# Patient Record
Sex: MALE | HISPANIC OR LATINO | Employment: UNEMPLOYED | URBAN - METROPOLITAN AREA
[De-identification: names, ages, dates, MRNs, and addresses within clinical notes are randomized per-mention and may not be internally consistent; named-entity substitution may affect disease eponyms.]

---

## 2017-01-05 ENCOUNTER — OFFICE VISIT (OUTPATIENT)
Dept: INTERPRETER SERVICES | Facility: CLINIC | Age: 62
End: 2017-01-05

## 2017-01-05 ENCOUNTER — OFFICE VISIT (OUTPATIENT)
Dept: PSYCHIATRY | Facility: CLINIC | Age: 62
End: 2017-01-05
Attending: CLINICAL NURSE SPECIALIST
Payer: MEDICAID

## 2017-01-05 VITALS
BODY MASS INDEX: 33.58 KG/M2 | DIASTOLIC BLOOD PRESSURE: 94 MMHG | WEIGHT: 201.8 LBS | HEART RATE: 76 BPM | SYSTOLIC BLOOD PRESSURE: 141 MMHG

## 2017-01-05 DIAGNOSIS — F19.21 POLYSUBSTANCE DEPENDENCE, NON-OPIOID, IN REMISSION (H): ICD-10-CM

## 2017-01-05 DIAGNOSIS — F33.2 SEVERE RECURRENT MAJOR DEPRESSION WITHOUT PSYCHOTIC FEATURES (H): ICD-10-CM

## 2017-01-05 DIAGNOSIS — F40.10 SOCIAL ANXIETY DISORDER: Primary | ICD-10-CM

## 2017-01-05 PROCEDURE — 99212 OFFICE O/P EST SF 10 MIN: CPT | Mod: ZF

## 2017-01-05 NOTE — PROGRESS NOTES
Outpatient Psychiatry Progress Note     Provider: LORE Kim CNS  Date: 2017  Service:  Medication follow up with counseling.   Patient Identification: Gianluca Singh  : 1955   MRN: [id number]    Gianluca Singh is a 61 year old year old male who presents for ongoing psychiatric care.  Gianluca Singh was last seen in clinic on .   At that time,   Assessment & Plan       Gianluca Singh is seen today for follow up and reports his mood has been stable with continued difficult in functioning.    Diagnosis  Axis 1: Major Depression severe, without psychotic symptoms, Social anxiety disorder.  Axis 2: none  Axis 3: See problem list in the medical record    Plan:  Medication: Continue current medications  OTC Recommendations: none  Lab Orders:  none  Referrals: none  Release of Information: none  Future Treatment Considerations:per symptoms  Return for Follow Up:8 weeks               2016  Patient is seen with .  Today Gianluca reports he is feeling about the same.  He attends a group once a week. Also continues therapy and 2 times a week attends AA  Side effects of medication include: none  Psychiatric Review of Systems:  The patient endorses symptoms of depression: In the last 2 weeks more than sleep disturbance, appetite disturbance, restless/lethargy, suicidal ideation but denies plan or intent. Nearly everyday feeling depressed, anhedonia, fatigue, feelings of failure, concentration problems  He  patient endorses symptoms of anxiety : continued social anxiety, avoidance  He endorses symptoms of al including none.    He endorses symptoms of psychosis including at times hears a voice that is difficult to understand say his name.       Review of Medical Systems:  Sleep: stable  Energy: stable  Concentration: stable  Appetite: stable  GI Concerns: none  Cardiac concerns: none  Neurological concerns: none  Other medical concerns: no new concerns  Current Substance  Use:  Alcohol:continued sober 8 year anniversary this month  Other drugs:none  Caffeine:occasional  Nicotine: no change  Past Medical History:   Past Medical History   Diagnosis Date     Hypertension      Depression      Gastro-oesophageal reflux disease      Deviated septum      Sleep apnea      cant tolerate cpap     Otitis media, chronic      Hearing loss      Night sweats      Sleep problems      Ringing in ears      Sneezing      Nasal congestion      Headache(784.0)      Major depression 5/7/2013     LOC (loss of consciousness) (H) age 45     out for 20 mintes after hit on top of head with board     Patient Active Problem List   Diagnosis     Non-English speaking patient     Chronic nasal congestion     HEIKE (obstructive sleep apnea)     Chronic hepatitis C (H)     Esophageal reflux     Headache     Short-term memory loss     Polysubstance dependence, non-opioid, in remission (H)     Social anxiety disorder     Severe recurrent major depression without psychotic features (H)     Heterozygous MTHFR mutation C677T (H)       Allergies:   Allergies   Allergen Reactions     Trazodone Other (See Comments)     Very depressed and suicidal          Current Medications     Current Outpatient Prescriptions Ordered in [facility]   Medication Sig Dispense Refill     OLANZapine (ZYPREXA) 20 MG tablet Take 1 tablet (20 mg) by mouth At Bedtime 30 tablet 5     venlafaxine (EFFEXOR-XR) 150 MG 24 hr capsule Take 2 capsules (300 mg) by mouth daily 60 capsule 5     metFORMIN (GLUCOPHAGE) 500 MG tablet Take one tablet daily in the am and two tablets at bedtime.  R Skolar NP at [facility]       ranitidine (ZANTAC) 150 MG tablet Take 1 tablet (150 mg) by mouth 2 times daily       atorvastatin (LIPITOR) 40 MG tablet Take 1 tablet (40 mg) by mouth daily       lisinopril-hydrochlorothiazide (PRINZIDE,ZESTORETIC) 20-25 MG per tablet Take 1 tablet by mouth daily       No current [facility]-ordered facility-administered medications on file.        Mental  "Status Exam     Appearance:  Casually dressed and Well groomed  Behavior/relationship to examiner/demeanor:  Cooperative  Orientation: Oriented to person, place, time and situation  Psychomotor: normal form  Speech Rate:  Normal  Speech Spontaneity:  Normal  Mood:  \"the same\"  Affect:  Appropriate/mood-congruent  Thought Process (Associations):  Goal directed  Thought Content:  no overt psychosis, patient does not appear to be responding to internal stimuli, Suicidal ideation and denies suicidal intent or plan  Abnormal Perception:  None  Attention/Concentration:  Normal  Language:  Intact  Insight:  Adequate  Judgment:  Adequate for safety      Results     Vital signs: /94 mmHg  Pulse 76  Wt 91.536 kg (201 lb 12.8 oz)    Laboratory Data:  no new results    Assessment & Plan      Gianluca Singh is seen today for follow up and reports he is feeling about the same with depression and anxiety which limits functioning.     Diagnosis  Axis 1: Major Depression, Recurrent, Severe without psychotic symptoms, Social Anxiety Disorder, Alcohol dependence and substance abuse in remission.  Axis 2: none  Axis 3: See problem list in the medical record    Plan:  Medication: continue current medications  OTC Recommendations: none  Lab Orders:  none  Referrals: none  Release of Information: none  Future Treatment Considerations:per symptoms  Return for Follow Up:2 months   The risks, benefits, alternatives and side effects have been discussed and are understood by the patient. The patient understands the risks of using street drugs or alcohol. There are no medical contraindications, the patient agrees to treatment, and has the capacity to do so. The patient understands to call 911 or come to the nearest ED if life threatening or urgent symptoms present.  Over 50% of this time was spent counseling the patient and/or coordinating care regarding review of social and occupational functioning.  In addition patient was counseled " on health and wellness practices to augment medication treatment of symptoms. See note for details.    Alba Merlos, APRN CNS 1/5/2017

## 2017-01-05 NOTE — NURSING NOTE
Chief Complaint   Patient presents with     Recheck Medication     major depressive disorder; social anxiety disorder     Reviewed allergies, smoking status  Obtained weight, blood pressure and heart rate

## 2017-01-07 ASSESSMENT — PATIENT HEALTH QUESTIONNAIRE - PHQ9: SUM OF ALL RESPONSES TO PHQ QUESTIONS 1-9: 23

## 2017-03-08 ENCOUNTER — OFFICE VISIT (OUTPATIENT)
Dept: INTERPRETER SERVICES | Facility: CLINIC | Age: 62
End: 2017-03-08

## 2017-03-08 ENCOUNTER — OFFICE VISIT (OUTPATIENT)
Dept: PSYCHIATRY | Facility: CLINIC | Age: 62
End: 2017-03-08
Attending: CLINICAL NURSE SPECIALIST
Payer: MEDICAID

## 2017-03-08 VITALS
WEIGHT: 200.6 LBS | HEART RATE: 81 BPM | SYSTOLIC BLOOD PRESSURE: 142 MMHG | DIASTOLIC BLOOD PRESSURE: 95 MMHG | BODY MASS INDEX: 33.38 KG/M2

## 2017-03-08 DIAGNOSIS — F19.21 POLYSUBSTANCE DEPENDENCE, NON-OPIOID, IN REMISSION (H): ICD-10-CM

## 2017-03-08 DIAGNOSIS — F33.2 SEVERE RECURRENT MAJOR DEPRESSION WITHOUT PSYCHOTIC FEATURES (H): Primary | ICD-10-CM

## 2017-03-08 DIAGNOSIS — F40.10 SOCIAL ANXIETY DISORDER: ICD-10-CM

## 2017-03-08 PROCEDURE — T1013 SIGN LANG/ORAL INTERPRETER: HCPCS | Mod: U3,ZF

## 2017-03-08 PROCEDURE — 99212 OFFICE O/P EST SF 10 MIN: CPT | Mod: ZF

## 2017-03-08 RX ORDER — VENLAFAXINE HYDROCHLORIDE 150 MG/1
300 CAPSULE, EXTENDED RELEASE ORAL DAILY
Qty: 60 CAPSULE | Refills: 5 | Status: SHIPPED | OUTPATIENT
Start: 2017-03-08 | End: 2017-05-10

## 2017-03-08 NOTE — NURSING NOTE
Chief Complaint   Patient presents with     RECHECK     Severe recurrent major depression without psychotic features      Reviewed allergies, smoking status, and pharmacy preference  Administered abuse screening questions   Obtained weight, blood pressure and heart rate   Iona Maddox LPN

## 2017-03-08 NOTE — MR AVS SNAPSHOT
After Visit Summary   3/8/2017    Gianluca Singh    MRN: [id number]           Patient Information     Date Of Birth          1955        Visit Information        Provider Department      3/8/2017 1:15 PM Alba Merlos APRN CNS Psychiatry Clinic        Today's Diagnoses     Severe recurrent major depression without psychotic features (H)    -  1    Social anxiety disorder        Polysubstance dependence, non-opioid, in remission (H)           Follow-ups after your visit        Your next 10 appointments already scheduled     May 10, 2017  1:15 PM CDT   Adult Med Follow UP with LORE Kim CNS   Psychiatry Clinic ([facility])    [address]  [address] [facility] [address] [phone]              Who to contact     Please call your clinic at [phone] to:    Ask questions about your health    Make or cancel appointments    Discuss your medicines    Learn about your test results    Speak to your doctor   If you have compliments or concerns about an experience at your clinic, or if you wish to file a complaint, please contact [facility] Patient Relations at [phone] or email us at [email or web address]         Additional Information About Your Visit        MyChart Information     Blue Securityt is an electronic gateway that provides easy, online access to your medical records. With Open Lending, you can request a clinic appointment, read your test results, renew a prescription or communicate with your care team.     To sign up for Blue Securityt visit the website at [email or web address]   You will be asked to enter the access code listed below, as well as some personal information. Please follow the directions to create your username and password.     Your access code is: [email or web address]  Expires: 2017  5:07 PM     Your access code will  in 90 days. If you need help or a new code, please  contact your [facility] or call [phone] for assistance.        Care EveryWhere ID     This is your Care EveryWhere ID. This could be used by other organizations to access your [place] medical records  [id number]        Your Vitals Were     Pulse BMI (Body Mass Index)                81 33.38 kg/m2           Blood Pressure from Last 3 Encounters:   03/08/17 (!) 142/95   01/05/17 (!) 141/94   11/11/16 135/83    Weight from Last 3 Encounters:   03/08/17 91 kg (200 lb 9.6 oz)   01/05/17 91.5 kg (201 lb 12.8 oz)   11/11/16 91.4 kg (201 lb 9.6 oz)              Today, you had the following     No orders found for display         Where to get your medicines      These medications were sent to [facility] [address] [place] [address] [facility] & [address] [address]     Phone:  [phone]     venlafaxine 150 MG 24 hr capsule          Primary Care Provider Office Phone # Fax #    Moira Kohlernixon Cuadra [phone]       [facility] [address] [facility] [address]        Thank you!     Thank you for choosing PSYCHIATRY CLINIC  for your care. Our goal is always to provide you with excellent care. Hearing back from our patients is one way we can continue to improve our services. Please take a few minutes to complete the written survey that you may receive in the mail after your visit with us. Thank you!             Your Updated Medication List - Protect others around you: Learn how to safely use, store and throw away your medicines at www.disposemymeds.org.          This list is accurate as of: 3/8/17 11:59 PM.  Always use your most recent med list.                   Brand Name Dispense Instructions for use    LIPITOR 40 MG tablet   Generic drug:  atorvastatin      Take 1 tablet (40 mg) by mouth daily       lisinopril-hydrochlorothiazide 20-25 MG per tablet    PRINZIDE/ZESTORETIC     Take 1 tablet  by mouth daily       metFORMIN 500 MG tablet    GLUCOPHAGE     Take one tablet daily in the am and two tablets at bedtime.  R Abril NP at [facility]       OLANZapine 20 MG tablet    zyPREXA    30 tablet    Take 1 tablet (20 mg) by mouth At Bedtime       ranitidine 150 MG tablet    ZANTAC     Take 1 tablet (150 mg) by mouth 2 times daily       venlafaxine 150 MG 24 hr capsule    EFFEXOR-XR    60 capsule    Take 2 capsules (300 mg) by mouth daily

## 2017-03-08 NOTE — PROGRESS NOTES
Outpatient Psychiatry Progress Note     Provider: LORE Kim CNS  Date: 3/8/2017  Service:  Medication follow up with counseling.   Patient Identification: Gianluca Singh  : 1955   MRN: [id number]    Gianluca Singh is a 61 year old year old male who presents for ongoing psychiatric care.  Gianluca Singh was last seen in clinic on 17.   At that time,   Assessment & Plan      Gianluca Singh is seen today for follow up and reports he is feeling about the same with depression and anxiety which limits functioning.      Diagnosis  Axis 1: Major Depression, Recurrent, Severe without psychotic symptoms, Social Anxiety Disorder, Alcohol dependence and substance abuse in remission.  Axis 2: none  Axis 3: See problem list in the medical record     Plan:  Medication: continue current medications  OTC Recommendations: none  Lab Orders: none  Referrals: none  Release of Information: none  Future Treatment Considerations:per symptoms  Return for Follow Up:2 months      3/8/2017  Seen with Qatari speaking .  Today Gianluca reports he feels tired and has no energy but has been feeling this way for a long time. The therapist he was seeing has left [facility] and she recommended a different therapist. He is attending [employer] 2 times per week. Sometimes goes for walks.    Last blood sugar was about 3 months.  He goes back in tomorrow and might have it checked then.  His  is Rodolfo and seeing her every week.  Side effects of medication include: none reported  Psychiatric Review of Systems:  The patient endorses symptoms of depression: In the last 2 weeks per PHQ 9 more than half days sleep disturbance, appetite disturbance, concentration problems, restless/lethargy.  Nearly everyday anhedonia, feeling depressed, fatigue, feelings of failure.  He  patient endorses symptoms of anxiety : stable  He endorses symptoms of al including none.    He endorses symptoms of psychosis including no  psychotic symptoms.       Review of Medical Systems:  Sleep: stable and no concerns  Energy: stable  Concentration: stable  Appetite: stable  GI Concerns: none  Cardiac concerns: none  Neurological concerns: none  Other medical concerns: no new concerns  Current Substance Use:  Alcohol:denies  Other drugs:denies  Caffeine:no change  Nicotine: no change - uses e cig  Past Medical History:   Past Medical History   Diagnosis Date     Depression      Deviated septum      Gastro-oesophageal reflux disease      Headache(784.0)      Hearing loss      Hypertension      LOC (loss of consciousness) (H) age 45     out for 20 mintes after hit on top of head with board     Major depression 5/7/2013     Nasal congestion      Night sweats      Otitis media, chronic      Ringing in ears      Sleep apnea      cant tolerate cpap     Sleep problems      Sneezing      Patient Active Problem List   Diagnosis     Non-English speaking patient     Chronic nasal congestion     HEIKE (obstructive sleep apnea)     Chronic hepatitis C (H)     Esophageal reflux     Headache     Short-term memory loss     Polysubstance dependence, non-opioid, in remission (H)     Social anxiety disorder     Severe recurrent major depression without psychotic features (H)     Heterozygous MTHFR mutation C677T (H)       Allergies:   Allergies   Allergen Reactions     Trazodone Other (See Comments)     Very depressed and suicidal          Current Medications     Current Outpatient Prescriptions Ordered in [facility]   Medication Sig Dispense Refill     OLANZapine (ZYPREXA) 20 MG tablet Take 1 tablet (20 mg) by mouth At Bedtime 30 tablet 5     venlafaxine (EFFEXOR-XR) 150 MG 24 hr capsule Take 2 capsules (300 mg) by mouth daily 60 capsule 5     metFORMIN (GLUCOPHAGE) 500 MG tablet Take one tablet daily in the am and two tablets at bedtime.  R Skolar NP at [facility]       ranitidine (ZANTAC) 150 MG tablet Take 1 tablet (150 mg) by mouth 2 times daily       atorvastatin (LIPITOR)  "40 MG tablet Take 1 tablet (40 mg) by mouth daily       lisinopril-hydrochlorothiazide (PRINZIDE,ZESTORETIC) 20-25 MG per tablet Take 1 tablet by mouth daily       No current [facility]-ordered facility-administered medications on file.         Mental Status Exam     Appearance:  Casually dressed and Well groomed  Behavior/relationship to examiner/demeanor:  Cooperative  Orientation: Oriented to person, place, time and situation  Psychomotor: normal form  Speech Rate:  Normal  Speech Spontaneity:  Normal  Mood:  \"okay\"  Affect:  Appropriate/mood-congruent  Thought Process (Associations):  Goal directed  Thought Content:  no overt psychosis, denies suicidal ideation, intent or thoughts and patient does not appear to be responding to internal stimuli  Abnormal Perception:  None  Attention/Concentration:  Normal  Language:  Intact  Insight:  Adequate  Judgment:  Good      Results     Vital signs: Wt 91 kg (200 lb 9.6 oz)  BMI 33.38 kg/m2    Laboratory Data:  no new data available    Assessment & Plan      Gianluca Singh is seen today for follow up and reports his mood has been stable with continued difficulty with depression, social anxiety, communication difficulty due to language.    Diagnosis  Axis 1: Major Depression, Recurrent severe without psychosis, Social Anxiety Disorder, Polysubstance in remission  Axis 2: none  Axis 3: See problem list in the medical record    Plan:  Medication: Continue current medications  OTC Recommendations: none  Lab Orders:  none  Referrals: none  Release of Information: none, gave business card for him to give to his PCP to have lab results sent here.  Future Treatment Considerations:per symptoms  Return for Follow Up:2 months.   The risks, benefits, alternatives and side effects have been discussed and are understood by the patient. The patient understands the risks of using street drugs or alcohol. There are no medical contraindications, the patient agrees to treatment, and has the " capacity to do so. The patient understands to call 911 or come to the nearest ED if life threatening or urgent symptoms present.  Over 50% of this time was spent counseling the patient and/or coordinating care regarding review of social and occupational functioning.  In addition patient was counseled on health and wellness practices to augment medication treatment of symptoms. See note for details.    Alba Merlos, APRN CNS 3/8/2017

## 2017-03-09 ENCOUNTER — TRANSFERRED RECORDS (OUTPATIENT)
Dept: HEALTH INFORMATION MANAGEMENT | Facility: CLINIC | Age: 62
End: 2017-03-09

## 2017-03-10 ASSESSMENT — PATIENT HEALTH QUESTIONNAIRE - PHQ9: SUM OF ALL RESPONSES TO PHQ QUESTIONS 1-9: 22

## 2017-03-22 ENCOUNTER — TELEPHONE (OUTPATIENT)
Dept: PSYCHIATRY | Facility: CLINIC | Age: 62
End: 2017-03-22

## 2017-03-22 LAB
ALBUMIN SERPL-MCNC: 3.8 G/DL
ALP SERPL-CCNC: 103 U/L
ALT SERPL-CCNC: 31 U/L
ANION GAP SERPL CALCULATED.3IONS-SCNC: 11 MMOL/L
AST SERPL-CCNC: 15 U/L
BILIRUB SERPL-MCNC: 0.3 MG/DL
BUN SERPL-MCNC: 12 MG/DL
CALCIUM SERPL-MCNC: 8.9 MG/DL
CHLORIDE SERPLBLD-SCNC: 105 MMOL/L
CHOLEST SERPL-MCNC: 121 MG/DL
CO2 SERPL-SCNC: 26 MMOL/L
CREAT SERPL-MCNC: 0.95 MG/DL
ERYTHROCYTE [DISTWIDTH] IN BLOOD BY AUTOMATED COUNT: 13.1 %
GFR SERPL CREATININE-BSD FRML MDRD: 80 ML/MIN/1.73M2
GLUCOSE SERPL-MCNC: 111 MG/DL (ref 70–99)
HBA1C MFR BLD: 5.9 % (ref 0–5.7)
HCT VFR BLD AUTO: 42 %
HDLC SERPL-MCNC: 34 MG/DL
HEMOGLOBIN: 14.6 G/DL (ref 13.3–17.7)
LDLC SERPL CALC-MCNC: 63 MG/DL
MCH RBC QN AUTO: 30.8 PG
MCHC RBC AUTO-ENTMCNC: 34.8 G/DL
MCV RBC AUTO: 89 FL
NONHDLC SERPL-MCNC: 86 MG/DL
PLATELET # BLD AUTO: 278 10^9/L
POTASSIUM SERPL-SCNC: 3.8 MMOL/L
PROT SERPL-MCNC: 7.5 G/DL
RBC # BLD AUTO: 4.74 10^12/L
SODIUM SERPL-SCNC: 142 MMOL/L
TRIGL SERPL-MCNC: 119 MG/DL
TSH SERPL-ACNC: 1.93 MCU/ML
WBC # BLD AUTO: 6.5 10^9/L

## 2017-03-22 NOTE — TELEPHONE ENCOUNTER
03/21/2017,Received: TSH, Lipid Panel, CMP, CBC W PLT, HgbA1C Results from [facility] ([facility]), drawn on 03/09/2017. Results entered into EMR. Sent to LORE Gibbons for review. Previous Lab results also included from [facility],dates drawn: 07/21/2016, 03/09/2016, 12/10/2015, A copy was given to Alba Merlos CNP for review. Original fax placed in scanning on 03/22/2017 and a copy was kept in psychiatry until scanning completed/confirmed. Iona Maddox LPN

## 2017-05-10 ENCOUNTER — OFFICE VISIT (OUTPATIENT)
Dept: PSYCHIATRY | Facility: CLINIC | Age: 62
End: 2017-05-10
Attending: CLINICAL NURSE SPECIALIST
Payer: MEDICAID

## 2017-05-10 VITALS
HEART RATE: 81 BPM | WEIGHT: 202.8 LBS | DIASTOLIC BLOOD PRESSURE: 90 MMHG | BODY MASS INDEX: 33.75 KG/M2 | SYSTOLIC BLOOD PRESSURE: 132 MMHG

## 2017-05-10 DIAGNOSIS — F33.2 SEVERE RECURRENT MAJOR DEPRESSION WITHOUT PSYCHOTIC FEATURES (H): Primary | ICD-10-CM

## 2017-05-10 DIAGNOSIS — F19.21 POLYSUBSTANCE DEPENDENCE, NON-OPIOID, IN REMISSION (H): ICD-10-CM

## 2017-05-10 DIAGNOSIS — F40.10 SOCIAL ANXIETY DISORDER: ICD-10-CM

## 2017-05-10 DIAGNOSIS — F51.04 CHRONIC INSOMNIA: ICD-10-CM

## 2017-05-10 PROCEDURE — 99212 OFFICE O/P EST SF 10 MIN: CPT | Mod: ZF

## 2017-05-10 RX ORDER — VENLAFAXINE HYDROCHLORIDE 150 MG/1
300 CAPSULE, EXTENDED RELEASE ORAL DAILY
Qty: 60 CAPSULE | Refills: 5 | Status: SHIPPED | OUTPATIENT
Start: 2017-05-10 | End: 2017-10-19

## 2017-05-10 RX ORDER — OLANZAPINE 20 MG/1
20 TABLET ORAL AT BEDTIME
Qty: 30 TABLET | Refills: 5 | Status: SHIPPED | OUTPATIENT
Start: 2017-05-10 | End: 2017-12-20

## 2017-05-10 NOTE — MR AVS SNAPSHOT
After Visit Summary   5/10/2017    Gianluca Singh    MRN: [id number]           Patient Information     Date Of Birth          1955        Visit Information        Provider Department      5/10/2017 1:15 PM Diana Wills; Alba Merlos APRN CNS Psychiatry Clinic        Today's Diagnoses     Severe recurrent major depression without psychotic features (H)    -  1    Social anxiety disorder        Polysubstance dependence, non-opioid, in remission (H)        Chronic insomnia           Follow-ups after your visit        Follow-up notes from your care team     Return in about 10 weeks (around 7/19/2017).      Your next 10 appointments already scheduled     Jul 19, 2017  1:15 PM CDT   Adult Med Follow UP with LORE Kim CNS   Psychiatry Clinic ([facility])    [address] [facility] [address] [phone]              Who to contact     Please call your clinic at [phone] to:    Ask questions about your health    Make or cancel appointments    Discuss your medicines    Learn about your test results    Speak to your doctor   If you have compliments or concerns about an experience at your clinic, or if you wish to file a complaint, please contact [facility] Patient Relations at [phone] or email us at [email or web address]         Additional Information About Your Visit        MyChart Information     ShowNearbyt is an electronic gateway that provides easy, online access to your medical records. With Syntensia, you can request a clinic appointment, read your test results, renew a prescription or communicate with your care team.     To sign up for ShowNearbyt visit the website at [email or web address]   You will be asked to enter the access code listed below, as well as some personal information. Please follow the directions to create your username and password.     Your  access code is: [email or web address]  Expires: 2017  5:07 PM     Your access code will  in 90 days. If you need help or a new code, please contact your [facility] or call [phone] for assistance.        Care EveryWhere ID     This is your Care EveryWhere ID. This could be used by other organizations to access your [place] medical records  [id number]        Your Vitals Were     Pulse BMI (Body Mass Index)                81 33.75 kg/m2           Blood Pressure from Last 3 Encounters:   05/10/17 132/90   17 (!) 142/95   17 (!) 141/94    Weight from Last 3 Encounters:   05/10/17 92 kg (202 lb 12.8 oz)   17 91 kg (200 lb 9.6 oz)   17 91.5 kg (201 lb 12.8 oz)              Today, you had the following     No orders found for display         Where to get your medicines      These medications were sent to [facility] [address] [place] [address] & [address] [address]     Phone:  [phone]     OLANZapine 20 MG tablet    venlafaxine 150 MG 24 hr capsule          Primary Care Provider Office Phone # Fax #    Moira Vero Cuadra [phone]       [facility]  [facility] [address]        Thank you!     Thank you for choosing PSYCHIATRY CLINIC  for your care. Our goal is always to provide you with excellent care. Hearing back from our patients is one way we can continue to improve our services. Please take a few minutes to complete the written survey that you may receive in the mail after your visit with us. Thank you!             Your Updated Medication List - Protect others around you: Learn how to safely use, store and throw away your medicines at www.disposemymeds.org.          This list is accurate as of: 5/10/17 11:59 PM.  Always use your most recent med list.                   Brand Name Dispense Instructions for use    LIPITOR 40 MG tablet    Generic drug:  atorvastatin      Take 1 tablet (40 mg) by mouth daily       lisinopril-hydrochlorothiazide 20-25 MG per tablet    PRINZIDE/ZESTORETIC     Take 1 tablet by mouth daily       metFORMIN 500 MG tablet    GLUCOPHAGE     Take one tablet daily in the am and two tablets at bedtime.  HOME Samuels NP at [facility]       OLANZapine 20 MG tablet    zyPREXA    30 tablet    Take 1 tablet (20 mg) by mouth At Bedtime       ranitidine 150 MG tablet    ZANTAC     Take 1 tablet (150 mg) by mouth 2 times daily       venlafaxine 150 MG 24 hr capsule    EFFEXOR-XR    60 capsule    Take 2 capsules (300 mg) by mouth daily

## 2017-05-10 NOTE — PROGRESS NOTES
Outpatient Psychiatry Progress Note     Provider: LORE Kim CNS  Date: 5/10/2017  Service:  Medication follow up with counseling.   Patient Identification: Gianluca Singh  : 1955   MRN: [id number]    Gianluca Singh is a 61 year old year old male who presents for ongoing psychiatric care.  Gianluca Singh was last seen in clinic on 3/8/17.   At that time,   Assessment & Plan      Gianluca Singh is seen today for follow up and reports his mood has been stable with continued difficulty with depression, social anxiety, communication difficulty due to language.     Diagnosis  Axis 1: Major Depression, Recurrent severe without psychosis, Social Anxiety Disorder, Polysubstance in remission  Axis 2: none  Axis 3: See problem list in the medical record     Plan:  Medication: Continue current medications  OTC Recommendations: none  Lab Orders: none  Referrals: none  Release of Information: none, gave business card for him to give to his PCP to have lab results sent here.  Future Treatment Considerations:per symptoms  Return for Follow Up:2 months    ____________________________________________________________________________________________________________________________________________    05/10/2017   Patient is seen with .  Today Gianluca reports he is feeling so.  No worsening of symptoms. Has been the same - attending groups. He is seeing new therapist but not at [facility].  He just saw her one time.   Likes where he is living.     Side effects of medication include: none known  Psychiatric Review of Systems:  The patient endorses symptoms of depression: In the last 2 weeks per PHQ 9 more than 1/2 days sleep disturbance, appetite disturbance, concentration problems.  Nearly every day anhedonia, feeling depressed, feelings of failure  He  patient endorses symptoms of anxiety : stable  He endorses symptoms of al including none.    He endorses symptoms of psychosis including no  psychotic symptoms.       Review of Medical Systems:  Sleep: stable  Energy: stable  Concentration: stable  Appetite: stable  GI Concerns: none  Cardiac concerns: none  Neurological concerns: none  Other medical concerns: no new concerns  Current Substance Use:  Alcohol:continues sober  Other drugs:denies  Caffeine:not reviewed  Nicotine: continued e cig  Past Medical History:   Past Medical History:   Diagnosis Date     Depression      Deviated septum      Gastro-oesophageal reflux disease      Headache(784.0)      Hearing loss      Hypertension      LOC (loss of consciousness) (H) age 45    out for 20 mintes after hit on top of head with board     Major depression 5/7/2013     Nasal congestion      Night sweats      Otitis media, chronic      Ringing in ears      Sleep apnea     cant tolerate cpap     Sleep problems      Sneezing      Patient Active Problem List   Diagnosis     Non-English speaking patient     Chronic nasal congestion     HEIKE (obstructive sleep apnea)     Chronic hepatitis C (H)     Esophageal reflux     Headache     Short-term memory loss     Polysubstance dependence, non-opioid, in remission (H)     Social anxiety disorder     Severe recurrent major depression without psychotic features (H)     Heterozygous MTHFR mutation C677T (H)       Allergies:   Allergies   Allergen Reactions     Trazodone Other (See Comments)     Very depressed and suicidal          Current Medications     Current Outpatient Prescriptions Ordered in [facility]   Medication Sig Dispense Refill     venlafaxine (EFFEXOR-XR) 150 MG 24 hr capsule Take 2 capsules (300 mg) by mouth daily 60 capsule 5     OLANZapine (ZYPREXA) 20 MG tablet Take 1 tablet (20 mg) by mouth At Bedtime 30 tablet 5     metFORMIN (GLUCOPHAGE) 500 MG tablet Take one tablet daily in the am and two tablets at bedtime.  R Skolar NP at [facility]       ranitidine (ZANTAC) 150 MG tablet Take 1 tablet (150 mg) by mouth 2 times daily       atorvastatin (LIPITOR) 40 MG  "tablet Take 1 tablet (40 mg) by mouth daily       lisinopril-hydrochlorothiazide (PRINZIDE,ZESTORETIC) 20-25 MG per tablet Take 1 tablet by mouth daily       No current Epic-ordered facility-administered medications on file.         Mental Status Exam     Appearance:  Casually dressed and Well groomed  Behavior/relationship to examiner/demeanor:  Cooperative  Orientation: Oriented to person, place, time and situation  Psychomotor: normal form  Speech Rate:  Normal  Speech Spontaneity:  Normal  Mood:  \"okay\"  Affect:  Appropriate/mood-congruent  Thought Process (Associations):  Goal directed  Thought Content:  no overt psychosis, denies suicidal ideation, intent or thoughts and patient does not appear to be responding to internal stimuli  Abnormal Perception:  None  Attention/Concentration:  Normal  Language:  Intact  Insight:  Adequate  Judgment:  Good      Results     Vital signs: /90  Pulse 81  Wt 92 kg (202 lb 12.8 oz)  BMI 33.75 kg/m2    Laboratory Data:  no new data    Assessment & Plan      Gianluca Singh is seen today for follow up and reports he continues with depression and anxiety but is at baseline.  Agrees with plan to continue current medication. Is seeing new therapist and feels it will be a good fit. In part is isolated by language barrier.    Diagnosis  Axis 1: Major Depression, Recurrent Severe, Social Anxiety Disorder, Polysubstance in remission.  Axis 2: none  Axis 3: See problem list in the medical record    Plan:  Medication: Continue current medications  OTC Recommendations: none  Lab Orders:  none  Referrals: none  Release of Information: none  Future Treatment Considerations:per symptoms  Return for Follow Up: 2 months   The risks, benefits, alternatives and side effects have been discussed and are understood by the patient. The patient understands the risks of using street drugs or alcohol. There are no medical contraindications, the patient agrees to treatment, and has the capacity " to do so. The patient understands to call 911 or come to the nearest ED if life threatening or urgent symptoms present.  Over 50% of this time was spent counseling the patient and/or coordinating care regarding review of social and occupational functioning.  In addition patient was counseled on health and wellness practices to augment medication treatment of symptoms. See note for details.    Alba Merlos, LORE CNS 5/10/2017

## 2017-05-10 NOTE — NURSING NOTE
Chief Complaint   Patient presents with     RECHECK     Severe recurrent major depression without psychotic features      Reviewed allergies, smoking status, and pharmacy preference  Administered abuse screening questions-done 3/8/17   Obtained weight, blood pressure and heart rate   Iona Maddox LPN

## 2017-05-11 ASSESSMENT — PATIENT HEALTH QUESTIONNAIRE - PHQ9: SUM OF ALL RESPONSES TO PHQ QUESTIONS 1-9: 21

## 2017-07-19 ENCOUNTER — OFFICE VISIT (OUTPATIENT)
Dept: PSYCHIATRY | Facility: CLINIC | Age: 62
End: 2017-07-19
Attending: CLINICAL NURSE SPECIALIST
Payer: MEDICAID

## 2017-07-19 VITALS
WEIGHT: 203.4 LBS | BODY MASS INDEX: 33.85 KG/M2 | HEART RATE: 71 BPM | DIASTOLIC BLOOD PRESSURE: 96 MMHG | SYSTOLIC BLOOD PRESSURE: 156 MMHG

## 2017-07-19 DIAGNOSIS — F19.21 POLYSUBSTANCE DEPENDENCE, NON-OPIOID, IN REMISSION (H): ICD-10-CM

## 2017-07-19 DIAGNOSIS — F40.10 SOCIAL ANXIETY DISORDER: Primary | ICD-10-CM

## 2017-07-19 DIAGNOSIS — F33.2 SEVERE RECURRENT MAJOR DEPRESSION WITHOUT PSYCHOTIC FEATURES (H): ICD-10-CM

## 2017-07-19 PROCEDURE — 99212 OFFICE O/P EST SF 10 MIN: CPT | Mod: ZF

## 2017-07-19 NOTE — MR AVS SNAPSHOT
After Visit Summary   7/19/2017    Gianluca Singh    MRN: [id number]           Patient Information     Date Of Birth          1955        Visit Information        Provider Department      7/19/2017 1:15 PM Rosendo Pereira; Alba Merlos APRN [facility]        Today's Diagnoses     Social anxiety disorder    -  1    Severe recurrent major depression without psychotic features (H)        Polysubstance dependence, non-opioid, in remission (H)           Follow-ups after your visit        Follow-up notes from your care team     Return in about 6 weeks (around 8/30/2017).      Your next 10 appointments already scheduled     Aug 01, 2017  8:15 AM CDT   NEW GENERAL with Sudha Fine MD   Eye Clinic ([facility])    [facility]  [address]  [address] [address]  [place] [address]   [phone]            Aug 30, 2017 10:15 AM CDT   Adult Med Follow UP with LORE Kim CNS   Psychiatry Clinic ([facility])    [address]  [address] [facility] [address] [phone]              Who to contact     Please call your clinic at [phone] to:    Ask questions about your health    Make or cancel appointments    Discuss your medicines    Learn about your test results    Speak to your doctor   If you have compliments or concerns about an experience at your clinic, or if you wish to file a complaint, please contact [facility] Patient Relations at [phone] or email us at [email or web address]         Additional Information About Your Visit        TrafficCasthart Information     Resolver is an electronic gateway that provides easy, online access to your medical records. With Resolver, you can request a clinic appointment, read your test results, renew a prescription or communicate with your care team.     To sign up for Resolver visit the website at  [email or web address]   You will be asked to enter the access code listed below, as well as some personal information. Please follow the directions to create your username and password.     Your access code is: [email or web address]  Expires: 10/17/2017  6:30 AM     Your access code will  in 90 days. If you need help or a new code, please contact your [facility] or call [phone] for assistance.        Care EveryWhere ID     This is your Care EveryWhere ID. This could be used by other organizations to access your [employer] medical records  [id number]        Your Vitals Were     Pulse BMI (Body Mass Index)                71 33.85 kg/m2           Blood Pressure from Last 3 Encounters:   17 (!) 156/96   05/10/17 132/90   17 (!) 142/95    Weight from Last 3 Encounters:   17 92.3 kg (203 lb 6.4 oz)   05/10/17 92 kg (202 lb 12.8 oz)   17 91 kg (200 lb 9.6 oz)              Today, you had the following     No orders found for display       Primary Care Provider Office Phone # Fax #    Moira Pham HIRA Cuadra [phone]       [facility]  [facility] [address]        Equal Access to Services     JENNY LORENZANA : Hadii aad ku hadasho Soomaali, waaxda luqadaha, qaybta kaalmada adeegyada, marilia allen. So [facility] [phone].    ATENCIÓN: Si habla español, tiene a dowd disposición servicios gratuitos de asistencia lingüística. Llame al [phone].    We comply with applicable federal civil rights laws and [place] laws. We do not discriminate on the basis of race, color, national origin, age, disability sex, sexual orientation or gender identity.            Thank you!     Thank you for choosing PSYCHIATRY CLINIC  for your care. Our goal is always to provide you with excellent care. Hearing back from our patients is one way we can continue to improve our services. Please take a few minutes to complete  the written survey that you may receive in the mail after your visit with us. Thank you!             Your Updated Medication List - Protect others around you: Learn how to safely use, store and throw away your medicines at www.disposemymeds.org.          This list is accurate as of: 7/19/17 11:59 PM.  Always use your most recent med list.                   Brand Name Dispense Instructions for use Diagnosis    LIPITOR 40 MG tablet   Generic drug:  atorvastatin      Take 1 tablet (40 mg) by mouth daily        lisinopril-hydrochlorothiazide 20-25 MG per tablet    PRINZIDE/ZESTORETIC     Take 1 tablet by mouth daily        metFORMIN 500 MG tablet    GLUCOPHAGE     Take one tablet daily in the am and two tablets at bedtime.  R Abril NP at [facility]        OLANZapine 20 MG tablet    zyPREXA    30 tablet    Take 1 tablet (20 mg) by mouth At Bedtime    Chronic insomnia       ranitidine 150 MG tablet    ZANTAC     Take 1 tablet (150 mg) by mouth 2 times daily        venlafaxine 150 MG 24 hr capsule    EFFEXOR-XR    60 capsule    Take 2 capsules (300 mg) by mouth daily    Social anxiety disorder

## 2017-07-19 NOTE — PROGRESS NOTES
Outpatient Psychiatry Progress Note     Provider: LORE Kim CNS  Date: 2017  Service:  Medication follow up with counseling.   Patient Identification: Gianluca Singh  : 1955   MRN: [id number]    Gianluca Singh is a 61 year old year old male who presents for ongoing psychiatric care.  Gianluca Singh was last seen in clinic on 5/10/17.   At that time,   Assessment & Plan       Gianluca Singh is seen today for follow up and reports he continues with depression and anxiety but is at baseline.  Agrees with plan to continue current medication. Is seeing new therapist and feels it will be a good fit. In part is isolated by language barrier.     Diagnosis  Axis 1: Major Depression, Recurrent Severe, Social Anxiety Disorder, Polysubstance in remission.  Axis 2: none  Axis 3: See problem list in the medical record     Plan:  Medication: Continue current medications  OTC Recommendations: none  Lab Orders:  none  Referrals: none  Release of Information: none  Future Treatment Considerations:per symptoms  Return for Follow Up: 2 months      ____________________________________________________________________________________________________________________________________________    2017   Seen with .  Today Gianluca reports he has been feeling about the same. No new stress. Likes his new therapist. Reviewed that blood pressure is up. He reports he has been taking consistently.   Continues to attend support groups.  Side effects of medication include: none reported.   Psychiatric Review of Systems:  The patient endorses symptoms of depression: In the last 2 weeks per PHQ 9: several days passive SI.  More than 1/2 days anhedonia.  Nearly everyday feeling down, fatigue, feelings of failure, concentration problems, restless.lethargy.   He  patient endorses symptoms of anxiety : stable with increase at times  He endorses symptoms of al including none.    He endorses symptoms of  psychosis including no psychotic symptoms.       Review of Medical Systems:  Sleep: stable  Energy: stable  Concentration: stable  Appetite: stable  GI Concerns: none  Cardiac concerns: no new concerns  Neurological concerns: none  Other medical concerns: no new concerns  Current Substance Use:  Alcohol:denies  Other drugs:denies  Caffeine:not reviewed  Nicotine: no change  Past Medical History:   Past Medical History:   Diagnosis Date     Depression      Deviated septum      Gastro-oesophageal reflux disease      Headache(784.0)      Hearing loss      Hypertension      LOC (loss of consciousness) (H) age 45    out for 20 mintes after hit on top of head with board     Major depression 5/7/2013     Nasal congestion      Night sweats      Otitis media, chronic      Ringing in ears      Sleep apnea     cant tolerate cpap     Sleep problems      Sneezing      Patient Active Problem List   Diagnosis     Non-English speaking patient     Chronic nasal congestion     HEIKE (obstructive sleep apnea)     Chronic hepatitis C (H)     Esophageal reflux     Headache     Short-term memory loss     Polysubstance dependence, non-opioid, in remission (H)     Social anxiety disorder     Severe recurrent major depression without psychotic features (H)     Heterozygous MTHFR mutation C677T (H)       Allergies:   Allergies   Allergen Reactions     Trazodone Other (See Comments)     Very depressed and suicidal          Current Medications     Current Outpatient Prescriptions Ordered in [facility]   Medication Sig Dispense Refill     venlafaxine (EFFEXOR-XR) 150 MG 24 hr capsule Take 2 capsules (300 mg) by mouth daily 60 capsule 5     OLANZapine (ZYPREXA) 20 MG tablet Take 1 tablet (20 mg) by mouth At Bedtime 30 tablet 5     metFORMIN (GLUCOPHAGE) 500 MG tablet Take one tablet daily in the am and two tablets at bedtime.  R Skolar NP at [facility]       ranitidine (ZANTAC) 150 MG tablet Take 1 tablet (150 mg) by mouth 2 times daily       atorvastatin  "(LIPITOR) 40 MG tablet Take 1 tablet (40 mg) by mouth daily       lisinopril-hydrochlorothiazide (PRINZIDE,ZESTORETIC) 20-25 MG per tablet Take 1 tablet by mouth daily       No current Epic-ordered facility-administered medications on file.         Mental Status Exam     Appearance:  Casually dressed and Well groomed  Behavior/relationship to examiner/demeanor:  Cooperative  Orientation: Oriented to person, place, time and situation  Psychomotor: normal form  Speech Rate:  Normal  Speech Spontaneity:  Normal  Mood:  \"the same\"  Affect:  Appropriate/mood-congruent  Thought Process (Associations):  Goal directed  Thought Content:  no overt psychosis, denies suicidal ideation, intent or thoughts and patient does not appear to be responding to internal stimuli  Abnormal Perception:  None  Attention/Concentration:  Normal  Language:  Intact  Insight:  Adequate  Judgment:  Adequate for safety      Results     Vital signs: BP (!) 156/96  Pulse 71  Wt 92.3 kg (203 lb 6.4 oz)  BMI 33.85 kg/m2    Laboratory Data:  no new data available    Assessment & Plan      Gianluca Singh is seen today for follow up with an  and reports his mood has been stable with continued depression and anxiety.    Diagnosis  Axis 1: Major Depression, Recurrent, severe without psychosis, Social Anxiety Disorder, Polysubstance dependence in remission  Axis 2: none  Axis 3: See problem list in the medical record    Plan:  Medication: Continue current medications  OTC Recommendations: none  Lab Orders:  none  Referrals: none  Release of Information: Therapist at [facility] [phone]  Future Treatment Considerations:per symptoms  Return for Follow Up:6 weeks   The risks, benefits, alternatives and side effects have been discussed and are understood by the patient. The patient understands the risks of using street drugs or alcohol. There are no medical contraindications, the patient agrees to treatment, and has the capacity to do " so. The patient understands to call 911 or come to the nearest ED if life threatening or urgent symptoms present.  Over 50% of this time was spent counseling the patient and/or coordinating care regarding review of social and occupational functioning.  In addition patient was counseled on health and wellness practices to augment medication treatment of symptoms. See note for details.    Alba Merlos, APRN CNS 7/19/2017

## 2017-07-19 NOTE — NURSING NOTE
Chief Complaint   Patient presents with     Recheck Medication     MDD    Reviewed allergies, smoking status, and pharmacy preference  Administered abuse screening question  Obtained weight, blood pressure and heart rate

## 2017-08-01 ENCOUNTER — OFFICE VISIT (OUTPATIENT)
Dept: OPHTHALMOLOGY | Facility: CLINIC | Age: 62
End: 2017-08-01
Attending: OPHTHALMOLOGY
Payer: MEDICAID

## 2017-08-01 DIAGNOSIS — H04.129 DRY EYE: ICD-10-CM

## 2017-08-01 DIAGNOSIS — E11.9 DIABETES MELLITUS TYPE 2 WITHOUT RETINOPATHY (H): Primary | ICD-10-CM

## 2017-08-01 DIAGNOSIS — H25.13 AGE-RELATED NUCLEAR CATARACT OF BOTH EYES: ICD-10-CM

## 2017-08-01 DIAGNOSIS — H52.7 REFRACTIVE ERROR: ICD-10-CM

## 2017-08-01 PROCEDURE — 92015 DETERMINE REFRACTIVE STATE: CPT | Mod: ZF

## 2017-08-01 PROCEDURE — 99213 OFFICE O/P EST LOW 20 MIN: CPT | Mod: ZF

## 2017-08-01 PROCEDURE — T1013 SIGN LANG/ORAL INTERPRETER: HCPCS | Mod: U3,ZF | Performed by: STUDENT IN AN ORGANIZED HEALTH CARE EDUCATION/TRAINING PROGRAM

## 2017-08-01 ASSESSMENT — VISUAL ACUITY
OS_CC: J1
OD_CC: J1
OD_CC: 20/30
CORRECTION_TYPE: GLASSES
METHOD: SNELLEN - LINEAR
OS_CC: 20/20

## 2017-08-01 ASSESSMENT — REFRACTION_WEARINGRX
OS_SPHERE: -0.50
OD_ADD: +2.50
OD_SPHERE: -0.50
OS_ADD: +2.50
OD_AXIS: 040
OD_CYLINDER: +1.00
OS_CYLINDER: +0.75
SPECS_TYPE: BIFOCAL
OS_AXIS: 130

## 2017-08-01 ASSESSMENT — SLIT LAMP EXAM - LIDS
COMMENTS: NORMAL
COMMENTS: NORMAL

## 2017-08-01 ASSESSMENT — TONOMETRY
OS_IOP_MMHG: 10
OD_IOP_MMHG: 13
IOP_METHOD: TONOPEN

## 2017-08-01 ASSESSMENT — CONF VISUAL FIELD
OS_NORMAL: 1
OD_NORMAL: 1

## 2017-08-01 ASSESSMENT — REFRACTION_MANIFEST
OS_AXIS: 130
OD_AXIS: 040
OD_SPHERE: PLANO
OD_CYLINDER: +1.00
OS_ADD: +2.25
OS_SPHERE: -0.25
OD_ADD: +2.25
OS_CYLINDER: +0.75

## 2017-08-01 ASSESSMENT — CUP TO DISC RATIO
OD_RATIO: 0.3
OS_RATIO: 0.45

## 2017-08-01 ASSESSMENT — EXTERNAL EXAM - RIGHT EYE: OD_EXAM: NORMAL

## 2017-08-01 ASSESSMENT — EXTERNAL EXAM - LEFT EYE: OS_EXAM: NORMAL

## 2017-08-01 NOTE — NURSING NOTE
Chief Complaints and History of Present Illnesses   Patient presents with     Diabetic Eye Exam     HPI    Affected eye(s):  Both   Symptoms:     No blurred vision   No decreased vision   No floaters   No flashes   Itching (Comment: intermittent OU)      Frequency:  Constant       Do you have eye pain now?:  No      Comments:  Pt stated decreased near vision over the last 1 year.                  A1C                      5.9                 03/09/2017                 A1C                      6.4                 08/14/2014     BS:   Ceferino Woods  8:37 AM August 1, 2017

## 2017-08-01 NOTE — LETTER
8/1/2017       RE: Gianluca Singh  [address]   [place] [address]     Dear Colleague,    Thank you for referring your patient, Gianluca Singh, to the EYE CLINIC at [facility]. Please see a copy of my visit note below.    CC: Diabetic eye exam    HPI: Gianluca Singh is a 62 year old male who presents for diabetic eye exam. Hx of DM2 x 1 year, on metformin, most recent A1c 5.9 on 3/9/17. Denies any change in vision in either eye recently. Feels that he sees well with glasses on. CURTIS was 1 year ago at a different clinic.    Reports occ dryness sx in both eyes.     PMH:  DM2 x 1 year  HTN    POHx:  Glasses    Current Eye Medications:   None    FH:  Pt denies      Assessment & Plan   Gianluca Singh is a 62 year old male with the following diagnoses:   1. Diabetes mellitus type 2 without retinopathy (H)    2. Age-related nuclear cataract of both eyes    3. Dry eye    4. Refractive error         DM2:   No diabetic retinopathy on exam today   Continue BP/BG control with PCP supervision   Continue annual dilated eye exams     Cataracts, both eyes:   Excellent corrected VA. Pt asymptomatic   Monitor     Dry eye:   Start ATs prn    Refractive error:   Updated MRx dispensed today    C/D asymmetry:   0.3/0.45   IOP normal   No FH glaucoma   Monitor, may pursue glaucoma suspect workup if asymmetry increases or if IOP becomes elevated      RTC: 1 year for diabetes eye exam, sooner prn    Seen with Dr. Sung Fine MD   PGY-3 Ophthalmology      Attending Physician Attestation:  Complete documentation of historical and exam elements from today's encounter can be found in the full encounter summary report (not reduplicated in this progress note).  I personally obtained the chief complaint(s) and history of present illness.  I confirmed and edited as necessary the review of systems, past medical/surgical history, family history, social history, and examination  findings as documented by others; and I examined the patient myself.  I personally reviewed the relevant tests, images, and reports as documented above.  I formulated and edited as necessary the assessment and plan and discussed the findings and management plan with the patient and family. . - Jamar Almaraz MD

## 2017-08-01 NOTE — PROGRESS NOTES
CC: Diabetic eye exam    HPI: Gianluca Singh is a 62 year old male who presents for diabetic eye exam. Hx of DM2 x 1 year, on metformin, most recent A1c 5.9 on 3/9/17. Denies any change in vision in either eye recently. Feels that he sees well with glasses on. CURTIS was 1 year ago at a different clinic.    Reports occ dryness sx in both eyes.     PMH:  DM2 x 1 year  HTN    POHx:  Glasses    Current Eye Medications:   None    FH:  Pt denies      Assessment & Plan   Gianluca Singh is a 62 year old male with the following diagnoses:   1. Diabetes mellitus type 2 without retinopathy (H)    2. Age-related nuclear cataract of both eyes    3. Dry eye    4. Refractive error         DM2:   No diabetic retinopathy on exam today   Continue BP/BG control with PCP supervision   Continue annual dilated eye exams     Cataracts, both eyes:   Excellent corrected VA. Pt asymptomatic   Monitor     Dry eye:   Start ATs prn    Refractive error:   Updated MRx dispensed today    C/D asymmetry:   0.3/0.45   IOP normal   No FH glaucoma   Monitor, may pursue glaucoma suspect workup if asymmetry increases or if IOP becomes elevated      RTC: 1 year for diabetes eye exam, sooner prn    Seen with Dr. Sung Fine MD   PGY-3 Ophthalmology      Attending Physician Attestation:  Complete documentation of historical and exam elements from today's encounter can be found in the full encounter summary report (not reduplicated in this progress note).  I personally obtained the chief complaint(s) and history of present illness.  I confirmed and edited as necessary the review of systems, past medical/surgical history, family history, social history, and examination findings as documented by others; and I examined the patient myself.  I personally reviewed the relevant tests, images, and reports as documented above.  I formulated and edited as necessary the assessment and plan and discussed the findings and management plan with the patient and  family. . - Jamar Almaraz MD

## 2017-08-01 NOTE — MR AVS SNAPSHOT
After Visit Summary   8/1/2017    Gianluca Singh    MRN: [id number]           Patient Information     Date Of Birth          1955        Visit Information        Provider Department      8/1/2017 8:15 AM Sudha Fine MD; [facility] SERVICES Eye Clinic        Today's Diagnoses     Diabetes mellitus type 2 without retinopathy (H)    -  1    Age-related nuclear cataract of both eyes        Dry eye           Follow-ups after your visit        Follow-up notes from your care team     Return in about 1 year (around 8/1/2018) for diabetes eye exam.      Your next 10 appointments already scheduled     Aug 30, 2017 10:15 AM CDT   Adult Med Follow UP with LORE Kim CNS   Psychiatry Clinic ([facility])    [place] [id number] [facility] [address] [phone]              Who to contact     Please call your clinic at [phone] to:    Ask questions about your health    Make or cancel appointments    Discuss your medicines    Learn about your test results    Speak to your doctor   If you have compliments or concerns about an experience at your clinic, or if you wish to file a complaint, please contact [facility] Patient Relations at [phone] or email us at [email or web address]         Additional Information About Your Visit        MyChart Information     BuyPlayWin is an electronic gateway that provides easy, online access to your medical records. With BuyPlayWin, you can request a clinic appointment, read your test results, renew a prescription or communicate with your care team.     To sign up for Starport Systemst visit the website at [email or web address]   You will be asked to enter the access code listed below, as well as some personal information. Please follow the directions to create your username and password.     Your access code is: [email or web address]  Expires: 10/17/2017  6:30 AM      Your access code will  in 90 days. If you need help or a new code, please contact your [facility] or call [phone] for assistance.        Care EveryWhere ID     This is your Care EveryWhere ID. This could be used by other organizations to access your [facility] medical records  [id number]         Blood Pressure from Last 3 Encounters:   14 126/84   14 105/67   14 133/87    Weight from Last 3 Encounters:   14 95.8 kg (211 lb 3.2 oz)   14 93.5 kg (206 lb 3.2 oz)   14 93 kg (205 lb)              Today, you had the following     No orders found for display       Primary Care Provider Office Phone # Fax #    Moira Pham HIRA Cuadra [phone]       [facility] CL[facility]  [facility] [address]        Equal Access to Services     JENNY LORENZANA : Hadii aad ku hadasho Sojasminali, waaxda luqadaha, qaybta kaalmada adeegyada, waxay anne baez . So [facility] [phone].    ATENCIÓN: Si grahamla español, tiene a dowd disposición servicios gratuitos de asistencia lingüística. Llame al [phone].    We comply with applicable federal civil rights laws and [place] laws. We do not discriminate on the basis of race, color, national origin, age, disability sex, sexual orientation or gender identity.            Thank you!     Thank you for choosing EYE CLINIC  for your care. Our goal is always to provide you with excellent care. Hearing back from our patients is one way we can continue to improve our services. Please take a few minutes to complete the written survey that you may receive in the mail after your visit with us. Thank you!             Your Updated Medication List - Protect others around you: Learn how to safely use, store and throw away your medicines at www.disposemymeds.org.          This list is accurate as of: 17 10:12 AM.  Always use your most recent med list.                   Brand Name  Dispense Instructions for use Diagnosis    LIPITOR 40 MG tablet   Generic drug:  atorvastatin      Take 1 tablet (40 mg) by mouth daily        lisinopril-hydrochlorothiazide 20-25 MG per tablet    PRINZIDE/ZESTORETIC     Take 1 tablet by mouth daily        metFORMIN 500 MG tablet    GLUCOPHAGE     Take one tablet daily in the am and two tablets at bedtime.  R Abril NP at [facility]        OLANZapine 20 MG tablet    zyPREXA    30 tablet    Take 1 tablet (20 mg) by mouth At Bedtime    Chronic insomnia       ranitidine 150 MG tablet    ZANTAC     Take 1 tablet (150 mg) by mouth 2 times daily        venlafaxine 150 MG 24 hr capsule    EFFEXOR-XR    60 capsule    Take 2 capsules (300 mg) by mouth daily    Social anxiety disorder

## 2017-09-06 ENCOUNTER — OFFICE VISIT (OUTPATIENT)
Dept: PSYCHIATRY | Facility: CLINIC | Age: 62
End: 2017-09-06
Attending: CLINICAL NURSE SPECIALIST
Payer: MEDICAID

## 2017-09-06 VITALS
HEART RATE: 87 BPM | DIASTOLIC BLOOD PRESSURE: 94 MMHG | BODY MASS INDEX: 34.25 KG/M2 | WEIGHT: 205.8 LBS | SYSTOLIC BLOOD PRESSURE: 155 MMHG

## 2017-09-06 DIAGNOSIS — F33.2 SEVERE RECURRENT MAJOR DEPRESSION WITHOUT PSYCHOTIC FEATURES (H): ICD-10-CM

## 2017-09-06 DIAGNOSIS — F19.21 POLYSUBSTANCE DEPENDENCE, NON-OPIOID, IN REMISSION (H): ICD-10-CM

## 2017-09-06 DIAGNOSIS — F40.10 SOCIAL ANXIETY DISORDER: Primary | ICD-10-CM

## 2017-09-06 PROCEDURE — 99212 OFFICE O/P EST SF 10 MIN: CPT | Mod: ZF

## 2017-09-06 NOTE — PROGRESS NOTES
Outpatient Psychiatry Progress Note     Provider: LORE Kim CNS  Date: 2017  Service:  Medication follow up with counseling.   Patient Identification: Gianluca Singh  : 1955   MRN: [id number]    Gianluca Singh is a 62 year old year old male who presents for ongoing psychiatric care.  Gianluca Singh was last seen in clinic on 17.   At that time,   Assessment & Plan       Gianlcua Singh is seen today for follow up with an  and reports his mood has been stable with continued depression and anxiety.     Diagnosis  Axis 1: Major Depression, Recurrent, severe without psychosis, Social Anxiety Disorder, Polysubstance dependence in remission  Axis 2: none  Axis 3: See problem list in the medical record     Plan:  Medication: Continue current medications  OTC Recommendations: none  Lab Orders:  none  Referrals: none  Release of Information: Therapist at [facility] [phone]  Future Treatment Considerations:per symptoms  Return for Follow Up:6 weeks      ____________________________________________________________________________________________________________________________________________    2017  Today Gianluca reports has more depression, back pain, increased fatigue. Back pain started about 2 weeks ago. BP has been ok at his primary care.  No new stresses but continues to wish he had contact with his children.   Has not been sleeping very well lately.     Side effects of medication include: none known  Psychiatric Review of Systems:  The patient endorses symptoms of depression: In the last 2 weeks per PHQ 9 more than 1/2 days sleep disturbance, appetite disturbance, concentration problems, SI but denies plan or intent. Nearly everyday anhedonia, feeling depressed, fatigue, feelings of failure, restless.lethargy.  He  patient endorses symptoms of anxiety : stable  He endorses symptoms of al including none.    He endorses symptoms of psychosis including no  psychotic symptoms.       Review of Medical Systems:  Sleep: see subjective  Energy: stable  Concentration: stable  Appetite: stable  GI Concerns: no concerns  Cardiac concerns: no concerns  Neurological concerns: no concerns  Other medical concerns: no concerns  Current Substance Use:  Alcohol:denies  Other drugs:denies  Caffeine:no change  Nicotine: no change  Past Medical History:   Past Medical History:   Diagnosis Date     Depression      Deviated septum      Gastro-oesophageal reflux disease      Headache(784.0)      Hearing loss      Hypertension      LOC (loss of consciousness) (H) age 45    out for 20 mintes after hit on top of head with board     Major depression 5/7/2013     Nasal congestion      Night sweats      Otitis media, chronic      Ringing in ears      Sleep apnea     cant tolerate cpap     Sleep problems      Sneezing      Patient Active Problem List   Diagnosis     Non-English speaking patient     Chronic nasal congestion     HEIKE (obstructive sleep apnea)     Chronic hepatitis C (H)     Esophageal reflux     Headache     Short-term memory loss     Polysubstance dependence, non-opioid, in remission (H)     Social anxiety disorder     Severe recurrent major depression without psychotic features (H)     Heterozygous MTHFR mutation C677T (H)       Allergies:   Allergies   Allergen Reactions     Trazodone Other (See Comments)     Very depressed and suicidal          Current Medications     Current Outpatient Prescriptions Ordered in [facility]   Medication Sig Dispense Refill     venlafaxine (EFFEXOR-XR) 150 MG 24 hr capsule Take 2 capsules (300 mg) by mouth daily 60 capsule 5     OLANZapine (ZYPREXA) 20 MG tablet Take 1 tablet (20 mg) by mouth At Bedtime 30 tablet 5     metFORMIN (GLUCOPHAGE) 500 MG tablet Take one tablet daily in the am and two tablets at bedtime.  R Skolar NP at [facility]       ranitidine (ZANTAC) 150 MG tablet Take 1 tablet (150 mg) by mouth 2 times daily       atorvastatin (LIPITOR) 40  "MG tablet Take 1 tablet (40 mg) by mouth daily       lisinopril-hydrochlorothiazide (PRINZIDE,ZESTORETIC) 20-25 MG per tablet Take 1 tablet by mouth daily       No current [facility]-ordered facility-administered medications on file.         Mental Status Exam     Appearance:  Casually dressed and Well groomed  Behavior/relationship to examiner/demeanor:  Cooperative  Orientation: Oriented to person, place, time and situation  Psychomotor: normal form  Speech Rate:  Normal  Speech Spontaneity:  Normal  Mood:  \"okay\"  Affect:  Appropriate/mood-congruent  Thought Process (Associations):  Goal directed  Thought Content:  no overt psychosis, patient does not appear to be responding to internal stimuli, Suicidal ideation and denies suicidal intent or plan  Abnormal Perception:  None  Attention/Concentration:  Normal  Language:  Intact  Insight:  Adequate  Judgment:  Adequate for safety      Results     Vital signs: BP (!) 155/94  Pulse 87  Wt 93.4 kg (205 lb 12.8 oz)  BMI 34.25 kg/m2    Laboratory Data:  no new data available    Assessment & Plan      Gianluca Singh is seen today for follow up and reports he is feeling the same with continued depression and anxiety affecting his functioning, however his symptoms are stable and he is at baseline.    Diagnosis  Social Anxiety Disorder, Major Depression, Recurrent Severe without psychosis, Polysubstance dependence in remission.    Plan:  Medication: Continue current medications  OTC Recommendations: none  Lab Orders:  none  Referrals: none  Release of Information: none  Future Treatment Considerations:per symptoms  Return for Follow Up:4 weeks    The risks, benefits, alternatives and side effects have been discussed and are understood by the patient. The patient understands the risks of using street drugs or alcohol. There are no medical contraindications, the patient agrees to treatment, and has the capacity to do so. The patient understands to call 911 or come to the nearest " ED if life threatening or urgent symptoms present.  Over 50% of this time was spent counseling the patient and/or coordinating care regarding review of social and occupational functioning.  In addition patient was counseled on health and wellness practices to augment medication treatment of symptoms. See note for details.    Alba Merlos, LORE CNS 9/6/2017

## 2017-09-06 NOTE — LETTER
September 6, 2017      Re: Gianluca Singh  [address]   [facility] [address]     Gianluca has been a patient of mine since 11/11/2103.  During that that he has been a model patient. His mental health has been stable. He does not use drugs or alcohol and has no had a legal problems.  To my knowledge he has not had an problems in his current housing and has always paid his rent on time. He has community assistance through [facility] also.     Sincerely,        Alba TORREZ CNS

## 2017-09-06 NOTE — LETTER
September 6, 2017      Re: Gianluca Singh  [address]   [facility] [address]     Gianluca has been a patient of mine since 11/11/213.  During that time he has been a model patient. His mental health has been stable. He does not use drugs or alcohol and has no had a legal problems.  To my knowledge he has not had an problems in his current housing and has always paid his rent on time. He has community assistance through [facility] also.     Sincerely,        Alba TORREZ CNS

## 2017-09-06 NOTE — NURSING NOTE
Chief Complaint   Patient presents with     Recheck Medication     social anxiety disorder     Reviewed allergies, smoking status, and pharmacy preference    Obtained weight, blood pressure and heart rate

## 2017-09-06 NOTE — MR AVS SNAPSHOT
After Visit Summary   9/6/2017    Gianluca Singh    MRN: [id number]           Patient Information     Date Of Birth          1955        Visit Information        Provider Department      9/6/2017 10:45 AM Alba Merlos APRN CNS Psychiatry Clinic        Today's Diagnoses     Social anxiety disorder    -  1    Severe recurrent major depression without psychotic features (H)        Polysubstance dependence, non-opioid, in remission (H)           Follow-ups after your visit        Your next 10 appointments already scheduled     Oct 05, 2017 10:45 AM CDT   Adult Med Follow UP with LORE Kim CNS   Psychiatry Clinic ([facility])    [address]  [address] [facility] [id number] [phone]            Nov 13, 2017 12:00 PM CST   Lab with  LAB   [facility] ([facility])    [address] [facility]  [address] [facility] [id number] [phone]            Nov 13, 2017  1:00 PM CST   (Arrive by 12:45 PM)   New General Liver with Shelton Arechiga MD   [facility] Hepatology ([facility])    [address] [place] [address]  3rd Floor  [facility] [address] [phone]              Who to contact     Please call your clinic at [phone] to:    Ask questions about your health    Make or cancel appointments    Discuss your medicines    Learn about your test results    Speak to your doctor   If you have compliments or concerns about an experience at your clinic, or if you wish to file a complaint, please contact [facility] Patient Relations at [phone] or email us at [email or web address]         Additional Information About Your Visit        MyChart Information     Mingyian is an electronic gateway that provides easy, online access to your medical records. With Mingyian, you can request a clinic appointment, read your test results, renew a  prescription or communicate with your care team.     To sign up for Signdathart visit the website at [email or web address]   You will be asked to enter the access code listed below, as well as some personal information. Please follow the directions to create your username and password.     Your access code is: [email or web address]  Expires: 10/17/2017  6:30 AM     Your access code will  in 90 days. If you need help or a new code, please contact your [facility] or call [phone] for assistance.        Care EveryWhere ID     This is your Care EveryWhere ID. This could be used by other organizations to access your [place] medical records  [id number]        Your Vitals Were     Pulse BMI (Body Mass Index)                87 34.25 kg/m2           Blood Pressure from Last 3 Encounters:   17 (!) 155/94   17 (!) 156/96   05/10/17 132/90    Weight from Last 3 Encounters:   17 93.4 kg (205 lb 12.8 oz)   17 92.3 kg (203 lb 6.4 oz)   05/10/17 92 kg (202 lb 12.8 oz)              Today, you had the following     No orders found for display       Primary Care Provider Office Phone # Fax #    Moira Vero Cuadra [phone]       [facility] CLINIC  [place] [address]        Equal Access to Services     JENNY LORENZANA AH: Hadii aad ku hadasho Soomaali, waaxda luqadaha, qaybta kaalmada adeegyada, marilia allen. So [facility] [phone].    ATENCIÓN: Si habla español, tiene a dowd disposición servicios gratuitos de asistencia lingüística. Llame al [phone].    We comply with applicable federal civil rights laws and [place] laws. We do not discriminate on the basis of race, color, national origin, age, disability sex, sexual orientation or gender identity.            Thank you!     Thank you for choosing PSYCHIATRY CLINIC  for your care. Our goal is always to provide you with excellent care. Hearing back from our  patients is one way we can continue to improve our services. Please take a few minutes to complete the written survey that you may receive in the mail after your visit with us. Thank you!             Your Updated Medication List - Protect others around you: Learn how to safely use, store and throw away your medicines at www.disposemymeds.org.          This list is accurate as of: 9/6/17 11:59 PM.  Always use your most recent med list.                   Brand Name Dispense Instructions for use Diagnosis    LIPITOR 40 MG tablet   Generic drug:  atorvastatin      Take 1 tablet (40 mg) by mouth daily        lisinopril-hydrochlorothiazide 20-25 MG per tablet    PRINZIDE/ZESTORETIC     Take 1 tablet by mouth daily        metFORMIN 500 MG tablet    GLUCOPHAGE     Take one tablet daily in the am and two tablets at bedtime.  HOME Samuels NP at [facility]        OLANZapine 20 MG tablet    zyPREXA    30 tablet    Take 1 tablet (20 mg) by mouth At Bedtime    Chronic insomnia       ranitidine 150 MG tablet    ZANTAC     Take 1 tablet (150 mg) by mouth 2 times daily        venlafaxine 150 MG 24 hr capsule    EFFEXOR-XR    60 capsule    Take 2 capsules (300 mg) by mouth daily    Social anxiety disorder

## 2017-09-20 ASSESSMENT — PATIENT HEALTH QUESTIONNAIRE - PHQ9: SUM OF ALL RESPONSES TO PHQ QUESTIONS 1-9: 23

## 2017-10-18 DIAGNOSIS — B19.20 HEPATITIS C VIRUS INFECTION WITHOUT HEPATIC COMA, UNSPECIFIED CHRONICITY: Primary | ICD-10-CM

## 2017-10-19 ENCOUNTER — OFFICE VISIT (OUTPATIENT)
Dept: PSYCHIATRY | Facility: CLINIC | Age: 62
End: 2017-10-19
Attending: CLINICAL NURSE SPECIALIST
Payer: MEDICAID

## 2017-10-19 ENCOUNTER — OFFICE VISIT (OUTPATIENT)
Dept: INTERPRETER SERVICES | Facility: CLINIC | Age: 62
End: 2017-10-19
Attending: CLINICAL NURSE SPECIALIST

## 2017-10-19 VITALS
DIASTOLIC BLOOD PRESSURE: 100 MMHG | HEART RATE: 75 BPM | SYSTOLIC BLOOD PRESSURE: 146 MMHG | WEIGHT: 200.4 LBS | BODY MASS INDEX: 33.35 KG/M2

## 2017-10-19 DIAGNOSIS — F40.10 SOCIAL ANXIETY DISORDER: ICD-10-CM

## 2017-10-19 PROCEDURE — 99212 OFFICE O/P EST SF 10 MIN: CPT | Mod: ZF

## 2017-10-19 RX ORDER — VENLAFAXINE HYDROCHLORIDE 150 MG/1
300 CAPSULE, EXTENDED RELEASE ORAL DAILY
Qty: 60 CAPSULE | Refills: 5 | Status: SHIPPED | OUTPATIENT
Start: 2017-10-19 | End: 2017-12-20

## 2017-10-19 NOTE — PROGRESS NOTES
Outpatient Psychiatry Progress Note     Provider: LORE Isaac CNP  Date: 10/19/2017  Service:  Medication follow up with counseling.   Patient Identification: Gianluca Singh  : 1955   MRN: [id number]    Gianluca Singh is a 62 year old year old male who presents for ongoing psychiatric care.  Gianluca Singh was last seen in clinic on 17.   At that time, Gianluca reported that his mood was stable with continued depression and anxiety    ____________________________________________________________________________________________________________________________________________    10/19/2017  Today Gianluca, accompanied by an , reports continued mood stability with occasional depression and anxiety.  He reports his mood is good and much of what he expressed during interview contradicted what was documented on the PHQ-9.  He states he feels nervous around people but continues to attend AA and an older adults group (60+ y.o.).  He continues to walk for 30 minutes every other day.  He is sleeping 6-7 hours per night and not feeling rested the following morning.  Appetite is good with no concerns. Has lost 5lbs since last appointment.  He does report that his therapist resigned and that he'll be assigned a new one.  The clinic is supposed to call but has not as of today.  Gianluca reports the therapy was helpful and wants to restart soon.  Side effects of medication include: none reported  Psychiatric Review of Systems:  The patient endorses symptoms of depression: In the last 2 weeks per PHQ 9:  Gianluca endorses feeling little interest, depressed, having little energy and feeling bad about himself.  He has sleep disturbance, poor appetite, and trouble concentrating more than half the days.  He has thoughts of he would be better off dead several days.    He  patient endorses symptoms of anxiety : worrying and feeling nervous or on edge when in groups.  He endorses symptoms of al including none.    He  endorses symptoms of psychosis including no psychotic symptoms.       Review of Medical Systems:  Sleep: 6-7 hours per night  Energy:  Low but he continues to walk every other day and attend groups  Concentration: experiences difficulty maintaining attention  Appetite: He reports it is good.  GI Concerns: none currently   Cardiac concerns: Elevated BP.    Neurological concerns: none endorsed  Other medical concerns: increased pain.  Scheduled to see doctor next month  Current Substance Use:  Alcohol:denies  Other drugs:denies  Caffeine:no change  Nicotine: no change  Past Medical History:   Past Medical History:   Diagnosis Date     Depression      Deviated septum      Gastro-oesophageal reflux disease      Headache(784.0)      Hearing loss      Hypertension      LOC (loss of consciousness) (H) age 45    out for 20 mintes after hit on top of head with board     Major depression 5/7/2013     Nasal congestion      Night sweats      Otitis media, chronic      Ringing in ears      Sleep apnea     cant tolerate cpap     Sleep problems      Sneezing      Patient Active Problem List   Diagnosis     Non-English speaking patient     Chronic nasal congestion     HEIKE (obstructive sleep apnea)     Chronic hepatitis C (H)     Esophageal reflux     Headache     Short-term memory loss     Polysubstance dependence, non-opioid, in remission (H)     Social anxiety disorder     Severe recurrent major depression without psychotic features (H)     Heterozygous MTHFR mutation C677T (H)       Allergies:   Allergies   Allergen Reactions     Trazodone Other (See Comments)     Very depressed and suicidal          Current Medications     Current Outpatient Prescriptions Ordered in [facility]   Medication Sig Dispense Refill     venlafaxine (EFFEXOR-XR) 150 MG 24 hr capsule Take 2 capsules (300 mg) by mouth daily 60 capsule 5     OLANZapine (ZYPREXA) 20 MG tablet Take 1 tablet (20 mg) by mouth At Bedtime 30 tablet 5     metFORMIN (GLUCOPHAGE) 500 MG  "tablet Take one tablet daily in the am and two tablets at bedtime.  R Abril NP at [facility]       ranitidine (ZANTAC) 150 MG tablet Take 1 tablet (150 mg) by mouth 2 times daily       atorvastatin (LIPITOR) 40 MG tablet Take 1 tablet (40 mg) by mouth daily       lisinopril-hydrochlorothiazide (PRINZIDE,ZESTORETIC) 20-25 MG per tablet Take 1 tablet by mouth daily       No current Epic-ordered facility-administered medications on file.         Mental Status Exam     Appearance:  Casually dressed and Well groomed  Behavior/relationship to examiner/demeanor:  Cooperative, Engaged and Pleasant  Orientation: Oriented to person, place, time and situation  Psychomotor: slowed gait   Speech Rate:  Normal  Speech Spontaneity:  Normal  Mood:  \"good\"  Affect:  Appropriate/mood-congruent  Thought Process (Associations):  Logical, Linear and Goal directed  Thought Content:  no evidence of suicidal or homicidal ideation, denies suicidal ideation, intent or thoughts, patient denies auditory and visual hallucinations, patient does not appear to be responding to internal stimuli, denies suicidal intent or plan and No homicidal ideation  Abnormal Perception:  None  Attention/Concentration:  Fair  Language:  Intact  Insight:  Adequate  Judgment:  Adequate for safety      Results     Vital signs: BP (!) 146/100  Pulse 75  Wt 90.9 kg (200 lb 6.4 oz)  BMI 33.35 kg/m2    Laboratory Data:  No new lab results but new orders created yesterday    Assessment & Plan      Gianluca Singh is seen today for follow up and reports continued mood stability.  No medication changes.  Recommended he call his psychotherapy clinic to ensure he is assigned a new therapist since his current therapist resigned.    Diagnosis  Social Anxiety Disorder, Major Depression, Recurrent Severe without psychosis, Polysubstance dependence in remission.    Plan:  Medication: no changes  OTC Recommendations: none  Lab Orders:  none  Referrals: none  Release of Information: " none  Future Treatment Considerations: per symptoms  Return for Follow Up: 2 months with LORE Gibbons   The risks, benefits, alternatives and side effects have been discussed and are understood by the patient. The patient understands the risks of using street drugs or alcohol. There are no medical contraindications, the patient agrees to treatment, and has the capacity to do so. The patient understands to call 911 or come to the nearest ED if life threatening or urgent symptoms present.  In addition time was spent counseling the patient and/or coordinating care regarding review of social and occupational functioning.  In addition patient was counseled on health and wellness practices to augment medication treatment of symptoms. See note for details.    LORE Isaac CNP 10/19/2017

## 2017-10-19 NOTE — NURSING NOTE
Chief Complaint   Patient presents with     Recheck Medication     social anxiety disorder     Reviewed allergies, smoking status, and pharmacy preference  Administered abuse screening question  Obtained weight, blood pressure and heart rate

## 2017-10-19 NOTE — MR AVS SNAPSHOT
After Visit Summary   10/19/2017    Gianluca Singh    MRN: [id number]           Patient Information     Date Of Birth          1955        Visit Information        Provider Department      10/19/2017 9:15 AM Antonio Chatterjee APRN CNP Psychiatry Clinic        Today's Diagnoses     Social anxiety disorder           Follow-ups after your visit        Your next 10 appointments already scheduled     Dec 20, 2017 11:15 AM CST   Adult Med Follow UP with LORE Kim CNS   Psychiatry Clinic ([facility])    [address]  [address] [facility] [id number] [phone]              Who to contact     Please call your clinic at [phone] to:    Ask questions about your health    Make or cancel appointments    Discuss your medicines    Learn about your test results    Speak to your doctor   If you have compliments or concerns about an experience at your clinic, or if you wish to file a complaint, please contact [facility] Patient Relations at [phone] or email us at [email or web address]         Additional Information About Your Visit        MyChart Information     Companion Canine is an electronic gateway that provides easy, online access to your medical records. With Compan[employer] Canine, you can request a clinic appointment, read your test results, renew a prescription or communicate with your care team.     To sign up for [employer] visit the website at [email or web address]   You will be asked to enter the access code listed below, as well as some personal information. Please follow the directions to create your username and password.     Your access code is: [email or web address]  Expires: 2018  5:30 AM     Your access code will  in 90 days. If you need help or a new code, please contact your [facility] or call [phone] for assistance.        Care EveryWhere ID     This is your Care  EveryWhere ID. This could be used by other organizations to access your [place] medical records  [id number]        Your Vitals Were     Pulse BMI (Body Mass Index)                75 33.35 kg/m2           Blood Pressure from Last 3 Encounters:   11/13/17 129/85   10/19/17 (!) 146/100   09/06/17 (!) 155/94    Weight from Last 3 Encounters:   11/13/17 91.5 kg (201 lb 12.8 oz)   10/19/17 90.9 kg (200 lb 6.4 oz)   09/06/17 93.4 kg (205 lb 12.8 oz)              Today, you had the following     No orders found for display         Where to get your medicines      Some of these will need a paper prescription and others can be bought over the counter.  Ask your nurse if you have questions.     Bring a paper prescription for each of these medications     venlafaxine 150 MG 24 hr capsule          Primary Care Provider Office Phone # Fax #    Moira Vero Cuadra [phone]       [facility] [address] [address]        Equal Access to Services     [facility]OHNNIE [facility] : Hadii yolanda murillo hadasho Sofany, waaxda luqadaha, qaybta kaalmada adegeorge, marilia baez . So [facility] [phone].    ATENCIÓN: Si habla español, tiene a dowd disposición servicios gratuitos de asistencia lingüística. Llame al [phone].    We comply with applicable federal civil rights laws and [place] laws. We do not discriminate on the basis of race, color, national origin, age, disability, sex, sexual orientation, or gender identity.            Thank you!     Thank you for choosing PSYCHIATRY CLINIC  for your care. Our goal is always to provide you with excellent care. Hearing back from our patients is one way we can continue to improve our services. Please take a few minutes to complete the written survey that you may receive in the mail after your visit with us. Thank you!             Your Updated Medication List - Protect others around you: Learn how to safely use, store and throw away your  medicines at www.disposemymeds.org.          This list is accurate as of: 10/19/17 11:59 PM.  Always use your most recent med list.                   Brand Name Dispense Instructions for use Diagnosis    LIPITOR 40 MG tablet   Generic drug:  atorvastatin      Take 1 tablet (40 mg) by mouth daily        lisinopril-hydrochlorothiazide 20-25 MG per tablet    PRINZIDE/ZESTORETIC     Take 1 tablet by mouth daily        metFORMIN 500 MG tablet    GLUCOPHAGE     Take one tablet daily in the am and two tablets at bedtime.  HOME Samuels NP at [facility]        OLANZapine 20 MG tablet    zyPREXA    30 tablet    Take 1 tablet (20 mg) by mouth At Bedtime    Chronic insomnia       ranitidine 150 MG tablet    ZANTAC     Take 1 tablet (150 mg) by mouth 2 times daily        venlafaxine 150 MG 24 hr capsule    EFFEXOR-XR    60 capsule    Take 2 capsules (300 mg) by mouth daily    Social anxiety disorder

## 2017-11-02 ENCOUNTER — PRE VISIT (OUTPATIENT)
Dept: GASTROENTEROLOGY | Facility: CLINIC | Age: 62
End: 2017-11-02

## 2017-11-02 NOTE — TELEPHONE ENCOUNTER
Itinerary mailed to home address    lab orders in [facility], lab appointment made    Carole Schultz CMA  11/2/2017 5:26 PM

## 2017-11-06 ASSESSMENT — PATIENT HEALTH QUESTIONNAIRE - PHQ9: SUM OF ALL RESPONSES TO PHQ QUESTIONS 1-9: 21

## 2017-11-13 ENCOUNTER — OFFICE VISIT (OUTPATIENT)
Dept: GASTROENTEROLOGY | Facility: CLINIC | Age: 62
End: 2017-11-13
Attending: INTERNAL MEDICINE
Payer: MEDICAID

## 2017-11-13 VITALS
BODY MASS INDEX: 33.62 KG/M2 | WEIGHT: 201.8 LBS | OXYGEN SATURATION: 95 % | HEIGHT: 65 IN | DIASTOLIC BLOOD PRESSURE: 85 MMHG | SYSTOLIC BLOOD PRESSURE: 129 MMHG | HEART RATE: 81 BPM | TEMPERATURE: 98.2 F

## 2017-11-13 DIAGNOSIS — B19.20 HEPATITIS C VIRUS INFECTION WITHOUT HEPATIC COMA, UNSPECIFIED CHRONICITY: ICD-10-CM

## 2017-11-13 DIAGNOSIS — B18.2 CHRONIC HEPATITIS C WITHOUT HEPATIC COMA (H): ICD-10-CM

## 2017-11-13 DIAGNOSIS — B18.2 CHRONIC HEPATITIS C WITHOUT HEPATIC COMA (H): Primary | ICD-10-CM

## 2017-11-13 LAB
ALBUMIN SERPL-MCNC: 3.8 G/DL (ref 3.4–5)
ALP SERPL-CCNC: 117 U/L (ref 40–150)
ALT SERPL W P-5'-P-CCNC: 32 U/L (ref 0–70)
ANION GAP SERPL CALCULATED.3IONS-SCNC: 10 MMOL/L (ref 3–14)
AST SERPL W P-5'-P-CCNC: 25 U/L (ref 0–45)
BASOPHILS # BLD AUTO: 0 10E9/L (ref 0–0.2)
BASOPHILS NFR BLD AUTO: 0.6 %
BILIRUB DIRECT SERPL-MCNC: 0.2 MG/DL (ref 0–0.2)
BILIRUB SERPL-MCNC: 0.7 MG/DL (ref 0.2–1.3)
BUN SERPL-MCNC: 16 MG/DL (ref 7–30)
CALCIUM SERPL-MCNC: 9 MG/DL (ref 8.5–10.1)
CHLORIDE SERPL-SCNC: 101 MMOL/L (ref 94–109)
CO2 SERPL-SCNC: 23 MMOL/L (ref 20–32)
CREAT SERPL-MCNC: 0.91 MG/DL (ref 0.66–1.25)
DIFFERENTIAL METHOD BLD: NORMAL
EOSINOPHIL # BLD AUTO: 0.1 10E9/L (ref 0–0.7)
EOSINOPHIL NFR BLD AUTO: 1.6 %
ERYTHROCYTE [DISTWIDTH] IN BLOOD BY AUTOMATED COUNT: 12.6 % (ref 10–15)
GFR SERPL CREATININE-BSD FRML MDRD: 84 ML/MIN/1.7M2
GLUCOSE SERPL-MCNC: 109 MG/DL (ref 70–99)
HBV CORE AB SERPL QL IA: REACTIVE
HBV SURFACE AB SERPL IA-ACNC: 21.87 M[IU]/ML
HBV SURFACE AG SERPL QL IA: NONREACTIVE
HCT VFR BLD AUTO: 42.9 % (ref 40–53)
HGB BLD-MCNC: 14.6 G/DL (ref 13.3–17.7)
IMM GRANULOCYTES # BLD: 0.1 10E9/L (ref 0–0.4)
IMM GRANULOCYTES NFR BLD: 0.8 %
INR PPP: 0.96 (ref 0.86–1.14)
LYMPHOCYTES # BLD AUTO: 1.5 10E9/L (ref 0.8–5.3)
LYMPHOCYTES NFR BLD AUTO: 24.5 %
MCH RBC QN AUTO: 30.2 PG (ref 26.5–33)
MCHC RBC AUTO-ENTMCNC: 34 G/DL (ref 31.5–36.5)
MCV RBC AUTO: 89 FL (ref 78–100)
MONOCYTES # BLD AUTO: 0.6 10E9/L (ref 0–1.3)
MONOCYTES NFR BLD AUTO: 8.8 %
NEUTROPHILS # BLD AUTO: 4 10E9/L (ref 1.6–8.3)
NEUTROPHILS NFR BLD AUTO: 63.7 %
NRBC # BLD AUTO: 0 10*3/UL
NRBC BLD AUTO-RTO: 0 /100
PLATELET # BLD AUTO: 248 10E9/L (ref 150–450)
POTASSIUM SERPL-SCNC: 3.7 MMOL/L (ref 3.4–5.3)
PROT SERPL-MCNC: 7.6 G/DL (ref 6.8–8.8)
RBC # BLD AUTO: 4.84 10E12/L (ref 4.4–5.9)
SODIUM SERPL-SCNC: 134 MMOL/L (ref 133–144)
WBC # BLD AUTO: 6.3 10E9/L (ref 4–11)

## 2017-11-13 PROCEDURE — 91200 LIVER ELASTOGRAPHY: CPT | Mod: ZF

## 2017-11-13 PROCEDURE — 86706 HEP B SURFACE ANTIBODY: CPT | Performed by: INTERNAL MEDICINE

## 2017-11-13 PROCEDURE — T1013 SIGN LANG/ORAL INTERPRETER: HCPCS | Mod: U3,ZF | Performed by: INTERNAL MEDICINE

## 2017-11-13 PROCEDURE — 36415 COLL VENOUS BLD VENIPUNCTURE: CPT | Performed by: INTERNAL MEDICINE

## 2017-11-13 PROCEDURE — G0499 HEPB SCREEN HIGH RISK INDIV: HCPCS | Performed by: INTERNAL MEDICINE

## 2017-11-13 PROCEDURE — 80076 HEPATIC FUNCTION PANEL: CPT | Performed by: INTERNAL MEDICINE

## 2017-11-13 PROCEDURE — 85610 PROTHROMBIN TIME: CPT | Performed by: INTERNAL MEDICINE

## 2017-11-13 PROCEDURE — 80048 BASIC METABOLIC PNL TOTAL CA: CPT | Performed by: INTERNAL MEDICINE

## 2017-11-13 PROCEDURE — 87522 HEPATITIS C REVRS TRNSCRPJ: CPT | Performed by: INTERNAL MEDICINE

## 2017-11-13 PROCEDURE — 85025 COMPLETE CBC W/AUTO DIFF WBC: CPT | Performed by: INTERNAL MEDICINE

## 2017-11-13 PROCEDURE — 86704 HEP B CORE ANTIBODY TOTAL: CPT | Performed by: INTERNAL MEDICINE

## 2017-11-13 ASSESSMENT — PAIN SCALES - GENERAL: PAINLEVEL: NO PAIN (0)

## 2017-11-13 NOTE — NURSING NOTE
Chief Complaint   Patient presents with     Consult     Hepatitis C   Pt roomed, vitals, meds, and allergies reviewed with pt. Pt ready for provider.  Seun Almaraz, CMA

## 2017-11-13 NOTE — LETTER
11/13/2017       RE: Gianluca Singh  [address]   [facility] [address]     Dear Colleague,    Thank you for referring your patient, Gianluca Singh, to the [facility] HEPATOLOGY at [facility]. Please see a copy of my visit note below.    [facility]    Hepatology New Patient Visit    Referring provider:  Moira Cuadra      62 year old male    Chief complaint:  Hepatitis C    HPI:  HCV  - dx?  - GT-1a or 1b  - hx IVDU, EDA  - liver bx 5/2013- stage 0 fibrosis  - no prior rx    Comes to clinic this AM with a  for management of hep C.  It is not clear when the patient was first diagnosed with hep C.  He likely acquired it between 1977 and 2009 when he was using IV and IN drugs.  He had a liver biopsy in 2013 which showed no fibrosis.  He has never been treated mainly due to his history of depression.    Patient is well.  He denies any signs or symptoms specific to liver disease.    Patient denies jaundice, abdominal distension, lower extremity edema, lethargy or confusion.    No history of melena, hematemesis or hematochezia.    Patient denies fevers, sweats, chills or weight loss.    Patient does not drink alcohol.  He last drank alcohol in 2009.  He previously drank large amounts for 40-45 years.  He is an ex-smoker since 2009 and smoked several cigarettes per day for a similar period.    Medical hx Surgical hx   Past Medical History:   Diagnosis Date     Depression      Deviated septum      Gastro-oesophageal reflux disease      Headache(784.0)      Hearing loss      Hypertension      LOC (loss of consciousness) (H) age 45     Major depression 5/7/2013     Nasal congestion      Night sweats      Otitis media, chronic      Ringing in ears      Sleep apnea      Sleep problems      Sneezing       Past Surgical History:   Procedure Laterality Date     HERNIA REPAIR  2006    boith sides     LASER CO2  TYMPANOMASTOIDECTOMY  9/7/2011    Procedure:LASER CO2 TYMPANOMASTOIDECTOMY; Right Tympanoplasty , Cartilage Backed Right Tympanomastoidectomy, Omni Guide Laser on Standby  *Latex Safe*; Surgeon:BENNETT MAXWELL; Location:[facility] OR     SEPTOPLASTY  9/17/2012    Procedure: SEPTOPLASTY;  Septoplasty *Latex Safe* Turbinate reduction ;  Surgeon: Babar Dickerson MD;  Location: [facility] OR     UVULOPALATOPHARYNGOPLASTY  7/9/2012    Procedure: UVULOPALATOPHARYNGOPLASTY;  Uvulopalatopharyngoplasty * Latex Safe*;  Surgeon: Babar Dickerson MD;  Location: [facility] OR          Medications  Prior to Admission medications    Medication Sig Start Date End Date Taking? Authorizing Provider   venlafaxine (EFFEXOR-XR) 150 MG 24 hr capsule Take 2 capsules (300 mg) by mouth daily 10/19/17  Yes Antonio Chatterjee APRN CNP   OLANZapine (ZYPREXA) 20 MG tablet Take 1 tablet (20 mg) by mouth At Bedtime 5/10/17  Yes Alba Merlos APRN CNS   metFORMIN (GLUCOPHAGE) 500 MG tablet Take one tablet daily in the am and two tablets at bedtime.  R Skolar NP at [facility] 4/13/16  Yes Alba Merlos APRN CNS   ranitidine (ZANTAC) 150 MG tablet Take 1 tablet (150 mg) by mouth 2 times daily 3/16/16  Yes Alba Merlos APRN CNS   atorvastatin (LIPITOR) 40 MG tablet Take 1 tablet (40 mg) by mouth daily 2/4/16  Yes Alba Merlos APRN CNS   lisinopril-hydrochlorothiazide (PRINZIDE,ZESTORETIC) 20-25 MG per tablet Take 1 tablet by mouth daily 2/4/16  Yes Alba Merlos APRN CNS       Allergies  Allergies   Allergen Reactions     Trazodone Other (See Comments)     Very depressed and suicidal       Family hx Social hx   Family History   Problem Relation Age of Onset     Depression Mother      Depression Father      Substance Abuse Father      MENTAL ILLNESS Other      institutionalized     Substance Abuse Brother      Substance Abuse Brother      Substance Abuse Brother      Substance Abuse Brother      Substance Abuse Brother      Liver Disease No  "family hx of       Social History   Substance Use Topics     Smoking status: Former Smoker     Packs/day: 0.00     Types: Cigarettes     Smokeless tobacco: Never Used      Comment: pt still uses e-cig     Alcohol use No     Lives alone in [place].  3 children, presumably healthy- has no contact.  Not currently working.  Previously \"did everything.\"  [place] from  [place] 2005.     Review of systems  A 10-point review of systems was negative.    Examination  /85  Pulse 81  Temp 98.2  F (36.8  C) (Oral)  Ht 1.651 m (5' 5\")  Wt 91.5 kg (201 lb 12.8 oz)  SpO2 95%  BMI 33.58 kg/m2  Body mass index is 33.58 kg/(m^2).    Gen- well, NAD, A+Ox3, normal color  Eye- EOMI  ENT- MMM, normal oropharynx  Lym- no palpable lymphadenopathy  CVS- S1, S2 normal, no added sounds, RRR  RS- CTA  Abd- obese, soft, non-tender, no ascites or organomegaly on palpation or percussion, BS+  Extr- pulses good, no CURTIS  MS- hands normal- no clubbing  Neuro- A+Ox3, no asterixis  Skin- no rash or jaundice  Psych- normal mood    Laboratory  Lab Results   Component Value Date     11/13/2017    POTASSIUM 3.7 11/13/2017    CHLORIDE 101 11/13/2017    CO2 23 11/13/2017    BUN 16 11/13/2017    CR 0.91 11/13/2017       Lab Results   Component Value Date    BILITOTAL 0.7 11/13/2017    ALT 32 11/13/2017    AST 25 11/13/2017    ALKPHOS 117 11/13/2017       Lab Results   Component Value Date    ALBUMIN 3.8 11/13/2017    PROTTOTAL 7.6 11/13/2017        Lab Results   Component Value Date    WBC 6.3 11/13/2017    HGB 14.6 11/13/2017    MCV 89 11/13/2017     11/13/2017       Lab Results   Component Value Date    INR 0.96 11/13/2017     HCV RNA 3/4/2013- GT1a or 1b    Radiology  Nil recent    Assessment  62 year old male who presents for evaluation and management of treatment-naive, GT-1 hepatitis C.  No clinical or biochemical evidence of cirrhosis.  Will obtain Fibrosis Scan to rule out advanced hepatic fibrosis.  If no evidence of " advanced hepatic fibrosis, will treat with 12 weeks ELB-GRAZ and will not require follow-up.    We discussed the natural history of hepatitis C, cirrhosis, duration, efficacy and side effects of antiviral therapy.      Plan  1.  Check HCV RNA  2.  Fibrosis Scan  3.  ELB-GRAZ for 12 weeks if no cirrhosis  4.  Follow-up as needed    Shelton Fofana MD  Hepatology  [facility]

## 2017-11-13 NOTE — MR AVS SNAPSHOT
"              After Visit Summary   11/13/2017    Gianluca Singh    MRN: [id number]           Patient Information     Date Of Birth          1955        Visit Information        Provider Department      11/13/2017 12:45 PM Shelton Bhakta MD; [employer] [employer] [facility] Hepatology        Today's Diagnoses     Chronic hepatitis C without hepatic coma (H)    -  1       Follow-ups after your visit        Follow-up notes from your care team     Return if symptoms worsen or fail to improve.      Your next 10 appointments already scheduled     Dec 20, 2017 11:15 AM CST   Adult Med Follow UP with LORE Kim CNS   Psychiatry Clinic ([facility])    [address] [facility] [address] [phone]              Who to contact     If you have questions or need follow up information about today's clinic visit or your schedule please contact [facility] HEPATOLOGY directly at [phone].  Normal or non-critical lab and imaging results will be communicated to you by Synerch[employer]art, letter or phone within 4 business days after the clinic has received the results. If you do not hear from us within 7 days, please contact the clinic through [employer] or phone. If you have a critical or abnormal lab result, we will notify you by phone as soon as possible.  Submit refill requests through "[employer] or call your pharmacy and they will forward the refill request to us. Please allow 3 business days for your refill to be completed.          Additional Information About Your Visit        [employer] Information     "[employer] lets you send messages to your doctor, view your test results, renew your prescriptions, schedule appointments and more. To sign up, go to [email or web address] . Click on \"Log in\" on the left side of the screen, which will take you to the Welcome page. Then click on \"Sign up Now\" on the right side of the page.     You will be asked to " "enter the access code listed below, as well as some personal information. Please follow the directions to create your username and password.     Your access code is: [email or web address]  Expires: 2018  5:30 AM     Your access code will  in 90 days. If you need help or a new code, please call your [place] clinic or [phone].        Care EveryWhere ID     This is your Care EveryWhere ID. This could be used by other organizations to access your [place] medical records  [id number]        Your Vitals Were     Pulse Temperature Height Pulse Oximetry BMI (Body Mass Index)       81 98.2  F (36.8  C) (Oral) 1.651 m (5' 5\") 95% 33.58 kg/m2        Blood Pressure from Last 3 Encounters:   17 129/85   14 126/84   14 105/67    Weight from Last 3 Encounters:   17 91.5 kg (201 lb 12.8 oz)   14 95.8 kg (211 lb 3.2 oz)   14 93.5 kg (206 lb 3.2 oz)               Primary Care Provider Office Phone # Fax #    Moira Kohlernixon Cuadra [phone]       [facility] CLINIC  [facility] [address]        Equal Access to Services     JENNY LORENZANA AH: Hadii aad ku hadasho Soomaali, waaxda luqadaha, qaybta kaalmada adeegyada, marilia allen. So [place] [phone].    ATENCIÓN: Si habla español, tiene a dowd disposición servicios gratuitos de asistencia lingüística. Llame al [phone].    We comply with applicable federal civil rights laws and [place] laws. We do not discriminate on the basis of race, color, national origin, age, disability, sex, sexual orientation, or gender identity.            Thank you!     Thank you for choosing [facility] HEPATOLOGY  for your care. Our goal is always to provide you with excellent care. Hearing back from our patients is one way we can continue to improve our services. Please take a few minutes to complete the written survey that you may receive in the mail after your visit with us. Thank you!           "   Your Updated Medication List - Protect others around you: Learn how to safely use, store and throw away your medicines at www.disposemymeds.org.          This list is accurate as of: 11/13/17 11:59 PM.  Always use your most recent med list.                   Brand Name Dispense Instructions for use Diagnosis    LIPITOR 40 MG tablet   Generic drug:  atorvastatin      Take 1 tablet (40 mg) by mouth daily        lisinopril-hydrochlorothiazide 20-25 MG per tablet    PRINZIDE/ZESTORETIC     Take 1 tablet by mouth daily        metFORMIN 500 MG tablet    GLUCOPHAGE     Take one tablet daily in the am and two tablets at bedtime.  R Abril NP at [facility]        OLANZapine 20 MG tablet    zyPREXA    30 tablet    Take 1 tablet (20 mg) by mouth At Bedtime    Chronic insomnia       ranitidine 150 MG tablet    ZANTAC     Take 1 tablet (150 mg) by mouth 2 times daily        venlafaxine 150 MG 24 hr capsule    EFFEXOR-XR    60 capsule    Take 2 capsules (300 mg) by mouth daily    Social anxiety disorder

## 2017-11-13 NOTE — PROGRESS NOTES
[facility]    Hepatology New Patient Visit    Referring provider:  Moira Cuadra      62 year old male    Chief complaint:  Hepatitis C    HPI:  HCV  - dx?  - GT-1a or 1b  - hx IVDU, EDA  - liver bx 5/2013- stage 0 fibrosis  - no prior rx    Comes to clinic this AM with a  for management of hep C.  It is not clear when the patient was first diagnosed with hep C.  He likely acquired it between 1977 and 2009 when he was using IV and IN drugs.  He had a liver biopsy in 2013 which showed no fibrosis.  He has never been treated mainly due to his history of depression.    Patient is well.  He denies any signs or symptoms specific to liver disease.    Patient denies jaundice, abdominal distension, lower extremity edema, lethargy or confusion.    No history of melena, hematemesis or hematochezia.    Patient denies fevers, sweats, chills or weight loss.    Patient does not drink alcohol.  He last drank alcohol in 2009.  He previously drank large amounts for 40-45 years.  He is an ex-smoker since 2009 and smoked several cigarettes per day for a similar period.    Medical hx Surgical hx   Past Medical History:   Diagnosis Date     Depression      Deviated septum      Gastro-oesophageal reflux disease      Headache(784.0)      Hearing loss      Hypertension      LOC (loss of consciousness) (H) age 45     Major depression 5/7/2013     Nasal congestion      Night sweats      Otitis media, chronic      Ringing in ears      Sleep apnea      Sleep problems      Sneezing       Past Surgical History:   Procedure Laterality Date     HERNIA REPAIR  2006    [facility]     LASER CO2 TYMPANOMASTOIDECTOMY  9/7/2011    Procedure:LASER CO2 TYMPANOMASTOIDECTOMY; Right Tympanoplasty , Cartilage Backed Right Tympanomastoidectomy, Omni Guide Laser on Standby  *Latex Safe*; Surgeon:BENNETT MAXWELL Location:[facility] OR     SEPTOPLASTY  9/17/2012    Procedure: SEPTOPLASTY;  Septoplasty *Latex  Safe* Turbinate reduction ;  Surgeon: Babar Dickerson MD;  Location: [facility] OR     UVULOPALATOPHARYNGOPLASTY  7/9/2012    Procedure: UVULOPALATOPHARYNGOPLASTY;  Uvulopalatopharyngoplasty * Latex Safe*;  Surgeon: Babar Dickerson MD;  Location:  OR          Medications  Prior to Admission medications    Medication Sig Start Date End Date Taking? Authorizing Provider   venlafaxine (EFFEXOR-XR) 150 MG 24 hr capsule Take 2 capsules (300 mg) by mouth daily 10/19/17  Yes Antonio Chatterjee APRN CNP   OLANZapine (ZYPREXA) 20 MG tablet Take 1 tablet (20 mg) by mouth At Bedtime 5/10/17  Yes Alba Merlos APRN CNS   metFORMIN (GLUCOPHAGE) 500 MG tablet Take one tablet daily in the am and two tablets at bedtime.  R Skolar NP at [facility] 4/13/16  Yes Alba Merlos APRN CNS   ranitidine (ZANTAC) 150 MG tablet Take 1 tablet (150 mg) by mouth 2 times daily 3/16/16  Yes Alba Merlos APRN CNS   atorvastatin (LIPITOR) 40 MG tablet Take 1 tablet (40 mg) by mouth daily 2/4/16  Yes Alba Merlos APRN CNS   lisinopril-hydrochlorothiazide (PRINZIDE,ZESTORETIC) 20-25 MG per tablet Take 1 tablet by mouth daily 2/4/16  Yes Alba Merlos APRN CNS       Allergies  Allergies   Allergen Reactions     Trazodone Other (See Comments)     Very depressed and suicidal       Family hx Social hx   Family History   Problem Relation Age of Onset     Depression Mother      Depression Father      Substance Abuse Father      MENTAL ILLNESS Other      institutionalized     Substance Abuse Brother      Substance Abuse Brother      Substance Abuse Brother      Substance Abuse Brother      Substance Abuse Brother      Liver Disease No family hx of       Social History   Substance Use Topics     Smoking status: Former Smoker     Packs/day: 0.00     Types: Cigarettes     Smokeless tobacco: Never Used      Comment: pt still uses e-cig     Alcohol use No     Lives alone in [place].  3 children, presumably healthy- has no contact.   "Not currently working.  Previously \"did everything.\"  [place] in [place] from  [place] 2005.     Review of systems  A 10-point review of systems was negative.    Examination  /85  Pulse 81  Temp 98.2  F (36.8  C) (Oral)  Ht 1.651 m (5' 5\")  Wt 91.5 kg (201 lb 12.8 oz)  SpO2 95%  BMI 33.58 kg/m2  Body mass index is 33.58 kg/(m^2).    Gen- well, NAD, A+Ox3, normal color  Eye- EOMI  ENT- MMM, normal oropharynx  Lym- no palpable lymphadenopathy  CVS- S1, S2 normal, no added sounds, RRR  RS- CTA  Abd- obese, soft, non-tender, no ascites or organomegaly on palpation or percussion, BS+  Extr- pulses good, no CURTIS  MS- hands normal- no clubbing  Neuro- A+Ox3, no asterixis  Skin- no rash or jaundice  Psych- normal mood    Laboratory  Lab Results   Component Value Date     11/13/2017    POTASSIUM 3.7 11/13/2017    CHLORIDE 101 11/13/2017    CO2 23 11/13/2017    BUN 16 11/13/2017    CR 0.91 11/13/2017       Lab Results   Component Value Date    BILITOTAL 0.7 11/13/2017    ALT 32 11/13/2017    AST 25 11/13/2017    ALKPHOS 117 11/13/2017       Lab Results   Component Value Date    ALBUMIN 3.8 11/13/2017    PROTTOTAL 7.6 11/13/2017        Lab Results   Component Value Date    WBC 6.3 11/13/2017    HGB 14.6 11/13/2017    MCV 89 11/13/2017     11/13/2017       Lab Results   Component Value Date    INR 0.96 11/13/2017     HCV RNA 3/4/2013- GT1a or 1b    Radiology  Nil recent    Assessment  62 year old male who presents for evaluation and management of treatment-naive, GT-1 hepatitis C.  No clinical or biochemical evidence of cirrhosis.  Will obtain Fibrosis Scan to rule out advanced hepatic fibrosis.  If no evidence of advanced hepatic fibrosis, will treat with 12 weeks ELB-GRAZ and will not require follow-up.    We discussed the natural history of hepatitis C, cirrhosis, duration, efficacy and side effects of antiviral therapy.      Plan  1.  Check HCV RNA  2.  Fibrosis Scan  3.  ELB-GRAZ for 12 weeks if no " cirrhosis  4.  Follow-up as needed    Shelton Fofana MD  Hepatology  [facility]

## 2017-11-16 LAB
HCV RNA SERPL NAA+PROBE-ACNC: ABNORMAL [IU]/ML
HCV RNA SERPL NAA+PROBE-LOG IU: 6.1 LOG IU/ML

## 2017-12-03 DIAGNOSIS — B18.2 CHRONIC HEPATITIS C WITHOUT HEPATIC COMA (H): Primary | ICD-10-CM

## 2017-12-06 ENCOUNTER — TELEPHONE (OUTPATIENT)
Dept: GASTROENTEROLOGY | Facility: CLINIC | Age: 62
End: 2017-12-06

## 2017-12-06 DIAGNOSIS — B18.2 CHRONIC HEPATITIS C WITHOUT HEPATIC COMA (H): Primary | ICD-10-CM

## 2017-12-06 NOTE — TELEPHONE ENCOUNTER
Called patient through  services to let him know that his fibrosis scan looked great and Dr. Fofana will be ordering 12 weeks of Zepatier for his hepatitis C.  Patient was pleased to know of his results and had no further questions.

## 2017-12-11 ENCOUNTER — TELEPHONE (OUTPATIENT)
Dept: GASTROENTEROLOGY | Facility: CLINIC | Age: 62
End: 2017-12-11

## 2017-12-11 NOTE — TELEPHONE ENCOUNTER
Patient has Medicaid, faxed attestation form to clinic for completion. Please also note, plan's preferred therapy is Mavyret. Would you like us to move forward with the Zepatier or submit Mavyret? Please advise, thank you.

## 2017-12-13 NOTE — TELEPHONE ENCOUNTER
PA Initiation    Medication: Mavyret  Insurance Company: [place] Medicaid ([facility] - Phone [phone] Fax [phone]  Pharmacy Filling the Rx: [facility] - [place], [place] - [facility] [address]  Filling Pharmacy Phone: [phone]  Filling Pharmacy Fax: [phone]  Start Date: 12/13/2017

## 2017-12-15 NOTE — TELEPHONE ENCOUNTER
Received fax back from plan: Provider must verify here is monitoring plan for reactivation of HBV during treatment and post-treatment follow up and also that patient has been counseled on HBV reactivation adverse events management plan and the risk of HBV reactivation including serious liver injury or death.    Can you please write something up (more a less a small appeal letter) regarding this? Please let me know if you have any questions, thank you.

## 2017-12-20 ENCOUNTER — OFFICE VISIT (OUTPATIENT)
Dept: INTERPRETER SERVICES | Facility: CLINIC | Age: 62
End: 2017-12-20
Payer: MEDICAID

## 2017-12-20 ENCOUNTER — TELEPHONE (OUTPATIENT)
Dept: GASTROENTEROLOGY | Facility: CLINIC | Age: 62
End: 2017-12-20

## 2017-12-20 ENCOUNTER — OFFICE VISIT (OUTPATIENT)
Dept: PSYCHIATRY | Facility: CLINIC | Age: 62
End: 2017-12-20
Attending: CLINICAL NURSE SPECIALIST
Payer: MEDICAID

## 2017-12-20 VITALS
HEART RATE: 83 BPM | DIASTOLIC BLOOD PRESSURE: 88 MMHG | WEIGHT: 194.6 LBS | BODY MASS INDEX: 32.38 KG/M2 | SYSTOLIC BLOOD PRESSURE: 144 MMHG

## 2017-12-20 DIAGNOSIS — F33.2 SEVERE RECURRENT MAJOR DEPRESSION WITHOUT PSYCHOTIC FEATURES (H): ICD-10-CM

## 2017-12-20 DIAGNOSIS — F51.04 CHRONIC INSOMNIA: ICD-10-CM

## 2017-12-20 DIAGNOSIS — F19.21 POLYSUBSTANCE DEPENDENCE, NON-OPIOID, IN REMISSION (H): Primary | ICD-10-CM

## 2017-12-20 DIAGNOSIS — F40.10 SOCIAL ANXIETY DISORDER: ICD-10-CM

## 2017-12-20 PROCEDURE — 99212 OFFICE O/P EST SF 10 MIN: CPT | Mod: ZF

## 2017-12-20 RX ORDER — OLANZAPINE 20 MG/1
20 TABLET ORAL AT BEDTIME
Qty: 30 TABLET | Refills: 5 | Status: SHIPPED | OUTPATIENT
Start: 2017-12-20 | End: 2018-06-20

## 2017-12-20 RX ORDER — VENLAFAXINE HYDROCHLORIDE 150 MG/1
300 CAPSULE, EXTENDED RELEASE ORAL DAILY
Qty: 60 CAPSULE | Refills: 5 | Status: SHIPPED | OUTPATIENT
Start: 2017-12-20 | End: 2018-06-20

## 2017-12-20 NOTE — Clinical Note
Marilyn, I saw Gianluca today and he said that he has not received the anti viral medication.  He needs a  to talk. Could someone contact him and let him know if he should expect it in the mail or if he can pick it up at his usual pharmacy? Thank you,Alba ORTIZ, APRN

## 2017-12-20 NOTE — TELEPHONE ENCOUNTER
Spoke with patient through  service to let him know that we are still processing his paperwork for his Mavyret medication.  As soon as everything goes through I will be contacting him.  Patient was fine with this and had no further questions.

## 2017-12-20 NOTE — MR AVS SNAPSHOT
After Visit Summary   12/20/2017    Gianluca Singh    MRN: [id number]           Patient Information     Date Of Birth          1955        Visit Information        Provider Department      12/20/2017 11:15 AM Alba Merlos APRN CNS Psychiatry Clinic        Today's Diagnoses     Polysubstance dependence, non-opioid, in remission (H)    -  1    Social anxiety disorder        Severe recurrent major depression without psychotic features (H)        Chronic insomnia           Follow-ups after your visit        Follow-up notes from your care team     Return in about 8 weeks (around 2/14/2018).      Your next 10 appointments already scheduled     Feb 21, 2018 10:15 AM CST   Adult Med Follow UP with LORE Kim CNS   Psychiatry Clinic ([facility])    [place] [id number] [facility] [address] [phone]              Who to contact     Please call your clinic at [phone] to:    Ask questions about your health    Make or cancel appointments    Discuss your medicines    Learn about your test results    Speak to your doctor   If you have compliments or concerns about an experience at your clinic, or if you wish to file a complaint, please contact [facility] Patient Relations at [phone] or email us at [email or web address]         Additional Information About Your Visit        MyChart Information     MyPrepAppt is an electronic gateway that provides easy, online access to your medical records. With FusionOne, you can request a clinic appointment, read your test results, renew a prescription or communicate with your care team.     To sign up for MyPrepAppt visit the website at [email or web address]   You will be asked to enter the access code listed below, as well as some personal information. Please follow the directions to create your username and password.     Your access code is:  [email or web address]  Expires: 2018  5:30 AM     Your access code will  in 90 days. If you need help or a new code, please contact your [facility] or call [phone] for assistance.        Care EveryWhere ID     This is your Care EveryWhere ID. This could be used by other organizations to access your [facility] medical records  [id number]        Your Vitals Were     Pulse BMI (Body Mass Index)                83 32.38 kg/m2           Blood Pressure from Last 3 Encounters:   17 144/88   17 129/85   10/19/17 (!) 146/100    Weight from Last 3 Encounters:   17 88.3 kg (194 lb 9.6 oz)   17 91.5 kg (201 lb 12.8 oz)   10/19/17 90.9 kg (200 lb 6.4 oz)              Today, you had the following     No orders found for display         Where to get your medicines      These medications were sent to [facility] [address] [place] [address] & [address] [address]     Phone:  [phone]     OLANZapine 20 MG tablet    venlafaxine 150 MG 24 hr capsule          Primary Care Provider Office Phone # Fax #    Moira Kohlernixon Cuadra [phone]       [facility]  [facility] [address]        Equal Access to Services     JENNY LORENZANA AH: Hadii aad ku hadasho Soomaali, waaxda luqadaha, qaybta kaalmada adeegyada, marilia rodríguez hayadriane baez . So [facility] [phone].    ATENCIÓN: Si habla español, tiene a dowd disposición servicios gratuitos de asistencia lingüística. Llame al [phone].    We comply with applicable federal civil rights laws and [place] laws. We do not discriminate on the basis of race, color, national origin, age, disability, sex, sexual orientation, or gender identity.            Thank you!     Thank you for choosing PSYCHIATRY CLINIC  for your care. Our goal is always to provide you with excellent care. Hearing back from our patients is one  way we can continue to improve our services. Please take a few minutes to complete the written survey that you may receive in the mail after your visit with us. Thank you!             Your Updated Medication List - Protect others around you: Learn how to safely use, store and throw away your medicines at www.disposemymeds.org.          This list is accurate as of: 12/20/17 11:59 PM.  Always use your most recent med list.                   Brand Name Dispense Instructions for use Diagnosis    elbasvir-grazoprevir  MG Tabs per tablet    ZEPATIER    30 tablet    Take 1 tablet by mouth daily    Chronic hepatitis C without hepatic coma (H)       LIPITOR 40 MG tablet   Generic drug:  atorvastatin      Take 1 tablet (40 mg) by mouth daily        lisinopril-hydrochlorothiazide 20-25 MG per tablet    PRINZIDE/ZESTORETIC     Take 1 tablet by mouth daily        metFORMIN 500 MG tablet    GLUCOPHAGE     Take one tablet daily in the am and two tablets at bedtime.  R Skolar NP at [facility]        OLANZapine 20 MG tablet    zyPREXA    30 tablet    Take 1 tablet (20 mg) by mouth At Bedtime    Chronic insomnia       ranitidine 150 MG tablet    ZANTAC     Take 1 tablet (150 mg) by mouth 2 times daily        venlafaxine 150 MG 24 hr capsule    EFFEXOR-XR    60 capsule    Take 2 capsules (300 mg) by mouth daily    Social anxiety disorder

## 2017-12-20 NOTE — PROGRESS NOTES
Outpatient Psychiatry Progress Note     Provider: LORE Kim CNS  Date: 2017  Service:  Medication follow up with counseling.   Patient Identification: Gianluca Singh  : 1955   MRN: [id number]    Gianluca Singh is a 62 year old year old male who presents for ongoing psychiatric care.  Gianluca Singh was last seen in clinic on 10/19/17 by Antonio Gates.   At that time,   Assessment & Plan       Gianluca Singh is seen today for follow up and reports continued mood stability.  No medication changes.  Recommended he call his psychotherapy clinic to ensure he is assigned a new therapist since his current therapist resigned.     Diagnosis  Social Anxiety Disorder, Major Depression, Recurrent Severe without psychosis, Polysubstance dependence in remission.     Plan:  Medication: no changes  OTC Recommendations: none  Lab Orders:  none  Referrals: none  Release of Information: none  Future Treatment Considerations: per symptoms  Return for Follow Up: 2 months with LORE Gibbons      ____________________________________________________________________________________________________________________________________________    2017   Seen with   Upper sorbian  Today Gianluca reports his mood is worse due to the holidays which are hard for him since he has no contact with  Children.   Youngest is age 29 and two older children.  He has been more isolated than he had been. He could spend the holiday with a couple he his friends with.   Not seeing a therapist now because the last therapist left and has not called him to schedule there. He will ask  to help find a new own.  Attending program at [facility] ?  Side effects of medication include: none known  Psychiatric Review of Systems:  The patient endorses symptoms of depression: In the last 2 weeks per PHQ 9 score of 24. Denies SI intent or plan.  He  patient endorses symptoms of anxiety : stable  He endorses symptoms of al  including none.    He endorses symptoms of psychosis including no psychotic symptoms.       Review of Medical Systems:  Sleep: stable  Energy: stable  Concentration: stable  Appetite: has lost weight, reports eating healthier  GI Concerns: none  Cardiac concerns: none  Neurological concerns: none  Other medical concerns: no new concerns  Current Substance Use:  Alcohol:continues sober  Other drugs:none  Caffeine:no change  Nicotine: e cigs  Past Medical History:   Past Medical History:   Diagnosis Date     Depression      Gastro-oesophageal reflux disease      Headache(784.0)      Hypertension      LOC (loss of consciousness) (H) age 45    out for 20 mintes after hit on top of head with board     Major depression 5/7/2013     Otitis media, chronic      Sleep apnea     cant tolerate cpap     Patient Active Problem List   Diagnosis     Non-English speaking patient     Chronic nasal congestion     HEIKE (obstructive sleep apnea)     Chronic hepatitis C (H)     Esophageal reflux     Headache     Short-term memory loss     Polysubstance dependence, non-opioid, in remission (H)     Social anxiety disorder     Severe recurrent major depression without psychotic features (H)     Heterozygous MTHFR mutation C677T (H)       Allergies:   Allergies   Allergen Reactions     Trazodone Other (See Comments)     Very depressed and suicidal          Current Medications     Current Outpatient Prescriptions Ordered in [facility]   Medication Sig Dispense Refill     elbasvir-grazoprevir (ZEPATIER)  MG TABS per tablet Take 1 tablet by mouth daily 30 tablet 2     venlafaxine (EFFEXOR-XR) 150 MG 24 hr capsule Take 2 capsules (300 mg) by mouth daily 60 capsule 5     OLANZapine (ZYPREXA) 20 MG tablet Take 1 tablet (20 mg) by mouth At Bedtime 30 tablet 5     metFORMIN (GLUCOPHAGE) 500 MG tablet Take one tablet daily in the am and two tablets at bedtime.  HOME Samuels NP at [facility]       ranitidine (ZANTAC) 150 MG tablet Take 1 tablet (150 mg) by  "mouth 2 times daily       atorvastatin (LIPITOR) 40 MG tablet Take 1 tablet (40 mg) by mouth daily       lisinopril-hydrochlorothiazide (PRINZIDE,ZESTORETIC) 20-25 MG per tablet Take 1 tablet by mouth daily       No current Epic-ordered facility-administered medications on file.         Mental Status Exam     Appearance:  Casually dressed and Well groomed  Behavior/relationship to examiner/demeanor:  Cooperative  Orientation: Oriented to person, place, time and situation  Psychomotor: normal form  Speech Rate:  Normal  Speech Spontaneity:  Normal  Mood:  \"same\"  Affect:  Appropriate/mood-congruent  Thought Process (Associations):  Goal directed  Thought Content:  no overt psychosis, patient does not appear to be responding to internal stimuli, Suicidal ideation and denies suicidal intent or plan  Abnormal Perception:  None  Attention/Concentration:  Normal  Language:  Intact  Insight:  Adequate  Judgment:  Adequate for safety      Results     Vital signs: /88  Pulse 83  Wt 88.3 kg (194 lb 9.6 oz)  BMI 32.38 kg/m2    Laboratory Data:  no new data available    Assessment & Plan      Gianluca Singh is seen today for follow up with  and reports his mood is increased depression and anxiety due to the holidays and not having contact with children.  He has not started treatment ordered for Hep C as he has not received the medication yet. At this time is safe and has some support. Agrees to continue current medication.    Diagnosis  Social Anxiety Disorder, Major Depression, Recurrent Severe without psychosis, Polysubstance dependence in remission.    Plan:  Medication: Continue current medication  OTC Recommendations: none  Lab Orders:  none  Referrals: none  Release of Information: none  Future Treatment Considerations:per symptoms  Return for Follow Up:8 weeks. Message sent to hepatology that he has not received Hep C antiviral medication.   The risks, benefits, alternatives and side effects " have been discussed and are understood by the patient. The patient understands the risks of using street drugs or alcohol. There are no medical contraindications, the patient agrees to treatment, and has the capacity to do so. The patient understands to call 911 or come to the nearest ED if life threatening or urgent symptoms present.  In addition time was spent counseling the patient and/or coordinating care regarding review of social and occupational functioning.  In addition patient was counseled on health and wellness practices to augment medication treatment of symptoms. See note for details.    Alba Merlos, APRN CNS 12/20/2017

## 2017-12-21 ASSESSMENT — PATIENT HEALTH QUESTIONNAIRE - PHQ9: SUM OF ALL RESPONSES TO PHQ QUESTIONS 1-9: 22

## 2017-12-26 ENCOUNTER — NURSE TRIAGE (OUTPATIENT)
Dept: NURSING | Facility: CLINIC | Age: 62
End: 2017-12-26

## 2017-12-26 ENCOUNTER — TELEPHONE (OUTPATIENT)
Dept: GASTROENTEROLOGY | Facility: CLINIC | Age: 62
End: 2017-12-26

## 2017-12-26 NOTE — TELEPHONE ENCOUNTER
Received a call from LSW from [facility]. Mr. Singh had questions to the LSW of why his Mayret had been denied. Reviewed chart and communicated information from note on 12/20:  My Catalan LPN        Spoke with patient through  service to let him know that we are still processing his paperwork for his Mavyret medication.  As soon as everything goes through I will be contacting him.  Patient was fine with this and had no further questions.         Ms. Choi will be seeing patient tomorrow and will communicate this to him. If other questions, he can contact his RN at the clinic.     LORE Cheng, CNP  Inpatient hepatology [place]  [phone]

## 2017-12-26 NOTE — TELEPHONE ENCOUNTER
Reason for Call: [facility]   Lindsay BHAT  [phone], assisted to Page  Marcus to page the on-call Hepatology provider( covering for Shelton Bhakta )  to her @ [phone] requesting more information about  Mavyret Rx denial . Gave Lindsay the 's phone number if she doesn't hear anything within 20 minutes for a call back.   .Aida Davis RN [facility] [place] nurse advisors.

## 2017-12-29 DIAGNOSIS — B18.2 CHRONIC HEPATITIS C WITHOUT HEPATIC COMA (H): Primary | ICD-10-CM

## 2018-01-02 ENCOUNTER — MEDICAL CORRESPONDENCE (OUTPATIENT)
Dept: HEALTH INFORMATION MANAGEMENT | Facility: CLINIC | Age: 63
End: 2018-01-02

## 2018-01-02 ENCOUNTER — TELEPHONE (OUTPATIENT)
Dept: GASTROENTEROLOGY | Facility: CLINIC | Age: 63
End: 2018-01-02

## 2018-01-02 NOTE — TELEPHONE ENCOUNTER
Nicholas,RN with [facility] clinic called to request Hep Clinic contact pt regarding Hep C medication & treatment plan.    Please callback pt at [phone].    Nicholas at [facility] can be called at [phone], opt [phone].

## 2018-01-02 NOTE — TELEPHONE ENCOUNTER
Prior Authorization Approval    Authorization Effective Date: 12/27/2017  Authorization Expiration Date: 2/20/2018  Medication: Mavyret  Approved Dose/Quantity: 8 weeks  Reference #: [id number]   Insurance Company: [place] Medicaid ([facility]) - Phone [phone] Fax [phone]  Expected CoPay: $3     CoPay Card Available: No   Foundation Assistance Needed: No  Which Pharmacy is filling the prescription (Not needed for infusion/clinic administered): [facility] - [place], [place] - [address]

## 2018-01-05 NOTE — TELEPHONE ENCOUNTER
1/5/2018  2:35 PM      Hep C Care Coordination Call   Connected with patient for follow up on Hep C treatment delivery status. Patient is Tunisian speaking. This RN CC does speak fluent Tunisian. Communicated with patient that Baron CROWELL From [facility] will be contacting patient to set up delivery of Hepatitis C treatment. Patient states, he did receive a call but the connection was bad and ultimately could not complete setting up delivery. Patient states he will wait for  to call with a  to set up delivery and is aware he can contact this RN CC directly at [phone] with any further questions or concerns.         Jonna Ricci RN, BSN, PHN  [facility]   RN Care Coordinator Hepatology Specialty Clinic/Program

## 2018-01-12 ENCOUNTER — OFFICE VISIT (OUTPATIENT)
Dept: PHARMACY | Facility: CLINIC | Age: 63
End: 2018-01-12
Payer: MEDICAID

## 2018-01-12 VITALS — HEART RATE: 75 BPM | DIASTOLIC BLOOD PRESSURE: 84 MMHG | SYSTOLIC BLOOD PRESSURE: 134 MMHG

## 2018-01-12 DIAGNOSIS — E11.9 TYPE 2 DIABETES MELLITUS WITHOUT COMPLICATION, WITHOUT LONG-TERM CURRENT USE OF INSULIN (H): ICD-10-CM

## 2018-01-12 DIAGNOSIS — B18.2 CHRONIC HEPATITIS C WITHOUT HEPATIC COMA (H): Primary | ICD-10-CM

## 2018-01-12 DIAGNOSIS — K21.9 GASTROESOPHAGEAL REFLUX DISEASE WITHOUT ESOPHAGITIS: ICD-10-CM

## 2018-01-12 DIAGNOSIS — I10 ESSENTIAL HYPERTENSION: ICD-10-CM

## 2018-01-12 DIAGNOSIS — F32.A DEPRESSION, UNSPECIFIED DEPRESSION TYPE: ICD-10-CM

## 2018-01-12 DIAGNOSIS — E78.5 HYPERLIPIDEMIA LDL GOAL <100: ICD-10-CM

## 2018-01-12 PROCEDURE — 99605 MTMS BY PHARM NP 15 MIN: CPT | Performed by: PHARMACIST

## 2018-01-12 PROCEDURE — 99607 MTMS BY PHARM ADDL 15 MIN: CPT | Performed by: PHARMACIST

## 2018-01-12 NOTE — PATIENT INSTRUCTIONS
Recommendations from today's MTM visit:                                                    MTM (medication therapy management) is a service provided by a clinical pharmacist designed to help you get the most of out of your medicines.     Concerning Mavyret Therapy:   -You will be taking Mavyret, 3 tablets daily for 8 weeks with food   -If you start any new medications, please call to discuss drug interactions with me before starting   -If you miss a dose, and it has been less than 18 hours, you may take the dose. If it has been more than 18 hours, skip the dose and take your next dose as normal. Do not take 2 doses at the same time.    -Most common side effects are Headache, Fatigue, and Nausea.    -Replace all items that come into contact with blood one week after starting therapy (eg. Toothbrush, bladed razors, diabetic lancets, etc).    -Set a daily alarm on your phone to remind you to take the medication.    1. Hold your Atorvastatin while on Mavyret therapy.     Next MTM visit: as needed    To schedule another MTM appointment, please call the clinic directly or you may call the MTM scheduling line at [phone] or toll-free at [phone].     My Clinical Pharmacist's contact information:                                                      It was a pleasure seeing you today!  Please feel free to contact me with any questions or concerns you have.      Regan Melissa, PharmD  MTM Pharmacist    Phone: [phone]      You may receive a survey about the MTM services you received.  I would appreciate your feedback to help me serve you better in the future. Please fill it out and return it when you can. Your comments will be anonymous.

## 2018-01-12 NOTE — MR AVS SNAPSHOT
After Visit Summary   1/12/2018    Gianluca Singh    MRN: [id number]           Patient Information     Date Of Birth          1955        Visit Information        Provider Department      1/12/2018 11:00 AM [facility]        Care Instructions    Recommendations from today's MTM visit:                                                    MTM (medication therapy management) is a service provided by a clinical pharmacist designed to help you get the most of out of your medicines.     Concerning Mavyret Therapy:   -You will be taking Mavyret, 3 tablets daily for 8 weeks with food   -If you start any new medications, please call to discuss drug interactions with me before starting   -If you miss a dose, and it has been less than 18 hours, you may take the dose. If it has been more than 18 hours, skip the dose and take your next dose as normal. Do not take 2 doses at the same time.    -Most common side effects are Headache, Fatigue, and Nausea.    -Replace all items that come into contact with blood one week after starting therapy (eg. Toothbrush, bladed razors, diabetic lancets, etc).    -Set a daily alarm on your phone to remind you to take the medication.    1. Hold your Atorvastatin while on Mavyret therapy.     Next MTM visit: as needed    To schedule another MTM appointment, please call the clinic directly or you may call the MTM scheduling line at [phone] or toll-free at [phone].     My Clinical Pharmacist's contact information:                                                      It was a pleasure seeing you today!  Please feel free to contact me with any questions or concerns you have.      Regan Melissa, PharmD  MTM Pharmacist    Phone: [phone]      You may receive a survey about the MTM services you received.  I would appreciate your feedback to help me serve you better in the future. Please fill it out and return it when  "you can. Your comments will be anonymous.              Follow-ups after your visit        Your next 10 appointments already scheduled     2018 10:15 AM [facility]   Adult Med Follow UP with LORE Kim CNS   Psychiatry Clinic ([facility])    [address]  [address] [place] [address] [phone]              Who to contact     If you have questions or need follow up information about today's clinic visit or your schedule please contact [facility] MEDICATION THERAPY MANAGEMENT directly at [phone].  Normal or non-critical lab and imaging results will be communicated to you by Simple[employer], letter or phone within 4 business days after the clinic has received the results. If you do not hear from us within 7 days, please contact the clinic through Simple[employer] or phone. If you have a critical or abnormal lab result, we will notify you by phone as soon as possible.  Submit refill requests through [employer] or call your pharmacy and they will forward the refill request to us. Please allow 3 business days for your refill to be completed.          Additional Information About Your Visit        MyChart Information     [employer] lets you send messages to your doctor, view your test results, renew your prescriptions, schedule appointments and more. To sign up, go to [email or web address] . Click on \"Log in\" on the left side of the screen, which will take you to the Welcome page. Then click on \"Sign up Now\" on the right side of the page.     You will be asked to enter the access code listed below, as well as some personal information. Please follow the directions to create your username and password.     Your access code is: [email or web address]  Expires: 2018  5:30 AM     Your access code will  in 90 days. If you need help or a new code, please call your [place] clinic or [phone].        Care EveryWhere ID     This is your Care EveryWhere ID. This could be used " by other organizations to access your [place] medical records  [id number]        Your Vitals Were     Pulse                   75            Blood Pressure from Last 3 Encounters:   01/12/18 134/84   11/13/17 129/85   11/13/14 126/84    Weight from Last 3 Encounters:   11/13/17 201 lb 12.8 oz (91.5 kg)   11/13/14 211 lb 3.2 oz (95.8 kg)   03/13/14 206 lb 3.2 oz (93.5 kg)              Today, you had the following     No orders found for display         Today's Medication Changes          These changes are accurate as of: 1/12/18 11:19 AM.  If you have any questions, ask your nurse or doctor.               Stop taking these medicines if you haven't already. Please contact your care team if you have questions.     elbasvir-grazoprevir  MG Tabs per tablet   Commonly known as:  ZEPATIER   Stopped by:  Regan Melissa, [facility]                    Primary Care Provider Office Phone # Fax #    Moira Vero Cuadra [phone]       [facility] [address] [facility] [address]        Equal Access to Services     [facility] : Hadii yolanda murillo hadasho Soomaali, waaxda luqadaha, qaybta kaalmada adevipinyada, marilia baez . So [facility] [phone].    ATENCIÓN: Si habla español, tiene a dowd disposición servicios gratuitos de asistencia lingüística. [facility] [phone].    We comply with applicable federal civil rights laws and [place] laws. We do not discriminate on the basis of race, color, national origin, age, disability, sex, sexual orientation, or gender identity.            Thank you!     Thank you for choosing [facility] MEDICATION THERAPY MANAGEMENT  for your care. Our goal is always to provide you with excellent care. Hearing back from our patients is one way we can continue to improve our services. Please take a few minutes to complete the written survey that you may receive in the mail after your visit with us. Thank you!             Your Updated Medication  List - Protect others around you: Learn how to safely use, store and throw away your medicines at www.disposemymeds.org.          This list is accurate as of: 1/12/18 11:19 AM.  Always use your most recent med list.                   Brand Name Dispense Instructions for use Diagnosis    ASPIRIN PO      Take 81 mg by mouth daily        Glecaprevir-Pibrentasvir 100-40 MG Tabs     90 tablet    Take 3 tablets by mouth daily    Chronic hepatitis C without hepatic coma (H)       LIPITOR 40 MG tablet   Generic drug:  atorvastatin      Take 1 tablet (40 mg) by mouth daily        lisinopril-hydrochlorothiazide 20-25 MG per tablet    PRINZIDE/ZESTORETIC     Take 1 tablet by mouth daily        metFORMIN 500 MG tablet    GLUCOPHAGE     Take one tablet daily in the am and two tablets at bedtime.  HOME Samuels NP at [facility]        OLANZapine 20 MG tablet    zyPREXA    30 tablet    Take 1 tablet (20 mg) by mouth At Bedtime    Chronic insomnia       ranitidine 150 MG tablet    ZANTAC     Take 150 mg by mouth daily        venlafaxine 150 MG 24 hr capsule    EFFEXOR-XR    60 capsule    Take 2 capsules (300 mg) by mouth daily    Social anxiety disorder

## 2018-01-12 NOTE — PROGRESS NOTES
SUBJECTIVE/OBJECTIVE:                           Gianluca Singh is a 62 year old male coming in for an initial visit for Medication Therapy Management.  He was referred to me from LIV Ricci. Used a phone  for     Chief Complaint: HCV start/ medication review. Questioning how to take 2-3 capsules    Allergies/ADRs: Reviewed in Epic  Tobacco: No tobacco use  Alcohol: not currently using  Caffeine: 1 cups/day of coffee  Activity: 15-20 minutes daily.   PMH: Reviewed in Epic    Medication Adherence/Access  The patient misses their medication 0 times per week.    Patient is responsible for his/her own medications.   The patient fills specialty prescriptions at [facility].      HCV: Planning on taking with lunch.   -[facility] Use Only - Okay  Genotype: 1a or 1b  Viral Load and date drawn: 1.36 million on 11/13/17  Previous treatment: Treatment naive  Liver staging: F0 on 12/03/17  Child Martin score: N/A  HIV positive: unknown  Hep B Hx: Past resolved infection: will need monitoring for reactivation.   Renal impairment CrCl <30mL/min: No  Decompensated Cirrhosis: No  Recurrent HCV post-liver transplant: No  Hepatocellular Carcinoma: No   Initial Regimen: Mavyret  Length of Therapy Ordered: 8 weeks  DDIx with HCV therapy: Atorvastatin  MTM Evaluation: HCV therapy appropriate and Length of therapy appropriate  Recommended Interventions: Hold Atorvastatin during Mavyret therapy.     Hypertension: Current medications include Lisinopril/ HCTZ 20/25mg daily.  Patient does not self-monitor BP.  Patient reports no current medication side effects.    Hyperlipidemia: Current therapy includes Atorvastatin 40mg once daily.  Pt reports mild myalgia when he is feeling tired.   The ASCVD Risk score (Marlys MIR Jr, et al., 2013) failed to calculate for the following reasons:    The valid total cholesterol range is 130 to 320 mg/dL    Diabetes:  Pt currently taking Metformin 500mg q1AM and 1000mg qPM. Pt is not  experiencing side effects.  SMBG: one time daily.   Ranges (patient reported): fasting highest is 130, lowest 85-90. He is usually between .   Patient is not experiencing hypoglycemia  Recent symptoms of high blood sugar? none  Microalbumin unknown if < 30 mg/g. Pt is taking an ACEi/ARB.  Aspirin: Taking 81mg daily and denies side effects  Diet/Exercise: exercising daily, did not discuss diet.     GERD: Current medications include: Zantac (ranitidine) 150mg daily.  This controls his GERD symptoms.  Pt c/o no current symptoms.  Patient feels that current regimen is effective.    Depression:  Current medications include: Venlafaxine XL 300mg daily. He states this is effective. He takes Olanzapine for sleep, this is effective as well.  Pt reports that depression symptoms are improved on these therapies.    Current labs include:  BP Readings from Last 3 Encounters:   11/13/17 129/85   11/13/14 126/84   03/13/14 105/67     Today's Vitals: There were no vitals taken for this visit.  Lab Results   Component Value Date    A1C 5.9 03/09/2017   .  Lab Results   Component Value Date    CHOL 121 03/09/2017     Lab Results   Component Value Date    TRIG 119 03/09/2017     Lab Results   Component Value Date    HDL 34 03/09/2017     Lab Results   Component Value Date    LDL 63 03/09/2017       Liver Function Studies -   Recent Labs   Lab Test  11/13/17   1113   PROTTOTAL  7.6   ALBUMIN  3.8   BILITOTAL  0.7   ALKPHOS  117   AST  25   ALT  32       No results found for: UCRR, MICROL, UMALCR    Last Basic Metabolic Panel:  Lab Results   Component Value Date     11/13/2017      Lab Results   Component Value Date    POTASSIUM 3.7 11/13/2017     Lab Results   Component Value Date    CHLORIDE 101 11/13/2017     Lab Results   Component Value Date    BUN 16 11/13/2017     Lab Results   Component Value Date    CR 0.91 11/13/2017     GFR Estimate   Date Value Ref Range Status   11/13/2017 84 >60 mL/min/1.7m2 Final     Comment:      Non  GFR Calc   03/09/2017 80 >60 ml/min/1.73m2 Final   08/15/2014 89 >60 ml/min/1.73m2 Final     GFR Estimate If Black   Date Value Ref Range Status   11/13/2017 >90 >60 mL/min/1.7m2 Final     Comment:      GFR Calc   03/09/2017 >90 >60 ml/min/1.73m2 Final   08/15/2014 >90 >60 ml/min/1.73m2 Final     TSH   Date Value Ref Range Status   03/09/2017 1.93 0.40 - 4.00 mcU/mL Final       There is no immunization history on file for this patient.    ASSESSMENT:                             Current medications were reviewed today.     Medication Adherence: no issues identified    HCV: Therapy is appropriate. Pt takes most his medications on an empty stomach, so he will take Mavyret at lunchtime. He will hold Atorvastatin during Mavyret course as Mavyret can substantially increase blood concentrations of statins.   Discussed with patient...  -Adherence- pt to set alarm on his phone  -DDIx- Pt to hold Atorvastatin  -Missed dose protocol- 18 hours  -Common SE  -Replacing objects that come into contact with blood 1 week after start (ie. Toothbrush, razors, Lancets, pt states he only uses fresh lancets)  -Administration- take with food.     Hypertension: Stable. BP <140/90 today.    Hyperlipidemia: Stable. Pt holding Atorvastatin prior to therapy.     Diabetes:  Stable. Fasting BG at goal .     GERD: Stable. Sx controlled.     Depression:  Stable. Sx controlled    PLAN:                            Jonna Ricci RN...  1. Pt is starting Mavyret x 8 weeks on 1/12/18    Pt to...  1. Hold Atorvastatin throughout Mavyret therapy.     I spent 50 minutes with this patient today. I offer these suggestions for consideration by the PCP. A copy of the visit note was provided to the patient's primary care provider.    Will follow up as needed.    The patient was given a summary of these recommendations as an after visit summary.     Regan Melissa, PharmD  [place] Pharmacist    Phone: [phone]

## 2018-01-15 ENCOUNTER — CARE COORDINATION (OUTPATIENT)
Dept: GASTROENTEROLOGY | Facility: CLINIC | Age: 63
End: 2018-01-15

## 2018-01-15 DIAGNOSIS — B18.2 CHRONIC HEPATITIS C WITHOUT HEPATIC COMA (H): Primary | ICD-10-CM

## 2018-01-15 NOTE — LETTER
January 15, 2018       TO: Gianluca Singh  [address]   [place] [address]       Dear Mr. Singh,    Below is a summary of your treatment schedule. Please follow the schedule as closely as possible. Labs should be drawn as close to the date indicated at the [facility] or [facility].    Hepatitis C Treatment  Treatment:  Mavyret x8 weeks       Start Date: 01/12/18    Week 4  Hepatic panel, BMP Lab Due: 2/9/2018. You do not need to fast for this lab. Please set up a clinic nurse visit to review lab results and to check in on treatment tolerance which will be reported to the provider and insurance.     Week 8 - End of Treatment  HCV RNA Quant Lab Due: 3/9/2018  Please have this lab drawn on or within a week after this date 3/9/2018. You will need to repeat this lab 3 months after completing treatment to ensure you have cleared the virus. Please see date for the final lab below. You do not need to fast for this lab.     3 Months Post Treatment  HCV RNA Quant Lab Due: 6/7/2018 Please have this lab drawn on the specified date of 6/7/2018 or within a week after. This final lab will determine if you have cleared the Hepatitis C virus with Mavyret treatment. Without this final lab, we will be unable to determine if treatment was successful. You do not need to fast for this lab.             Educational information to patient on Hep C treatment;     -Contact the [facility] Hepatology clinic and speak with RN Care Coordinator prior to starting any new prescribed or OTC medications.   -Take medications exactly as prescribed, do not change dose or stop taking without consulting your provider.   -Take Medication one time each day with or without food  -If you miss a dose of medication, then take it as soon as you remember on the same day. If not remembered on the same day, then skip the dose and take the next dose at the usual time. Do not take more than the recommended dose. Contact the  clinic if you miss a dose.    Please contact the pharmacy 1-2 weeks prior to needing a medication refill.      Side Effects  The most common side effects of Hep C medication treatment can include:  -tiredness  -headache  Notify the clinic of any side effects that bother you or that do not go away.   Possible side effects have been discussed.   Patient has been instructed to clinic for rash, itching or unmanageable nausea.    How to store Hep C Treatment Medications  -Store Medication at room temperature below 86 degrees F  -Keep Medication in it's original container  -Do not use Medication if the seal is broken or missing    General information  It is not known if treatment will prevent you from infecting another person or reinfecting yourself with the hepatitis C virus during treatment. It is best that as soon as you start treatment to buy a new toothbrush, disposable razors (if you use a rotating shaver you do not need to buy a new one) and nail clippers. If you check your blood sugar at home, please dispose of the fingerstick needle after each use and DO NOT REUSE the insulin needles. These items should not be shared with anyone.        If you have any questions, please contact the main clinic at [phone] or your RN Care Coordinator at [phone]. We appreciate you choosing the [facility] clinic for your treatment.                   Jonna Ricci RN, BSN, PHN  [facility]  Care Coordinator for Hepatology Clinic/Specialty Program

## 2018-01-15 NOTE — PROGRESS NOTES
1/15/2018  10:36 AM      Hep C Care Coordination Call   Patient connected with Regan STAFFORD PharmD to review medication interactions and side effects of Hepatitis C treatment. Patient will be taking Mavyret for x8 weeks. Patient reported starting on 1/12/2018. I will base the treatment POC on this start date;       Below is a summary of your treatment schedule. Please follow the schedule as closely as possible. Labs should be drawn as close to the date indicated at the [facility] or [facility].    Hepatitis C Treatment  Treatment:  Mavyret x8 weeks   Genotype: 1a (or 1b)  Stage Fibrosis: F0  Treatment Naive       Start Date: 01/12/18    Week 4  Hepatic panel, BMP Lab Due: 2/9/2018. You do not need to fast for this lab. Please set up a clinic nurse visit to review lab results and to check in on treatment tolerance which will be reported to the provider and insurance.     Week 8 - End of Treatment  HCV RNA Quant Lab Due: 3/9/2018  Please have this lab drawn on or within a week after this date 3/9/2018. You will need to repeat this lab 3 months after completing treatment to ensure you have cleared the virus. Please see date for the final lab below. You do not need to fast for this lab.     3 Months Post Treatment  HCV RNA Quant Lab Due: 6/7/2018 Please have this lab drawn on the specified date of 6/7/2018 or within a week after. This final lab will determine if you have cleared the Hepatitis C virus with Mavyret treatment. Without this final lab, we will be unable to determine if treatment was successful. You do not need to fast for this lab.     Educational information to patient on Hep C treatment;     -Contact the [facility] Hepatology clinic and speak with RN Care Coordinator prior to starting any new prescribed or OTC medications.   -Take medications exactly as prescribed, do not change dose or stop taking without consulting your provider.   -Take Medication one time each day with or without  food  -If you miss a dose of medication, then take it as soon as you remember on the same day. If not remembered on the same day, then skip the dose and take the next dose at the usual time. Do not take more than the recommended dose. Contact the clinic if you miss a dose.    Please contact the pharmacy 1-2 weeks prior to needing a medication refill.      Side Effects  The most common side effects of Hep C medication treatment can include:  -tiredness  -headache  Notify the clinic of any side effects that bother you or that do not go away.   Possible side effects have been discussed.   Patient has been instructed to clinic for rash, itching or unmanageable nausea.    How to store Hep C Treatment Medications  -Store Medication at room temperature below 86 degrees F  -Keep Medication in it's original container  -Do not use Medication if the seal is broken or missing    General information  It is not known if treatment will prevent you from infecting another person or reinfecting yourself with the hepatitis C virus during treatment. It is best that as soon as you start treatment to buy a new toothbrush, disposable razors (if you use a rotating shaver you do not need to buy a new one) and nail clippers. If you check your blood sugar at home, please dispose of the fingerstick needle after each use and DO NOT REUSE the insulin needles. These items should not be shared with anyone.        If you have any questions, please contact the main clinic at [phone] or your RN Care Coordinator at [phone]. We appreciate you choosing the [facility] clinic for your treatment. Patient agrees to Hep C treatment POC and verbalizes understanding. Patient will receive a copy of treatment plan in the mail, address verified with patient. Patient has no further questions or concerns. Hep C care team updated on patient status.              Jonna Ricci RN, BSN, PHN  [facility]  Care Coordinator for Hepatology Clinic/Specialty Program

## 2018-01-23 ENCOUNTER — HOSPITAL ENCOUNTER (OUTPATIENT)
Facility: CLINIC | Age: 63
End: 2018-01-23
Attending: INTERNAL MEDICINE | Admitting: INTERNAL MEDICINE
Payer: MEDICAID

## 2018-02-09 DIAGNOSIS — B18.2 CHRONIC HEPATITIS C WITHOUT HEPATIC COMA (H): ICD-10-CM

## 2018-02-09 LAB
ALBUMIN SERPL-MCNC: 3.8 G/DL (ref 3.4–5)
ALP SERPL-CCNC: 82 U/L (ref 40–150)
ALT SERPL W P-5'-P-CCNC: 22 U/L (ref 0–70)
ANION GAP SERPL CALCULATED.3IONS-SCNC: 8 MMOL/L (ref 3–14)
AST SERPL W P-5'-P-CCNC: 21 U/L (ref 0–45)
BILIRUB DIRECT SERPL-MCNC: <0.1 MG/DL (ref 0–0.2)
BILIRUB SERPL-MCNC: 0.2 MG/DL (ref 0.2–1.3)
BUN SERPL-MCNC: 16 MG/DL (ref 7–30)
CALCIUM SERPL-MCNC: 9.2 MG/DL (ref 8.5–10.1)
CHLORIDE SERPL-SCNC: 104 MMOL/L (ref 94–109)
CO2 SERPL-SCNC: 22 MMOL/L (ref 20–32)
CREAT SERPL-MCNC: 0.81 MG/DL (ref 0.66–1.25)
GFR SERPL CREATININE-BSD FRML MDRD: >90 ML/MIN/1.7M2
GLUCOSE SERPL-MCNC: 92 MG/DL (ref 70–99)
POTASSIUM SERPL-SCNC: 4 MMOL/L (ref 3.4–5.3)
PROT SERPL-MCNC: 8.2 G/DL (ref 6.8–8.8)
SODIUM SERPL-SCNC: 135 MMOL/L (ref 133–144)

## 2018-02-21 ENCOUNTER — OFFICE VISIT (OUTPATIENT)
Dept: PSYCHIATRY | Facility: CLINIC | Age: 63
End: 2018-02-21
Attending: CLINICAL NURSE SPECIALIST
Payer: MEDICAID

## 2018-02-21 VITALS
DIASTOLIC BLOOD PRESSURE: 85 MMHG | WEIGHT: 202.4 LBS | BODY MASS INDEX: 33.68 KG/M2 | HEART RATE: 83 BPM | SYSTOLIC BLOOD PRESSURE: 135 MMHG

## 2018-02-21 DIAGNOSIS — F40.10 SOCIAL ANXIETY DISORDER: ICD-10-CM

## 2018-02-21 DIAGNOSIS — F33.2 SEVERE RECURRENT MAJOR DEPRESSION WITHOUT PSYCHOTIC FEATURES (H): ICD-10-CM

## 2018-02-21 DIAGNOSIS — Z78.9 NON-ENGLISH SPEAKING PATIENT: ICD-10-CM

## 2018-02-21 DIAGNOSIS — F19.21 POLYSUBSTANCE DEPENDENCE, NON-OPIOID, IN REMISSION (H): Primary | ICD-10-CM

## 2018-02-21 PROCEDURE — T1013 SIGN LANG/ORAL INTERPRETER: HCPCS | Mod: U3,ZF

## 2018-02-21 PROCEDURE — G0463 HOSPITAL OUTPT CLINIC VISIT: HCPCS | Mod: ZF

## 2018-02-21 ASSESSMENT — PAIN SCALES - GENERAL: PAINLEVEL: NO PAIN (0)

## 2018-02-21 NOTE — NURSING NOTE
Chief Complaint   Patient presents with     Recheck Medication     Polysubstance dependence, non-opioid, in remission      Reviewed allergies, medications, pharmacy and smoking status.  Administered abuse screening questions     Obtained weight, pain level, blood pressure and heart rate

## 2018-02-21 NOTE — MR AVS SNAPSHOT
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      Your next 10 appointments already scheduled     Mar 08, 2018   Procedure with Iona Lutz MD   [facility], [facility] ([facility], [facility])    [address] [place] [id number] [phone]           The [facility] is located on the corner of [facility] and [facility] on the [facility]. It is easily accessible from virtually any point in the [place] area, via [address] and [address].            Apr 18, 2018  9:45 AM CDT   Adult Med Follow UP with LORE Kim CNS   Psychiatry Clinic ([facility])    [place] [id number] [address] [address] [phone]              Who to contact     Please call your clinic at [phone] to:    Ask questions about your health    Make or cancel appointments    Discuss your medicines    Learn about your test results    Speak to your doctor            Additional Information About Your Visit        MyChart Information     Cambly is an electronic gateway that provides easy, online access to your medical records. With Cambly, you can request a clinic appointment, read your test results, renew a prescription or communicate with your care team.     To sign up for Cambly visit the  website at [email or web address]   You will be asked to enter the access code listed below, as well as some personal information. Please follow the directions to create your username and password.     Your access code is: [email or web address]  Expires: 2018  9:44 PM     Your access code will  in 90 days. If you need help or a new code, please contact your [facility] or call [phone] for assistance.        Care EveryWhere ID     This is your Care EveryWhere ID. This could be used by other organizations to access your [facility] medical records  [id number]        Your Vitals Were     Pulse BMI (Body Mass Index)                83 33.68 kg/m2           Blood Pressure from Last 3 Encounters:   18 135/85   18 134/84   17 144/88    Weight from Last 3 Encounters:   18 91.8 kg (202 lb 6.4 oz)   17 88.3 kg (194 lb 9.6 oz)   17 91.5 kg (201 lb 12.8 oz)              Today, you had the following     No orders found for display       Primary Care Provider Office Phone # Fax #    Moira Kohlernixon Cuadra [phone]       [facility]  [facility] [id number]        Equal Access to Services     JENNY LORENZANA : Hadii yolanda ku hadasho Sojasminali, waaxda luqadaha, qaybta kaalmada adevipinyada, marilia allen. So [facility] [phone].    ATENCIÓN: Si habla español, tiene a dowd disposición servicios gratuitos de asistencia lingüística. Llame al [phone].    We comply with applicable federal civil rights laws and [place] laws. We do not discriminate on the basis of race, color, national origin, age, disability, sex, sexual orientation, or gender identity.            Thank you!     Thank you for choosing PS[facility] CLINIC  for your care. Our goal is always to provide you with excellent care. Hearing back from our patients is one way we can continue to improve our services. Please take a few minutes to  complete the written survey that you may receive in the mail after your visit with us. Thank you!             Your Updated Medication List - Protect others around you: Learn how to safely use, store and throw away your medicines at www.disposemymeds.org.          This list is accurate as of 2/21/18 11:59 PM.  Always use your most recent med list.                   Brand Name Dispense Instructions for use Diagnosis    ASPIRIN PO      Take 81 mg by mouth daily        Glecaprevir-Pibrentasvir 100-40 MG Tabs     90 tablet    Take 3 tablets by mouth daily    Chronic hepatitis C without hepatic coma (H)       LIPITOR 40 MG tablet   Generic drug:  atorvastatin      Take 1 tablet (40 mg) by mouth daily        lisinopril-hydrochlorothiazide 20-25 MG per tablet    PRINZIDE/ZESTORETIC     Take 1 tablet by mouth daily        metFORMIN 500 MG tablet    GLUCOPHAGE     Take one tablet daily in the am and two tablets at bedtime.  HOME Samuels NP at [facility]        OLANZapine 20 MG tablet    zyPREXA    30 tablet    Take 1 tablet (20 mg) by mouth At Bedtime    Chronic insomnia       ranitidine 150 MG tablet    ZANTAC     Take 150 mg by mouth daily        venlafaxine 150 MG 24 hr capsule    EFFEXOR-XR    60 capsule    Take 2 capsules (300 mg) by mouth daily    Social anxiety disorder

## 2018-02-21 NOTE — PROGRESS NOTES
Outpatient Psychiatry Progress Note     Provider: LORE Kim CNS  Date: 2018  Service:  Medication follow up with counseling.   Patient Identification: Gianluca Singh  : 1955   MRN: [id number]    Gianluca Singh is a 62 year old year old male who presents for ongoing psychiatric care.  Gianluca Singh was last seen in clinic on 17.   At that time,   Assessment & Plan       Gianluca Singh is seen today for follow up with  and reports his mood is increased depression and anxiety due to the holidays and not having contact with children.  He has not started treatment ordered for Hep C as he has not received the medication yet. At this time is safe and has some support. Agrees to continue current medication.     Diagnosis  Social Anxiety Disorder, Major Depression, Recurrent Severe without psychosis, Polysubstance dependence in remission.     Plan:  Medication: Continue current medication  OTC Recommendations: none  Lab Orders:  none  Referrals: none  Release of Information: none  Future Treatment Considerations:per symptoms  Return for Follow Up:8 weeks. Message sent to hepatology that he has not received Hep C antiviral medication.      ____________________________________________________________________________________________________________________________________________    2018   Seen with    Today Gianluca reports he did start Hep C medications.  Mood has been stable. Not seeing therapist since last one went to another clinic.  Attending AA regularly. Continues isolated.  Side effects of medication include:none  Psychiatric Review of Systems:  Depression: In the last 2 weeks per PHQ 9 several days suicidal ideation but denies plan or intent.  More than half days sleep disturbance, appetite disturbance, concentration problems, restless/lethargy.  Nearly everyday anhedonia, feeling depressed, fatigue, feelings of failure.  Anxiety : stable  Pamela:  "none   Psychosis  none.   ADHD none    Review of Medical Systems:  Sleep: stable  Energy: stable  Concentration: stable  Appetite: stable  GI Concerns: none  Cardiac concerns: none  Neurological concerns: none  Other medical concerns: no new concerns  Current Substance Use:  Alcohol:sober  Other drugs:none  Caffeine:no change  Nicotine: e cigs  Past Medical History:   Past Medical History:   Diagnosis Date     Depression      Gastro-oesophageal reflux disease      Headache(784.0)      Hypertension      LOC (loss of consciousness) (H) age 45    out for 20 mintes after hit on top of head with board     Major depression 5/7/2013     Otitis media, chronic      Sleep apnea     cant tolerate cpap     Patient Active Problem List   Diagnosis     Non-English speaking patient     Chronic nasal congestion     HEIKE (obstructive sleep apnea)     Chronic hepatitis C (H)     Esophageal reflux     Headache     Short-term memory loss     Polysubstance dependence, non-opioid, in remission (H)     Social anxiety disorder     Severe recurrent major depression without psychotic features (H)     Heterozygous MTHFR mutation C677T (H)       Allergies:   Allergies   Allergen Reactions     Trazodone Other (See Comments)     Very depressed and suicidal          Current Medications     Current Outpatient Prescriptions Ordered in [employer]   Medication Sig Dispense Refill     UNABLE TO FIND MEDICATION NAME: not listed on package - Pt states it is for \"Hepatitis C\"       ASPIRIN PO Take 81 mg by mouth daily       Glecaprevir-Pibrentasvir 100-40 MG TABS Take 3 tablets by mouth daily 90 tablet 1     OLANZapine (ZYPREXA) 20 MG tablet Take 1 tablet (20 mg) by mouth At Bedtime 30 tablet 5     venlafaxine (EFFEXOR-XR) 150 MG 24 hr capsule Take 2 capsules (300 mg) by mouth daily 60 capsule 5     metFORMIN (GLUCOPHAGE) 500 MG tablet Take one tablet daily in the am and two tablets at bedtime.  R Abril NP at [facility]       ranitidine (ZANTAC) 150 MG tablet Take " "150 mg by mouth daily        atorvastatin (LIPITOR) 40 MG tablet Take 1 tablet (40 mg) by mouth daily       lisinopril-hydrochlorothiazide (PRINZIDE,ZESTORETIC) 20-25 MG per tablet Take 1 tablet by mouth daily       No current Epic-ordered facility-administered medications on file.         Mental Status Exam     Appearance:  Casually dressed and Well groomed  Behavior/relationship to examiner/demeanor:  Cooperative  Orientation: Oriented to person, place, time and situation  Psychomotor: normal   Speech Rate:  Normal  Speech Spontaneity:  Normal  Mood:  \"good\"  Affect:  Appropriate/mood-congruent  Thought Process (Associations):  Linear and Goal directed  Thought Content:  no overt psychosis and denies suicidal ideation, intent or thoughts  Abnormal Perception:  None  Attention/Concentration:  Normal  Insight:  Adequate  Judgment:  Adequate for safety      Results     Vital signs: /85  Pulse 83  Wt 91.8 kg (202 lb 6.4 oz)  BMI 33.68 kg/m2    Laboratory Data:  no new data    Assessment & Plan      Gianluca Singh is seen today for follow up and reports starting his Hep C treatment, says he understands the directions from the pharmacy. He would like to continue current medications.    Diagnosis  Social Anxiety Disorder, Major Depression, Recurrent Severe without psychosis, Polysubstance dependence in remission.       Plan:  Medication: Continue current  medications  OTC Recommendations: none  Lab Orders:  none  Referrals: none  Release of Information: none  Future Treatment Considerations:per symptoms  Return for Follow Up:8 weeks   The risks, benefits, alternatives and side effects have been discussed and are understood by the patient. The patient understands the risks of using street drugs or alcohol. There are no medical contraindications, the patient agrees to treatment, and has the capacity to do so. The patient understands to call 911 or come to the nearest ED if life threatening or urgent symptoms " present.  In addition time was spent counseling the patient and/or coordinating care regarding review of social and occupational functioning.  In addition patient was counseled on health and wellness practices to augment medication treatment of symptoms. See note for details.    Pat Allen, Psychiatric/Mental Health Nurse Practitioner Student, acting as scribe for LORE Kim CNS  I , Alba Merlos, personally performed the entire clinical encounter as documented by Pat Allen RN, DNP student    LORE Kim 2/21/2018

## 2018-03-01 ENCOUNTER — TELEPHONE (OUTPATIENT)
Dept: GASTROENTEROLOGY | Facility: CLINIC | Age: 63
End: 2018-03-01

## 2018-03-01 DIAGNOSIS — Z12.11 ENCOUNTER FOR SCREENING COLONOSCOPY: Primary | ICD-10-CM

## 2018-03-01 NOTE — TELEPHONE ENCOUNTER
Patient scheduled for colonoscopy    Indication for procedure. screening    Referring Provider. Dr. Fofana    ? Yes, French    Arrival time verified? Yes, 8:30 am    Facility location verified? [address] [facility].  [phone]    Instructions given regarding prep and procedure    Prep Type Gol[place]    Are you taking any anticoagulants or blood thinners? no    Instructions given? Yes, verbally with  and written    Electronic implanted devices? no    Pre procedure teaching completed? Yes    Transportation from procedure? Yes, making arrangements with     H&P / Pre op physical completed? To be completed by PCP.    Kathe Peacock RN

## 2018-03-06 ASSESSMENT — PATIENT HEALTH QUESTIONNAIRE - PHQ9: SUM OF ALL RESPONSES TO PHQ QUESTIONS 1-9: 21

## 2018-03-08 ENCOUNTER — OFFICE VISIT (OUTPATIENT)
Dept: INTERPRETER SERVICES | Facility: CLINIC | Age: 63
End: 2018-03-08
Payer: MEDICAID

## 2018-03-08 ENCOUNTER — HOSPITAL ENCOUNTER (OUTPATIENT)
Facility: CLINIC | Age: 63
Discharge: HOME | End: 2018-03-08
Attending: INTERNAL MEDICINE | Admitting: INTERNAL MEDICINE
Payer: MEDICAID

## 2018-03-08 ENCOUNTER — ANESTHESIA (OUTPATIENT)
Dept: GASTROENTEROLOGY | Facility: CLINIC | Age: 63
End: 2018-03-08
Payer: MEDICAID

## 2018-03-08 ENCOUNTER — ANESTHESIA EVENT (OUTPATIENT)
Dept: GASTROENTEROLOGY | Facility: CLINIC | Age: 63
End: 2018-03-08
Payer: MEDICAID

## 2018-03-08 ENCOUNTER — SURGERY (OUTPATIENT)
Age: 63
End: 2018-03-08

## 2018-03-08 VITALS
OXYGEN SATURATION: 95 % | HEART RATE: 74 BPM | SYSTOLIC BLOOD PRESSURE: 155 MMHG | RESPIRATION RATE: 15 BRPM | DIASTOLIC BLOOD PRESSURE: 96 MMHG

## 2018-03-08 LAB — COLONOSCOPY: NORMAL

## 2018-03-08 PROCEDURE — 37000008 ZZH ANESTHESIA TECHNICAL FEE, 1ST 30 MIN: Performed by: INTERNAL MEDICINE

## 2018-03-08 PROCEDURE — 25000128 H RX IP 250 OP 636: Performed by: NURSE ANESTHETIST, CERTIFIED REGISTERED

## 2018-03-08 PROCEDURE — 37000009 ZZH ANESTHESIA TECHNICAL FEE, EACH ADDTL 15 MIN: Performed by: INTERNAL MEDICINE

## 2018-03-08 PROCEDURE — T1013 SIGN LANG/ORAL INTERPRETER: HCPCS | Mod: U3

## 2018-03-08 PROCEDURE — G0121 COLON CA SCRN NOT HI RSK IND: HCPCS | Performed by: INTERNAL MEDICINE

## 2018-03-08 PROCEDURE — 45378 DIAGNOSTIC COLONOSCOPY: CPT | Performed by: INTERNAL MEDICINE

## 2018-03-08 RX ORDER — FENTANYL CITRATE 50 UG/ML
INJECTION, SOLUTION INTRAMUSCULAR; INTRAVENOUS PRN
Status: DISCONTINUED | OUTPATIENT
Start: 2018-03-08 | End: 2018-03-08

## 2018-03-08 RX ORDER — ONDANSETRON 4 MG/1
4 TABLET, ORALLY DISINTEGRATING ORAL EVERY 30 MIN PRN
Status: DISCONTINUED | OUTPATIENT
Start: 2018-03-08 | End: 2018-03-08 | Stop reason: HOSPADM

## 2018-03-08 RX ORDER — SODIUM CHLORIDE, SODIUM LACTATE, POTASSIUM CHLORIDE, CALCIUM CHLORIDE 600; 310; 30; 20 MG/100ML; MG/100ML; MG/100ML; MG/100ML
INJECTION, SOLUTION INTRAVENOUS CONTINUOUS PRN
Status: DISCONTINUED | OUTPATIENT
Start: 2018-03-08 | End: 2018-03-08

## 2018-03-08 RX ORDER — NALOXONE HYDROCHLORIDE 0.4 MG/ML
.1-.4 INJECTION, SOLUTION INTRAMUSCULAR; INTRAVENOUS; SUBCUTANEOUS
Status: DISCONTINUED | OUTPATIENT
Start: 2018-03-08 | End: 2018-03-08 | Stop reason: HOSPADM

## 2018-03-08 RX ORDER — PROPOFOL 10 MG/ML
INJECTION, EMULSION INTRAVENOUS PRN
Status: DISCONTINUED | OUTPATIENT
Start: 2018-03-08 | End: 2018-03-08

## 2018-03-08 RX ORDER — PROPOFOL 10 MG/ML
INJECTION, EMULSION INTRAVENOUS CONTINUOUS PRN
Status: DISCONTINUED | OUTPATIENT
Start: 2018-03-08 | End: 2018-03-08

## 2018-03-08 RX ORDER — ONDANSETRON 2 MG/ML
4 INJECTION INTRAMUSCULAR; INTRAVENOUS EVERY 30 MIN PRN
Status: DISCONTINUED | OUTPATIENT
Start: 2018-03-08 | End: 2018-03-08 | Stop reason: HOSPADM

## 2018-03-08 RX ORDER — SODIUM CHLORIDE, SODIUM LACTATE, POTASSIUM CHLORIDE, CALCIUM CHLORIDE 600; 310; 30; 20 MG/100ML; MG/100ML; MG/100ML; MG/100ML
INJECTION, SOLUTION INTRAVENOUS CONTINUOUS
Status: DISCONTINUED | OUTPATIENT
Start: 2018-03-08 | End: 2018-03-08 | Stop reason: HOSPADM

## 2018-03-08 RX ADMIN — FENTANYL CITRATE 50 MCG: 50 INJECTION, SOLUTION INTRAMUSCULAR; INTRAVENOUS at 09:45

## 2018-03-08 RX ADMIN — PROPOFOL 100 MCG/KG/MIN: 10 INJECTION, EMULSION INTRAVENOUS at 10:01

## 2018-03-08 RX ADMIN — PROPOFOL 20 MG: 10 INJECTION, EMULSION INTRAVENOUS at 10:05

## 2018-03-08 RX ADMIN — FENTANYL CITRATE 50 MCG: 50 INJECTION, SOLUTION INTRAMUSCULAR; INTRAVENOUS at 10:05

## 2018-03-08 RX ADMIN — SODIUM CHLORIDE, POTASSIUM CHLORIDE, SODIUM LACTATE AND CALCIUM CHLORIDE: 600; 310; 30; 20 INJECTION, SOLUTION INTRAVENOUS at 09:45

## 2018-03-08 NOTE — DISCHARGE INSTRUCTIONS
Discharge Instructions after Colonoscopy  or Sigmoidoscopy    Today you had a __x__ Colonoscopy    Activity and Diet  You were given medicine for pain. You may be dizzy or sleepy.  For 24 hours:    Do not drive or use heavy equipment.    Do not make important decisions.    Do not drink any alcohol.  You may return to your normal diet and medicines.    Discomfort    Air was placed in your colon during the exam in order to see it. Walking helps to pass the air.    You may take Tylenol (acetaminophen) for pain unless your doctor has told you not to.  Do not take aspirin or ibuprofen (Advil, Motrin, or other anti-inflammatory  drugs) for _____ days.    Follow-up  ____ We took small tissue samples or polyps to study. Your doctor will call you with the results  within two weeks.    When to call:    Call right away if you have:    Unusual pain in belly or chest pain not relieved with passing air.    More than 1 to 2 Tablespoons of bleeding from your rectum.    Fever above 100.6  F (37.5  C).    If you have severe pain, bleeding, or shortness of breath, go to an emergency room.    If you have questions, call:  Monday to Friday, 7 a.m. to 4:30 p.m.  Endoscopy: [phone] (We may have to call you back)    After hours  Hospital: [phone] (Ask for the GI fellow on call)

## 2018-03-08 NOTE — ANESTHESIA PREPROCEDURE EVALUATION
Anesthesia Evaluation     .             ROS/MED HX    ENT/Pulmonary:     (+)sleep apnea, doesn't use CPAP , . .    Neurologic:  - neg neurologic ROS     Cardiovascular:     (+) hypertension----. : . . . :. .       METS/Exercise Tolerance:  >4 METS   Hematologic:  - neg hematologic  ROS       Musculoskeletal:  - neg musculoskeletal ROS       GI/Hepatic:     (+) GERD hepatitis type C,       Renal/Genitourinary:  - ROS Renal section negative       Endo:     (+) type II DM .      Psychiatric:  - neg psychiatric ROS   (+) psychiatric history depression      Infectious Disease:  - neg infectious disease ROS       Malignancy:         Other:                     Physical Exam  Normal systems: cardiovascular and pulmonary    Airway   Mallampati: I  TM distance: >3 FB  Neck ROM: full    Dental   (+) missing and partials    Cardiovascular   Rhythm and rate: regular and normal      Pulmonary    breath sounds clear to auscultation                    Anesthesia Plan      History & Physical Review  History and physical reviewed and following examination; no interval change.    ASA Status:  3 .    NPO Status:  > 8 hours    Plan for MAC with Intravenous and Propofol induction. Maintenance will be TIVA.  Reason for MAC:  Deep or markedly invasive procedure (G8)  PONV prophylaxis:  Ondansetron (or other 5HT-3)       Postoperative Care  Postoperative pain management:  IV analgesics.      Consents  Anesthetic plan, risks, benefits and alternatives discussed with:  Patient..

## 2018-03-08 NOTE — IP AVS SNAPSHOT
[facility], [place], Endoscopy    [address] [facility] [address]    Phone:  [phone]                                       After Visit Summary   3/8/2018    Gianluca Singh    MRN: [id number]           After Visit Summary Signature Page     I have received my discharge instructions, and my questions have been answered. I have discussed any challenges I see with this plan with the nurse or doctor.    ..........................................................................................................................................  Patient/Patient Representative Signature      ..........................................................................................................................................  Patient Representative Print Name and Relationship to Patient    ..................................................               ................................................  Date                                            Time    ..........................................................................................................................................  Reviewed by Signature/Title    ...................................................              ..............................................  Date                                                            Time

## 2018-03-08 NOTE — IP AVS SNAPSHOT
MRN:[id number]                      After Visit Summary   3/8/2018    Gianluca Singh    MRN: [id number]           Thank you!     Thank you for choosing [facility] for your care. Our goal is always to provide you with excellent care. Hearing back from our patients is one way we can continue to improve our services. Please take a few minutes to complete the written survey that you may receive in the mail after you visit with us. Thank you!        Patient Information     Date Of Birth          1955        About your hospital stay     You were admitted on:  March 8, 2018 You last received care in the:  [facility], Endoscopy    You were discharged on:  March 8, 2018       Who to Call     For medical emergencies, please call 911.  For non-urgent questions about your medical care, please call your primary care provider or clinic, [phone]  For questions related to your surgery, please call your surgery clinic        Attending Provider     Provider Specialty    Iona Lutz MD Internal Medicine       Primary Care Provider Office Phone # Fax #    Moira Vero Cuadra [phone]      Your next 10 appointments already scheduled     Apr 18, 2018  9:45 AM CDT   Adult Med Follow UP with LORE Kim CNS   Psychiatry Clinic ([facility])    [place] [id number] [place] [address] [phone]              Pending Results     No orders found from 3/6/2018 to 3/9/2018.            Admission Information     Date & Time Provider Department Dept. Phone    3/8/2018 Iona Lutz MD [facility], Endoscopy [phone]      Your Vitals Were     Blood Pressure Pulse Respirations Pulse Oximetry          155/96 74 15 95%        MyChart Information     MyChart lets you send messages to your doctor, view your test results, renew your prescriptions, schedule appointments and more. To sign up, go  "to [email or web address] . Click on \"Log in\" on the left side of the screen, which will take you to the Welcome page. Then click on \"Sign up Now\" on the right side of the page.     You will be asked to enter the access code listed below, as well as some personal information. Please follow the directions to create your username and password.     Your access code is: [email or web address]  Expires: 2018  9:44 PM     Your access code will  in 90 days. If you need help or a new code, please call your [facility] clinic or [phone].        Care EveryWhere ID     This is your Care EveryWhere ID. This could be used by other organizations to access your [facility] medical records  [id number]        Equal Access to Services     JENNY LORENZANA : Emely Barcenas, vineet oneil, becki becker, marilia allen. So [facility] [phone].    ATENCIÓN: Si habla español, tiene a dowd disposición servicios gratuitos de asistencia lingüística. Murray al [phone].    We comply with applicable federal civil rights laws and [place] laws. We do not discriminate on the basis of race, color, national origin, age, disability, sex, sexual orientation, or gender identity.               Review of your medicines      UNREVIEWED medicines. Ask your doctor about these medicines        Dose / Directions    ASPIRIN PO        Dose:  81 mg   Take 81 mg by mouth daily   Refills:  0       LIPITOR 40 MG tablet   Generic drug:  atorvastatin        Dose:  40 mg   Take 1 tablet (40 mg) by mouth daily   Refills:  0       lisinopril-hydrochlorothiazide 20-25 MG per tablet   Commonly known as:  PRINZIDE/ZESTORETIC        Dose:  1 tablet   Take 1 tablet by mouth daily   Refills:  0       metFORMIN 500 MG tablet   Commonly known as:  GLUCOPHAGE        Take one tablet daily in the am and two tablets at bedtime.  R Abril NP at [facility]   Refills:  0       OLANZapine 20 MG tablet   Commonly known as:  zyPREXA "   Used for:  Chronic insomnia        Dose:  20 mg   Take 1 tablet (20 mg) by mouth At Bedtime   Quantity:  30 tablet   Refills:  5       ranitidine 150 MG tablet   Commonly known as:  ZANTAC        Dose:  150 mg   Take 150 mg by mouth daily   Refills:  0       venlafaxine 150 MG 24 hr capsule   Commonly known as:  EFFEXOR-XR   Used for:  Social anxiety disorder        Dose:  300 mg   Take 2 capsules (300 mg) by mouth daily   Quantity:  60 capsule   Refills:  5                Protect others around you: Learn how to safely use, store and throw away your medicines at www.disposemymeds.org.             Medication List: This is a list of all your medications and when to take them. Check marks below indicate your daily home schedule. Keep this list as a reference.      Medications           Morning Afternoon Evening Bedtime As Needed    ASPIRIN PO   Take 81 mg by mouth daily                                LIPITOR 40 MG tablet   Take 1 tablet (40 mg) by mouth daily   Generic drug:  atorvastatin                                lisinopril-hydrochlorothiazide 20-25 MG per tablet   Commonly known as:  PRINZIDE/ZESTORETIC   Take 1 tablet by mouth daily                                metFORMIN 500 MG tablet   Commonly known as:  GLUCOPHAGE   Take one tablet daily in the am and two tablets at bedtime.  R Skolar NP at [facility]                                OLANZapine 20 MG tablet   Commonly known as:  zyPREXA   Take 1 tablet (20 mg) by mouth At Bedtime                                ranitidine 150 MG tablet   Commonly known as:  ZANTAC   Take 150 mg by mouth daily                                venlafaxine 150 MG 24 hr capsule   Commonly known as:  EFFEXOR-XR   Take 2 capsules (300 mg) by mouth daily

## 2018-03-08 NOTE — ANESTHESIA POSTPROCEDURE EVALUATION
Patient: Gianluca Singh    Procedure(s):  colonoscopy - Wound Class: II-Clean Contaminated    Diagnosis:screening colonoscopy  Diagnosis Additional Information: No value filed.    Anesthesia Type:  MAC    Note:  Anesthesia Post Evaluation    Patient location during evaluation: PACU  Patient participation: Able to fully participate in evaluation  Level of consciousness: awake and alert  Pain management: adequate  Airway patency: patent  Cardiovascular status: acceptable  Respiratory status: acceptable  Hydration status: acceptable  PONV: none     Anesthetic complications: None          Last vitals:  Vitals:    03/08/18 1110 03/08/18 1111 03/08/18 1112   BP: (!) 155/96     Pulse:      Resp: 18 16 15   SpO2: 95% 96% 95%         Electronically Signed By: Jen Powers MD  March 8, 2018  11:41 AM

## 2018-03-08 NOTE — ANESTHESIA CARE TRANSFER NOTE
Patient: Gianluca Singh    Procedure(s):  colonoscopy - Wound Class: II-Clean Contaminated    Diagnosis: screening colonoscopy  Diagnosis Additional Information: No value filed.    Anesthesia Type:   MAC     Note:  Airway :Room Air  Patient transferred to:Phase II  Comments: Pt to recovery room.  Spontaneous respirations.   Report given to RN.  Handoff Report: Identifed the Patient, Identified the Reponsible Provider, Reviewed the pertinent medical history, Discussed the surgical course, Reviewed Intra-OP anesthesia mangement and issues during anesthesia, Set expectations for post-procedure period and Allowed opportunity for questions and acknowledgement of understanding      Vitals: (Last set prior to Anesthesia Care Transfer)    CRNA VITALS  3/8/2018 1029 - 3/8/2018 1103      3/8/2018             Pulse: 75    Ht Rate: 74    SpO2: 97 %    Resp Rate (set): 10                Electronically Signed By: LORE Reed CRNA  March 8, 2018  11:03 AM

## 2018-04-18 ENCOUNTER — OFFICE VISIT (OUTPATIENT)
Dept: PSYCHIATRY | Facility: CLINIC | Age: 63
End: 2018-04-18
Attending: CLINICAL NURSE SPECIALIST
Payer: MEDICAID

## 2018-04-18 ENCOUNTER — OFFICE VISIT (OUTPATIENT)
Dept: INTERPRETER SERVICES | Facility: CLINIC | Age: 63
End: 2018-04-18
Payer: MEDICAID

## 2018-04-18 VITALS
DIASTOLIC BLOOD PRESSURE: 82 MMHG | BODY MASS INDEX: 33.18 KG/M2 | HEART RATE: 96 BPM | WEIGHT: 199.4 LBS | SYSTOLIC BLOOD PRESSURE: 119 MMHG

## 2018-04-18 DIAGNOSIS — F33.2 SEVERE RECURRENT MAJOR DEPRESSION WITHOUT PSYCHOTIC FEATURES (H): ICD-10-CM

## 2018-04-18 DIAGNOSIS — F19.21 POLYSUBSTANCE DEPENDENCE, NON-OPIOID, IN REMISSION (H): ICD-10-CM

## 2018-04-18 DIAGNOSIS — F40.10 SOCIAL ANXIETY DISORDER: Primary | ICD-10-CM

## 2018-04-18 PROCEDURE — G0463 HOSPITAL OUTPT CLINIC VISIT: HCPCS | Mod: ZF

## 2018-04-18 PROCEDURE — T1013 SIGN LANG/ORAL INTERPRETER: HCPCS | Mod: U3

## 2018-04-18 ASSESSMENT — PAIN SCALES - GENERAL: PAINLEVEL: NO PAIN (0)

## 2018-04-18 NOTE — NURSING NOTE
Chief Complaint   Patient presents with     Recheck Medication     Polysubstance dependence, non-opioid, in remission

## 2018-04-18 NOTE — PROGRESS NOTES
"  Outpatient Psychiatry Progress Note     Provider: LORE Kim CNS  Date: 2018  Service:  Medication follow up with counseling.   Patient Identification: Gianluca Singh  : 1955   MRN: [id number]    Gianluca Singh is a 62 year old year old male who presents for ongoing psychiatric care.  Gianluca Singh was last seen in clinic on 18.   At that time,   Assessment & Plan       Gianluca Singh is seen today for follow up and reports starting his Hep C treatment, says he understands the directions from the pharmacy. He would like to continue current medications.     Diagnosis  Social Anxiety Disorder, Major Depression, Recurrent Severe without psychosis, Polysubstance dependence in remission.        Plan:  Medication: Continue current  medications  OTC Recommendations: none  Lab Orders:  none  Referrals: none  Release of Information: none  Future Treatment Considerations:per symptoms  Return for Follow Up:8 weeks      ____________________________________________________________________________________________________________________________________________    2018   Seen with   Today Gianluca reports he is \"so -so\"  Has not seen a therapist for about 4 months. Not sure if this is helfpul. Has  he sees about 2 times a month.  Finished treatment for Hep C and gets labs in .    Attending groups, social is about the same.   He moved in February because rent was too expensive.  He is living in Public Housing and this place is better than the last place which was not public housing.   Side effects of medication include: none known  Psychiatric Review of Systems  Depression: In the last 2 weeks per PHQ 9 more than 1/2 days sleep disturbance, appetite disturbance, concentration problems, restless/lethargy.  Nearly everyday anhedonia, feeling depressed, fatigue, feelings of failure, SI but denies plan or intent.  Anxiety : no change  Pamela na   Psychosis  na. "   ADHD na    Review of Medical Systems:  Sleep: stable  Energy: stable  Concentration: stable  Appetite: stable  GI Concerns: no new concerns  Cardiac concerns: no new concerns  Neurological concerns: no new concerns  Other medical concerns: no new concerns, has completed treatment for Hep C and will be following up soon  Current Substance Use:  Alcohol:denies  Other drugs:denies  Caffeine:no change  Nicotine: no change  Past Medical History:   Past Medical History:   Diagnosis Date     Depression      Gastro-oesophageal reflux disease      Headache(784.0)      Hypertension      LOC (loss of consciousness) (H) age 45    out for 20 mintes after hit on top of head with board     Major depression 5/7/2013     Otitis media, chronic      Sleep apnea     cant tolerate cpap     Patient Active Problem List   Diagnosis     Non-English speaking patient     Chronic nasal congestion     HEIKE (obstructive sleep apnea)     Chronic hepatitis C (H)     Esophageal reflux     Headache     Short-term memory loss     Polysubstance dependence, non-opioid, in remission (H)     Social anxiety disorder     Severe recurrent major depression without psychotic features (H)     Heterozygous MTHFR mutation C677T (H)       Allergies:   Allergies   Allergen Reactions     Trazodone Other (See Comments)     Very depressed and suicidal          Current Medications     Current Outpatient Prescriptions Ordered in [facility]   Medication Sig Dispense Refill     ASPIRIN PO Take 81 mg by mouth daily       atorvastatin (LIPITOR) 40 MG tablet Take 1 tablet (40 mg) by mouth daily       lisinopril-hydrochlorothiazide (PRINZIDE,ZESTORETIC) 20-25 MG per tablet Take 1 tablet by mouth daily       metFORMIN (GLUCOPHAGE) 500 MG tablet Take one tablet daily in the am and two tablets at bedtime.  R Skolar NP at [facility]       OLANZapine (ZYPREXA) 20 MG tablet Take 1 tablet (20 mg) by mouth At Bedtime 30 tablet 5     ranitidine (ZANTAC) 150 MG tablet Take 150 mg by mouth daily  "       venlafaxine (EFFEXOR-XR) 150 MG 24 hr capsule Take 2 capsules (300 mg) by mouth daily 60 capsule 5     No current Epic-ordered facility-administered medications on file.         Mental Status Exam     Appearance:  Casually dressed and Well groomed  Behavior/relationship to examiner/demeanor:  Cooperative  Orientation: Oriented to person, place, time and situation  Psychomotor: normal form  Speech Rate:  Normal  Speech Spontaneity:  Poverty and latency but does not speak english  Mood:  \"so so\"  Affect:  Appropriate/mood-congruent  Thought Process (Associations):  Goal directed  Thought Content:  no overt psychosis, patient does not appear to be responding to internal stimuli, Suicidal ideation and denies suicidal intent or plan  Abnormal Perception:  None  Attention/Concentration:  Normal  Insight:  Adequate  Judgment:  Good      Results     Vital signs: /82  Pulse 96  Wt 90.4 kg (199 lb 6.4 oz)  BMI 33.18 kg/m2    Laboratory Data:  no new data    Assessment & Plan      Gianluca Singh is seen today for follow up with a  and reports his mood is the same and generally stable. Isolated due to social anxiety and language barrier.    Diagnosis  Social Anxiety Disorder, Major Depression, Recurrent Severe without psychosis, Polysubstance dependence in remission    Plan:  Medication: Continue current medications  OTC Recommendations: none  Lab Orders:  none  Referrals: none  Release of Information: none  Future Treatment Considerations:per symptoms  Return for Follow Up:8 weeks   The risks, benefits, alternatives and side effects have been discussed and are understood by the patient. The patient understands the risks of using street drugs or alcohol. There are no medical contraindications, the patient agrees to treatment, and has the capacity to do so. The patient understands to call 911 or come to the nearest ED if life threatening or urgent symptoms present.  In addition time was spent " counseling the patient and/or coordinating care regarding review of social and occupational functioning.  In addition patient was counseled on health and wellness practices to augment medication treatment of symptoms. See note for details.    Alba Merlos, APRN CNS 4/18/2018

## 2018-04-18 NOTE — MR AVS SNAPSHOT
After Visit Summary   2018    Gianluca Singh    MRN: [id number]           Patient Information     Date Of Birth          1955        Visit Information        Provider Department      2018 9:45 AM Alba Merlos APRN CNS [facility]        Today's Diagnoses     Social anxiety disorder    -  1    Severe recurrent major depression without psychotic features (H)        Polysubstance dependence, non-opioid, in remission (H)           Follow-ups after your visit        Follow-up notes from your care team     Return in about 8 weeks (around 2018).      Your next 10 appointments already scheduled     2018  1:45 PM CDT   Adult Med Follow UP with LORE Kim CNS   Psychiatry Clinic ([facility])    [place] [id number] [address] [phone]              Who to contact     Please call your clinic at [phone] to:    Ask questions about your health    Make or cancel appointments    Discuss your medicines    Learn about your test results    Speak to your doctor            Additional Information About Your Visit        MyChart Information     [employer] is an electronic gateway that provides easy, online access to your medical records. With [employer], you can request a clinic appointment, read your test results, renew a prescription or communicate with your care team.     To sign up for [employer] visit the website at [email or web address]   You will be asked to enter the access code listed below, as well as some personal information. Please follow the directions to create your username and password.     Your access code is: [email or web address]  Expires: 2018 10:44 PM     Your access code will  in 90 days. If you need help or a new code, please contact your [facility] or call [phone] for assistance.        Care EveryWhere ID     This is your Care EveryWhere  ID. This could be used by other organizations to access your [facility] medical records  [id number]        Your Vitals Were     Pulse BMI (Body Mass Index)                96 33.18 kg/m2           Blood Pressure from Last 3 Encounters:   04/18/18 119/82   03/08/18 (!) 155/96   02/21/18 135/85    Weight from Last 3 Encounters:   04/18/18 90.4 kg (199 lb 6.4 oz)   02/21/18 91.8 kg (202 lb 6.4 oz)   12/20/17 88.3 kg (194 lb 9.6 oz)              Today, you had the following     No orders found for display       Primary Care Provider Office Phone # Fax #    Moira Cuadra, HIRA [phone]       [facility] [address] [facility] [address]        Equal Access to Services     JENNY LORENZANA : Hadii yolanda Barcenas, waaxda luqadaha, qaybta kaalmada rosita, marilia baez . So [facility] [phone].    ATENCIÓN: Si habla español, tiene a dowd disposición servicios gratuitos de asistencia lingüística. Murray al [phone].    We comply with applicable federal civil rights laws and [place] laws. We do not discriminate on the basis of race, color, national origin, age, disability, sex, sexual orientation, or gender identity.            Thank you!     Thank you for choosing PSYCHIATRY CLINIC  for your care. Our goal is always to provide you with excellent care. Hearing back from our patients is one way we can continue to improve our services. Please take a few minutes to complete the written survey that you may receive in the mail after your visit with us. Thank you!             Your Updated Medication List - Protect others around you: Learn how to safely use, store and throw away your medicines at www.disposemymeds.org.          This list is accurate as of 4/18/18 11:59 PM.  Always use your most recent med list.                   Brand Name Dispense Instructions for use Diagnosis    ASPIRIN PO      Take 81 mg by mouth daily        LIPITOR 40 MG tablet   Generic  drug:  atorvastatin      Take 1 tablet (40 mg) by mouth daily        lisinopril-hydrochlorothiazide 20-25 MG per tablet    PRINZIDE/ZESTORETIC     Take 1 tablet by mouth daily        metFORMIN 500 MG tablet    GLUCOPHAGE     Take one tablet daily in the am and two tablets at bedtime.  HOME Samuels NP at [facility]        OLANZapine 20 MG tablet    zyPREXA    30 tablet    Take 1 tablet (20 mg) by mouth At Bedtime    Chronic insomnia       ranitidine 150 MG tablet    ZANTAC     Take 150 mg by mouth daily        venlafaxine 150 MG 24 hr capsule    EFFEXOR-XR    60 capsule    Take 2 capsules (300 mg) by mouth daily    Social anxiety disorder

## 2018-04-19 ASSESSMENT — PATIENT HEALTH QUESTIONNAIRE - PHQ9: SUM OF ALL RESPONSES TO PHQ QUESTIONS 1-9: 23

## 2018-06-07 ENCOUNTER — MEDICAL CORRESPONDENCE (OUTPATIENT)
Dept: HEALTH INFORMATION MANAGEMENT | Facility: CLINIC | Age: 63
End: 2018-06-07

## 2018-06-20 ENCOUNTER — OFFICE VISIT (OUTPATIENT)
Dept: PSYCHIATRY | Facility: CLINIC | Age: 63
End: 2018-06-20
Attending: CLINICAL NURSE SPECIALIST
Payer: MEDICAID

## 2018-06-20 VITALS
DIASTOLIC BLOOD PRESSURE: 88 MMHG | SYSTOLIC BLOOD PRESSURE: 128 MMHG | WEIGHT: 209.6 LBS | HEART RATE: 82 BPM | BODY MASS INDEX: 34.88 KG/M2

## 2018-06-20 DIAGNOSIS — F19.21 POLYSUBSTANCE DEPENDENCE, NON-OPIOID, IN REMISSION (H): ICD-10-CM

## 2018-06-20 DIAGNOSIS — F40.10 SOCIAL ANXIETY DISORDER: Primary | ICD-10-CM

## 2018-06-20 DIAGNOSIS — F33.2 SEVERE RECURRENT MAJOR DEPRESSION WITHOUT PSYCHOTIC FEATURES (H): ICD-10-CM

## 2018-06-20 DIAGNOSIS — Z78.9 NON-ENGLISH SPEAKING PATIENT: ICD-10-CM

## 2018-06-20 DIAGNOSIS — F51.04 CHRONIC INSOMNIA: ICD-10-CM

## 2018-06-20 PROCEDURE — G0463 HOSPITAL OUTPT CLINIC VISIT: HCPCS | Mod: ZF

## 2018-06-20 RX ORDER — VENLAFAXINE HYDROCHLORIDE 150 MG/1
300 CAPSULE, EXTENDED RELEASE ORAL DAILY
Qty: 60 CAPSULE | Refills: 5 | Status: SHIPPED | OUTPATIENT
Start: 2018-06-20 | End: 2018-10-30

## 2018-06-20 RX ORDER — OLANZAPINE 20 MG/1
20 TABLET ORAL AT BEDTIME
Qty: 30 TABLET | Refills: 5 | Status: SHIPPED | OUTPATIENT
Start: 2018-06-20 | End: 2018-08-15 | Stop reason: DRUGHIGH

## 2018-06-20 ASSESSMENT — PAIN SCALES - GENERAL: PAINLEVEL: NO PAIN (0)

## 2018-06-20 NOTE — MR AVS SNAPSHOT
After Visit Summary   6/20/2018    Gianluca Singh    MRN: [id number]           Patient Information     Date Of Birth          1955        Visit Information        Provider Department      6/20/2018 1:45 PM Alba Merlos APRN CNS Psychiatry Clinic        Today's Diagnoses     Social anxiety disorder    -  1    Severe recurrent major depression without psychotic features (H)        Polysubstance dependence, non-opioid, in remission (H)        Non-English speaking patient        Chronic insomnia           Follow-ups after your visit        Follow-up notes from your care team     Return in about 8 weeks (around 8/15/2018).      Your next 10 appointments already scheduled     Aug 06, 2018 10:30 AM CDT   NEW GENERAL with Wesly Darby OD   Eye Clinic ([facility])    [address] [address]   [phone]            Aug 15, 2018  1:15 PM CDT   Adult Med Follow UP with LORE Kim CNS   Psychiatry Clinic ([facility])    [address]  [address] [id number] [phone]              Who to contact     Please call your clinic at [phone] to:    Ask questions about your health    Make or cancel appointments    Discuss your medicines    Learn about your test results    Speak to your doctor            Additional Information About Your Visit        MyChart Information     [employer] is an electronic gateway that provides easy, online access to your medical records. With [employer], you can request a clinic appointment, read your test results, renew a prescription or communicate with your care team.     To sign up for [employer] visit the website at [email or web address]   You will be asked to enter the access code listed below, as well as some personal information. Please follow the directions to create your username and password.     Your access code  is: [email or web address]  Expires: 2018  6:30 AM     Your access code will  in 90 days. If you need help or a new code, please contact your [facility] or call [phone] for assistance.        Care EveryWhere ID     This is your Care EveryWhere ID. This could be used by other organizations to access your [facility] medical records  [id number]        Your Vitals Were     Pulse BMI (Body Mass Index)                82 34.88 kg/m2           Blood Pressure from Last 3 Encounters:   18 132/86   18 128/88   18 119/82    Weight from Last 3 Encounters:   18 94.6 kg (208 lb 9.6 oz)   18 95.1 kg (209 lb 9.6 oz)   18 90.4 kg (199 lb 6.4 oz)              Today, you had the following     No orders found for display         Where to get your medicines      These medications were sent to [facility] [address] [place] [address] & [address] [address]     Phone:  [phone]     OLANZapine 20 MG tablet    venlafaxine 150 MG 24 hr capsule          Primary Care Provider Office Phone # Fax #    Moira Vero Cuadra [phone]       [facility] [address]        Equal Access to Services     JENNY LORENZANA : Hadii yolanda murillo hadasho Sojasminali, waaxda luqadaha, qaybta kaalmada adeegyada, marilia baez . So [facility] [phone].    ATENCIÓN: Si habla español, tiene a dowd disposición servicios gratuitos de asistencia lingüística. Llame al [phone].    We comply with applicable federal civil rights laws and [place] laws. We do not discriminate on the basis of race, color, national origin, age, disability, sex, sexual orientation, or gender identity.            Thank you!     Thank you for choosing PSYCHIATRY CLINIC  for your care. Our goal is always to provide you with excellent care. Hearing back from our patients is one  way we can continue to improve our services. Please take a few minutes to complete the written survey that you may receive in the mail after your visit with us. Thank you!             Your Updated Medication List - Protect others around you: Learn how to safely use, store and throw away your medicines at www.disposemymeds.org.          This list is accurate as of 6/20/18 11:59 PM.  Always use your most recent med list.                   Brand Name Dispense Instructions for use Diagnosis    ASPIRIN PO      Take 81 mg by mouth daily        LIPITOR 40 MG tablet   Generic drug:  atorvastatin      Take 1 tablet (40 mg) by mouth daily        lisinopril-hydrochlorothiazide 20-25 MG per tablet    PRINZIDE/ZESTORETIC     Take 1 tablet by mouth daily        metFORMIN 500 MG tablet    GLUCOPHAGE     Take one tablet daily in the am and two tablets at bedtime.  HOME Samuels NP at [facility]        OLANZapine 20 MG tablet    zyPREXA    30 tablet    Take 1 tablet (20 mg) by mouth At Bedtime    Chronic insomnia       ranitidine 150 MG tablet    ZANTAC     Take 150 mg by mouth daily        venlafaxine 150 MG 24 hr capsule    EFFEXOR-XR    60 capsule    Take 2 capsules (300 mg) by mouth daily    Social anxiety disorder

## 2018-06-20 NOTE — PROGRESS NOTES
Outpatient Psychiatry Progress Note     Provider: LORE Kim CNS  Date: 2018  Service:  Medication follow up with counseling.   Patient Identification: Gianluca Singh  : 1955   MRN: [id number]    Gianluca Singh is a 62 year old year old male who presents for ongoing psychiatric care.  Gianluca Singh was last seen in clinic on 18.   At that time,   Assessment & Plan       Gianluca Singh is seen today for follow up with a  and reports his mood is the same and generally stable. Isolated due to social anxiety and language barrier.     Diagnosis  Social Anxiety Disorder, Major Depression, Recurrent Severe without psychosis, Polysubstance dependence in remission     Plan:  Medication: Continue current medications  OTC Recommendations: none  Lab Orders:  none  Referrals: none  Release of Information: none  Future Treatment Considerations:per symptoms  Return for Follow Up:8 weeks      ____________________________________________________________________________________________________________________________________________    2018    Seen with .   Today Gianluca reports he is feeling more or less the same. He continues to have bad dreams often, not about trauma.  He is busy.  Still attends AA.  Not found a therapist yet.  is helping to find him one soon.  Side effects of medication include: none  Psychiatric Review of Systems:  Depression: In the last 2 weeks per PHQ 9 more than 1/2 days sleep disturbance, appetite disturbance, concentration problems, restless/lethargy, SI but denies plan or intent.  Anxiety : stable  Pamela na   Psychosis  na.   ADHD na    Review of Medical Systems:  Sleep: stable  Energy: stable  Concentration: stable  Appetite: stable  GI Concerns: none  Cardiac concerns: none  Neurological concerns: none  Other medical concerns: no new concerns  Current Substance Use:  Alcohol:denies  Other drugs:denies  Caffeine:no  change  Nicotine: no change  Past Medical History:   Past Medical History:   Diagnosis Date     Depression      Gastro-oesophageal reflux disease      Headache(784.0)      Hypertension      LOC (loss of consciousness) (H) age 45    out for 20 mintes after hit on top of head with board     Major depression 5/7/2013     Otitis media, chronic      Sleep apnea     cant tolerate cpap     Patient Active Problem List   Diagnosis     Non-English speaking patient     Chronic nasal congestion     HEIKE (obstructive sleep apnea)     Chronic hepatitis C (H)     Esophageal reflux     Headache     Short-term memory loss     Polysubstance dependence, non-opioid, in remission (H)     Social anxiety disorder     Severe recurrent major depression without psychotic features (H)     Heterozygous MTHFR mutation C677T (H)       Allergies:   Allergies   Allergen Reactions     Trazodone Other (See Comments)     Very depressed and suicidal          Current Medications     Current Outpatient Prescriptions Ordered in [facility]   Medication Sig Dispense Refill     ASPIRIN PO Take 81 mg by mouth daily       atorvastatin (LIPITOR) 40 MG tablet Take 1 tablet (40 mg) by mouth daily       lisinopril-hydrochlorothiazide (PRINZIDE,ZESTORETIC) 20-25 MG per tablet Take 1 tablet by mouth daily       metFORMIN (GLUCOPHAGE) 500 MG tablet Take one tablet daily in the am and two tablets at bedtime.  R Skolar NP at [facility]       OLANZapine (ZYPREXA) 20 MG tablet Take 1 tablet (20 mg) by mouth At Bedtime 30 tablet 5     ranitidine (ZANTAC) 150 MG tablet Take 150 mg by mouth daily        venlafaxine (EFFEXOR-XR) 150 MG 24 hr capsule Take 2 capsules (300 mg) by mouth daily 60 capsule 5     No current [facility]-ordered facility-administered medications on file.         Mental Status Exam     Appearance:  Casually dressed and Well groomed  Behavior/relationship to examiner/demeanor:  Cooperative  Orientation: Oriented to person, place, time and situation  Psychomotor: normal  "form  Speech Rate:  Normal  Speech Spontaneity:  Normal  Mood:  \"so so \"  Affect:  Appropriate/mood-congruent  Thought Process (Associations):  Goal directed  Thought Content:  no overt psychosis, denies suicidal ideation, intent or thoughts and patient does not appear to be responding to internal stimuli  Abnormal Perception:  None  Attention/Concentration:  Normal  Insight:  Adequate  Judgment:  Good      Results     Vital signs: /88  Pulse 82  Wt 95.1 kg (209 lb 9.6 oz)  BMI 34.88 kg/m2    Laboratory Data:  no new data available    Assessment & Plan      Gianluca Singh is seen today for follow up with  and reports his mood has been stable with continued difficulty in daily functioning    Diagnosis  Social Anxiety Disorder, Major Depression, Recurrent Severe without psychosis, Polysubstance dependence in remission      Plan:  Medication: no change  OTC Recommendations: none  Lab Orders:  none  Referrals: none  Release of Information: none  Future Treatment Considerations:per symptoms  Return for Follow Up:8 weeks   The risks, benefits, alternatives and side effects have been discussed and are understood by the patient. The patient understands the risks of using street drugs or alcohol. There are no medical contraindications, the patient agrees to treatment, and has the capacity to do so. The patient understands to call 911 or come to the nearest ED if life threatening or urgent symptoms present.  In addition time was spent counseling the patient and/or coordinating care regarding review of social and occupational functioning.  In addition patient was counseled on health and wellness practices to augment medication treatment of symptoms. See note for details.    Alba Merlos, APRN CNS 6/20/2018   "

## 2018-06-21 ASSESSMENT — PATIENT HEALTH QUESTIONNAIRE - PHQ9: SUM OF ALL RESPONSES TO PHQ QUESTIONS 1-9: 22

## 2018-06-25 ENCOUNTER — OFFICE VISIT (OUTPATIENT)
Dept: GASTROENTEROLOGY | Facility: CLINIC | Age: 63
End: 2018-06-25
Attending: PHYSICIAN ASSISTANT
Payer: MEDICAID

## 2018-06-25 VITALS
HEIGHT: 66 IN | DIASTOLIC BLOOD PRESSURE: 86 MMHG | WEIGHT: 208.6 LBS | TEMPERATURE: 98.1 F | HEART RATE: 78 BPM | BODY MASS INDEX: 33.52 KG/M2 | OXYGEN SATURATION: 97 % | SYSTOLIC BLOOD PRESSURE: 132 MMHG

## 2018-06-25 DIAGNOSIS — B18.2 CHRONIC HEPATITIS C WITHOUT HEPATIC COMA (H): ICD-10-CM

## 2018-06-25 DIAGNOSIS — B18.2 CHRONIC HEPATITIS C WITHOUT HEPATIC COMA (H): Primary | ICD-10-CM

## 2018-06-25 LAB
ALBUMIN SERPL-MCNC: 3.7 G/DL (ref 3.4–5)
ALP SERPL-CCNC: 96 U/L (ref 40–150)
ALT SERPL W P-5'-P-CCNC: 25 U/L (ref 0–70)
ANION GAP SERPL CALCULATED.3IONS-SCNC: 11 MMOL/L (ref 3–14)
AST SERPL W P-5'-P-CCNC: 16 U/L (ref 0–45)
BILIRUB DIRECT SERPL-MCNC: <0.1 MG/DL (ref 0–0.2)
BILIRUB SERPL-MCNC: 0.3 MG/DL (ref 0.2–1.3)
BUN SERPL-MCNC: 20 MG/DL (ref 7–30)
CALCIUM SERPL-MCNC: 9.6 MG/DL (ref 8.5–10.1)
CHLORIDE SERPL-SCNC: 106 MMOL/L (ref 94–109)
CO2 SERPL-SCNC: 22 MMOL/L (ref 20–32)
CREAT SERPL-MCNC: 0.92 MG/DL (ref 0.66–1.25)
ERYTHROCYTE [DISTWIDTH] IN BLOOD BY AUTOMATED COUNT: 12.5 % (ref 10–15)
GFR SERPL CREATININE-BSD FRML MDRD: 83 ML/MIN/1.7M2
GLUCOSE SERPL-MCNC: 114 MG/DL (ref 70–99)
HCT VFR BLD AUTO: 40.1 % (ref 40–53)
HGB BLD-MCNC: 13.7 G/DL (ref 13.3–17.7)
INR PPP: 0.88 (ref 0.86–1.14)
MCH RBC QN AUTO: 30 PG (ref 26.5–33)
MCHC RBC AUTO-ENTMCNC: 34.2 G/DL (ref 31.5–36.5)
MCV RBC AUTO: 88 FL (ref 78–100)
PLATELET # BLD AUTO: 222 10E9/L (ref 150–450)
POTASSIUM SERPL-SCNC: 4.1 MMOL/L (ref 3.4–5.3)
PROT SERPL-MCNC: 8 G/DL (ref 6.8–8.8)
RBC # BLD AUTO: 4.56 10E12/L (ref 4.4–5.9)
SODIUM SERPL-SCNC: 138 MMOL/L (ref 133–144)
WBC # BLD AUTO: 5.6 10E9/L (ref 4–11)

## 2018-06-25 PROCEDURE — G0463 HOSPITAL OUTPT CLINIC VISIT: HCPCS | Mod: ZF

## 2018-06-25 PROCEDURE — 85027 COMPLETE CBC AUTOMATED: CPT | Performed by: INTERNAL MEDICINE

## 2018-06-25 PROCEDURE — 87522 HEPATITIS C REVRS TRNSCRPJ: CPT | Performed by: INTERNAL MEDICINE

## 2018-06-25 PROCEDURE — 85610 PROTHROMBIN TIME: CPT | Performed by: INTERNAL MEDICINE

## 2018-06-25 PROCEDURE — 80048 BASIC METABOLIC PNL TOTAL CA: CPT | Performed by: INTERNAL MEDICINE

## 2018-06-25 PROCEDURE — 80076 HEPATIC FUNCTION PANEL: CPT | Performed by: INTERNAL MEDICINE

## 2018-06-25 PROCEDURE — 36415 COLL VENOUS BLD VENIPUNCTURE: CPT | Performed by: INTERNAL MEDICINE

## 2018-06-25 PROCEDURE — T1013 SIGN LANG/ORAL INTERPRETER: HCPCS | Mod: U3,ZF

## 2018-06-25 ASSESSMENT — PAIN SCALES - GENERAL: PAINLEVEL: NO PAIN (0)

## 2018-06-25 NOTE — PROGRESS NOTES
Hepatology Clinic note  Gianluca Singh   Date of Birth 1955  Date of Service 6/25/2018         Assessment/plan:   Gianluca Singh is a 62 year old male with history of Hepatitis C who finished treatment with Mavyret x 8 weeks in the mid-March. No end of treatment HCV RNA was done. His 12-week SVR HCV RNA is pending today. Previous fibrosis scan showing Stage F0 fibrosis. His liver function and transaminases are normal today. No biochemical or clinical signs of advanced cirrhosis. We discussed the important of healthy nutrition for on-going liver health.     - HCV RNA pending today. If non-detectable, patient has cured his Hepatitis C.   - No routine follow-up needed. Follow-up in Hepatology clinic as needed.     Bernardo Marx PA-C   [facility]    -----------------------------------------------------       HPI:   Gianluca Singh is a 62 year old male presenting for the follow-up.     HCV  - GT-1a or 1b  - hx IVDU, EDA  - liver bx 5/2013- stage 0 fibrosis  - fibrosis scan 11/2017, Stage F0, 4.0 kilopascals   - no prior rx    Patient reports finishing Hep C treatment without difficulty. He denies any side effects. He finished Hep C treatment in the mid-March. No end of treatment HCV RNA has been done.     Patient denies jaundice, lower extremity edema, abdominal distension or confusion.  Patient also denies melena, hematochezia or hematemesis.Patient denies weight loss, fevers, sweats or chills.    No recent ETOH use.      Medical hx Surgical hx   Past Medical History:   Diagnosis Date     Depression      Gastro-oesophageal reflux disease      Headache(784.0)      Hypertension      LOC (loss of consciousness) (H) age 45     Major depression 5/7/2013     Otitis media, chronic      Sleep apnea     Past Surgical History:   Procedure Laterality Date     COLONOSCOPY N/A 3/8/2018    Procedure: COLONOSCOPY;  colonoscopy;  Surgeon: Iona Lutz MD;  Location: [facility] GI     HERNIA  "REPAIR  2006    boith sides     LASER CO2 TYMPANOMASTOIDECTOMY  9/7/2011    Procedure:LASER CO2 TYMPANOMASTOIDECTOMY; Right Tympanoplasty , Cartilage Backed Right Tympanomastoidectomy, Omni Guide Laser on Standby  *Latex Safe*; Surgeon:BENNETT MAXWELL; Location:[facility] OR     SEPTOPLASTY  9/17/2012    Procedure: SEPTOPLASTY;  Septoplasty *Latex Safe* Turbinate reduction ;  Surgeon: Babar Dickerson MD;  Location: [facility] OR     UVULOPALATOPHARYNGOPLASTY  7/9/2012    Procedure: UVULOPALATOPHARYNGOPLASTY;  Uvulopalatopharyngoplasty * Latex Safe*;  Surgeon: Babar Dickerson MD;  Location: [facility] OR                 Medications:     Current Outpatient Prescriptions   Medication     ASPIRIN PO     atorvastatin (LIPITOR) 40 MG tablet     lisinopril-hydrochlorothiazide (PRINZIDE,ZESTORETIC) 20-25 MG per tablet     metFORMIN (GLUCOPHAGE) 500 MG tablet     OLANZapine (ZYPREXA) 20 MG tablet     ranitidine (ZANTAC) 150 MG tablet     venlafaxine (EFFEXOR-XR) 150 MG 24 hr capsule     No current facility-administered medications for this visit.             Allergies:     Allergies   Allergen Reactions     Trazodone Other (See Comments)     Very depressed and suicidal            Review of Systems:   10 points ROS was obtained and highlighted in the HPI, otherwise negative.          Physical Exam:   VS:  /86  Pulse 78  Temp 98.1  F (36.7  C) (Oral)  Ht 1.67 m (5' 5.75\")  Wt 94.6 kg (208 lb 9.6 oz)  SpO2 97%  BMI 33.93 kg/m2      Gen- well, NAD, A+Ox3, normal color  Lym- no palpable LAD  CVS- RRR  RS- CTA  Abd- obese, soft, nontender. No organomegaly.   Extr- hands normal, no CURTIS  Skin- no rash or jaundice  Neuro- no asterixis  Psych- normal mood         Data:   Reviewed in person and significant for:    Lab Results   Component Value Date     02/09/2018      Lab Results   Component Value Date    POTASSIUM 4.0 02/09/2018     Lab Results   Component Value Date    CHLORIDE 104 02/09/2018     Lab Results   Component Value Date    CO2 " 22 02/09/2018     Lab Results   Component Value Date    BUN 16 02/09/2018     Lab Results   Component Value Date    CR 0.81 02/09/2018       Lab Results   Component Value Date    WBC 5.6 06/25/2018     Lab Results   Component Value Date    HGB 13.7 06/25/2018     Lab Results   Component Value Date    HCT 40.1 06/25/2018     Lab Results   Component Value Date    MCV 88 06/25/2018     Lab Results   Component Value Date     06/25/2018       Lab Results   Component Value Date    AST 21 02/09/2018     Lab Results   Component Value Date    ALT 22 02/09/2018     Lab Results   Component Value Date    BILICONJ 0.0 03/04/2013      Lab Results   Component Value Date    BILITOTAL 0.2 02/09/2018       Lab Results   Component Value Date    ALBUMIN 3.8 02/09/2018     Lab Results   Component Value Date    PROTTOTAL 8.2 02/09/2018      Lab Results   Component Value Date    ALKPHOS 82 02/09/2018       Lab Results   Component Value Date    INR 0.96 11/13/2017

## 2018-06-25 NOTE — MR AVS SNAPSHOT
After Visit Summary   6/25/2018    Gianluca Singh    MRN: [id number]           Patient Information     Date Of Birth          1955        Visit Information        Provider Department      6/25/2018 11:15 AM Belen Arnold; Bernardo Marx PA-C [facility] Hepatology        Today's Diagnoses     Chronic hepatitis C without hepatic coma (H)    -  1       Follow-ups after your visit        Your next 10 appointments already scheduled     Aug 06, 2018 10:30 AM CDT   NEW GENERAL with Wesly Darby OD   Eye Clinic ([facility])    [address]  [address] [address]  [facility] [id number] [phone]            Aug 15, 2018  1:15 PM CDT   Adult Med Follow UP with LORE Kim CNS   Psychiatry Clinic ([facility])    [address]  [address] [id number] [phone]              Future tests that were ordered for you today     Open Future Orders        Priority Expected Expires Ordered    Hepatitis C RNA quantitative Routine  7/25/2018 6/25/2018            Who to contact     If you have questions or need follow up information about today's clinic visit or your schedule please contact [facility] HEPATOLOGY directly at [phone].  Normal or non-critical lab and imaging results will be communicated to you by Symon[employer]hart, letter or phone within 4 business days after the clinic has received the results. If you do not hear from us within 7 days, please contact the clinic through Symonicshart or phone. If you have a critical or abnormal lab result, we will notify you by phone as soon as possible.  Submit refill requests through PowerOasis or call your pharmacy and they will forward the refill request to us. Please allow 3 business days for your refill to be completed.          Additional Information About Your Visit        PowerOasis Information     PowerOasis lets you send messages to your doctor, view your  "test results, renew your prescriptions, schedule appointments and more. To sign up, go to [email or web address] . Click on \"Log in\" on the left side of the screen, which will take you to the Welcome page. Then click on \"Sign up Now\" on the right side of the page.     You will be asked to enter the access code listed below, as well as some personal information. Please follow the directions to create your username and password.     Your access code is: [email or web address]  Expires: 2018  6:30 AM     Your access code will  in 90 days. If you need help or a new code, please call your [place] clinic or [phone].        Care EveryWhere ID     This is your Care EveryWhere ID. This could be used by other organizations to access your [place] medical records  [id number]        Your Vitals Were     Pulse Temperature Height Pulse Oximetry BMI (Body Mass Index)       78 98.1  F (36.7  C) (Oral) 1.67 m (5' 5.75\") 97% 33.93 kg/m2        Blood Pressure from Last 3 Encounters:   18 132/86   18 (!) 155/96   18 134/84    Weight from Last 3 Encounters:   18 94.6 kg (208 lb 9.6 oz)   17 91.5 kg (201 lb 12.8 oz)   14 95.8 kg (211 lb 3.2 oz)              Today, you had the following     No orders found for display       Primary Care Provider Office Phone # Fax #    Moira Vero Cuadra [phone]       [facility] CLINIC  [facility] [address]        Equal Access to Services     JENNY LORENZANA AH: Hadlillian Barcenas, vineet oneil, qaana m kaalmada rosita, marilia allen. So [facility] [phone].    ATENCIÓN: Si habla español, tiene a dowd disposición servicios gratuitos de asistencia lingüística. Llame al [phone].    We comply with applicable federal civil rights laws and [place] laws. We do not discriminate on the basis of race, color, national origin, age, disability, sex, sexual orientation, or gender " identity.            Thank you!     Thank you for choosing [facility] HEPATOLOGY  for your care. Our goal is always to provide you with excellent care. Hearing back from our patients is one way we can continue to improve our services. Please take a few minutes to complete the written survey that you may receive in the mail after your visit with us. Thank you!             Your Updated Medication List - Protect others around you: Learn how to safely use, store and throw away your medicines at www.disposemymeds.org.          This list is accurate as of 6/25/18 11:59 PM.  Always use your most recent med list.                   Brand Name Dispense Instructions for use Diagnosis    ASPIRIN PO      Take 81 mg by mouth daily        LIPITOR 40 MG tablet   Generic drug:  atorvastatin      Take 1 tablet (40 mg) by mouth daily        lisinopril-hydrochlorothiazide 20-25 MG per tablet    PRINZIDE/ZESTORETIC     Take 1 tablet by mouth daily        metFORMIN 500 MG tablet    GLUCOPHAGE     Take one tablet daily in the am and two tablets at bedtime.  R Skolar NP at [facility]        OLANZapine 20 MG tablet    zyPREXA    30 tablet    Take 1 tablet (20 mg) by mouth At Bedtime    Chronic insomnia       ranitidine 150 MG tablet    ZANTAC     Take 150 mg by mouth daily        venlafaxine 150 MG 24 hr capsule    EFFEXOR-XR    60 capsule    Take 2 capsules (300 mg) by mouth daily    Social anxiety disorder

## 2018-06-25 NOTE — NURSING NOTE
Chief Complaint   Patient presents with     RECHECK     Hepatitis C   Pt roomed, vitals, meds, and allergies reviewed with pt. Pt ready for provider.  Seun Almaraz, CMA

## 2018-06-25 NOTE — LETTER
6/25/2018      RE: Gianluca Singh  [address]  [address]  [facility] [place] [address]       Hepatology Clinic note  Gianluca Singh   Date of Birth 1955  Date of Service 6/25/2018         Assessment/plan:   Gianluca Singh is a 62 year old male with history of Hepatitis C who finished treatment with Mavyret x 8 weeks in the mid-March. No end of treatment HCV RNA was done. His 12-week SVR HCV RNA is pending today. Previous fibrosis scan showing Stage F0 fibrosis. His liver function and transaminases are normal today. No biochemical or clinical signs of advanced cirrhosis. We discussed the important of healthy nutrition for on-going liver health.     - HCV RNA pending today. If non-detectable, patient has cured his Hepatitis C.   - No routine follow-up needed. Follow-up in Hepatology clinic as needed.     Bernardo Marx PA-C   [facility] Hepatology clinic    -----------------------------------------------------       HPI:   Gianluca Singh is a 62 year old male presenting for the follow-up.     HCV  - GT-1a or 1b  - hx IVDU, EDA  - liver bx 5/2013- stage 0 fibrosis  - fibrosis scan 11/2017, Stage F0, 4.0 kilopascals   - no prior rx    Patient reports finishing Hep C treatment without difficulty. He denies any side effects. He finished Hep C treatment in the mid-March. No end of treatment HCV RNA has been done.     Patient denies jaundice, lower extremity edema, abdominal distension or confusion.  Patient also denies melena, hematochezia or hematemesis.Patient denies weight loss, fevers, sweats or chills.    No recent ETOH use.      Medical hx Surgical hx   Past Medical History:   Diagnosis Date     Depression      Gastro-oesophageal reflux disease      Headache(784.0)      Hypertension      LOC (loss of consciousness) (H) age 45     Major depression 5/7/2013     Otitis media, chronic      Sleep apnea     Past Surgical History:   Procedure Laterality Date     COLONOSCOPY N/A 3/8/2018    Procedure:  "COLONOSCOPY;  colonoscopy;  Surgeon: Iona Lutz MD;  Location:  GI     HERNIA REPAIR  2006    boith sides     LASER CO2 TYMPANOMASTOIDECTOMY  9/7/2011    Procedure:LASER CO2 TYMPANOMASTOIDECTOMY; Right Tympanoplasty , Cartilage Backed Right Tympanomastoidectomy, Omni Guide Laser on Standby  *Latex Safe*; Surgeon:BENNETT MAXWELL; Location:[facility] OR     SEPTOPLASTY  9/17/2012    Procedure: SEPTOPLASTY;  Septoplasty *Latex Safe* Turbinate reduction ;  Surgeon: Babar Dickerson MD;  Location:  OR     UVULOPALATOPHARYNGOPLASTY  7/9/2012    Procedure: UVULOPALATOPHARYNGOPLASTY;  Uvulopalatopharyngoplasty * Latex Safe*;  Surgeon: Babar Dickerson MD;  Location:  OR                 Medications:     Current Outpatient Prescriptions   Medication     ASPIRIN PO     atorvastatin (LIPITOR) 40 MG tablet     lisinopril-hydrochlorothiazide (PRINZIDE,ZESTORETIC) 20-25 MG per tablet     metFORMIN (GLUCOPHAGE) 500 MG tablet     OLANZapine (ZYPREXA) 20 MG tablet     ranitidine (ZANTAC) 150 MG tablet     venlafaxine (EFFEXOR-XR) 150 MG 24 hr capsule     No current facility-administered medications for this visit.             Allergies:     Allergies   Allergen Reactions     Trazodone Other (See Comments)     Very depressed and suicidal            Review of Systems:   10 points ROS was obtained and highlighted in the HPI, otherwise negative.          Physical Exam:   VS:  /86  Pulse 78  Temp 98.1  F (36.7  C) (Oral)  Ht 1.67 m (5' 5.75\")  Wt 94.6 kg (208 lb 9.6 oz)  SpO2 97%  BMI 33.93 kg/m2      Gen- well, NAD, A+Ox3, normal color  Lym- no palpable LAD  CVS- RRR  RS- CTA  Abd- obese, soft, nontender. No organomegaly.   Extr- hands normal, no CURTIS  Skin- no rash or jaundice  Neuro- no asterixis  Psych- normal mood         Data:   Reviewed in person and significant for:    Lab Results   Component Value Date     02/09/2018      Lab Results   Component Value Date    POTASSIUM 4.0 02/09/2018     Lab Results "   Component Value Date    CHLORIDE 104 02/09/2018     Lab Results   Component Value Date    CO2 22 02/09/2018     Lab Results   Component Value Date    BUN 16 02/09/2018     Lab Results   Component Value Date    CR 0.81 02/09/2018       Lab Results   Component Value Date    WBC 5.6 06/25/2018     Lab Results   Component Value Date    HGB 13.7 06/25/2018     Lab Results   Component Value Date    HCT 40.1 06/25/2018     Lab Results   Component Value Date    MCV 88 06/25/2018     Lab Results   Component Value Date     06/25/2018       Lab Results   Component Value Date    AST 21 02/09/2018     Lab Results   Component Value Date    ALT 22 02/09/2018     Lab Results   Component Value Date    BILICONJ 0.0 03/04/2013      Lab Results   Component Value Date    BILITOTAL 0.2 02/09/2018       Lab Results   Component Value Date    ALBUMIN 3.8 02/09/2018     Lab Results   Component Value Date    PROTTOTAL 8.2 02/09/2018      Lab Results   Component Value Date    ALKPHOS 82 02/09/2018       Lab Results   Component Value Date    INR 0.96 11/13/2017             Bernardo Marx PA-C

## 2018-06-26 ASSESSMENT — PATIENT HEALTH QUESTIONNAIRE - PHQ9: SUM OF ALL RESPONSES TO PHQ QUESTIONS 1-9: 13

## 2018-06-28 LAB
HCV RNA SERPL NAA+PROBE-ACNC: NORMAL [IU]/ML
HCV RNA SERPL NAA+PROBE-LOG IU: NORMAL LOG IU/ML

## 2018-08-06 ENCOUNTER — OFFICE VISIT (OUTPATIENT)
Dept: OPHTHALMOLOGY | Facility: CLINIC | Age: 63
End: 2018-08-06
Attending: OPTOMETRIST
Payer: MEDICAID

## 2018-08-06 DIAGNOSIS — H25.13 NUCLEAR SENILE CATARACT OF BOTH EYES: ICD-10-CM

## 2018-08-06 DIAGNOSIS — H40.033 ANATOMICAL NARROW ANGLE BORDERLINE GLAUCOMA OF BOTH EYES: ICD-10-CM

## 2018-08-06 PROCEDURE — G0463 HOSPITAL OUTPT CLINIC VISIT: HCPCS

## 2018-08-06 PROCEDURE — 92015 DETERMINE REFRACTIVE STATE: CPT | Mod: ZF

## 2018-08-06 PROCEDURE — T1013 SIGN LANG/ORAL INTERPRETER: HCPCS | Mod: U3,ZF | Performed by: OPTOMETRIST

## 2018-08-06 ASSESSMENT — CUP TO DISC RATIO
OD_RATIO: 0.3
OS_RATIO: 0.45

## 2018-08-06 ASSESSMENT — TONOMETRY
IOP_METHOD: TONOPEN
OS_IOP_MMHG: 19
OD_IOP_MMHG: 19

## 2018-08-06 ASSESSMENT — REFRACTION_WEARINGRX
OS_ADD: +2.00
OD_CYLINDER: +0.50
OD_SPHERE: PLANO
OS_SPHERE: -0.50
OS_AXIS: 130
OD_ADD: +2.00
OS_CYLINDER: +0.75
OD_AXIS: 037

## 2018-08-06 ASSESSMENT — GONIOSCOPY
OS_TEMPORAL: PTM
OS_NASAL: PTM
OD_NASAL: PTM
OD_SUPERIOR: PTM
OD_TEMPORAL: PTM
OD_INFERIOR: PTM
OS_SUPERIOR: PTM
OS_INFERIOR: PTM

## 2018-08-06 ASSESSMENT — REFRACTION_MANIFEST
OD_SPHERE: +0.50
OS_SPHERE: -0.50
OS_ADD: +2.25
OS_CYLINDER: +0.50
OD_ADD: +2.25
OS_AXIS: 130

## 2018-08-06 ASSESSMENT — EXTERNAL EXAM - LEFT EYE: OS_EXAM: NORMAL

## 2018-08-06 ASSESSMENT — SLIT LAMP EXAM - LIDS
COMMENTS: NORMAL
COMMENTS: NORMAL

## 2018-08-06 ASSESSMENT — EXTERNAL EXAM - RIGHT EYE: OD_EXAM: NORMAL

## 2018-08-06 ASSESSMENT — VISUAL ACUITY
METHOD: SNELLEN - LINEAR
OS_CC: 20/30
OD_CC: 20/50
OD_PH_CC: 20/25

## 2018-08-06 NOTE — PROGRESS NOTES
HPI:  Patient is being monitored for hepatitis C and just finished his last treatment in 03/18. Patient is also being monitored for diabetes and on oral medications. The last HA1C was 03/17 - 5.9. He is here for a diabetic eye exam. Vision is the same.       Pertinent Medical History:    Diabetes Type 2.     Hepatitis C      Assessment and Plan:  1.   Diabetes    Unable to dilate due to possible narrow angles today.     2.   Possible Narrow Angle, both eyes    IOP is stable.     Gonioscopy open to PTM both eyes with iris bowing. Can see scleral spur with depression in both eyes.     Recommends LPI evaluation with Dr. Dietz/Sung and/or glaucoma.     3.   Cataract, both eyes.     Not visually significant. No surgical intervention but could be contributing to narrow angle along with hyperopia.     4.   Hyperopia, right eye. Astigmatism, left eye.     Continue with current glasses.         Medical History:  Past Medical History:   Diagnosis Date     Depression      Gastro-oesophageal reflux disease      Headache(784.0)      Hypertension      LOC (loss of consciousness) (H) age 45    out for 20 mintes after hit on top of head with board     Major depression 5/7/2013     Otitis media, chronic      Sleep apnea     cant tolerate cpap       Medications:  Current Outpatient Prescriptions   Medication Sig Dispense Refill     ASPIRIN PO Take 81 mg by mouth daily       atorvastatin (LIPITOR) 40 MG tablet Take 1 tablet (40 mg) by mouth daily       lisinopril-hydrochlorothiazide (PRINZIDE,ZESTORETIC) 20-25 MG per tablet Take 1 tablet by mouth daily       metFORMIN (GLUCOPHAGE) 500 MG tablet Take one tablet daily in the am and two tablets at bedtime.  R Skolar NP at [facility]       OLANZapine (ZYPREXA) 20 MG tablet Take 1 tablet (20 mg) by mouth At Bedtime 30 tablet 5     ranitidine (ZANTAC) 150 MG tablet Take 150 mg by mouth daily        venlafaxine (EFFEXOR-XR) 150 MG 24 hr capsule Take 2 capsules (300 mg) by mouth daily 60 capsule  5

## 2018-08-06 NOTE — MR AVS SNAPSHOT
After Visit Summary   8/6/2018    Gianluca FLYNN    MRN: [id number]           Patient Information     Date Of Birth          1955        Visit Information        Provider Department      8/6/2018 10:15 AM Wesly Darby Chi, OD; [facility] LANGUAGE SERVICES Eye Clinic         Follow-ups after your visit        Follow-up notes from your care team     Return for LPI/narrow angle/evaluation with glaucoma or Dr. Almaraz earliest available..      Your next 10 appointments already scheduled     Aug 08, 2018  8:15 AM CDT   NEW GENERAL with Carole Dietz MD   Eye Clinic ([facility])    [address]  [address] Clin [address]  [facility] [id number] [phone]            Aug 15, 2018  1:15 PM CDT   Adult Med Follow UP with LORE Kim CNS   Psychiatry Clinic ([facility])    [address]  [address] [address] [phone]              Who to contact     Please call your clinic at [phone] to:    Ask questions about your health    Make or cancel appointments    Discuss your medicines    Learn about your test results    Speak to your doctor            Additional Information About Your Visit        Care EveryWhere ID     This is your Care EveryWhere ID. This could be used by other organizations to access your [place] medical records  [id number]         Blood Pressure from Last 3 Encounters:   06/25/18 132/86   03/08/18 (!) 155/96   01/12/18 134/84    Weight from Last 3 Encounters:   06/25/18 94.6 kg (208 lb 9.6 oz)   11/13/17 91.5 kg (201 lb 12.8 oz)   11/13/14 95.8 kg (211 lb 3.2 oz)              Today, you had the following     No orders found for display       Primary Care Provider Office Phone # Fax #    Moira Cuadra [phone]       [facility] CLINIC [address] [facility] [address]        Equal Access to Services     JENNY LORENZANA AH: Hadii aad ku  santo Barcenas, waaxda luqadaha, qaybta kaalmada rosita, marilia traciein hayaan treyvipin gomezalondra laRajeevadriane alejandro. So [facility] [phone].    ATENCIÓN: Si habla shalonda, tiene a dowd disposición servicios gratuitos de asistencia lingüística. Murray al [phone].    We comply with applicable federal civil rights laws and [place] laws. We do not discriminate on the basis of race, color, national origin, age, disability, sex, sexual orientation, or gender identity.            Thank you!     Thank you for choosing EYE CLINIC  for your care. Our goal is always to provide you with excellent care. Hearing back from our patients is one way we can continue to improve our services. Please take a few minutes to complete the written survey that you may receive in the mail after your visit with us. Thank you!             Your Updated Medication List - Protect others around you: Learn how to safely use, store and throw away your medicines at www.disposemymeds.org.          This list is accurate as of 8/6/18 11:14 AM.  Always use your most recent med list.                   Brand Name Dispense Instructions for use Diagnosis    ASPIRIN PO      Take 81 mg by mouth daily        LIPITOR 40 MG tablet   Generic drug:  atorvastatin      Take 1 tablet (40 mg) by mouth daily        lisinopril-hydrochlorothiazide 20-25 MG per tablet    PRINZIDE/ZESTORETIC     Take 1 tablet by mouth daily        metFORMIN 500 MG tablet    GLUCOPHAGE     Take one tablet daily in the am and two tablets at bedtime.  R Skolar NP at [facility]        OLANZapine 20 MG tablet    zyPREXA    30 tablet    Take 1 tablet (20 mg) by mouth At Bedtime    Chronic insomnia       ranitidine 150 MG tablet    ZANTAC     Take 150 mg by mouth daily        venlafaxine 150 MG 24 hr capsule    EFFEXOR-XR    60 capsule    Take 2 capsules (300 mg) by mouth daily    Social anxiety disorder

## 2018-08-07 DIAGNOSIS — H40.033 ANATOMICAL NARROW ANGLE BORDERLINE GLAUCOMA OF BOTH EYES: Primary | ICD-10-CM

## 2018-08-08 ENCOUNTER — OFFICE VISIT (OUTPATIENT)
Dept: OPHTHALMOLOGY | Facility: CLINIC | Age: 63
End: 2018-08-08
Attending: OPHTHALMOLOGY
Payer: MEDICAID

## 2018-08-08 DIAGNOSIS — H40.033 ANATOMICAL NARROW ANGLE OF BOTH EYES: Primary | ICD-10-CM

## 2018-08-08 DIAGNOSIS — H25.13 NUCLEAR SCLEROTIC CATARACT OF BOTH EYES: ICD-10-CM

## 2018-08-08 DIAGNOSIS — E11.9 DIABETES MELLITUS TYPE 2 WITHOUT RETINOPATHY (H): ICD-10-CM

## 2018-08-08 PROCEDURE — G0463 HOSPITAL OUTPT CLINIC VISIT: HCPCS | Mod: ZF

## 2018-08-08 ASSESSMENT — REFRACTION_WEARINGRX
OS_SPHERE: -0.50
OS_ADD: +2.00
OD_CYLINDER: +0.50
OD_AXIS: 037
OD_SPHERE: PLANO
OS_CYLINDER: +0.75
OD_ADD: +2.00
OS_AXIS: 130

## 2018-08-08 ASSESSMENT — CUP TO DISC RATIO
OS_RATIO: 0.45
OD_RATIO: 0.3

## 2018-08-08 ASSESSMENT — TONOMETRY
OS_IOP_MMHG: 14
IOP_METHOD: TONOPEN
OD_IOP_MMHG: 16

## 2018-08-08 ASSESSMENT — GONIOSCOPY
OD_SUPERIOR: B25B
OS_TEMPORAL: C35B
OS_SUPERIOR: B25B

## 2018-08-08 ASSESSMENT — VISUAL ACUITY
OD_SC: 20/60
OS_SC: 20/30
METHOD: SNELLEN - LINEAR
OD_PH_SC: 20/25-
OS_PH_SC: 20/25-

## 2018-08-08 ASSESSMENT — SLIT LAMP EXAM - LIDS
COMMENTS: NORMAL
COMMENTS: NORMAL

## 2018-08-08 ASSESSMENT — EXTERNAL EXAM - LEFT EYE: OS_EXAM: NORMAL

## 2018-08-08 ASSESSMENT — EXTERNAL EXAM - RIGHT EYE: OD_EXAM: NORMAL

## 2018-08-08 ASSESSMENT — CONF VISUAL FIELD
OD_NORMAL: 1
OS_NORMAL: 1
METHOD: COUNTING FINGERS

## 2018-08-08 NOTE — MR AVS SNAPSHOT
After Visit Summary   8/8/2018    Gianluca FLYNN    MRN: [id number]           Patient Information     Date Of Birth          1955        Visit Information        Provider Department      8/8/2018 8:00 AM Carole Dietz MD; PHONE,  Eye Clinic        Today's Diagnoses     Anatomical narrow angle of both eyes    -  1    Diabetes mellitus type 2 without retinopathy (H)        Nuclear sclerotic cataract of both eyes           Follow-ups after your visit        Follow-up notes from your care team     Return in about 1 year (around 8/8/2019).      Your next 10 appointments already scheduled     Aug 15, 2018  1:15 PM CDT   Adult Med Follow UP with LORE Kim CNS   Psychiatry Clinic ([facility])    [place] [id number] [place] [address] [phone]              Future tests that were ordered for you today     Open Future Orders        Priority Expected Expires Ordered    GONIOSCOPY Routine  8/7/2019 8/7/2018            Who to contact     Please call your clinic at [phone] to:    Ask questions about your health    Make or cancel appointments    Discuss your medicines    Learn about your test results    Speak to your doctor            Additional Information About Your Visit        Care EveryWhere ID     This is your Care EveryWhere ID. This could be used by other organizations to access your [place] medical records  [id number]         Blood Pressure from Last 3 Encounters:   06/25/18 132/86   03/08/18 (!) 155/96   01/12/18 134/84    Weight from Last 3 Encounters:   06/25/18 94.6 kg (208 lb 9.6 oz)   11/13/17 91.5 kg (201 lb 12.8 oz)   11/13/14 95.8 kg (211 lb 3.2 oz)              Today, you had the following     No orders found for display       Primary Care Provider Office Phone # Fax #    Moira Cuadra [phone]       [facility] [address] [facility] [address]         Equal Access to Services     [facility] : Hadii aad ku hadjorjeole Angusali, watruptida luqadaha, qaybta kaalmajohn becker, marilia allen. So [facility] [phone].    ATENCIÓN: Si habla español, tiene a dowd disposición servicios gratuitos de asistencia lingüística. Vereniceame al [phone].    We comply with applicable federal civil rights laws and [place] laws. We do not discriminate on the basis of race, color, national origin, age, disability, sex, sexual orientation, or gender identity.            Thank you!     Thank you for choosing EYE CLINIC  for your care. Our goal is always to provide you with excellent care. Hearing back from our patients is one way we can continue to improve our services. Please take a few minutes to complete the written survey that you may receive in the mail after your visit with us. Thank you!             Your Updated Medication List - Protect others around you: Learn how to safely use, store and throw away your medicines at www.disposemymeds.org.          This list is accurate as of 8/8/18 12:14 PM.  Always use your most recent med list.                   Brand Name Dispense Instructions for use Diagnosis    ASPIRIN PO      Take 81 mg by mouth daily        LIPITOR 40 MG tablet   Generic drug:  atorvastatin      Take 1 tablet (40 mg) by mouth daily        lisinopril-hydrochlorothiazide 20-25 MG per tablet    PRINZIDE/ZESTORETIC     Take 1 tablet by mouth daily        metFORMIN 500 MG tablet    GLUCOPHAGE     Take one tablet daily in the am and two tablets at bedtime.  R Skolar NP at [facility]        OLANZapine 20 MG tablet    zyPREXA    30 tablet    Take 1 tablet (20 mg) by mouth At Bedtime    Chronic insomnia       ranitidine 150 MG tablet    ZANTAC     Take 150 mg by mouth daily        venlafaxine 150 MG 24 hr capsule    EFFEXOR-XR    60 capsule    Take 2 capsules (300 mg) by mouth daily    Social anxiety disorder

## 2018-08-08 NOTE — NURSING NOTE
Chief Complaints and History of Present Illnesses   Patient presents with     Follow Up For      Anatomical narrow angle borderline glaucoma of both eyes      HPI    Last Eye Exam:  8/6/18   Affected eye(s):  Both   Symptoms:        Unknown duration    Frequency:  Constant       Do you have eye pain now?:  No      Comments:  Gianluca is here for  Evaluation of Anatomical narrow angle borderline glaucoma of both eyes   He says he sees double vision when one eye is covered RE>LE, but no double vision with both eyes open     Nghia Reilly COT 8:32 AM August 8, 2018

## 2018-08-08 NOTE — PROGRESS NOTES
HPI: Gianluca Singh is a 64 year old male referred by Dr. Darby for possible narrow angles. He has not noticed any changes in vision. No pain, redness, discharge. No flashes/floaters.    PMH:  DM2 x 1 year  HTN    POHx:  Glasses    FH:  Pt denies      Assessment & Plan     (H40.033) Anatomical narrow angle of both eyes  (primary encounter diagnosis)  Comment: Not occludable on gonioscopy today. Open to TM and scleral spur in both eyes.   Plan: Discussed the diagnosis and recommend continued observation    (E11.9) Diabetes mellitus type 2 without retinopathy (H)  Comment: On metformin. No recent A1c in EPIC. No diabetic retinopathy.  Plan: Discussed the importance of tight blood glucose control in the prevention of diabetic retinopathy. Recommend yearly dilated eye exam.    (H25.13) Nuclear sclerotic cataract of both eyes  Comment: Not visually significant, refracted well with Dr. Darby  Plan: Observe      RTC: 1 year for diabetes eye exam, sooner prn    Teaching statement:  Complete documentation of historical and exam elements from today's encounter can be found in the full encounter summary report (not reduplicated in this progress note). I personally obtained the chief complaint(s) and history of present illness.  I confirmed and edited as necessary the review of systems, past medical/surgical history, family history, social history, and examination findings as documented by others; and I examined the patient myself. I personally reviewed the relevant tests, images, and reports as documented above.     I formulated and edited as necessary the assessment and plan and discussed the findings and management plan with the patient and family.    Carole Dietz MD  Comprehensive Ophthalmology & Ocular Pathology  Department of Ophthalmology and Visual Neurosciences  [email or web address]  Pager [phone]

## 2018-08-15 ENCOUNTER — OFFICE VISIT (OUTPATIENT)
Dept: INTERPRETER SERVICES | Facility: CLINIC | Age: 63
End: 2018-08-15
Attending: CLINICAL NURSE SPECIALIST

## 2018-08-15 ENCOUNTER — OFFICE VISIT (OUTPATIENT)
Dept: PSYCHIATRY | Facility: CLINIC | Age: 63
End: 2018-08-15
Attending: CLINICAL NURSE SPECIALIST
Payer: MEDICAID

## 2018-08-15 VITALS
SYSTOLIC BLOOD PRESSURE: 136 MMHG | HEART RATE: 77 BPM | BODY MASS INDEX: 34.77 KG/M2 | WEIGHT: 213.8 LBS | DIASTOLIC BLOOD PRESSURE: 88 MMHG

## 2018-08-15 DIAGNOSIS — F33.2 SEVERE RECURRENT MAJOR DEPRESSION WITHOUT PSYCHOTIC FEATURES (H): ICD-10-CM

## 2018-08-15 DIAGNOSIS — F40.10 SOCIAL ANXIETY DISORDER: Primary | ICD-10-CM

## 2018-08-15 DIAGNOSIS — F19.21 POLYSUBSTANCE DEPENDENCE, NON-OPIOID, IN REMISSION (H): ICD-10-CM

## 2018-08-15 DIAGNOSIS — G47.33 OSA (OBSTRUCTIVE SLEEP APNEA): ICD-10-CM

## 2018-08-15 DIAGNOSIS — G47.00 INSOMNIA, UNSPECIFIED TYPE: ICD-10-CM

## 2018-08-15 PROCEDURE — T1013 SIGN LANG/ORAL INTERPRETER: HCPCS | Mod: U3,ZF

## 2018-08-15 PROCEDURE — G0463 HOSPITAL OUTPT CLINIC VISIT: HCPCS | Mod: ZF

## 2018-08-15 RX ORDER — DOXEPIN HYDROCHLORIDE 10 MG/1
10 CAPSULE ORAL AT BEDTIME
Qty: 30 CAPSULE | Refills: 0 | Status: SHIPPED | OUTPATIENT
Start: 2018-08-15 | End: 2018-08-31

## 2018-08-15 RX ORDER — OLANZAPINE 10 MG/1
10 TABLET ORAL AT BEDTIME
Qty: 30 TABLET | Refills: 0 | Status: SHIPPED | OUTPATIENT
Start: 2018-08-15 | End: 2018-08-31 | Stop reason: ALTCHOICE

## 2018-08-15 ASSESSMENT — PAIN SCALES - GENERAL: PAINLEVEL: NO PAIN (0)

## 2018-08-15 NOTE — PROGRESS NOTES
Outpatient Psychiatry Progress Note     Provider: LORE Kim CNS  Date: 8/15/2018  Service:  Medication follow up with counseling.   Patient Identification: Gianluca FLYNN  : 1955   MRN: [id number]    Gianluca FLYNN is a 63 year old year old male who presents for ongoing psychiatric care.  Gianluca FLYNN was last seen in clinic on 18.   At that time,  Assessment & Plan       Gianluca Singh is seen today for follow up with  and reports his mood has been stable with continued difficulty in daily functioning     Diagnosis  Social Anxiety Disorder, Major Depression, Recurrent Severe without psychosis, Polysubstance dependence in remission        Plan:  Medication: no change  OTC Recommendations: none  Lab Orders:  none  Referrals: none  Release of Information: none  Future Treatment Considerations:per symptoms  Return for Follow Up:8 weeks      ____________________________________________________________________________________________________________________________________________    08/15/2018   Seen with  Cindy.   Today Gianluca reports he is so so.  Summer has been hot.  Side effects of medication include: none reported  Psychiatric Review of Systems:  Depression: In the last 2 weeks per PHQ 9 more than 1/2 days sleep disturbance, appetite disturbance, restless/lethargy, passive SI.  Nearly everday anhedonia, feeling depressed, fatigue, feelings of failure, concentration problems.  Anxiety : stable  Pamela na   Psychosis  na.   ADHD na    Review of Medical Systems:  Sleep: stable, but tired during the day. Takes Olanzapine around 6pm because if he takes it at bedtime he wakes up to eat.  Feels that appetite has increased recently.  Eats after Olanzapine and before bedtime now. Not hungry in the am when he wakes up.  Energy: no change  Concentration: no change  Appetite: moderate  GI Concerns: no new concerns  Cardiac concerns: no  concerns  Neurological concerns: no concerns  Other medical concerns: no new concerns  Current Substance Use:  Alcohol:denies  Other drugs:denies  Caffeine:not reviewed  Nicotine: not reviewed  Past Medical History:   Past Medical History:   Diagnosis Date     Depression      Gastro-oesophageal reflux disease      Headache(784.0)      Hypertension      LOC (loss of consciousness) (H) age 45    out for 20 mintes after hit on top of head with board     Major depression 5/7/2013     Otitis media, chronic      Sleep apnea     cant tolerate cpap     Patient Active Problem List   Diagnosis     Non-English speaking patient     Chronic nasal congestion     HEIKE (obstructive sleep apnea)     Chronic hepatitis C (H)     Esophageal reflux     Headache     Short-term memory loss     Polysubstance dependence, non-opioid, in remission (H)     Social anxiety disorder     Severe recurrent major depression without psychotic features (H)     Heterozygous MTHFR mutation C677T (H)       Allergies:   Allergies   Allergen Reactions     Trazodone Other (See Comments)     Very depressed and suicidal          Current Medications     Current Outpatient Prescriptions Ordered in [facility]   Medication Sig Dispense Refill     ASPIRIN PO Take 81 mg by mouth daily       atorvastatin (LIPITOR) 40 MG tablet Take 1 tablet (40 mg) by mouth daily       lisinopril-hydrochlorothiazide (PRINZIDE,ZESTORETIC) 20-25 MG per tablet Take 1 tablet by mouth daily       metFORMIN (GLUCOPHAGE) 500 MG tablet Take one tablet daily in the am and two tablets at bedtime.  R Skolar NP at [facility]       OLANZapine (ZYPREXA) 20 MG tablet Take 1 tablet (20 mg) by mouth At Bedtime 30 tablet 5     ranitidine (ZANTAC) 150 MG tablet Take 150 mg by mouth daily        venlafaxine (EFFEXOR-XR) 150 MG 24 hr capsule Take 2 capsules (300 mg) by mouth daily 60 capsule 5     No current [facility]-ordered facility-administered medications on file.         Mental Status Exam     Appearance:  Casually  "dressed and Well groomed  Behavior/relationship to examiner/demeanor:  Cooperative  Orientation: Oriented to person, place, time and situation  Psychomotor: normal form  Speech Rate:  Normal  Speech Spontaneity:  Normal  Mood:  \"so so\"  Affect:  Appropriate/mood-congruent  Thought Process (Associations):  Goal directed  Thought Content:  no overt psychosis, denies suicidal ideation, intent or thoughts and patient does not appear to be responding to internal stimuli  Abnormal Perception:  None  Attention/Concentration:  Normal  Insight:  Adequate  Judgment:  Good      Results     Vital signs: /88  Pulse 77  Wt 97 kg (213 lb 12.8 oz)  BMI 34.77 kg/m2    Laboratory Data:  no new data available    Assessment & Plan      Gianluca FLYNN is seen today for follow up and reports his mood has been stable. Concerned about increased appetite and weight gain. Discussed trial of Doxepin for sleep instead of Olanzapine with gradual decrease in Olanzapine. Although possible weight gain with Doxepin hopefully dose will be low and weight gain will be less than olanzapine.    Diagnosis  Social Anxiety Disorder, Major Depression, Recurrent Severe without psychosis, Polysubstance dependence in remission      Plan:  Medication: Decrease Zyprexa to 10mg and add Doxepin 10mg, Continue Effexor XR 300mg  OTC Recommendations: none  Lab Orders:  none  Referrals: none  Release of Information: none  Future Treatment Considerations:discontinue Zyprexa if Doxepin works will for sleep.  Return for Follow Up:2 weeks   The risks, benefits, alternatives and side effects have been discussed and are understood by the patient. The patient understands the risks of using street drugs or alcohol. There are no medical contraindications, the patient agrees to treatment, and has the capacity to do so. The patient understands to call 911 or come to the nearest ED if life threatening or urgent symptoms present.  In addition time was spent " counseling the patient and/or coordinating care regarding review of social and occupational functioning.  In addition patient was counseled on health and wellness practices to augment medication treatment of symptoms. See note for details.    Alba Merlos, APRN CNS 8/15/2018

## 2018-08-15 NOTE — MR AVS SNAPSHOT
After Visit Summary   8/15/2018    Gianluca FLYNN    MRN: [id number]           Patient Information     Date Of Birth          1955        Visit Information        Provider Department      8/15/2018 1:15 PM Alba Merlos APRN CNS Psychiatry Clinic        Today's Diagnoses     Social anxiety disorder    -  1    Severe recurrent major depression without psychotic features (H)        Polysubstance dependence, non-opioid, in remission (H)        HEIKE (obstructive sleep apnea)        Insomnia, unspecified type           Follow-ups after your visit        Follow-up notes from your care team     Return in about 2 weeks (around 8/29/2018).      Your next 10 appointments already scheduled     Aug 31, 2018 10:15 AM CDT   Adult Med Follow UP with LORE Kim CNS   Psychiatry Clinic ([facility])    [place] [id number]  [address] [address] [phone]              Who to contact     Please call your clinic at [phone] to:    Ask questions about your health    Make or cancel appointments    Discuss your medicines    Learn about your test results    Speak to your doctor            Additional Information About Your Visit        Care EveryWhere ID     This is your Care EveryWhere ID. This could be used by other organizations to access your [place] medical records  [id number]        Your Vitals Were     Pulse BMI (Body Mass Index)                77 34.77 kg/m2           Blood Pressure from Last 3 Encounters:   08/15/18 136/88   06/25/18 132/86   06/20/18 128/88    Weight from Last 3 Encounters:   08/15/18 97 kg (213 lb 12.8 oz)   06/25/18 94.6 kg (208 lb 9.6 oz)   06/20/18 95.1 kg (209 lb 9.6 oz)              Today, you had the following     No orders found for display         Today's Medication Changes          These changes are accurate as of 8/15/18  2:03 PM.  If you have any questions, ask your nurse or doctor.                Start taking these medicines.        Dose/Directions    doxepin 10 MG capsule   Commonly known as:  SINEquan   Used for:  Insomnia, unspecified type, Severe recurrent major depression without psychotic features (H)   Started by:  Alba Merlos APRN CNS        Dose:  10 mg   Take 1 capsule (10 mg) by mouth At Bedtime   Quantity:  30 capsule   Refills:  0         These medicines have changed or have updated prescriptions.        Dose/Directions    OLANZapine 10 MG tablet   Commonly known as:  zyPREXA   This may have changed:    - medication strength  - how much to take  - additional instructions   Used for:  Severe recurrent major depression without psychotic features (H)   Changed by:  Alba Merlos APRN CNS        Dose:  10 mg   Take 1 tablet (10 mg) by mouth At Bedtime Discontinue 20mg dose   Quantity:  30 tablet   Refills:  0            Where to get your medicines      These medications were sent to [facility] [id number] [place] [address] & [address] [address]     Phone:  [phone]     doxepin 10 MG capsule    OLANZapine 10 MG tablet                Primary Care Provider Office Phone # Fax #    Moira JohnsonHIRA pierre [phone]       [facility] [address] [facility] [address]        Equal Access to Services     JENNY LORENZANA : Hadii yolanda ku hadasho Soomaali, waaxda luqadaha, qaybta kaalmada adeegyada, waxay anne hayadriane allen. So [facility] [phone].    ATENCIÓN: Si habla español, tiene a dowd disposición servicios gratuitos de asistencia lingüística. Llame al [phone].    We comply with applicable federal civil rights laws and [place] laws. We do not discriminate on the basis of race, color, national origin, age, disability, sex, sexual orientation, or gender identity.            Thank you!     Thank you for choosing PSYCHIATRY CLINIC  for your care. Our goal is always  to provide you with excellent care. Hearing back from our patients is one way we can continue to improve our services. Please take a few minutes to complete the written survey that you may receive in the mail after your visit with us. Thank you!             Your Updated Medication List - Protect others around you: Learn how to safely use, store and throw away your medicines at www.disposemymeds.org.          This list is accurate as of 8/15/18  2:03 PM.  Always use your most recent med list.                   Brand Name Dispense Instructions for use Diagnosis    ASPIRIN PO      Take 81 mg by mouth daily        doxepin 10 MG capsule    SINEquan    30 capsule    Take 1 capsule (10 mg) by mouth At Bedtime    Insomnia, unspecified type, Severe recurrent major depression without psychotic features (H)       LIPITOR 40 MG tablet   Generic drug:  atorvastatin      Take 1 tablet (40 mg) by mouth daily        lisinopril-hydrochlorothiazide 20-25 MG per tablet    PRINZIDE/ZESTORETIC     Take 1 tablet by mouth daily        metFORMIN 500 MG tablet    GLUCOPHAGE     Take one tablet daily in the am and two tablets at bedtime.  R Skolar NP at [facility]        OLANZapine 10 MG tablet    zyPREXA    30 tablet    Take 1 tablet (10 mg) by mouth At Bedtime Discontinue 20mg dose    Severe recurrent major depression without psychotic features (H)       ranitidine 150 MG tablet    ZANTAC     Take 150 mg by mouth daily        venlafaxine 150 MG 24 hr capsule    EFFEXOR-XR    60 capsule    Take 2 capsules (300 mg) by mouth daily    Social anxiety disorder

## 2018-08-31 ENCOUNTER — OFFICE VISIT (OUTPATIENT)
Dept: PSYCHIATRY | Facility: CLINIC | Age: 63
End: 2018-08-31
Attending: CLINICAL NURSE SPECIALIST
Payer: MEDICAID

## 2018-08-31 VITALS
WEIGHT: 213.8 LBS | HEART RATE: 80 BPM | DIASTOLIC BLOOD PRESSURE: 82 MMHG | BODY MASS INDEX: 34.77 KG/M2 | SYSTOLIC BLOOD PRESSURE: 124 MMHG

## 2018-08-31 DIAGNOSIS — F40.10 SOCIAL ANXIETY DISORDER: ICD-10-CM

## 2018-08-31 DIAGNOSIS — F33.2 SEVERE RECURRENT MAJOR DEPRESSION WITHOUT PSYCHOTIC FEATURES (H): ICD-10-CM

## 2018-08-31 DIAGNOSIS — F19.21 POLYSUBSTANCE DEPENDENCE, NON-OPIOID, IN REMISSION (H): Primary | ICD-10-CM

## 2018-08-31 DIAGNOSIS — G47.00 INSOMNIA, UNSPECIFIED TYPE: ICD-10-CM

## 2018-08-31 PROCEDURE — T1013 SIGN LANG/ORAL INTERPRETER: HCPCS | Mod: U3,ZF

## 2018-08-31 PROCEDURE — G0463 HOSPITAL OUTPT CLINIC VISIT: HCPCS | Mod: ZF

## 2018-08-31 RX ORDER — DOXEPIN HYDROCHLORIDE 10 MG/1
10 CAPSULE ORAL AT BEDTIME
Qty: 30 CAPSULE | Refills: 1 | Status: SHIPPED | OUTPATIENT
Start: 2018-08-31 | End: 2018-09-28

## 2018-08-31 ASSESSMENT — PAIN SCALES - GENERAL: PAINLEVEL: NO PAIN (0)

## 2018-08-31 NOTE — PROGRESS NOTES
Outpatient Psychiatry Progress Note     Provider: LORE Kim CNS  Date: 2018  Service:  Medication follow up with counseling.   Patient Identification: Gianluca FLYNN  : 1955   MRN: [id number]    Gianluca FLYNN is a 63 year old year old male who presents for ongoing psychiatric care.  Gianluca FLYNN was last seen in clinic on 8/15/18.   At that time,   Assessment & Plan       Gianluca FLYNN is seen today for follow up and reports his mood has been stable. Concerned about increased appetite and weight gain. Discussed trial of Doxepin for sleep instead of Olanzapine with gradual decrease in Olanzapine. Although possible weight gain with Doxepin hopefully dose will be low and weight gain will be less than olanzapine.     Diagnosis  Social Anxiety Disorder, Major Depression, Recurrent Severe without psychosis, Polysubstance dependence in remission        Plan:  Medication: Decrease Zyprexa to 10mg and add Doxepin 10mg, Continue Effexor XR 300mg  OTC Recommendations: none  Lab Orders:  none  Referrals: none  Release of Information: none  Future Treatment Considerations:discontinue Zyprexa if Doxepin works will for sleep.  Return for Follow Up:2 weeks      ____________________________________________________________________________________________________________________________________________    2018   Seen with   Today Gianluca reports he is sleeping about the same - 6 hours. Sleeps through the night  Side effects of medication include: none with Doxepin. He is less tired during the day  Psychiatric Review of Systems:  Depression: In the last 2 weeks per PHQ 9 PHQ-9 score:   PHQ-9 SCORE 2018   Total Score -   Total Score 22   Total Score -       Anxiety : stable  Pamela na   Psychosis  na.   ADHD na    Review of Medical Systems:  Sleep: see subjective  Energy: stable  Concentration: stable  Appetite: no change  GI Concerns: none  Cardiac  concerns: none  Neurological concerns: none  Other medical concerns: none  Current Substance Use:  Alcohol:denies  Other drugs:denies  Caffeine:no change  Nicotine: no change  Past Medical History:   Past Medical History:   Diagnosis Date     Depression      Gastro-oesophageal reflux disease      Headache(784.0)      Hypertension      LOC (loss of consciousness) (H) age 45    out for 20 mintes after hit on top of head with board     Major depression 5/7/2013     Otitis media, chronic      Sleep apnea     cant tolerate cpap     Patient Active Problem List   Diagnosis     Non-English speaking patient     Chronic nasal congestion     HEIKE (obstructive sleep apnea)     Chronic hepatitis C (H)     Esophageal reflux     Headache     Short-term memory loss     Polysubstance dependence, non-opioid, in remission (H)     Social anxiety disorder     Severe recurrent major depression without psychotic features (H)     Heterozygous MTHFR mutation C677T (H)     Insomnia, unspecified type       Allergies:   Allergies   Allergen Reactions     Trazodone Other (See Comments)     Very depressed and suicidal          Current Medications     Current Outpatient Prescriptions Ordered in [facility]   Medication Sig Dispense Refill     ASPIRIN PO Take 81 mg by mouth daily       atorvastatin (LIPITOR) 40 MG tablet Take 1 tablet (40 mg) by mouth daily       doxepin (SINEQUAN) 10 MG capsule Take 1 capsule (10 mg) by mouth At Bedtime 30 capsule 0     lisinopril-hydrochlorothiazide (PRINZIDE,ZESTORETIC) 20-25 MG per tablet Take 1 tablet by mouth daily       metFORMIN (GLUCOPHAGE) 500 MG tablet Take one tablet daily in the am and two tablets at bedtime.  HOME Samuels NP at [facility]       OLANZapine (ZYPREXA) 10 MG tablet Take 1 tablet (10 mg) by mouth At Bedtime Discontinue 20mg dose 30 tablet 0     ranitidine (ZANTAC) 150 MG tablet Take 150 mg by mouth daily        venlafaxine (EFFEXOR-XR) 150 MG 24 hr capsule Take 2 capsules (300 mg) by mouth daily 60  "capsule 5     No current Epic-ordered facility-administered medications on file.         Mental Status Exam     Appearance:  Casually dressed and Well groomed  Behavior/relationship to examiner/demeanor:  Cooperative  Orientation: Oriented to person, place, time and situation  Psychomotor: normal form  Speech Rate:  Normal  Speech Spontaneity:  Normal  Mood:  \"the same\"  Affect:  Appropriate/mood-congruent  Thought Process (Associations):  Goal directed  Thought Content:  no overt psychosis, patient does not appear to be responding to internal stimuli, Suicidal ideation and denies suicidal intent or plan  Abnormal Perception:  None  Attention/Concentration:  Normal  Insight:  Adequate  Judgment:  Good      Results     Vital signs: /82  Pulse 80  Wt 97 kg (213 lb 12.8 oz)  BMI 34.77 kg/m2    Laboratory Data:  no new data    Assessment & Plan      Gianluca FLYNN is seen today for follow up and reports his mood has been stable. Sleep is unchanged with the addition of Doxepin and decrease in Olanzapine to 10mg. Discussed trying to discontinue Olanzapine or decrease to 5mg. Patient would like to try discontinuing Olanzapine at this time.    Diagnosis  Encounter Diagnoses   Name Primary?     Polysubstance dependence, non-opioid, in remission (H) Yes     Social anxiety disorder      Severe recurrent major depression without psychotic features (H)      Insomnia, unspecified type        Plan:  Medication: Discontinue Olanzapine and continue Doxepin 10mg at bedtime. Also continue Effexor XR  OTC Recommendations: none  Lab Orders:  none  Referrals: none  Release of Information: none  Future Treatment Considerations:per reponse to discontinuing Olanzapien  Return for Follow Up:4 weeks   The risks, benefits, alternatives and side effects have been discussed and are understood by the patient. The patient understands the risks of using street drugs or alcohol. There are no medical contraindications, the patient " agrees to treatment, and has the capacity to do so. The patient understands to call 911 or come to the nearest ED if life threatening or urgent symptoms present.  In addition time was spent counseling the patient and/or coordinating care regarding review of social and occupational functioning.  In addition patient was counseled on health and wellness practices to augment medication treatment of symptoms. See note for details.    Alba Merlos, LORE CNS 8/31/2018

## 2018-08-31 NOTE — MR AVS SNAPSHOT
After Visit Summary   8/31/2018    Gianluca FLYNN    MRN: [id number]           Patient Information     Date Of Birth          1955        Visit Information        Provider Department      8/31/2018 10:00 AM Kathe Bonilla; Alba Merlos APRN CNS [facility]        Today's Diagnoses     Polysubstance dependence, non-opioid, in remission (H)    -  1    Social anxiety disorder        Severe recurrent major depression without psychotic features (H)        Insomnia, unspecified type           Follow-ups after your visit        Who to contact     Please call your clinic at [phone] to:    Ask questions about your health    Make or cancel appointments    Discuss your medicines    Learn about your test results    Speak to your doctor            Additional Information About Your Visit        Care EveryWhere ID     This is your Care EveryWhere ID. This could be used by other organizations to access your [place] medical records  [id number]        Your Vitals Were     Pulse BMI (Body Mass Index)                80 34.77 kg/m2           Blood Pressure from Last 3 Encounters:   08/31/18 124/82   08/15/18 136/88   06/25/18 132/86    Weight from Last 3 Encounters:   08/31/18 97 kg (213 lb 12.8 oz)   08/15/18 97 kg (213 lb 12.8 oz)   06/25/18 94.6 kg (208 lb 9.6 oz)              Today, you had the following     No orders found for display         Today's Medication Changes          These changes are accurate as of 8/31/18 10:51 AM.  If you have any questions, ask your nurse or doctor.               Stop taking these medicines if you haven't already. Please contact your care team if you have questions.     OLANZapine 10 MG tablet   Commonly known as:  zyPREXA   Stopped by:  Alba Merlos APRN CNS                Where to get your medicines      These medications were sent to [id number] - [place], [place] - [address] [facility]  [address] [address]     Phone:  [phone]     doxepin 10 MG capsule                Primary Care Provider Office Phone # Fax #    Moira Cuadra [phone]       [facility] [address] [address]        Equal Access to Services     JENNY LORENZANA : Hadii aad ku hadasho Soomaali, waaxda luqadaha, qaybta kaalmada adeegyada, waxay idiin hayaan adeeg khbonysh layariel . So [facility] [place] [facility] [phone].    ATENCIÓN: Si habla español, tiene a dowd disposición servicios gratuitos de asistencia lingüística. Murray al [phone].    We comply with applicable federal civil rights laws and [place] laws. We do not discriminate on the basis of race, color, national origin, age, disability, sex, sexual orientation, or gender identity.            Thank you!     Thank you for choosing PSYCHIATRY CLINIC  for your care. Our goal is always to provide you with excellent care. Hearing back from our patients is one way we can continue to improve our services. Please take a few minutes to complete the written survey that you may receive in the mail after your visit with us. Thank you!             Your Updated Medication List - Protect others around you: Learn how to safely use, store and throw away your medicines at www.disposemymeds.org.          This list is accurate as of 8/31/18 10:51 AM.  Always use your most recent med list.                   Brand Name Dispense Instructions for use Diagnosis    ASPIRIN PO      Take 81 mg by mouth daily        doxepin 10 MG capsule    SINEquan    30 capsule    Take 1 capsule (10 mg) by mouth At Bedtime    Insomnia, unspecified type, Severe recurrent major depression without psychotic features (H)       LIPITOR 40 MG tablet   Generic drug:  atorvastatin      Take 1 tablet (40 mg) by mouth daily        lisinopril-hydrochlorothiazide 20-25 MG per tablet    PRINZIDE/ZESTORETIC     Take 1 tablet by mouth daily        metFORMIN 500 MG tablet     GLUCOPHAGE     Take one tablet daily in the am and two tablets at bedtime.  HOME Samuels NP at [facility]        ranitidine 150 MG tablet    ZANTAC     Take 150 mg by mouth daily        venlafaxine 150 MG 24 hr capsule    EFFEXOR-XR    60 capsule    Take 2 capsules (300 mg) by mouth daily    Social anxiety disorder

## 2018-09-01 ASSESSMENT — PATIENT HEALTH QUESTIONNAIRE - PHQ9: SUM OF ALL RESPONSES TO PHQ QUESTIONS 1-9: 22

## 2018-09-15 ASSESSMENT — PATIENT HEALTH QUESTIONNAIRE - PHQ9: SUM OF ALL RESPONSES TO PHQ QUESTIONS 1-9: 23

## 2018-09-17 ENCOUNTER — TELEPHONE (OUTPATIENT)
Dept: PSYCHIATRY | Facility: CLINIC | Age: 63
End: 2018-09-17

## 2018-09-17 NOTE — TELEPHONE ENCOUNTER
-Effexor rx last written 6/20/18 for 30 d/s with 5 refills. Pt should have refills remaining.     -Contacted pharmacy at [phone], spoke with Clotilde who verified pt does have refills remaining and that medication will be filled today. Pt will be notified when ready to  at pharmacy.    -Writer called pt at [phone] and the phone was handed by pt to his case , Penny. Writer informed  that per pharmacy, there are refills remaining on the medication and that pharmacy will fill the medication today. Instructed  to call the pharmacy prior to going there to confirm the medication is ready to be picked up.  verbalized understanding.

## 2018-09-17 NOTE — TELEPHONE ENCOUNTER
Pt's  called to check about the refill of the effexor. He is very anxious. During call, Thuy called back the pt to discuss the refill of the medication.

## 2018-09-17 NOTE — TELEPHONE ENCOUNTER
[facility] Call Center    Phone Message    May a detailed message be left on voicemail: yes    Reason for Call: Medication Refill Request    Has the patient contacted the pharmacy for the refill? Yes   Name of medication being requested: venlafaxine 150mg  Provider who prescribed the medication: Alba Merlos  Pharmacy: [facility] [address] [place], [place] - [address]  Date medication is needed: ASAP - has been without medication for 4 days      Action Taken: Message routed to:  Other: Thuy Miller RNCC

## 2018-09-27 ENCOUNTER — OFFICE VISIT (OUTPATIENT)
Dept: PSYCHIATRY | Facility: CLINIC | Age: 63
End: 2018-09-27
Attending: CLINICAL NURSE SPECIALIST
Payer: MEDICAID

## 2018-09-27 ENCOUNTER — OFFICE VISIT (OUTPATIENT)
Dept: INTERPRETER SERVICES | Facility: CLINIC | Age: 63
End: 2018-09-27
Attending: CLINICAL NURSE SPECIALIST
Payer: MEDICAID

## 2018-09-27 VITALS
WEIGHT: 213.6 LBS | BODY MASS INDEX: 34.74 KG/M2 | DIASTOLIC BLOOD PRESSURE: 83 MMHG | SYSTOLIC BLOOD PRESSURE: 131 MMHG | HEART RATE: 78 BPM

## 2018-09-27 DIAGNOSIS — F33.2 SEVERE RECURRENT MAJOR DEPRESSION WITHOUT PSYCHOTIC FEATURES (H): ICD-10-CM

## 2018-09-27 DIAGNOSIS — G47.00 INSOMNIA, UNSPECIFIED TYPE: ICD-10-CM

## 2018-09-27 DIAGNOSIS — F19.21 POLYSUBSTANCE DEPENDENCE, NON-OPIOID, IN REMISSION (H): ICD-10-CM

## 2018-09-27 DIAGNOSIS — F40.10 SOCIAL ANXIETY DISORDER: Primary | ICD-10-CM

## 2018-09-27 PROCEDURE — G0463 HOSPITAL OUTPT CLINIC VISIT: HCPCS | Mod: ZF

## 2018-09-27 PROCEDURE — T1013 SIGN LANG/ORAL INTERPRETER: HCPCS | Mod: U3

## 2018-09-27 ASSESSMENT — PAIN SCALES - GENERAL: PAINLEVEL: NO PAIN (0)

## 2018-09-27 NOTE — MR AVS SNAPSHOT
After Visit Summary   9/27/2018    Gianluca FLYNN    MRN: [id number]           Patient Information     Date Of Birth          1955        Visit Information        Provider Department      9/27/2018 10:15 AM Alba Merlos APRN CNS [facility]ry [facility]        Today's Diagnoses     Social anxiety disorder    -  1    Severe recurrent major depression without psychotic features (H)        Polysubstance dependence, non-opioid, in remission (H)        Insomnia, unspecified type           Follow-ups after your visit        Your next 10 appointments already scheduled     Oct 30, 2018  1:15 PM CDT   Adult Med Follow UP with LORE Kim CNS   Psychiatry Clinic ([facility])    [address] [address] [phone]              Who to contact     Please call your clinic at [phone] to:    Ask questions about your health    Make or cancel appointments    Discuss your medicines    Learn about your test results    Speak to your doctor            Additional Information About Your Visit        Care EveryWhere ID     This is your Care EveryWhere ID. This could be used by other organizations to access your [place] medical records  [id number]        Your Vitals Were     Pulse BMI (Body Mass Index)                78 34.74 kg/m2           Blood Pressure from Last 3 Encounters:   09/27/18 131/83   08/31/18 124/82   08/15/18 136/88    Weight from Last 3 Encounters:   09/27/18 96.9 kg (213 lb 9.6 oz)   08/31/18 97 kg (213 lb 12.8 oz)   08/15/18 97 kg (213 lb 12.8 oz)              Today, you had the following     No orders found for display         Today's Medication Changes          These changes are accurate as of 9/27/18 11:59 PM.  If you have any questions, ask your nurse or doctor.               Start taking these medicines.        Dose/Directions    OLANZapine 10 MG tablet   Commonly known as:  zyPREXA   Used  for:  Severe recurrent major depression without psychotic features (H)   Started by:  Alba Merlos APRN CNS        Dose:  10 mg   Take 1 tablet (10 mg) by mouth At Bedtime Discontinue 20mg refills   Quantity:  30 tablet   Refills:  5            Where to get your medicines      These medications were sent to [facility] - [facility] [address] [place] [address] [address] [address]     Phone:  [phone]     doxepin 10 MG capsule    OLANZapine 10 MG tablet                Primary Care Provider Office Phone # Fax #    Moira Cuadra [phone]       [facility] [address] [address]        Equal Access to Services     JENNY LORENZANA : Hadii aad ku hadasho Soomaali, waaxda luqadaha, qaybta kaalmada adeegyada, waxay traciein liliana allen. So [facility] [phone].    ATENCIÓN: Si habla español, tiene a dowd disposición servicios gratuitos de asistencia lingüística. [facility] [phone].    We comply with applicable federal civil rights laws and [place] laws. We do not discriminate on the basis of race, color, national origin, age, disability, sex, sexual orientation, or gender identity.            Thank you!     Thank you for choosing PSYCHIATRY CLINIC  for your care. Our goal is always to provide you with excellent care. Hearing back from our patients is one way we can continue to improve our services. Please take a few minutes to complete the written survey that you may receive in the mail after your visit with us. Thank you!             Your Updated Medication List - Protect others around you: Learn how to safely use, store and throw away your medicines at www.disposemymeds.org.          This list is accurate as of 9/27/18 11:59 PM.  Always use your most recent med list.                   Brand Name Dispense Instructions for use Diagnosis    ASPIRIN PO      Take 81 mg by mouth daily         doxepin 10 MG capsule    SINEquan    30 capsule    Take 1 capsule (10 mg) by mouth At Bedtime    Insomnia, unspecified type, Severe recurrent major depression without psychotic features (H)       LIPITOR 40 MG tablet   Generic drug:  atorvastatin      Take 1 tablet (40 mg) by mouth daily        lisinopril-hydrochlorothiazide 20-25 MG per tablet    PRINZIDE/ZESTORETIC     Take 1 tablet by mouth daily        metFORMIN 500 MG tablet    GLUCOPHAGE     Take one tablet daily in the am and two tablets at bedtime.  R Abril NP at [facility]        OLANZapine 10 MG tablet    zyPREXA    30 tablet    Take 1 tablet (10 mg) by mouth At Bedtime Discontinue 20mg refills    Severe recurrent major depression without psychotic features (H)       ranitidine 150 MG tablet    ZANTAC     Take 150 mg by mouth daily        venlafaxine 150 MG 24 hr capsule    EFFEXOR-XR    60 capsule    Take 2 capsules (300 mg) by mouth daily    Social anxiety disorder

## 2018-09-27 NOTE — PROGRESS NOTES
Outpatient Psychiatry Progress Note     Provider: LORE Kim CNS  Date: 2018  Service:  Medication follow up with counseling.   Patient Identification: Gianluca FLYNN  : 1955   MRN: [id number]    Gianluca FLYNN is a 63 year old year old male who presents for ongoing psychiatric care.  Gianluca FLYNN was last seen in clinic on 18.   At that time,   Assessment & Plan       Gianluca FLYNN is seen today for follow up and reports his mood has been stable. Sleep is unchanged with the addition of Doxepin and decrease in Olanzapine to 10mg. Discussed trying to discontinue Olanzapine or decrease to 5mg. Patient would like to try discontinuing Olanzapine at this time.     Diagnosis       Encounter Diagnoses   Name Primary?     Polysubstance dependence, non-opioid, in remission (H) Yes     Social anxiety disorder       Severe recurrent major depression without psychotic features (H)       Insomnia, unspecified type           Plan:  Medication: Discontinue Olanzapine and continue Doxepin 10mg at bedtime. Also continue Effexor XR  OTC Recommendations: none  Lab Orders:  none  Referrals: none  Release of Information: none  Future Treatment Considerations:per reponse to discontinuing Olanzapien  Return for Follow Up:4 weeks      ____________________________________________________________________________________________________________________________________________    2018   On 18  called for patient because he was not able to get refill on Effexor XR. RN was able to connect with pharmacy and found that they had received the last order in  with 5 refills and patient could  that day but he had been without it for 3 days at that time.  Seen with   Today Gianluca reports he did not sleep with only Doxepin and so restarted Olanzapine but was only able to get refill on 20mg so has been taking this with Doxepin 10mg and sleeping  well. There was difficulty with his report as to whether he sleeps better with Doxepin 10mg and Olanzapine 20mg that with Olanzapine 10mg.  Somehow he thinks that he received a different dose of one of the medications and that with this he is sleeping better.  Doxepin was added in an attempt to decrease Olanzapine due to side effects of weight gain and daytime sedation.  He reports depression is worse due to the problems with sleep. He sometimes takes Effexor 450mg because he doesn't always feel that 300mg works.   Side effects of medication include: none reported  Psychiatric Review of Systems:  Depression: In the last 2 weeks per PHQ-9 score:   PHQ-9 SCORE 9/28/2018   Total Score -   Total Score 22   Total Score -       Anxiety : stable with continued difficulty  Pamela na   Psychosis  na.   ADHD na    Review of Medical Systems:  Sleep: see subjective  Energy: low  Concentration: no change  Appetite: no change  GI Concerns: none  Cardiac concerns: none  Neurological concerns: none  Other medical concerns: no new concerns  Current Substance Use:  Alcohol:denies  Other drugs:denies  Caffeine:no change  Nicotine: no change  Past Medical History:   Past Medical History:   Diagnosis Date     Depression      Gastro-oesophageal reflux disease      Headache(784.0)      Hypertension      LOC (loss of consciousness) (H) age 45    out for 20 mintes after hit on top of head with board     Major depression 5/7/2013     Otitis media, chronic      Sleep apnea     cant tolerate cpap     Patient Active Problem List   Diagnosis     Non-English speaking patient     Chronic nasal congestion     HEIKE (obstructive sleep apnea)     Chronic hepatitis C (H)     Esophageal reflux     Headache     Short-term memory loss     Polysubstance dependence, non-opioid, in remission (H)     Social anxiety disorder     Severe recurrent major depression without psychotic features (H)     Heterozygous MTHFR mutation C677T (H)     Insomnia, unspecified  "type       Allergies:   Allergies   Allergen Reactions     Trazodone Other (See Comments)     Very depressed and suicidal          Current Medications     Current Outpatient Prescriptions Ordered in [facility]   Medication Sig Dispense Refill     ASPIRIN PO Take 81 mg by mouth daily       atorvastatin (LIPITOR) 40 MG tablet Take 1 tablet (40 mg) by mouth daily       doxepin (SINEQUAN) 10 MG capsule Take 1 capsule (10 mg) by mouth At Bedtime 30 capsule 1     lisinopril-hydrochlorothiazide (PRINZIDE,ZESTORETIC) 20-25 MG per tablet Take 1 tablet by mouth daily       metFORMIN (GLUCOPHAGE) 500 MG tablet Take one tablet daily in the am and two tablets at bedtime.  R Skolar NP at [facility]       ranitidine (ZANTAC) 150 MG tablet Take 150 mg by mouth daily        venlafaxine (EFFEXOR-XR) 150 MG 24 hr capsule Take 2 capsules (300 mg) by mouth daily 60 capsule 5     No current [facility]-ordered facility-administered medications on file.         Mental Status Exam     Appearance:  Casually dressed and Well groomed  Behavior/relationship to examiner/demeanor:  Cooperative  Orientation: Oriented to  person, place, time and situation  Psychomotor: normal form  Speech Rate:  Normal uses   Speech Spontaneity:  Normal  Mood:  \"so so\"  Affect:  Appropriate/mood-congruent  Thought Process (Associations):  Goal directed  Thought Content:  no overt psychosis, patient does not appear to be responding to internal stimuli, Suicidal ideation and denies suicidal intent or plan  Abnormal Perception:  None  Attention/Concentration:  Normal  Insight:  Adequate  Judgment:  Adequate for safety      Results     Vital signs: /83  Pulse 78  Wt 96.9 kg (213 lb 9.6 oz)  BMI 34.74 kg/m2    Laboratory Data:  no new data available    Assessment & Plan      Gianluca FLYNN is seen today for follow up with  and reports he could not sleep well with only Doxepin. He got refill of Olanzapine 20mg and takes that with Doxepin 10mg. He " also takes Venlafaxine 300mg but reports that sometimes he takes an extra dose because it doesn't always work, Informed him that he cannot take more than 300mg.  Mood has declined but he thinks this is because of the problems he had with sleep and being off Effexor for a few days when he could not get refills. This may have been due to him taking more than prescribed however this was not noted by his pharmacy.  Patient thought that he was given a higher dose of some medication and that this has improved his sleep, however I could not clarify with him, even after calling the pharmacy to check on medication refills. Gianluca will return tomorrow with his medication bottles to determine what he is taking before ordering refills.  He will return on 9/28/18 to see Thuy Miller and bring medication so that we can determine if there are errors in previous ordersl    Diagnosis  Encounter Diagnoses   Name Primary?     Social anxiety disorder Yes     Severe recurrent major depression without psychotic features (H)      Polysubstance dependence, non-opioid, in remission (H)        Plan:  Decrease olanzapine from 20mg to 10mg and continue with doxepin. Writer explained this could help reduce the daytime sedation and weight gain side effects from olanzapine. Writer informed pt that a new order for olanzapine 10mg tabs would be sent to his pharmacy to fill. Writer also encouraged pt to refill his metformin, as the bottle he brought in to clinic was empty. Pt agreed and verbalized understanding of information. Pt scheduled to see Alba in 1 month.   OTC Recommendations: none  Lab Orders:  none  Referrals: none  Release of Information: none  Future Treatment Considerations:per symptoms  Return for Follow Up:one month   The risks, benefits, alternatives and side effects have been discussed and are understood by the patient. The patient understands the risks of using street drugs or alcohol. There are no medical contraindications, the  patient agrees to treatment, and has the capacity to do so. The patient understands to call 911 or come to the nearest ED if life threatening or urgent symptoms present.  In addition time was spent counseling the patient and/or coordinating care regarding review of social and occupational functioning.  In addition patient was counseled on health and wellness practices to augment medication treatment of symptoms. See note for details.    Alba Merlos, LORE CNS 9/27/2018

## 2018-09-28 ENCOUNTER — CARE COORDINATION (OUTPATIENT)
Dept: PSYCHIATRY | Facility: CLINIC | Age: 63
End: 2018-09-28

## 2018-09-28 ENCOUNTER — OFFICE VISIT (OUTPATIENT)
Dept: PSYCHIATRY | Facility: CLINIC | Age: 63
End: 2018-09-28
Attending: CLINICAL NURSE SPECIALIST
Payer: MEDICAID

## 2018-09-28 DIAGNOSIS — Z53.9 ERRONEOUS ENCOUNTER--DISREGARD: Primary | ICD-10-CM

## 2018-09-28 PROCEDURE — T1013 SIGN LANG/ORAL INTERPRETER: HCPCS | Mod: U3,ZF | Performed by: CLINICAL NURSE SPECIALIST

## 2018-09-28 RX ORDER — OLANZAPINE 10 MG/1
10 TABLET ORAL AT BEDTIME
Qty: 30 TABLET | Refills: 5 | Status: SHIPPED | OUTPATIENT
Start: 2018-09-28 | End: 2019-01-23

## 2018-09-28 RX ORDER — DOXEPIN HYDROCHLORIDE 10 MG/1
10 CAPSULE ORAL AT BEDTIME
Qty: 30 CAPSULE | Refills: 5 | Status: SHIPPED | OUTPATIENT
Start: 2018-09-28 | End: 2019-01-23

## 2018-09-28 NOTE — PROGRESS NOTES
Gianluca returned today to review medications for accuracy and determine what needed to be reordered since at the appointment yesterday he could not recall what dose of medication had worked best for him without having the bottle of medications in front of him.  He was seen by Thuy Miller RN with a  and medicationorders were placed to the pharmacy and medications in his record updated. Gianluca will return in 4 weeks.  Alba Merlos CNS, APRN

## 2018-09-28 NOTE — PROGRESS NOTES
-Writer met with pt and  in-person to review his medications, per provider request. Pt brought all of his medications to clinic, writer compared his current medication regimen with the medication orders on file. The one discrepancy noted is that pt states he is not taking Lipitor and does not have a recollection of ever taking the medication.     -Writer updated provider, alba Merlos, who provided 2 options for medication changes. Option 1 was to stay on olanzapine 20mg and discontinue doxepin. Option 2 was to decrease olanzapine from 20mg to 10mg and continue taking Doxepin.    -Writer presented options to pt and pt chose to decrease olanzapine from 20mg to 10mg and continue with doxepin. Writer explained this could help reduce the daytime sedation and weight gain side effects from olanzapine. Writer informed pt that a new order for olanzapine 10mg tabs would be sent to his pharmacy to fill. Writer also encouraged pt to refill his metformin, as the bottle he brought in to clinic was empty. Pt agreed and verbalized understanding of information. Pt scheduled to see Alba in 1 month.     -Writer updated provider, who entered new orders.

## 2018-09-29 ASSESSMENT — PATIENT HEALTH QUESTIONNAIRE - PHQ9: SUM OF ALL RESPONSES TO PHQ QUESTIONS 1-9: 22

## 2018-10-30 ENCOUNTER — OFFICE VISIT (OUTPATIENT)
Dept: PSYCHIATRY | Facility: CLINIC | Age: 63
End: 2018-10-30
Attending: CLINICAL NURSE SPECIALIST
Payer: MEDICAID

## 2018-10-30 ENCOUNTER — OFFICE VISIT (OUTPATIENT)
Dept: INTERPRETER SERVICES | Facility: CLINIC | Age: 63
End: 2018-10-30

## 2018-10-30 VITALS
WEIGHT: 206.4 LBS | SYSTOLIC BLOOD PRESSURE: 123 MMHG | BODY MASS INDEX: 33.57 KG/M2 | DIASTOLIC BLOOD PRESSURE: 79 MMHG | HEART RATE: 79 BPM

## 2018-10-30 DIAGNOSIS — F19.21 POLYSUBSTANCE DEPENDENCE, NON-OPIOID, IN REMISSION (H): ICD-10-CM

## 2018-10-30 DIAGNOSIS — F40.10 SOCIAL ANXIETY DISORDER: Primary | ICD-10-CM

## 2018-10-30 DIAGNOSIS — Z78.9 NON-ENGLISH SPEAKING PATIENT: ICD-10-CM

## 2018-10-30 DIAGNOSIS — F33.2 SEVERE RECURRENT MAJOR DEPRESSION WITHOUT PSYCHOTIC FEATURES (H): ICD-10-CM

## 2018-10-30 PROCEDURE — G0463 HOSPITAL OUTPT CLINIC VISIT: HCPCS | Mod: ZF

## 2018-10-30 PROCEDURE — T1013 SIGN LANG/ORAL INTERPRETER: HCPCS | Mod: U3

## 2018-10-30 RX ORDER — VENLAFAXINE HYDROCHLORIDE 150 MG/1
300 CAPSULE, EXTENDED RELEASE ORAL DAILY
Qty: 60 CAPSULE | Refills: 5 | Status: SHIPPED | OUTPATIENT
Start: 2018-10-30 | End: 2019-01-23

## 2018-10-30 ASSESSMENT — PATIENT HEALTH QUESTIONNAIRE - PHQ9: SUM OF ALL RESPONSES TO PHQ QUESTIONS 1-9: 22

## 2018-10-30 ASSESSMENT — PAIN SCALES - GENERAL: PAINLEVEL: NO PAIN (0)

## 2018-10-30 NOTE — MR AVS SNAPSHOT
After Visit Summary   10/30/2018    Gianluca FLYNN    MRN: [id number]           Patient Information     Date Of Birth          1955        Visit Information        Provider Department      10/30/2018 1:15 PM Alba Merlos APRN [facility]        Today's Diagnoses     Social anxiety disorder    -  1    Severe recurrent major depression without psychotic features (H)        Polysubstance dependence, non-opioid, in remission (H)        Non-English speaking patient           Follow-ups after your visit        Follow-up notes from your care team     Return in about 6 weeks (around 12/11/2018).      Your next 10 appointments already scheduled     Dec 12, 2018  1:15 PM CST   Adult Med Follow UP with LORE Kim CNS   Psychiatry Clinic ([facility])    [place] [id number]  [address] [id number] [phone]              Who to contact     Please call your clinic at [phone] to:    Ask questions about your health    Make or cancel appointments    Discuss your medicines    Learn about your test results    Speak to your doctor            Additional Information About Your Visit        Care EveryWhere ID     This is your Care EveryWhere ID. This could be used by other organizations to access your [place] medical records  [id number]        Your Vitals Were     Pulse BMI (Body Mass Index)                79 33.57 kg/m2           Blood Pressure from Last 3 Encounters:   10/30/18 123/79   09/27/18 131/83   08/31/18 124/82    Weight from Last 3 Encounters:   10/30/18 93.6 kg (206 lb 6.4 oz)   09/27/18 96.9 kg (213 lb 9.6 oz)   08/31/18 97 kg (213 lb 12.8 oz)              Today, you had the following     No orders found for display         Where to get your medicines      These medications were sent to [id number] - [place], [place] - [address] [place] [address]  [facility] [address]     Phone:  [phone]     venlafaxine 150 MG 24 hr capsule          Primary Care Provider Office Phone # Fax #    Moira Cuadra [phone]       [facility] [address] [address]        Equal Access to Services     JENNY LORENZANA : Hadii aad ku hadasho Soomaali, waaxda luqadaha, qaybta kaalmada adeegyada, waxay idiin hayaan adeeg khbonystephanie layariel . So [facility] [phone].    ATENCIÓN: Si habla español, tiene a odwd disposición servicios gratuitos de asistencia lingüística. Murray al [phone].    We comply with applicable federal civil rights laws and [place] laws. We do not discriminate on the basis of race, color, national origin, age, disability, sex, sexual orientation, or gender identity.            Thank you!     Thank you for choosing PSYCHIATRY CLINIC  for your care. Our goal is always to provide you with excellent care. Hearing back from our patients is one way we can continue to improve our services. Please take a few minutes to complete the written survey that you may receive in the mail after your visit with us. Thank you!             Your Updated Medication List - Protect others around you: Learn how to safely use, store and throw away your medicines at www.disposemymeds.org.          This list is accurate as of 10/30/18  2:24 PM.  Always use your most recent med list.                   Brand Name Dispense Instructions for use Diagnosis    ASPIRIN PO      Take 81 mg by mouth daily        doxepin 10 MG capsule    SINEquan    30 capsule    Take 1 capsule (10 mg) by mouth At Bedtime    Insomnia, unspecified type, Severe recurrent major depression without psychotic features (H)       LIPITOR 40 MG tablet   Generic drug:  atorvastatin      Take 1 tablet (40 mg) by mouth daily        lisinopril-hydrochlorothiazide 20-25 MG per tablet    PRINZIDE/ZESTORETIC     Take 1 tablet by mouth daily        metFORMIN 500 MG tablet    GLUCOPHAGE      Take one tablet daily in the am and two tablets at bedtime.  R Abril NP at [facility]        OLANZapine 10 MG tablet    zyPREXA    30 tablet    Take 1 tablet (10 mg) by mouth At Bedtime Discontinue 20mg refills    Severe recurrent major depression without psychotic features (H)       ranitidine 150 MG tablet    ZANTAC     Take 150 mg by mouth daily        venlafaxine 150 MG 24 hr capsule    EFFEXOR-XR    60 capsule    Take 2 capsules (300 mg) by mouth daily    Social anxiety disorder

## 2018-10-30 NOTE — PROGRESS NOTES
Outpatient Psychiatry Progress Note     Provider: LORE Kim CNS  Date: 10/30/2018  Service:  Medication follow up with counseling.   Patient Identification: Gianluca FLYNN  : 1955   MRN: [id number]    Gianluca FLYNN is a 63 year old year old male who presents for ongoing psychiatric care.  Gianluca FLYNN was last seen in clinic on 18.   At that time,   Assessment & Plan       Gianluca FLYNN is seen today for follow up with  and reports he could not sleep well with only Doxepin. He got refill of Olanzapine 20mg and takes that with Doxepin 10mg. He also takes Venlafaxine 300mg but reports that sometimes he takes an extra dose because it doesn't always work, Informed him that he cannot take more than 300mg.  Mood has declined but he thinks this is because of the problems he had with sleep and being off Effexor for a few days when he could not get refills. This may have been due to him taking more than prescribed however this was not noted by his pharmacy.  Patient thought that he was given a higher dose of some medication and that this has improved his sleep, however I could not clarify with him, even after calling the pharmacy to check on medication refills. Gianluca will return tomorrow with his medication bottles to determine what he is taking before ordering refills.  He will return on 18 to see Thuy Miller and bring medication so that we can determine if there are errors in previous ordersl     Diagnosis       Encounter Diagnoses   Name Primary?     Social anxiety disorder Yes     Severe recurrent major depression without psychotic features (H)       Polysubstance dependence, non-opioid, in remission (H)           Plan:  Decrease olanzapine from 20mg to 10mg and continue with doxepin. Writer explained this could help reduce the daytime sedation and weight gain side effects from olanzapine. Writer informed pt that a new order for olanzapine  10mg tabs would be sent to his pharmacy to fill. Writer also encouraged pt to refill his metformin, as the bottle he brought in to clinic was empty. Pt agreed and verbalized understanding of information. Pt scheduled to see Alba in 1 month.   OTC Recommendations: none  Lab Orders:  none  Referrals: none  Release of Information: none  Future Treatment Considerations:per symptoms  Return for Follow Up:one month      ____________________________________________________________________________________________________________________________________________    10/30/2018  Today Gianluca reports he is sleeping ok with the lower dose of Olanzapine and Doxepin. His mood varies.  He has suicidal thoughts due to not having contact with his children. He does have with his sisters.  They don't have contact with his children.  [facility] where he attends AA is closed for remodeling. He doesn't know when it will reopen. He is in the process of getting a new therapist at [facility].  He thinks his oldest daughter is around 40 years old. They live in [place]. He has only seen them every 10 years or so.   Health has been stable.   Side effects of medication include: none reported  Psychiatric Review of Systems:  Depression: In the last 2 weeks per PHQ-9 score:   PHQ-9 SCORE 10/30/2018   Total Score -   Total Score 22   Total Score -       Anxiety : stable  Pamela na   Psychosis  na.   ADHD na    Review of Medical Systems:  Sleep: ok  Energy: stable  Concentration: stable  Appetite: has lost some weight  GI Concerns: none  Cardiac concerns: none  Neurological concerns: none  Other medical concerns: no new concerns  Current Substance Use:  Alcohol: denies use  Other drugs:denies  Caffeine:no change  Nicotine: no change  Past Medical History:   Past Medical History:   Diagnosis Date     Depression      Gastro-oesophageal reflux disease      Headache(784.0)      Hypertension      LOC (loss of consciousness) (H) age 45    out for 20 mintes after  "hit on top of head with board     Major depression 5/7/2013     Otitis media, chronic      Sleep apnea     cant tolerate cpap     Patient Active Problem List   Diagnosis     Non-English speaking patient     Chronic nasal congestion     HEIKE (obstructive sleep apnea)     Chronic hepatitis C (H)     Esophageal reflux     Headache     Short-term memory loss     Polysubstance dependence, non-opioid, in remission (H)     Social anxiety disorder     Severe recurrent major depression without psychotic features (H)     Heterozygous MTHFR mutation C677T (H)     Insomnia, unspecified type       Allergies:   Allergies   Allergen Reactions     Trazodone Other (See Comments)     Very depressed and suicidal          Current Medications     Current Outpatient Prescriptions Ordered in [facility]   Medication Sig Dispense Refill     ASPIRIN PO Take 81 mg by mouth daily       atorvastatin (LIPITOR) 40 MG tablet Take 1 tablet (40 mg) by mouth daily       doxepin (SINEQUAN) 10 MG capsule Take 1 capsule (10 mg) by mouth At Bedtime 30 capsule 5     lisinopril-hydrochlorothiazide (PRINZIDE,ZESTORETIC) 20-25 MG per tablet Take 1 tablet by mouth daily       metFORMIN (GLUCOPHAGE) 500 MG tablet Take one tablet daily in the am and two tablets at bedtime.  R Skolar NP at [facility]       OLANZapine (ZYPREXA) 10 MG tablet Take 1 tablet (10 mg) by mouth At Bedtime Discontinue 20mg refills 30 tablet 5     ranitidine (ZANTAC) 150 MG tablet Take 150 mg by mouth daily        venlafaxine (EFFEXOR-XR) 150 MG 24 hr capsule Take 2 capsules (300 mg) by mouth daily 60 capsule 5     No current [facility]-ordered facility-administered medications on file.         Mental Status Exam     Appearance:  Casually dressed and Well groomed  Behavior/relationship to examiner/demeanor:  Cooperative  Orientation: Oriented to person, place, time and situation  Psychomotor: normal form  Speech Rate:  Normal  Speech Spontaneity:  Mild poverty  Mood:  \"so so\"  Affect:  " Appropriate/mood-congruent  Thought Process (Associations):  Goal directed  Thought Content:  no overt psychosis, denies suicidal ideation, intent or thoughts and patient does not appear to be responding to internal stimuli  Abnormal Perception:  None  Attention/Concentration:  Normal  Insight:  Adequate  Judgment:  Good      Results     Vital signs: /79  Pulse 79  Wt 93.6 kg (206 lb 6.4 oz)  BMI 33.57 kg/m2    Laboratory Data:  no new data available    Assessment & Plan      Gianluca FLYNN is seen today for follow up and reports he is feelin better with lower dose of Olanzapine. Has lost some weight and gets exercise everyday by walking. He agrees to continue current medication.    Diagnosis  Encounter Diagnoses   Name Primary?     Social anxiety disorder Yes     Severe recurrent major depression without psychotic features (H)      Polysubstance dependence, non-opioid, in remission (H)      Non-English speaking patient        Plan:  Medication: Continue current medication  OTC Recommendations: none  Lab Orders: through PCP  Referrals: none  Release of Information: none  Future Treatment Considerations:per symptoms per symptoms, side effects  Return for Follow Up:6 weeks   The risks, benefits, alternatives and side effects have been discussed and are understood by the patient. The patient understands the risks of using street drugs or alcohol. There are no medical contraindications, the patient agrees to treatment, and has the capacity to do so. The patient understands to call 911 or come to the nearest ED if life threatening or urgent symptoms present.  In addition time was spent counseling the patient and/or coordinating care regarding review of social and occupational functioning.  In addition patient was counseled on health and wellness practices to augment medication treatment of symptoms. See note for details.    Alba Merlos, APRN CNS 10/30/2018

## 2018-12-12 ENCOUNTER — OFFICE VISIT (OUTPATIENT)
Dept: PSYCHIATRY | Facility: CLINIC | Age: 63
End: 2018-12-12
Attending: CLINICAL NURSE SPECIALIST
Payer: MEDICAID

## 2018-12-12 VITALS
WEIGHT: 205 LBS | HEART RATE: 83 BPM | SYSTOLIC BLOOD PRESSURE: 142 MMHG | DIASTOLIC BLOOD PRESSURE: 93 MMHG | BODY MASS INDEX: 33.34 KG/M2

## 2018-12-12 DIAGNOSIS — F51.04 CHRONIC INSOMNIA: ICD-10-CM

## 2018-12-12 DIAGNOSIS — F40.10 SOCIAL ANXIETY DISORDER: Primary | ICD-10-CM

## 2018-12-12 DIAGNOSIS — G47.00 INSOMNIA, UNSPECIFIED TYPE: ICD-10-CM

## 2018-12-12 DIAGNOSIS — F19.21 POLYSUBSTANCE DEPENDENCE, NON-OPIOID, IN REMISSION (H): ICD-10-CM

## 2018-12-12 DIAGNOSIS — F33.2 SEVERE RECURRENT MAJOR DEPRESSION WITHOUT PSYCHOTIC FEATURES (H): ICD-10-CM

## 2018-12-12 PROCEDURE — G0463 HOSPITAL OUTPT CLINIC VISIT: HCPCS | Mod: ZF

## 2018-12-12 ASSESSMENT — PATIENT HEALTH QUESTIONNAIRE - PHQ9: SUM OF ALL RESPONSES TO PHQ QUESTIONS 1-9: 23

## 2018-12-12 ASSESSMENT — PAIN SCALES - GENERAL: PAINLEVEL: NO PAIN (0)

## 2018-12-12 NOTE — PROGRESS NOTES
Outpatient Psychiatry Progress Note     Provider: LORE Kim CNS  Date: 2018  Service:  Medication follow up with counseling.   Patient Identification: Gianluca FLYNN  : 1955   MRN: [id number]    Gianluca FLYNN is a 63 year old year old male who presents for ongoing psychiatric care.  Gianluca FLYNN was last seen in clinic on 10/30/18  At that time,   Assessment & Plan       Gianluca FLYNN is seen today for follow up and reports he is feelin better with lower dose of Olanzapine. Has lost some weight and gets exercise everyday by walking. He agrees to continue current medication.     Diagnosis       Encounter Diagnoses   Name Primary?     Social anxiety disorder Yes     Severe recurrent major depression without psychotic features (H)       Polysubstance dependence, non-opioid, in remission (H)       Non-English speaking patient           Plan:  Medication: Continue current medication  OTC Recommendations: none  Lab Orders: through PCP  Referrals: none  Release of Information: none  Future Treatment Considerations:per symptoms per symptoms, side effects  Return for Follow Up:6 weeks    ____________________________________________________________________________________________________________________________________________    2018  Today Gianluca reports he is feeling more depressed which he thinks is due to the holidays.  He does have a therapist again and only one appointment. It went fine but she it took awhile to get to see her. Will be returning in January.   Has contact with his siblings in [place] every week or two.   Side effects of medication include: none reported  Psychiatric Review of Systems:  Depression: In the last 2 weeks per PHQ-9 score:   PHQ-9 SCORE 2018   PHQ-9 Total Score -   PHQ-9 Total Score 23   PHQ-9 Total Score -       Anxiety : increased  Pamela na   Psychosis  na.   ADHD na    Review of Medical Systems:  Sleep: not as  good lately  Energy: still low  Concentration: stable  Appetite: about the same  GI Concerns: none  Cardiac concerns: none  Neurological concerns: none  Other medical concerns: no new concerns  Current Substance Use:  Alcohol:denies  Other drugs:denies  Caffeine:not reviewed  Nicotine: no change  Past Medical History:   Past Medical History:   Diagnosis Date     Depression      Gastro-oesophageal reflux disease      Headache(784.0)      Hypertension      LOC (loss of consciousness) (H) age 45    out for 20 mintes after hit on top of head with board     Major depression 5/7/2013     Otitis media, chronic      Sleep apnea     cant tolerate cpap     Patient Active Problem List   Diagnosis     Non-English speaking patient     Chronic nasal congestion     HEIKE (obstructive sleep apnea)     Chronic hepatitis C (H)     Esophageal reflux     Headache     Short-term memory loss     Polysubstance dependence, non-opioid, in remission (H)     Social anxiety disorder     Severe recurrent major depression without psychotic features (H)     Heterozygous MTHFR mutation C677T (H)     Insomnia, unspecified type       Allergies:   Allergies   Allergen Reactions     Trazodone Other (See Comments)     Very depressed and suicidal          Current Medications     Current Outpatient Medications Ordered in Epic   Medication Sig Dispense Refill     ASPIRIN PO Take 81 mg by mouth daily       atorvastatin (LIPITOR) 40 MG tablet Take 1 tablet (40 mg) by mouth daily       doxepin (SINEQUAN) 10 MG capsule Take 1 capsule (10 mg) by mouth At Bedtime 30 capsule 5     lisinopril-hydrochlorothiazide (PRINZIDE,ZESTORETIC) 20-25 MG per tablet Take 1 tablet by mouth daily       metFORMIN (GLUCOPHAGE) 500 MG tablet Take one tablet daily in the am and two tablets at bedtime.  R Skolar NP at [facility]       OLANZapine (ZYPREXA) 10 MG tablet Take 1 tablet (10 mg) by mouth At Bedtime Discontinue 20mg refills 30 tablet 5     ranitidine (ZANTAC) 150 MG tablet Take  "150 mg by mouth daily        venlafaxine (EFFEXOR-XR) 150 MG 24 hr capsule Take 2 capsules (300 mg) by mouth daily 60 capsule 5     No current Epic-ordered facility-administered medications on file.         Mental Status Exam     Appearance:  Casually dressed and Well groomed  Behavior/relationship to examiner/demeanor:  Cooperative  Orientation: Oriented to person, place, time and situation  Psychomotor: normal   Speech Rate:  Normal  Speech Spontaneity:  Normal  Mood:  \"not very good\"  Affect:  Appropriate/mood-congruent  Thought Process (Associations):  Circumstantial  Thought Content:  no overt psychosis, patient does not appear to be responding to internal stimuli, Suicidal ideation and denies suicidal intent or plan  Abnormal Perception:  None  Attention/Concentration:  Normal  Insight:  Adequate  Judgment:  Adequate for safety      Results     Vital signs: BP (!) 142/93   Pulse 83   Wt 93 kg (205 lb)   BMI 33.34 kg/m      Laboratory Data:  no new data    Assessment & Plan      Gianluca FLYNN is seen today for follow up and reports he is feeling more depressed due to the holidays and his children not wanting to have contact with him. At this time will continue current medications.  Discussed getting more exercise even though he reports he walks the [place] a couple times a week.  He will ask  about help with referral to maybe [facility] or similar place.    Diagnosis  No diagnosis found.    Plan:  Medication: Continue current medications  OTC Recommendations: none  Lab Orders:  none  Referrals: none  Release of Information: none  Future Treatment Considerations:per symptoms  Return for Follow Up:6 weeks   The risks, benefits, alternatives and side effects have been discussed and are understood by the patient. The patient understands the risks of using street drugs or alcohol. There are no medical contraindications, the patient agrees to treatment, and has the capacity to do so. The " patient understands to call 911 or come to the nearest ED if life threatening or urgent symptoms present.  In addition time was spent counseling the patient and/or coordinating care regarding review of social and occupational functioning.  In addition patient was counseled on health and wellness practices to augment medication treatment of symptoms. See note for details.    Alba Merlos, LORE CNS 12/12/2018

## 2018-12-12 NOTE — PATIENT INSTRUCTIONS
Thank you for coming to the PSYCHIATRY CLINIC.    Lab Testing:  If you had lab testing today and your results are reassuring or normal they will be mailed to you or sent through [employer] within 7 days.   If the lab tests need quick action we will call you with the results.  The phone number we will call with results is # [phone] (home) . If this is not the best number please call our clinic and change the number.    Medication Refills:  If you need any refills please call your pharmacy and they will contact us. Our fax number for refills is [phone]. Please allow three business for refill processing.   If you need to  your refill at a new pharmacy, please contact the new pharmacy directly. The new pharmacy will help you get your medications transferred.     Scheduling:  If you have any concerns about today's visit or wish to schedule another appointment please call our office during normal business hours [phone] (8-5:00 M-F)    Contact Us:  Please call [phone] during business hours (8-5:00 M-F).  If after clinic hours, or on the weekend, please call  [phone].    Financial Assistance [phone]  [employer] Billing [phone]  [employer] Billing [phone]  Medical Records [phone]      MENTAL HEALTH CRISIS NUMBERS:  [facility]:   [facility] - [phone]   Crisis Residence [facility] - [phone]   Walk-In Counseling [facility] [phone]   COPE 24/7 [place] Mobile Team for Adults - [[phone]]; Child - [[phone]]     Crisis Connection - [phone]     [facility]:   [facility] - [phone]   Walk-in counseling [facility] - [phone]   Walk-in counseling [facility] - [phone]   Crisis Residence [facility] - [phone]   Urgent Care Adult Mental Health:   --Drop-in, 24/7 crisis line, and [place] Mobile Team [[phone]]    CRISIS TEXT  LINE: Text [phone] from anywhere, anytime, any crisis 24/7;    OR SEE www.crisistextline.org     Poison Control Center - [phone]    CHILD: [facility] needs assessment team - [phone]     [facility] - [phone]; or [employer] [facility] - [phone]    If you have a medical emergency please call 911or go to the nearest ER.                    _____________________________________________    Again thank you for choosing PSYCHIATRY CLINIC and please let us know how we can best partner with you to improve you and your family's health.  You may be receiving a survey in the mail regarding this appointment. We would love to have your feedback, both positive and negative, so please fill out the survey and return it using the provided envelope. The survey is done by an external company, so your answers are anonymous.

## 2019-01-23 ENCOUNTER — OFFICE VISIT (OUTPATIENT)
Dept: PSYCHIATRY | Facility: CLINIC | Age: 64
End: 2019-01-23
Attending: CLINICAL NURSE SPECIALIST
Payer: MEDICAID

## 2019-01-23 VITALS
HEART RATE: 90 BPM | WEIGHT: 209.2 LBS | SYSTOLIC BLOOD PRESSURE: 150 MMHG | DIASTOLIC BLOOD PRESSURE: 91 MMHG | BODY MASS INDEX: 34.02 KG/M2

## 2019-01-23 DIAGNOSIS — F33.2 SEVERE RECURRENT MAJOR DEPRESSION WITHOUT PSYCHOTIC FEATURES (H): ICD-10-CM

## 2019-01-23 DIAGNOSIS — F40.10 SOCIAL ANXIETY DISORDER: ICD-10-CM

## 2019-01-23 DIAGNOSIS — G47.00 INSOMNIA, UNSPECIFIED TYPE: ICD-10-CM

## 2019-01-23 PROCEDURE — T1013 SIGN LANG/ORAL INTERPRETER: HCPCS | Mod: U3,ZF | Performed by: CLINICAL NURSE SPECIALIST

## 2019-01-23 PROCEDURE — G0463 HOSPITAL OUTPT CLINIC VISIT: HCPCS | Mod: ZF

## 2019-01-23 RX ORDER — CHOLECALCIFEROL (VITAMIN D3) 25 MCG
TABLET ORAL
Refills: 1 | COMMUNITY
Start: 2019-01-21

## 2019-01-23 RX ORDER — OLANZAPINE 10 MG/1
10 TABLET ORAL AT BEDTIME
Qty: 30 TABLET | Refills: 5 | Status: SHIPPED | OUTPATIENT
Start: 2019-01-23 | End: 2019-05-30

## 2019-01-23 RX ORDER — DOXEPIN HYDROCHLORIDE 10 MG/1
10 CAPSULE ORAL AT BEDTIME
Qty: 30 CAPSULE | Refills: 5 | Status: SHIPPED | OUTPATIENT
Start: 2019-01-23 | End: 2019-05-30

## 2019-01-23 RX ORDER — VENLAFAXINE HYDROCHLORIDE 150 MG/1
300 CAPSULE, EXTENDED RELEASE ORAL DAILY
Qty: 60 CAPSULE | Refills: 5 | Status: SHIPPED | OUTPATIENT
Start: 2019-01-23 | End: 2019-05-30

## 2019-01-23 ASSESSMENT — PATIENT HEALTH QUESTIONNAIRE - PHQ9: SUM OF ALL RESPONSES TO PHQ QUESTIONS 1-9: 22

## 2019-01-23 ASSESSMENT — PAIN SCALES - GENERAL: PAINLEVEL: NO PAIN (0)

## 2019-01-23 NOTE — PROGRESS NOTES
Outpatient Psychiatry Progress Note     Provider: LORE Kim CNS  Date: 2019  Service:  Medication follow up with counseling.   Patient Identification: Gianluca FLYNN  : 1955   MRN: [id number]    Gianluca FLYNN is a 63 year old year old male who presents for ongoing psychiatric care.  Gianluca FLYNN was last seen in clinic on 18.   At that time,   Assessment & Plan       Gianluca FLYNN is seen today for follow up and reports he is feeling more depressed due to the holidays and his children not wanting to have contact with him. At this time will continue current medications.  Discussed getting more exercise even though he reports he walks the [place] a couple times a week.  He will ask  about help with referral to maybe [facility] or similar place.     Diagnosis  No diagnosis found.     Plan:  Medication: Continue current medications  OTC Recommendations: none  Lab Orders:  none  Referrals: none  Release of Information: none  Future Treatment Considerations:per symptoms  Return for Follow Up:6 weeks      ____________________________________________________________________________________________________________________________________________    2019   Seen with   Today Gianluca reports he was able to get through holiday ago. He is seeing the therapist now every 15 days at [facility]. This is going ok.  No new concerns.  Continues with same  which he has for quite a few years  Side effects of medication include: none  Psychiatric Review of Systems:  Depression: In the last 2 weeks per PHQ-9 score:   PHQ-9 SCORE 2018   PHQ-9 Total Score -   PHQ-9 Total Score 23   PHQ-9 Total Score -       Anxiety : stable  Pamela na   Psychosis  na.   ADHD na    Review of Medical Systems:  Sleep: stable wakes during the night 2 to 3 times. Doesn't sleep during the day  Energy: stable  Concentration: stable  Appetite:  stable  GI Concerns: no concerns  Cardiac concerns: no concerns  Neurological concerns: none  Other medical concerns:  No new concerns  Current Substance Use:  Alcohol:continues sober  Other drugs:sober  Caffeine:a little  Nicotine: e cig  Past Medical History:   Past Medical History:   Diagnosis Date     Depression      Gastro-oesophageal reflux disease      Headache(784.0)      Hypertension      LOC (loss of consciousness) (H) age 45    out for 20 mintes after hit on top of head with board     Major depression 5/7/2013     Otitis media, chronic      Sleep apnea     cant tolerate cpap     Patient Active Problem List   Diagnosis     Non-English speaking patient     Chronic nasal congestion     HEIKE (obstructive sleep apnea)     Chronic hepatitis C (H)     Esophageal reflux     Headache     Short-term memory loss     Polysubstance dependence, non-opioid, in remission (H)     Social anxiety disorder     Severe recurrent major depression without psychotic features (H)     Heterozygous MTHFR mutation C677T (H)     Insomnia, unspecified type       Allergies:   Allergies   Allergen Reactions     Trazodone Other (See Comments)     Very depressed and suicidal          Current Medications     Current Outpatient Medications Ordered in Epic   Medication Sig Dispense Refill     ASPIRIN PO Take 81 mg by mouth daily       atorvastatin (LIPITOR) 40 MG tablet Take 1 tablet (40 mg) by mouth daily       doxepin (SINEQUAN) 10 MG capsule Take 1 capsule (10 mg) by mouth At Bedtime 30 capsule 5     lisinopril-hydrochlorothiazide (PRINZIDE,ZESTORETIC) 20-25 MG per tablet Take 1 tablet by mouth daily       metFORMIN (GLUCOPHAGE) 500 MG tablet Take one tablet daily in the am and two tablets at bedtime.  R Skolar NP at [facility]       OLANZapine (ZYPREXA) 10 MG tablet Take 1 tablet (10 mg) by mouth At Bedtime Discontinue 20mg refills 30 tablet 5     ranitidine (ZANTAC) 150 MG tablet Take 150 mg by mouth daily        venlafaxine (EFFEXOR-XR) 150  "MG 24 hr capsule Take 2 capsules (300 mg) by mouth daily 60 capsule 5     No current Epic-ordered facility-administered medications on file.         Mental Status Exam     Appearance:  Casually dressed and Well groomed  Behavior/relationship to examiner/demeanor:  Cooperative  Orientation: Oriented to person, place, time and situation  Psychomotor: normal form  Speech Rate:  Normal  Speech Spontaneity:  Normal  Mood:  \"so so\"  Affect:  Appropriate/mood-congruent  Thought Process (Associations):  Goal directed  Thought Content:  no overt psychosis, denies suicidal ideation, intent or thoughts and patient does not appear to be responding to internal stimuli  Abnormal Perception:  None  Attention/Concentration:  Normal  Insight:  Adequate  Judgment:  Good      Results     Vital signs: BP (!) 150/91   Pulse 90   Wt 94.9 kg (209 lb 3.2 oz)   BMI 34.02 kg/m      Laboratory Data:  no new data available    Assessment & Plan      Gianluca FLYNN is seen today for follow up with  and reports that mood is the same. Agrees to continue current medication but will follow up with PCP concerning elevated BP. Asked that he bring all his medications with him to next appointment.    Diagnosis  Encounter Diagnoses   Name Primary?     Severe recurrent major depression without psychotic features (H)      Insomnia, unspecified type      Social anxiety disorder        Plan:  Medication: Continue current medications  OTC Recommendations: none  Lab Orders:  none  Referrals: to contact PCP due to elevated BP  Release of Information: none  Future Treatment Considerations:per symptoms  Return for Follow Up:8 weeks   The risks, benefits, alternatives and side effects have been discussed and are understood by the patient. The patient understands the risks of using street drugs or alcohol. There are no medical contraindications, the patient agrees to treatment, and has the capacity to do so. The patient understands to " call 911 or come to the nearest ED if life threatening or urgent symptoms present.  In addition time was spent counseling the patient and/or coordinating care regarding review of social and occupational functioning.  In addition patient was counseled on health and wellness practices to augment medication treatment of symptoms. See note for details.    Alba Merlos, APRN CNS 1/23/2019

## 2019-03-20 ENCOUNTER — OFFICE VISIT (OUTPATIENT)
Dept: PSYCHIATRY | Facility: CLINIC | Age: 64
End: 2019-03-20
Attending: CLINICAL NURSE SPECIALIST
Payer: MEDICAID

## 2019-03-20 VITALS
BODY MASS INDEX: 33.5 KG/M2 | HEART RATE: 89 BPM | SYSTOLIC BLOOD PRESSURE: 131 MMHG | WEIGHT: 206 LBS | DIASTOLIC BLOOD PRESSURE: 87 MMHG

## 2019-03-20 DIAGNOSIS — Z78.9 NON-ENGLISH SPEAKING PATIENT: ICD-10-CM

## 2019-03-20 DIAGNOSIS — F51.04 CHRONIC INSOMNIA: ICD-10-CM

## 2019-03-20 DIAGNOSIS — F40.10 SOCIAL ANXIETY DISORDER: ICD-10-CM

## 2019-03-20 DIAGNOSIS — F19.21 POLYSUBSTANCE DEPENDENCE, NON-OPIOID, IN REMISSION (H): ICD-10-CM

## 2019-03-20 DIAGNOSIS — G47.00 INSOMNIA, UNSPECIFIED TYPE: ICD-10-CM

## 2019-03-20 DIAGNOSIS — F33.2 SEVERE RECURRENT MAJOR DEPRESSION WITHOUT PSYCHOTIC FEATURES (H): Primary | ICD-10-CM

## 2019-03-20 PROCEDURE — G0463 HOSPITAL OUTPT CLINIC VISIT: HCPCS | Mod: ZF

## 2019-03-20 PROCEDURE — T1013 SIGN LANG/ORAL INTERPRETER: HCPCS | Mod: U3,ZF

## 2019-03-20 ASSESSMENT — PATIENT HEALTH QUESTIONNAIRE - PHQ9: SUM OF ALL RESPONSES TO PHQ QUESTIONS 1-9: 22

## 2019-03-20 ASSESSMENT — PAIN SCALES - GENERAL: PAINLEVEL: NO PAIN (0)

## 2019-03-20 NOTE — NURSING NOTE
Chief Complaint   Patient presents with     Recheck Medication     Severe recurrent major depression without psychotic features

## 2019-03-20 NOTE — PROGRESS NOTES
Outpatient Psychiatry Progress Note     Provider: LORE Kim CNS  Date: 3/20/2019  Service:  Medication follow up with counseling.   Patient Identification: Gianluca FLYNN  : 1955   MRN: [id number]    Gianluca FLYNN is a 63 year old year old male who presents for ongoing psychiatric care.  Gianluca FLYNN was last seen in clinic on 19.   At that time,   Assessment & Plan       Gianluca FLYNN is seen today for follow up with  and reports that mood is the same. Agrees to continue current medication but will follow up with PCP concerning elevated BP. Asked that he bring all his medications with him to next appointment.     Diagnosis       Encounter Diagnoses   Name Primary?     Severe recurrent major depression without psychotic features (H)       Insomnia, unspecified type       Social anxiety disorder           Plan:  Medication: Continue current medications  OTC Recommendations: none  Lab Orders:  none  Referrals: to contact PCP due to elevated BP  Release of Information: none  Future Treatment Considerations:per symptoms  Return for Follow Up:8 weeks      ____________________________________________________________________________________________________________________________________________    2019   Seen with   Today Gianluca reports he is feeling the same. He reports he has concerns about his memory getting worse. Forgets the date and day, sometimes the year, where he puts things, what he was looking for, sometimes forgets people he knows. Takes the wrong bus but this is happening less.   Has not had neuropsych testing.   Side effects of medication include: none known  Psychiatric Review of Systems:  Depression: In the last 2 weeks per PHQ-9 score:   PHQ-9 SCORE 3/20/2019   PHQ-9 Total Score -   PHQ-9 Total Score 22   PHQ-9 Total Score -       Anxiety : stable  Pamela na  Psychosis  na.   ADHD na    Review of Medical  Systems:  Sleep: stable  Energy: stable  Concentration: maybe decreased  Appetite: stable  GI Concerns: none  Cardiac concerns: none  Neurological concerns: decrease in memory  Other medical concerns: memory  Current Substance Use:  Alcohol:continues sober  Other drugs:denies  Caffeine:no reviewed  Nicotine: not reviewed  Past Medical History:   Past Medical History:   Diagnosis Date     Depression      Gastro-oesophageal reflux disease      Headache(784.0)      Hypertension      LOC (loss of consciousness) (H) age 45    out for 20 mintes after hit on top of head with board     Major depression 5/7/2013     Otitis media, chronic      Sleep apnea     cant tolerate cpap     Patient Active Problem List   Diagnosis     Non-English speaking patient     Chronic nasal congestion     HEIKE (obstructive sleep apnea)     Chronic hepatitis C (H)     Esophageal reflux     Headache     Short-term memory loss     Polysubstance dependence, non-opioid, in remission (H)     Social anxiety disorder     Severe recurrent major depression without psychotic features (H)     Heterozygous MTHFR mutation C677T (H)     Insomnia, unspecified type       Allergies:   Allergies   Allergen Reactions     Trazodone Other (See Comments)     Very depressed and suicidal          Current Medications     Current Outpatient Medications Ordered in Epic   Medication Sig Dispense Refill     ASPIRIN PO Take 81 mg by mouth daily       atorvastatin (LIPITOR) 40 MG tablet Take 1 tablet (40 mg) by mouth daily       doxepin (SINEQUAN) 10 MG capsule Take 1 capsule (10 mg) by mouth At Bedtime 30 capsule 5     lisinopril-hydrochlorothiazide (PRINZIDE,ZESTORETIC) 20-25 MG per tablet Take 1 tablet by mouth daily       metFORMIN (GLUCOPHAGE) 500 MG tablet Take one tablet daily in the am and two tablets at bedtime.  R Skolar NP at [facility]       OLANZapine (ZYPREXA) 10 MG tablet Take 1 tablet (10 mg) by mouth At Bedtime 30 tablet 5     ranitidine (ZANTAC) 150 MG tablet Take  "150 mg by mouth daily        venlafaxine (EFFEXOR-XR) 150 MG 24 hr capsule Take 2 capsules (300 mg) by mouth daily 60 capsule 5     VITAMIN D3 1000 units tablet TK 2 TS PO D  1     No current Epic-ordered facility-administered medications on file.         Mental Status Exam     Appearance:  Casually dressed and Well groomed  Behavior/relationship to examiner/demeanor:  Cooperative  Orientation: Oriented to person, place, time and situation  Psychomotor: normal form  Speech Rate:  Normal  Speech Spontaneity:  Normal  Mood:  \"so so\"  Affect:  Appropriate/mood-congruent  Thought Process (Associations):  Goal directed  Thought Content:  no overt psychosis, denies suicidal ideation, intent or thoughts and patient does not appear to be responding to internal stimuli  Abnormal Perception:  None  Attention/Concentration:  Normal  Insight:  Adequate  Judgment:  Adequate for safety      Results     Vital signs: /87   Pulse 89   Wt 93.4 kg (206 lb)   BMI 33.50 kg/m      Laboratory Data:  no new data    Assessment & Plan      Gianluca FLYNN is seen today for follow up and reports his mood is about the same and would like to continue current medications.     Diagnosis  Encounter Diagnoses   Name Primary?     Severe recurrent major depression without psychotic features (H) Yes     Insomnia, unspecified type      Social anxiety disorder      Polysubstance dependence, non-opioid, in remission (H)      Chronic insomnia      Non-English speaking patient        Plan:  Medication: continue current medications  OTC Recommendations: none  Lab Orders:  none  Referrals: none  Release of Information: none - requested he have psychologist send any testing  Future Treatment Considerations:per symptoms  Return for Follow Up:8 weeks   The risks, benefits, alternatives and side effects have been discussed and are understood by the patient. The patient understands the risks of using street drugs or alcohol. There are no medical " contraindications, the patient agrees to treatment, and has the capacity to do so. The patient understands to call 911 or come to the nearest ED if life threatening or urgent symptoms present.  In addition time was spent counseling the patient and/or coordinating care regarding review of social and occupational functioning.  In addition patient was counseled on health and wellness practices to augment medication treatment of symptoms. See note for details.    Alba Merlos, APRN CNS 3/20/2019

## 2019-05-10 DIAGNOSIS — F40.10 SOCIAL ANXIETY DISORDER: ICD-10-CM

## 2019-05-13 RX ORDER — VENLAFAXINE HYDROCHLORIDE 150 MG/1
CAPSULE, EXTENDED RELEASE ORAL
Qty: 60 CAPSULE | Refills: 0 | OUTPATIENT
Start: 2019-05-13

## 2019-05-30 ENCOUNTER — OFFICE VISIT (OUTPATIENT)
Dept: PSYCHIATRY | Facility: CLINIC | Age: 64
End: 2019-05-30
Attending: CLINICAL NURSE SPECIALIST
Payer: MEDICAID

## 2019-05-30 VITALS
SYSTOLIC BLOOD PRESSURE: 125 MMHG | HEART RATE: 76 BPM | WEIGHT: 208.6 LBS | BODY MASS INDEX: 33.93 KG/M2 | DIASTOLIC BLOOD PRESSURE: 84 MMHG

## 2019-05-30 DIAGNOSIS — Z78.9 NON-ENGLISH SPEAKING PATIENT: ICD-10-CM

## 2019-05-30 DIAGNOSIS — F19.21 POLYSUBSTANCE DEPENDENCE, NON-OPIOID, IN REMISSION (H): ICD-10-CM

## 2019-05-30 DIAGNOSIS — F40.10 SOCIAL ANXIETY DISORDER: Primary | ICD-10-CM

## 2019-05-30 DIAGNOSIS — G47.00 INSOMNIA, UNSPECIFIED TYPE: ICD-10-CM

## 2019-05-30 DIAGNOSIS — F33.2 SEVERE RECURRENT MAJOR DEPRESSION WITHOUT PSYCHOTIC FEATURES (H): ICD-10-CM

## 2019-05-30 PROCEDURE — G0463 HOSPITAL OUTPT CLINIC VISIT: HCPCS | Mod: ZF

## 2019-05-30 PROCEDURE — T1013 SIGN LANG/ORAL INTERPRETER: HCPCS | Mod: U3,ZF | Performed by: CLINICAL NURSE SPECIALIST

## 2019-05-30 RX ORDER — OLANZAPINE 10 MG/1
10 TABLET ORAL AT BEDTIME
Qty: 30 TABLET | Refills: 5 | Status: SHIPPED | OUTPATIENT
Start: 2019-05-30 | End: 2019-09-25

## 2019-05-30 RX ORDER — DOXEPIN HYDROCHLORIDE 10 MG/1
10 CAPSULE ORAL AT BEDTIME
Qty: 30 CAPSULE | Refills: 5 | Status: SHIPPED | OUTPATIENT
Start: 2019-05-30 | End: 2019-09-25

## 2019-05-30 RX ORDER — VENLAFAXINE HYDROCHLORIDE 150 MG/1
300 CAPSULE, EXTENDED RELEASE ORAL DAILY
Qty: 60 CAPSULE | Refills: 5 | Status: SHIPPED | OUTPATIENT
Start: 2019-05-30 | End: 2019-09-25

## 2019-05-30 ASSESSMENT — PATIENT HEALTH QUESTIONNAIRE - PHQ9: SUM OF ALL RESPONSES TO PHQ QUESTIONS 1-9: 22

## 2019-05-30 ASSESSMENT — PAIN SCALES - GENERAL: PAINLEVEL: NO PAIN (0)

## 2019-05-30 NOTE — PROGRESS NOTES
Outpatient Psychiatry Progress Note     Provider: LORE Kim CNS  Date: 2019  Service:  Medication follow up with counseling.   Patient Identification: Gianluca FLYNN  : 1955   MRN: [id number]    Gianluca FLYNN is a 63 year old year old male who presents for ongoing psychiatric care.  Gianluca FLYNN was last seen in clinic on 3/20/19.   At that time,   Assessment & Plan       Gianluca FLYNN is seen today for follow up and reports his mood is about the same and would like to continue current medications.      Diagnosis       Encounter Diagnoses   Name Primary?     Severe recurrent major depression without psychotic features (H) Yes     Insomnia, unspecified type       Social anxiety disorder       Polysubstance dependence, non-opioid, in remission (H)       Chronic insomnia       Non-English speaking patient           Plan:  Medication: continue current medications  OTC Recommendations: none  Lab Orders:  none  Referrals: none  Release of Information: none - requested he have psychologist send any testing  Future Treatment Considerations:per symptoms  Return for Follow Up:8 weeks      ____________________________________________________________________________________________________________________________________________    2019   Seen with    Today Gianluca reports his is feeling the same. He has not had changes up or down.  Continues to have difficulties with memory has appointments set for neuropsychological testing.   Side effects of medication include: none reported  Psychiatric Review of Systems:  Depression: In the last 2 weeks per PHQ-9 score: Denies current Suicide plan or intent at this time.  PHQ-9 SCORE 2019   PHQ-9 Total Score -   PHQ-9 Total Score 22   PHQ-9 Total Score -       Anxiety : stable  Pamela na   Psychosis  na.   ADHD na    Review of Medical Systems:  Sleep: 5 to 6 hours  Energy: low  Concentration:  moderate  Appetite: stable  GI Concerns: no new concerns  Cardiac concerns: no new concerns  Neurological concerns: no new concerns  Other medical concerns: no new concerns  Current Substance Use:  Alcohol:denies  Other drugs:denies  Caffeine:no change  Nicotine: e cig  Past Medical History:   Past Medical History:   Diagnosis Date     Depression      Gastro-oesophageal reflux disease      Headache(784.0)      Hypertension      LOC (loss of consciousness) (H) age 45    out for 20 mintes after hit on top of head with board     Major depression 5/7/2013     Otitis media, chronic      Sleep apnea     cant tolerate cpap     Patient Active Problem List   Diagnosis     Non-English speaking patient     Chronic nasal congestion     HEIKE (obstructive sleep apnea)     Chronic hepatitis C (H)     Esophageal reflux     Headache     Short-term memory loss     Polysubstance dependence, non-opioid, in remission (H)     Social anxiety disorder     Severe recurrent major depression without psychotic features (H)     Heterozygous MTHFR mutation C677T (H)     Insomnia, unspecified type       Allergies:   Allergies   Allergen Reactions     Trazodone Other (See Comments)     Very depressed and suicidal          Current Medications     Current Outpatient Medications Ordered in Epic   Medication Sig Dispense Refill     ASPIRIN PO Take 81 mg by mouth daily       atorvastatin (LIPITOR) 40 MG tablet Take 1 tablet (40 mg) by mouth daily       doxepin (SINEQUAN) 10 MG capsule Take 1 capsule (10 mg) by mouth At Bedtime 30 capsule 5     lisinopril-hydrochlorothiazide (PRINZIDE,ZESTORETIC) 20-25 MG per tablet Take 1 tablet by mouth daily       metFORMIN (GLUCOPHAGE) 500 MG tablet Take one tablet daily in the am and two tablets at bedtime.  R Skolar NP at [facility]       OLANZapine (ZYPREXA) 10 MG tablet Take 1 tablet (10 mg) by mouth At Bedtime 30 tablet 5     ranitidine (ZANTAC) 150 MG tablet Take 1 tablet (150 mg) by mouth 2 times daily        "venlafaxine (EFFEXOR-XR) 150 MG 24 hr capsule Take 2 capsules (300 mg) by mouth daily 60 capsule 5     VITAMIN D3 1000 units tablet TK 2 TS PO D  1     No current Epic-ordered facility-administered medications on file.         Mental Status Exam     Appearance:  Casually dressed and Well groomed  Behavior/relationship to examiner/demeanor:  Cooperative  Orientation: Oriented to person, place, time and situation  Psychomotor: normal form  Speech Rate:  Normal  Speech Spontaneity:  Normal  Mood:  \"okay\"  Affect:  Appropriate/mood-congruent  Thought Process (Associations):  Goal directed  Thought Content:  no overt psychosis, patient does not appear to be responding to internal stimuli, Suicidal ideation and denies suicidal intent or plan  Abnormal Perception:  None  Attention/Concentration:  Normal  Insight:  Adequate  Judgment:  Adequate for safety      Results     Vital signs: /84   Pulse 76   Wt 94.6 kg (208 lb 9.6 oz)   BMI 33.93 kg/m      Laboratory Data:  no new data    Assessment & Plan      Gianluca FLYNN is seen today for follow up with  and reports his mood has been stable. Continues to have concerns about his memory and has appointments for neuropsychological testing next month. He is seeing therapist at [facility] regularly.  Although SI, denies current intent or plan. He would like to continue current medication.    Diagnosis  Encounter Diagnoses   Name Primary?     Social anxiety disorder Yes     Severe recurrent major depression without psychotic features (H)      Insomnia, unspecified type      Polysubstance dependence, non-opioid, in remission (H)      Non-English speaking patient        Plan:  Medication: no change  OTC Recommendations: none  Lab Orders:  none  Referrals: none  Release of Information: none  Future Treatment Considerations:per symptoms  Return for Follow Up:8 weeks   The risks, benefits, alternatives and side effects have been discussed and are " understood by the patient. The patient understands the risks of using street drugs or alcohol. There are no medical contraindications, the patient agrees to treatment, and has the capacity to do so. The patient understands to call 911 or come to the nearest ED if life threatening or urgent symptoms present.  In addition time was spent counseling the patient and/or coordinating care regarding review of social and occupational functioning.  In addition patient was counseled on health and wellness practices to augment medication treatment of symptoms. See note for details.    Alba Merlos, APRN CNS 5/30/2019

## 2019-07-24 ENCOUNTER — OFFICE VISIT (OUTPATIENT)
Dept: PSYCHIATRY | Facility: CLINIC | Age: 64
End: 2019-07-24
Attending: CLINICAL NURSE SPECIALIST
Payer: MEDICARE

## 2019-07-24 VITALS
WEIGHT: 206.4 LBS | HEART RATE: 73 BPM | DIASTOLIC BLOOD PRESSURE: 93 MMHG | BODY MASS INDEX: 33.57 KG/M2 | SYSTOLIC BLOOD PRESSURE: 143 MMHG

## 2019-07-24 DIAGNOSIS — F40.10 SOCIAL ANXIETY DISORDER: Primary | ICD-10-CM

## 2019-07-24 DIAGNOSIS — G47.00 INSOMNIA, UNSPECIFIED TYPE: ICD-10-CM

## 2019-07-24 DIAGNOSIS — G47.33 OSA (OBSTRUCTIVE SLEEP APNEA): ICD-10-CM

## 2019-07-24 DIAGNOSIS — Z78.9 NON-ENGLISH SPEAKING PATIENT: ICD-10-CM

## 2019-07-24 DIAGNOSIS — F19.21 POLYSUBSTANCE DEPENDENCE, NON-OPIOID, IN REMISSION (H): ICD-10-CM

## 2019-07-24 DIAGNOSIS — F33.2 SEVERE RECURRENT MAJOR DEPRESSION WITHOUT PSYCHOTIC FEATURES (H): ICD-10-CM

## 2019-07-24 PROCEDURE — T1013 SIGN LANG/ORAL INTERPRETER: HCPCS | Mod: U3,ZF

## 2019-07-24 ASSESSMENT — PAIN SCALES - GENERAL: PAINLEVEL: NO PAIN (0)

## 2019-07-24 ASSESSMENT — PATIENT HEALTH QUESTIONNAIRE - PHQ9: SUM OF ALL RESPONSES TO PHQ QUESTIONS 1-9: 22

## 2019-07-24 NOTE — PROGRESS NOTES
Outpatient Psychiatry Progress Note     Provider: LORE Kim CNS  Date: 2019  Service:  Medication follow up with counseling.   Patient Identification: Gianluca FLYNN  : 1955   MRN: [id number]    Gianluca FLYNN is a 63 year old year old male who presents for ongoing psychiatric care.  Gianluca FLYNN was last seen in clinic on 19.   At that time,   Assessment & Plan       Gianluca FLYNN is seen today for follow up with  and reports his mood has been stable. Continues to have concerns about his memory and has appointments for neuropsychological testing next month. He is seeing therapist at [facility] regularly.  Although SI, denies current intent or plan. He would like to continue current medication.     Diagnosis       Encounter Diagnoses   Name Primary?     Social anxiety disorder Yes     Severe recurrent major depression without psychotic features (H)       Insomnia, unspecified type       Polysubstance dependence, non-opioid, in remission (H)       Non-English speaking patient           Plan:  Medication: no change  OTC Recommendations: none  Lab Orders:  none  Referrals: none  Release of Information: none  Future Treatment Considerations:per symptoms  Return for Follow Up:8 weeks      ____________________________________________________________________________________________________________________________________________    2019   Seen with .   Today Gianluca reports he is feeling the same. He is keeping busy enough. Feels that medication is helping and he never feels as bad as he had.  Side effects of medication include: none reported  Psychiatric Review of Systems:  Depression: In the last 2 weeks per PHQ-9 score:   PHQ-9 SCORE 2019   PHQ-9 Total Score -   PHQ-9 Total Score 22   PHQ-9 Total Score -       Anxiety : stable with continued social anxiety  Pamela na   Psychosis  na.   ADHD na    Review of  Medical Systems:  Sleep: getting 6 to 7 hours  Energy: stable  Concentration: stable  Appetite: stable, has lost weight  GI Concerns: none  Cardiac concerns: none  Neurological concerns: none  Other medical concerns:  No new concerns  Current Substance Use:  Alcohol:denies  Other drugs:denies  Caffeine:no change  Nicotine: no change  Past Medical History:   Past Medical History:   Diagnosis Date     Depression      Gastro-oesophageal reflux disease      Headache(784.0)      Hypertension      LOC (loss of consciousness) (H) age 45    out for 20 mintes after hit on top of head with board     Major depression 5/7/2013     Otitis media, chronic      Sleep apnea     cant tolerate cpap     Patient Active Problem List   Diagnosis     Non-English speaking patient     Chronic nasal congestion     HEIKE (obstructive sleep apnea)     Chronic hepatitis C (H)     Esophageal reflux     Headache     Short-term memory loss     Polysubstance dependence, non-opioid, in remission (H)     Social anxiety disorder     Severe recurrent major depression without psychotic features (H)     Heterozygous MTHFR mutation C677T (H)     Insomnia, unspecified type       Allergies:   Allergies   Allergen Reactions     Trazodone Other (See Comments)     Very depressed and suicidal          Current Medications     Current Outpatient Medications Ordered in Epic   Medication Sig Dispense Refill     ASPIRIN PO Take 81 mg by mouth daily       atorvastatin (LIPITOR) 40 MG tablet Take 1 tablet (40 mg) by mouth daily       doxepin (SINEQUAN) 10 MG capsule Take 1 capsule (10 mg) by mouth At Bedtime 30 capsule 5     lisinopril-hydrochlorothiazide (PRINZIDE,ZESTORETIC) 20-25 MG per tablet Take 1 tablet by mouth daily       metFORMIN (GLUCOPHAGE) 500 MG tablet Take one tablet daily in the am and two tablets at bedtime.  R Skolar NP at [facility]       OLANZapine (ZYPREXA) 10 MG tablet Take 1 tablet (10 mg) by mouth At Bedtime 30 tablet 5     ranitidine (ZANTAC) 150 MG  "tablet Take 1 tablet (150 mg) by mouth 2 times daily       venlafaxine (EFFEXOR-XR) 150 MG 24 hr capsule Take 2 capsules (300 mg) by mouth daily 60 capsule 5     VITAMIN D3 1000 units tablet TK 2 TS PO D  1     No current Epic-ordered facility-administered medications on file.         Mental Status Exam     Appearance:  Casually dressed and Well groomed  Behavior/relationship to examiner/demeanor:  Cooperative  Orientation: Oriented to person, place, time and situation  Psychomotor: normal form  Speech Rate:  Normal  Speech Spontaneity:  Normal  Mood:  \"so so\"  Affect:  Appropriate/mood-congruent  Thought Process (Associations):  Goal directed  Thought Content:  no overt psychosis, denies suicidal ideation, intent or thoughts and patient does not appear to be responding to internal stimuli  Abnormal Perception:  None  Attention/Concentration:  Normal  Insight:  Adequate  Judgment:  Good      Results     Vital signs: BP (!) 143/93   Pulse 73   Wt 93.6 kg (206 lb 6.4 oz)   BMI 33.57 kg/m      Laboratory Data:  no new data    Assessment & Plan      Gianluca FLYNN is seen today with an  for follow up and reports his mood overall is stable with continued difficulty in social functioning. He agrees with plan to continue current medication.    Diagnosis  Encounter Diagnoses   Name Primary?     Social anxiety disorder Yes     Severe recurrent major depression without psychotic features (H)      Insomnia, unspecified type      Polysubstance dependence, non-opioid, in remission (H)      HEIKE (obstructive sleep apnea)      Non-English speaking patient        Plan:  Medication: Continue current medications  OTC Recommendations: none  Lab Orders:  none  Referrals: none  Release of Information: none  Future Treatment Considerations:per symptoms  Return for Follow Up: 8 weeks   The risks, benefits, alternatives and side effects have been discussed and are understood by the patient. The patient understands the " risks of using street drugs or alcohol. There are no medical contraindications, the patient agrees to treatment, and has the capacity to do so. The patient understands to call 911 or come to the nearest ED if life threatening or urgent symptoms present.  In addition time was spent counseling the patient and/or coordinating care regarding review of social and occupational functioning.  In addition patient was counseled on health and wellness practices to augment medication treatment of symptoms. See note for details.    Alba Merlos, APRN CNS 7/24/2019

## 2019-08-05 DIAGNOSIS — F40.10 SOCIAL ANXIETY DISORDER: ICD-10-CM

## 2019-08-06 RX ORDER — VENLAFAXINE HYDROCHLORIDE 150 MG/1
CAPSULE, EXTENDED RELEASE ORAL
Qty: 180 CAPSULE | Refills: 5 | OUTPATIENT
Start: 2019-08-06

## 2019-09-12 ENCOUNTER — TELEPHONE (OUTPATIENT)
Dept: PSYCHIATRY | Facility: CLINIC | Age: 64
End: 2019-09-12

## 2019-09-12 NOTE — TELEPHONE ENCOUNTER
Health Call Center    Phone Message    May a detailed message be left on voicemail: yes    Reason for Call: Other: Pt's nurse called on his behalf to leave a note with Alba Merlos about getting referral for neuropsych evaluation at the  [facility]. The pt has tried [facility] and [facility].      Action Taken: Other: [facility]

## 2019-09-13 NOTE — TELEPHONE ENCOUNTER
Referral     Alba Merlos, LORE CNS  You Yesterday (2:13 PM)      It looks like he thought he already had an appointment scheduled somewhere when I saw him last month.  Did she say why he couldn't get it done where she had referred him? Wondering if it is because he needs a .  She should be able to refer him to [facility] Neuro[facility] and maybe better if she does it so it comes from other dept than psychiatry..  Another option is [facility]. Should include that he needs an . It might be difficult because he doesn't read or write in English and I am not sure of his reading or writing level in Telugu.   Alba    Routing comment    -Returned phone call to [facility] to [facility]  through [facility]  at [phone]. No answer. LVM requesting she return phone call to writer.

## 2019-09-25 ENCOUNTER — OFFICE VISIT (OUTPATIENT)
Dept: PSYCHIATRY | Facility: CLINIC | Age: 64
End: 2019-09-25
Attending: CLINICAL NURSE SPECIALIST
Payer: MEDICARE

## 2019-09-25 VITALS
HEART RATE: 77 BPM | SYSTOLIC BLOOD PRESSURE: 143 MMHG | BODY MASS INDEX: 34.45 KG/M2 | WEIGHT: 211.8 LBS | DIASTOLIC BLOOD PRESSURE: 96 MMHG

## 2019-09-25 DIAGNOSIS — F19.21 POLYSUBSTANCE DEPENDENCE, NON-OPIOID, IN REMISSION (H): ICD-10-CM

## 2019-09-25 DIAGNOSIS — F33.2 SEVERE RECURRENT MAJOR DEPRESSION WITHOUT PSYCHOTIC FEATURES (H): Primary | ICD-10-CM

## 2019-09-25 DIAGNOSIS — G47.33 OSA (OBSTRUCTIVE SLEEP APNEA): ICD-10-CM

## 2019-09-25 DIAGNOSIS — G47.00 INSOMNIA, UNSPECIFIED TYPE: ICD-10-CM

## 2019-09-25 DIAGNOSIS — F40.10 SOCIAL ANXIETY DISORDER: ICD-10-CM

## 2019-09-25 DIAGNOSIS — Z78.9 NON-ENGLISH SPEAKING PATIENT: ICD-10-CM

## 2019-09-25 DIAGNOSIS — F51.04 CHRONIC INSOMNIA: ICD-10-CM

## 2019-09-25 PROCEDURE — G0463 HOSPITAL OUTPT CLINIC VISIT: HCPCS | Mod: ZF

## 2019-09-25 PROCEDURE — T1013 SIGN LANG/ORAL INTERPRETER: HCPCS | Mod: U3,ZF

## 2019-09-25 RX ORDER — DOXEPIN HYDROCHLORIDE 10 MG/1
10 CAPSULE ORAL AT BEDTIME
Qty: 30 CAPSULE | Refills: 5 | Status: SHIPPED | OUTPATIENT
Start: 2019-09-25 | End: 2020-04-15

## 2019-09-25 RX ORDER — OLANZAPINE 10 MG/1
10 TABLET ORAL AT BEDTIME
Qty: 30 TABLET | Refills: 5 | Status: SHIPPED | OUTPATIENT
Start: 2019-09-25 | End: 2020-04-15

## 2019-09-25 RX ORDER — VENLAFAXINE HYDROCHLORIDE 150 MG/1
300 CAPSULE, EXTENDED RELEASE ORAL DAILY
Qty: 60 CAPSULE | Refills: 5 | Status: SHIPPED | OUTPATIENT
Start: 2019-09-25 | End: 2020-03-24

## 2019-09-25 ASSESSMENT — PAIN SCALES - GENERAL: PAINLEVEL: NO PAIN (0)

## 2019-09-25 ASSESSMENT — PATIENT HEALTH QUESTIONNAIRE - PHQ9: SUM OF ALL RESPONSES TO PHQ QUESTIONS 1-9: 22

## 2019-09-25 NOTE — TELEPHONE ENCOUNTER
Message   Received: Today   Message Contents   Alba Merlos APRN CNS Moccio, Emily, RN             Did you ever get a hold of his  about memory testing? Maybe she should try calling [facility] and ask to speak with one of the psychologist there about where he might be able to be seen. It would probably be best if he saw a provider for the testing that speaks Russian as he only reads a little Russian and no english.   Alba      -Placed additional phone call to Lui Qureshi  with [facility]. No answer. Left an additional message requesting she return phone call to writer to further address this issue.

## 2019-09-25 NOTE — Clinical Note
Could we also contact his pcp at [facility] about his previous sleep studies and recommendations.  I keep forgetting he has Sleep apnea and wonder about fatigue and weight and memory due to that.  I should try and find it in his chart maybe before you call her though but might need reminding to look.Alba

## 2019-09-25 NOTE — PROGRESS NOTES
Outpatient Psychiatry Progress Note     Provider: LORE Kim CNS  Date: 2019  Service:  Medication follow up with counseling.   Patient Identification: Gianluca FLNYN  : 1955   MRN: [id number]    Gianluca FLYNN is a 64 year old year old male who presents for ongoing psychiatric care.  Gianluca FLYNN was last seen in clinic on 19.   At that time,   Assessment & Plan       Gianluca FLYNN is seen today with an  for follow up and reports his mood overall is stable with continued difficulty in social functioning. He agrees with plan to continue current medication.     Diagnosis       Encounter Diagnoses   Name Primary?     Social anxiety disorder Yes     Severe recurrent major depression without psychotic features (H)       Insomnia, unspecified type       Polysubstance dependence, non-opioid, in remission (H)       HEIKE (obstructive sleep apnea)       Non-English speaking patient           Plan:  Medication: Continue current medications  OTC Recommendations: none  Lab Orders:  none  Referrals: none  Release of Information: none  Future Treatment Considerations:per symptoms  Return for Follow Up: 8 weeks      ____________________________________________________________________________________________________________________________________________    2019   Seen with .  Today Gianluca reports he is feeling the same. Maybe more tired  He went to the appointment for  Side effects of medication include: none known  Psychiatric Review of Systems:  Depression: In the last 2 weeks per PHQ-9 score:   PHQ-9 SCORE 2019   PHQ-9 Total Score -   PHQ-9 Total Score 22   PHQ-9 Total Score -       Anxiety : stable  Pamela none   Psychosis  none.   ADHD na    Review of Medical Systems:  Sleep: no change - 7 hours and no problem falling asleep or staying asleep. Not using CPAP since couldn't sleep with it. Sleep evaluation was some years ago and  hasn't returned  Energy: stable  Concentration: stable   Appetite: stable  GI Concerns: none  Cardiac concerns: no new concerns  Neurological concerns: no new concerns  Other medical concerns:  No new concerns  Current Substance Use:  Alcohol:denies  Other drugs:denies  Caffeine:no change  Nicotine: no change  Past Medical History:   Past Medical History:   Diagnosis Date     Depression      Gastro-oesophageal reflux disease      Headache(784.0)      Hypertension      LOC (loss of consciousness) (H) age 45    out for 20 mintes after hit on top of head with board     Major depression 5/7/2013     Otitis media, chronic      Sleep apnea     cant tolerate cpap     Patient Active Problem List   Diagnosis     Non-English speaking patient     Chronic nasal congestion     HEIKE (obstructive sleep apnea)     Chronic hepatitis C (H)     Esophageal reflux     Headache     Short-term memory loss     Polysubstance dependence, non-opioid, in remission (H)     Social anxiety disorder     Severe recurrent major depression without psychotic features (H)     Heterozygous MTHFR mutation C677T (H)     Insomnia, unspecified type       Allergies:   Allergies   Allergen Reactions     Trazodone Other (See Comments)     Very depressed and suicidal          Current Medications     Current Outpatient Medications Ordered in Epic   Medication Sig Dispense Refill     ASPIRIN PO Take 81 mg by mouth daily       atorvastatin (LIPITOR) 40 MG tablet Take 1 tablet (40 mg) by mouth daily       doxepin (SINEQUAN) 10 MG capsule Take 1 capsule (10 mg) by mouth At Bedtime 30 capsule 5     lisinopril-hydrochlorothiazide (PRINZIDE,ZESTORETIC) 20-25 MG per tablet Take 1 tablet by mouth daily       metFORMIN (GLUCOPHAGE) 500 MG tablet Take one tablet daily in the am and two tablets at bedtime.  R Skolar NP at [facility]       OLANZapine (ZYPREXA) 10 MG tablet Take 1 tablet (10 mg) by mouth At Bedtime 30 tablet 5     ranitidine (ZANTAC) 150 MG tablet Take 1 tablet  "(150 mg) by mouth 2 times daily       venlafaxine (EFFEXOR-XR) 150 MG 24 hr capsule Take 2 capsules (300 mg) by mouth daily 60 capsule 5     VITAMIN D3 1000 units tablet TK 2 TS PO D  1     No current Epic-ordered facility-administered medications on file.         Mental Status Exam     Appearance:  Casually dressed and Well groomed  Behavior/relationship to examiner/demeanor:  Cooperative  Orientation: Oriented to person, place, time and situation  Psychomotor: normal form  Speech Rate:  Normal  Speech Spontaneity:  Normal  Mood:  \"so so\"  Affect:  Appropriate/mood-congruent  Thought Process (Associations):  Goal directed  Thought Content:  no overt psychosis, denies suicidal ideation, intent or thoughts and patient does not appear to be responding to internal stimuli  Abnormal Perception:  None  Attention/Concentration:  Normal  Insight:  Adequate  Judgment:  Adequate for safety      Results     Vital signs: BP (!) 143/96   Pulse 77   Wt 96.1 kg (211 lb 12.8 oz)   BMI 34.45 kg/m      Laboratory Data:  no new data    Assessment & Plan      Gianluca FLYNN is seen today for follow up and reports his mood is stable with continued difficulty functioning due to baseline symptoms, language barrier and support system. Discussed considering follow up for sleep study since he has been diagnosed with sleep apnea, has gained weight and is not able to tolerate CPAP.   This could also affect cognition.  Reviewed notes from 2013 from [place] will review with patient also at next appointment.    Diagnosis  Encounter Diagnoses   Name Primary?     Severe recurrent major depression without psychotic features (H) Yes     Social anxiety disorder      Polysubstance dependence, non-opioid, in remission (H)      Non-English speaking patient      Chronic insomnia        Plan:  Medication: Continue current medication  OTC Recommendations: none  Lab Orders:  none  Referrals: none at this time.Consider if I need to do " referral for memory  Release of Information: none  Future Treatment Considerations:per symptoms - encouraged to consider use of CPAP or see if dental appliance might be effective  Return for Follow Up:8 weeks   The risks, benefits, alternatives and side effects have been discussed and are understood by the patient. The patient understands the risks of using street drugs or alcohol. There are no medical contraindications, the patient agrees to treatment, and has the capacity to do so. The patient understands to call 911 or come to the nearest ED if life threatening or urgent symptoms present.  In addition time was spent counseling the patient and/or coordinating care regarding review of social and occupational functioning.  In addition patient was counseled on health and wellness practices to augment medication treatment of symptoms. See note for details.    Alba Merlos, APRN CNS 9/25/2019

## 2019-09-25 NOTE — PATIENT INSTRUCTIONS
Thank you for coming to the PSYCHIATRY CLINIC.    Lab Testing:  If you had lab testing today and your results are reassuring or normal they will be mailed to you or sent through [employer] within 7 days.   If the lab tests need quick action we will call you with the results.  The phone number we will call with results is # [phone] (home) . If this is not the best number please call our clinic and change the number.    Medication Refills:  If you need any refills please call your pharmacy and they will contact us. Our fax number for refills is [phone]. Please allow three business for refill processing.   If you need to  your refill at a new pharmacy, please contact the new pharmacy directly. The new pharmacy will help you get your medications transferred.     Scheduling:  If you have any concerns about today's visit or wish to schedule another appointment please call our office during normal business hours [phone] (8-5:00 M-F)    Contact Us:  Please call [phone] during business hours (8-5:00 M-F).  If after clinic hours, or on the weekend, please call  [phone].    Financial Assistance [phone]  [facility]illing [phone]  [place] Billing Office, [facility]: [phone]  [place] Billing [phone]  Medical Records [phone]      MENTAL HEALTH CRISIS NUMBERS:  [facility]:   [facility] - [phone]   Crisis Residence [facility] - [phone]   Walk-In Counseling [facility] [phone]   COPE 24/7 [place] Mobile Team for Adults - [[phone]]; Child - [[phone]]        [facility]:   [facility] - [phone]   Walk-in counseling [facility] - [phone]   Walk-in counseling [facility] - [phone]   Crisis Residence [facility] - [phone]   Urgent Care Adult Mental Health:   --Drop-in, 24/7 crisis line, and [facility] Mobile Team  [[phone]]    CRISIS TEXT LINE: Text [phone] from anywhere, anytime, any crisis 24/7;    OR SEE [email or web address]     Poison Control Center - [phone]    CHILD: [facility] needs assessment team - [phone]     [facility] - [phone]; or [facility] - [phone]    If you have a medical emergency please call 911or go to the nearest ER.                    _____________________________________________    Again thank you for choosing PSYCHIATRY CLINIC and please let us know how we can best partner with you to improve you and your family's health.  You may be receiving a survey in the mail regarding this appointment. We would love to have your feedback, both positive and negative, so please fill out the survey and return it using the provided envelope. The survey is done by an external company, so your answers are anonymous.

## 2019-09-25 NOTE — Clinical Note
Did you ever get a hold of his  about memory testing? Maybe she should try calling [facility] and ask to speak with one of the psychologist there about where he might be able to be seen. It would probably be best if he saw a provider for the testing that speaks Serbian as he only reads a little Serbian and no english.Alba

## 2019-09-27 DIAGNOSIS — F33.2 SEVERE RECURRENT MAJOR DEPRESSION WITHOUT PSYCHOTIC FEATURES (H): ICD-10-CM

## 2019-09-27 DIAGNOSIS — G47.00 INSOMNIA, UNSPECIFIED TYPE: ICD-10-CM

## 2019-09-28 DIAGNOSIS — F40.10 SOCIAL ANXIETY DISORDER: ICD-10-CM

## 2019-09-30 RX ORDER — OLANZAPINE 10 MG/1
TABLET ORAL
Qty: 90 TABLET | Refills: 5 | OUTPATIENT
Start: 2019-09-30

## 2019-09-30 RX ORDER — DOXEPIN HYDROCHLORIDE 10 MG/1
CAPSULE ORAL
Qty: 90 CAPSULE | Refills: 5 | OUTPATIENT
Start: 2019-09-30

## 2019-09-30 NOTE — TELEPHONE ENCOUNTER
FW: Rx Auth Request  / PHARM  REQ   Received: Today   Message Contents   Diana Patricio RN Moccio, Emily, RN   Caller: Unspecified (3 days ago, 10:00 AM)             Please see pharm. Req. Chg to 90 day RF as approp.  Thank you    Previous Messages

## 2019-10-01 RX ORDER — VENLAFAXINE HYDROCHLORIDE 150 MG/1
CAPSULE, EXTENDED RELEASE ORAL
Qty: 180 CAPSULE | Refills: 5 | OUTPATIENT
Start: 2019-10-01

## 2019-10-03 ENCOUNTER — CARE COORDINATION (OUTPATIENT)
Dept: PSYCHIATRY | Facility: CLINIC | Age: 64
End: 2019-10-03

## 2019-10-03 NOTE — PROGRESS NOTES
Message   Received: 1 week ago   Message Contents   Alba Merlos APRN CNS Moccio, Emily, RN             Could we also contact his pcp at [facility] about his previous sleep studies and recommendations.  I keep forgetting he has Sleep apnea and wonder about fatigue and weight and memory due to that.  I should try and find it in his chart maybe before you call her though but might need reminding to look.   Alba      -Placed phone call to [facility] at [phone] to follow-up on provider's request for results of previous sleep studies. Spoke with RN and was told that they have no records of previous sleep studies completed.  -Placed phone call to pt via [employer] interpretor. Spoke with pt. He is uncertain as to where he had the sleep studies completed because it was so long ago. He was unable to provide any additional information.

## 2019-10-04 NOTE — PROGRESS NOTES
Alba Merlos APRN CNS  You 1 hour ago (1:44 PM)      Would you be able to contact PCP and see if she might refer him for a new Sleep Consult? It has been a while and he has gained weight. I think it would be a good idea before getting neuropsych testing since if it is severe it could affect his memory as well as other functioning.   Let me know if she prefers I order it.  It is really difficult to get other services since he requires an  for everything.  I don't know if where he had the study done before they needed an .   Thank you,   Alba    Routing comment      -Placed phone call to [facility] at [phone]. Spoke with a nurse who will pass along a message to the pt's PCP regarding ordering a sleep consult.

## 2019-10-08 NOTE — PROGRESS NOTES
-Received incoming phone call from Ashleigh with Dr. Cuadra's office at the [facility] clinic. Dr. Cuadra has entered the referral for a sleep study, and scheduling should be reaching out directly to pt to coordinate. [facility] clinic will also reach out to pt to inform him of the referral.  -Spoke with pt via . Let him know that Alba would like him to complete a sleep study to rule out any issues with sleep that could be negatively impacting memory and overall functioning. The pt sad he's sleeping fine, but is agreeable to having the sleep study completed.

## 2019-10-11 ENCOUNTER — TELEPHONE (OUTPATIENT)
Dept: PSYCHIATRY | Facility: CLINIC | Age: 64
End: 2019-10-11

## 2019-10-11 NOTE — TELEPHONE ENCOUNTER
On 10/11/2019 the patient signed an JAGJIT authorizing medical records to be released from [facility] to [facility] for the purpose of to coordinate services. I sent the request to medical records via the [employer] system kept a copy in psychiatry until scanning is complete.  [facility]

## 2019-10-16 DIAGNOSIS — E66.3 OVERWEIGHT: Primary | ICD-10-CM

## 2019-11-20 ENCOUNTER — OFFICE VISIT (OUTPATIENT)
Dept: PSYCHIATRY | Facility: CLINIC | Age: 64
End: 2019-11-20
Attending: CLINICAL NURSE SPECIALIST
Payer: MEDICARE

## 2019-11-20 VITALS
BODY MASS INDEX: 33.86 KG/M2 | WEIGHT: 208.2 LBS | DIASTOLIC BLOOD PRESSURE: 78 MMHG | SYSTOLIC BLOOD PRESSURE: 131 MMHG | HEART RATE: 89 BPM

## 2019-11-20 DIAGNOSIS — F19.21 POLYSUBSTANCE DEPENDENCE, NON-OPIOID, IN REMISSION (H): ICD-10-CM

## 2019-11-20 DIAGNOSIS — G47.00 INSOMNIA, UNSPECIFIED TYPE: ICD-10-CM

## 2019-11-20 DIAGNOSIS — F33.2 SEVERE RECURRENT MAJOR DEPRESSION WITHOUT PSYCHOTIC FEATURES (H): ICD-10-CM

## 2019-11-20 DIAGNOSIS — F40.10 SOCIAL ANXIETY DISORDER: Primary | ICD-10-CM

## 2019-11-20 PROCEDURE — T1013 SIGN LANG/ORAL INTERPRETER: HCPCS | Mod: U3,ZF

## 2019-11-20 PROCEDURE — G0463 HOSPITAL OUTPT CLINIC VISIT: HCPCS | Mod: ZF

## 2019-11-20 ASSESSMENT — PATIENT HEALTH QUESTIONNAIRE - PHQ9: SUM OF ALL RESPONSES TO PHQ QUESTIONS 1-9: 22

## 2019-11-20 ASSESSMENT — PAIN SCALES - GENERAL: PAINLEVEL: NO PAIN (0)

## 2019-11-20 NOTE — PROGRESS NOTES
Outpatient Psychiatry Progress Note     Provider: LORE Kim CNS  Date: 2019  Service:  Medication follow up with counseling.   Patient Identification: Gianluca FLYNN  : 1955   MRN: [id number]    Gianluca FLYNN is a 64 year old year old male who presents for ongoing psychiatric care.  Gianluca FLYNN was last seen in clinic on 19.   At that time,   Assessment & Plan       Gianluca FLYNN is seen today for follow up and reports his mood is stable with continued difficulty functioning due to baseline symptoms, language barrier and support system. Discussed considering follow up for sleep study since he has been diagnosed with sleep apnea, has gained weight and is not able to tolerate CPAP.   This could also affect cognition.  Reviewed notes from  from [facility] will review with patient also at next appointment.     Diagnosis       Encounter Diagnoses   Name Primary?     Severe recurrent major depression without psychotic features (H) Yes     Social anxiety disorder       Polysubstance dependence, non-opioid, in remission (H)       Non-English speaking patient       Chronic insomnia           Plan:  Medication: Continue current medication  OTC Recommendations: none  Lab Orders:  none  Referrals: none at this time.Consider if I need to do referral for memory  Release of Information: none  Future Treatment Considerations:per symptoms - encouraged to consider use of CPAP or see if dental appliance might be effective  Return for Follow Up:8 weeks      ____________________________________________________________________________________________________________________________________________    2019   Seen with .  Today Gianluca reports he is about the same. Continues to attend groups. Likes this time of year although misses family for holidays.  Side effects of medication include: weight gain  Psychiatric Review of Systems:  Depression:  In the last 2 weeks per PHQ-9 score:   PHQ-9 SCORE 5/30/2019 7/24/2019 9/25/2019   PHQ-9 Total Score - - -   PHQ-9 Total Score 22 22 22   PHQ-9 Total Score - - -        Anxiety : stable  Pamela na   Psychosis  na.   ADHD na    Review of Medical Systems:  Sleep: stable with continued problems  Energy: stable  Concentration: stable  Appetite: stable  GI Concerns: no new concerns  Cardiac concerns: no new concerns  Neurological concerns: no new concerns  Other medical concerns: no new concerns  Current Substance Use:  Alcohol:denies  Other drugs:denies  Caffeine:no change  Nicotine: no change  Past Medical History:   Past Medical History:   Diagnosis Date     Depression      Gastro-oesophageal reflux disease      Headache(784.0)      Hypertension      LOC (loss of consciousness) (H) age 45    out for 20 mintes after hit on top of head with board     Major depression 5/7/2013     Otitis media, chronic      Sleep apnea     cant tolerate cpap     Patient Active Problem List   Diagnosis     Non-English speaking patient     Chronic nasal congestion     HEIKE (obstructive sleep apnea)     Chronic hepatitis C (H)     Esophageal reflux     Headache     Short-term memory loss     Polysubstance dependence, non-opioid, in remission (H)     Social anxiety disorder     Severe recurrent major depression without psychotic features (H)     Heterozygous MTHFR mutation C677T (H)     Insomnia, unspecified type       Allergies:   Allergies   Allergen Reactions     Trazodone Other (See Comments)     Very depressed and suicidal          Current Medications     Current Outpatient Medications Ordered in Epic   Medication Sig Dispense Refill     ASPIRIN PO Take 81 mg by mouth daily       atorvastatin (LIPITOR) 40 MG tablet Take 1 tablet (40 mg) by mouth daily       doxepin (SINEQUAN) 10 MG capsule Take 1 capsule (10 mg) by mouth At Bedtime 30 capsule 5     lisinopril-hydrochlorothiazide (PRINZIDE,ZESTORETIC) 20-25 MG per tablet Take 1 tablet by  "mouth daily       metFORMIN (GLUCOPHAGE) 500 MG tablet Take one tablet daily in the am and two tablets at bedtime.  R Skolar NP at [facility]       OLANZapine (ZYPREXA) 10 MG tablet Take 1 tablet (10 mg) by mouth At Bedtime 30 tablet 5     ranitidine (ZANTAC) 150 MG tablet Take 1 tablet (150 mg) by mouth 2 times daily       venlafaxine (EFFEXOR-XR) 150 MG 24 hr capsule Take 2 capsules (300 mg) by mouth daily 60 capsule 5     VITAMIN D3 1000 units tablet TK 2 TS PO D  1     No current Epic-ordered facility-administered medications on file.         Mental Status Exam     Appearance:  Casually dressed and Well groomed  Behavior/relationship to examiner/demeanor:  Cooperative  Orientation: Oriented to person, place, time and situation  Psychomotor: normal form  Speech Rate:  Normal  Speech Spontaneity:  Normal  Mood:  \"so so\"  Affect:  Appropriate/mood-congruent  Thought Process (Associations):  Goal directed  Thought Content:  no overt psychosis, denies suicidal ideation, intent or thoughts and patient does not appear to be responding to internal stimuli  Abnormal Perception:  None  Attention/Concentration:  Normal  Insight:  Adequate  Judgment:  Good      Results     Vital signs: /78   Pulse 89   Wt 94.4 kg (208 lb 3.2 oz)   BMI 33.86 kg/m      Laboratory Data:  no new data available    Assessment & Plan      Gianluca FLYNN is seen today with  for follow up and reports continued difficulty with depression, anxiety and also sleep. Discussed previous sleep study and considering seeing sleep provider again for retrial of CPAP but he is not interested at this time.    Diagnosis  Encounter Diagnoses   Name Primary?     Social anxiety disorder Yes     Severe recurrent major depression without psychotic features (H)      Insomnia, unspecified type      Polysubstance dependence, non-opioid, in remission (H)        Plan:  Medication: continue current medication  OTC Recommendations: none  Lab " Orders:  none  Referrals: none at this time  Release of Information: [facility]el[facility] Luis F  Future Treatment Considerations:per symptoms  Return for Follow Up:8 weeks   The risks, benefits, alternatives and side effects have been discussed and are understood by the patient. The patient understands the risks of using street drugs or alcohol. There are no medical contraindications, the patient agrees to treatment, and has the capacity to do so. The patient understands to call 911 or come to the nearest ED if life threatening or urgent symptoms present.  In addition time was spent counseling the patient and/or coordinating care regarding review of social and occupational functioning.  In addition patient was counseled on health and wellness practices to augment medication treatment of symptoms. See note for details.    Alba Merlos, LORE CNS 11/20/2019

## 2019-12-18 DIAGNOSIS — F33.2 SEVERE RECURRENT MAJOR DEPRESSION WITHOUT PSYCHOTIC FEATURES (H): ICD-10-CM

## 2019-12-18 DIAGNOSIS — G47.00 INSOMNIA, UNSPECIFIED TYPE: ICD-10-CM

## 2019-12-20 RX ORDER — OLANZAPINE 10 MG/1
TABLET ORAL
Qty: 90 TABLET | Refills: 5 | OUTPATIENT
Start: 2019-12-20

## 2019-12-20 RX ORDER — DOXEPIN HYDROCHLORIDE 10 MG/1
CAPSULE ORAL
Qty: 90 CAPSULE | Refills: 5 | OUTPATIENT
Start: 2019-12-20

## 2020-01-16 ENCOUNTER — OFFICE VISIT (OUTPATIENT)
Dept: PSYCHIATRY | Facility: CLINIC | Age: 65
End: 2020-01-16
Attending: CLINICAL NURSE SPECIALIST
Payer: MEDICARE

## 2020-01-16 VITALS
HEART RATE: 80 BPM | WEIGHT: 200 LBS | BODY MASS INDEX: 32.53 KG/M2 | SYSTOLIC BLOOD PRESSURE: 123 MMHG | DIASTOLIC BLOOD PRESSURE: 82 MMHG

## 2020-01-16 DIAGNOSIS — F40.10 SOCIAL ANXIETY DISORDER: ICD-10-CM

## 2020-01-16 DIAGNOSIS — F19.21 POLYSUBSTANCE DEPENDENCE, NON-OPIOID, IN REMISSION (H): ICD-10-CM

## 2020-01-16 DIAGNOSIS — G47.00 INSOMNIA, UNSPECIFIED TYPE: ICD-10-CM

## 2020-01-16 DIAGNOSIS — F33.2 SEVERE RECURRENT MAJOR DEPRESSION WITHOUT PSYCHOTIC FEATURES (H): Primary | ICD-10-CM

## 2020-01-16 PROCEDURE — G0463 HOSPITAL OUTPT CLINIC VISIT: HCPCS | Mod: ZF

## 2020-01-16 ASSESSMENT — PAIN SCALES - GENERAL: PAINLEVEL: NO PAIN (0)

## 2020-01-16 ASSESSMENT — PATIENT HEALTH QUESTIONNAIRE - PHQ9: SUM OF ALL RESPONSES TO PHQ QUESTIONS 1-9: 22

## 2020-01-16 NOTE — PROGRESS NOTES
Outpatient Psychiatry Progress Note     Provider: LORE Kim CNS  Date: 2020  Service:  Medication follow up with counseling.   Patient Identification: Gianluca FLYNN  : 1955   MRN: [id number]    Gianluca FLYNN is a 64 year old year old male who presents for ongoing psychiatric care.  Gianluca FLYNN was last seen in clinic on 19.   At that time,   Assessment & Plan       Gianluca FLYNN is seen today with  for follow up and reports continued difficulty with depression, anxiety and also sleep. Discussed previous sleep study and considering seeing sleep provider again for retrial of CPAP but he is not interested at this time.     Diagnosis       Encounter Diagnoses   Name Primary?     Social anxiety disorder Yes     Severe recurrent major depression without psychotic features (H)       Insomnia, unspecified type       Polysubstance dependence, non-opioid, in remission (H)           Plan:  Medication: continue current medication  OTC Recommendations: none  Lab Orders:  none  Referrals: none at this time  Release of Information: [employer]  Future Treatment Considerations:per symptoms  Return for Follow Up:8 weeks      ____________________________________________________________________________________________________________________________________________    Seen with .   2020  Today Gianluca reports he is good.  No changes in mood or living situation. Cotninues sober.    Side effects of medication include: none reported  Psychiatric Review of Systems:  Depression: In the last 2 weeks per PHQ-9 score:   PHQ-9 SCORE 2019   PHQ-9 Total Score - - -   PHQ-9 Total Score 22 22 22   PHQ-9 Total Score - - -        Anxiety : stable  Pamela na   Psychosis  denies.   ADHD na    Review of Medical Systems:  Sleep: no change  Energy: stable  Concentration: stable with continued problem -  feels memory is getting worse  Appetite: stable  GI Concerns: none  Cardiac concerns: none  Neurological concerns: none  Other medical concerns: no new concerns  Current Substance Use:  Alcohol:denies  Other drugs:denies  Caffeine:not reviewed  Nicotine: cigarettes about 8 per day.  Past Medical History:   Past Medical History:   Diagnosis Date     Depression      Gastro-oesophageal reflux disease      Headache(784.0)      Hypertension      LOC (loss of consciousness) (H) age 45    out for 20 mintes after hit on top of head with board     Major depression 5/7/2013     Otitis media, chronic      Sleep apnea     cant tolerate cpap     Patient Active Problem List   Diagnosis     Non-English speaking patient     Chronic nasal congestion     HEIKE (obstructive sleep apnea)     Chronic hepatitis C (H)     Esophageal reflux     Headache     Short-term memory loss     Polysubstance dependence, non-opioid, in remission (H)     Social anxiety disorder     Severe recurrent major depression without psychotic features (H)     Heterozygous MTHFR mutation C677T (H)     Insomnia, unspecified type       Allergies:   Allergies   Allergen Reactions     Trazodone Other (See Comments)     Very depressed and suicidal          Current Medications     Current Outpatient Medications Ordered in Epic   Medication Sig Dispense Refill     ASPIRIN PO Take 81 mg by mouth daily       atorvastatin (LIPITOR) 40 MG tablet Take 1 tablet (40 mg) by mouth daily       doxepin (SINEQUAN) 10 MG capsule Take 1 capsule (10 mg) by mouth At Bedtime 30 capsule 5     lisinopril-hydrochlorothiazide (PRINZIDE,ZESTORETIC) 20-25 MG per tablet Take 1 tablet by mouth daily       metFORMIN (GLUCOPHAGE) 500 MG tablet Take one tablet daily in the am and two tablets at bedtime.  R Skolar NP at [facility]       OLANZapine (ZYPREXA) 10 MG tablet Take 1 tablet (10 mg) by mouth At Bedtime 30 tablet 5     ranitidine (ZANTAC) 150 MG tablet Take 1 tablet (150 mg) by mouth 2 times daily  "      venlafaxine (EFFEXOR-XR) 150 MG 24 hr capsule Take 2 capsules (300 mg) by mouth daily 60 capsule 5     VITAMIN D3 1000 units tablet TK 2 TS PO D  1     No current Epic-ordered facility-administered medications on file.         Mental Status Exam     Appearance:  Casually dressed and Well groomed  Behavior/relationship to examiner/demeanor:  Cooperative  Orientation: Oriented to person, place, time and situation  Psychomotor: normal form  Speech Rate:  Normal  Speech Spontaneity:  Normal  Mood:  \"good\"  Affect:  Appropriate/mood-congruent  Thought Process (Associations):  Goal directed  Thought Content:  no overt psychosis, patient does not appear to be responding to internal stimuli, Suicidal ideation and denies suicidal intent or plan  Abnormal Perception:  None  Attention/Concentration:  Normal  Insight:  Adequate  Judgment:  Good      Results     Vital signs: /82   Pulse 80   Wt 90.7 kg (200 lb)   BMI 32.53 kg/m      Laboratory Data:  no new data    Assessment & Plan      Gianluca FLYNN is seen today for follow up with an  and reports his mood has been stable. Agrees with plan to continue current medications    Diagnosis  Encounter Diagnoses   Name Primary?     Severe recurrent major depression without psychotic features (H) Yes     Social anxiety disorder      Insomnia, unspecified type      Polysubstance dependence, non-opioid, in remission (H)        Plan:  Medication: Continue current medications  OTC Recommendations: none  Lab Orders:  none  Referrals: none  Release of Information: none  Future Treatment Considerations:per symptoms  Return for Follow Up:8 weeks   The risks, benefits, alternatives and side effects have been discussed and are understood by the patient. The patient understands the risks of using street drugs or alcohol. There are no medical contraindications, the patient agrees to treatment, and has the capacity to do so. The patient understands to call 911 or " come to the nearest ED if life threatening or urgent symptoms present.  In addition time was spent counseling the patient and/or coordinating care regarding review of social and occupational functioning.  In addition patient was counseled on health and wellness practices to augment medication treatment of symptoms. See note for details.    Alba Merlos, LORE CNS 1/16/2020

## 2020-01-17 DIAGNOSIS — F33.2 SEVERE RECURRENT MAJOR DEPRESSION WITHOUT PSYCHOTIC FEATURES (H): ICD-10-CM

## 2020-01-17 DIAGNOSIS — G47.00 INSOMNIA, UNSPECIFIED TYPE: ICD-10-CM

## 2020-01-20 RX ORDER — DOXEPIN HYDROCHLORIDE 10 MG/1
CAPSULE ORAL
Qty: 30 CAPSULE | Refills: 5 | OUTPATIENT
Start: 2020-01-20

## 2020-01-20 RX ORDER — OLANZAPINE 10 MG/1
TABLET ORAL
Qty: 30 TABLET | Refills: 5 | OUTPATIENT
Start: 2020-01-20

## 2020-02-26 ENCOUNTER — OFFICE VISIT (OUTPATIENT)
Dept: OPHTHALMOLOGY | Facility: CLINIC | Age: 65
End: 2020-02-26
Attending: OPHTHALMOLOGY
Payer: MEDICARE

## 2020-02-26 DIAGNOSIS — H52.4 PRESBYOPIA: ICD-10-CM

## 2020-02-26 DIAGNOSIS — H25.13 NUCLEAR SCLEROTIC CATARACT OF BOTH EYES: ICD-10-CM

## 2020-02-26 DIAGNOSIS — E11.9 DIABETES MELLITUS TYPE 2 WITHOUT RETINOPATHY (H): ICD-10-CM

## 2020-02-26 DIAGNOSIS — H40.033 ANATOMICAL NARROW ANGLE OF BOTH EYES: Primary | ICD-10-CM

## 2020-02-26 DIAGNOSIS — H52.7 REFRACTIVE ERROR: ICD-10-CM

## 2020-02-26 PROCEDURE — G0463 HOSPITAL OUTPT CLINIC VISIT: HCPCS | Mod: ZF

## 2020-02-26 ASSESSMENT — REFRACTION_MANIFEST
OS_SPHERE: -0.25
OS_CYLINDER: +0.50
OS_AXIS: 135
OD_ADD: +2.25
OS_ADD: +2.25
OD_AXIS: 035
OD_CYLINDER: +0.50
OD_SPHERE: +0.50

## 2020-02-26 ASSESSMENT — TONOMETRY
OD_IOP_MMHG: 19
OS_IOP_MMHG: 17
IOP_METHOD: TONOPEN

## 2020-02-26 ASSESSMENT — CONF VISUAL FIELD
OS_NORMAL: 1
METHOD: COUNTING FINGERS
OD_NORMAL: 1

## 2020-02-26 ASSESSMENT — EXTERNAL EXAM - RIGHT EYE: OD_EXAM: NORMAL

## 2020-02-26 ASSESSMENT — VISUAL ACUITY
OS_SC+: -1
OD_PH_SC+: -1
OD_SC: 20/70
METHOD: SNELLEN - LINEAR
OS_SC: 20/25
OD_PH_SC: 20/30

## 2020-02-26 ASSESSMENT — CUP TO DISC RATIO
OD_RATIO: 0.3
OS_RATIO: 0.45

## 2020-02-26 ASSESSMENT — EXTERNAL EXAM - LEFT EYE: OS_EXAM: NORMAL

## 2020-02-26 ASSESSMENT — SLIT LAMP EXAM - LIDS
COMMENTS: NORMAL
COMMENTS: NORMAL

## 2020-02-26 NOTE — PROGRESS NOTES
HPI: Gianluca Singh is a 64 year old male here for diabetic eye exam. He has noticed an increased blurring of vision right eye more than left eye since last visit. He does not wear any glasses. No pain, redness, discharge. No flashes/floaters.    PMH:  DM2 x 1 year  HTN    POHx:  Glasses    FH:  Pt denies      Assessment & Plan     (H40.033) Anatomical narrow angle of both eyes  (primary encounter diagnosis)  Comment: Not occludable on gonioscopy today. Open to TM and scleral spur in both eyes.   Plan: Discussed the diagnosis and recommend continued observation    (E11.9) Diabetes mellitus type 2 without retinopathy (H)  Comment: On metformin. No recent A1c in EPIC. No diabetic retinopathy.  Plan: Discussed the importance of tight blood glucose control in the prevention of diabetic retinopathy. Recommend yearly dilated eye exam.    (H25.13) Nuclear sclerotic cataract of both eyes  Comment: Not visually significant  Plan: Observe    (H52.7) Refractive error/(H52.4) Presbyopia  Comment: Refracts well  Plan: He states he cannot afford to buy glasses. Overall good acuity left eye without correction that allows him to function well. Ok to observe.      RTC: 1 year for diabetes eye exam, sooner prn    Teaching statement:  Complete documentation of historical and exam elements from today's encounter can be found in the full encounter summary report (not reduplicated in this progress note). I personally obtained the chief complaint(s) and history of present illness.  I confirmed and edited as necessary the review of systems, past medical/surgical history, family history, social history, and examination findings as documented by others; and I examined the patient myself. I personally reviewed the relevant tests, images, and reports as documented above.     I formulated and edited as necessary the assessment and plan and discussed the findings and management plan with the patient and family.    Carole Dietz,  MD  Comprehensive Ophthalmology & Ocular Pathology  Department of Ophthalmology and Visual Neurosciences  [email or web address]  Pager [phone]

## 2020-02-26 NOTE — NURSING NOTE
Chief Complaints and History of Present Illnesses   Patient presents with     Diabetic Eye Exam     Chief Complaint(s) and History of Present Illness(es)     Diabetic Eye Exam     Associated symptoms: Negative for floaters, flashes, photophobia, nausea, itching and redness    Pain scale: 0/10              Comments     Gianluca is here for a diabetic eye exam. He has no complaints about his overall vision .     A1c is unknown   BS unknown    Nghia Reilly COT 2:28 PM February 26, 2020

## 2020-03-17 ENCOUNTER — APPOINTMENT (OUTPATIENT)
Dept: INTERPRETER SERVICES | Facility: CLINIC | Age: 65
End: 2020-03-17
Payer: MEDICARE

## 2020-03-24 DIAGNOSIS — F40.10 SOCIAL ANXIETY DISORDER: ICD-10-CM

## 2020-03-24 RX ORDER — VENLAFAXINE HYDROCHLORIDE 150 MG/1
300 CAPSULE, EXTENDED RELEASE ORAL DAILY
Qty: 60 CAPSULE | Refills: 0 | Status: SHIPPED | OUTPATIENT
Start: 2020-03-24 | End: 2020-04-15

## 2020-03-24 NOTE — TELEPHONE ENCOUNTER
Last seen: 1/16/2020  RTC: 8 weeks  Cancel: 3/18/2020  No-show: None  Next appt: 4/15/2020     Medication requested: venlafaxine (EFFEXOR-XR) 150 MG 24 hr capsule   Directions: Take 2 capsules (300 mg) by mouth daily  Qty: #60  Last refilled: 2/23/2020  Last rx written 9/25/19 for a 30 d/s with 5 refills     Medication refill pended for a 30 d/s and routed to provider to sign.    Per chart review, olanzapine and doxepin were last refilled for a 30 d/s on 3/15/2020 per outside med tab.

## 2020-04-14 ENCOUNTER — APPOINTMENT (OUTPATIENT)
Dept: INTERPRETER SERVICES | Facility: CLINIC | Age: 65
End: 2020-04-14
Payer: MEDICARE

## 2020-04-15 ENCOUNTER — VIRTUAL VISIT (OUTPATIENT)
Dept: PSYCHIATRY | Facility: CLINIC | Age: 65
End: 2020-04-15
Attending: CLINICAL NURSE SPECIALIST
Payer: MEDICARE

## 2020-04-15 DIAGNOSIS — G47.00 INSOMNIA, UNSPECIFIED TYPE: ICD-10-CM

## 2020-04-15 DIAGNOSIS — F33.2 SEVERE RECURRENT MAJOR DEPRESSION WITHOUT PSYCHOTIC FEATURES (H): ICD-10-CM

## 2020-04-15 DIAGNOSIS — F40.10 SOCIAL ANXIETY DISORDER: ICD-10-CM

## 2020-04-15 RX ORDER — VENLAFAXINE HYDROCHLORIDE 150 MG/1
300 CAPSULE, EXTENDED RELEASE ORAL DAILY
Qty: 60 CAPSULE | Refills: 2 | Status: SHIPPED | OUTPATIENT
Start: 2020-04-15 | End: 2020-06-02

## 2020-04-15 RX ORDER — OLANZAPINE 10 MG/1
10 TABLET ORAL AT BEDTIME
Qty: 30 TABLET | Refills: 2 | Status: SHIPPED | OUTPATIENT
Start: 2020-04-15 | End: 2020-06-02 | Stop reason: DRUGHIGH

## 2020-04-15 RX ORDER — DOXEPIN HYDROCHLORIDE 10 MG/1
10 CAPSULE ORAL AT BEDTIME
Qty: 30 CAPSULE | Refills: 2 | Status: SHIPPED | OUTPATIENT
Start: 2020-04-15 | End: 2020-06-02

## 2020-04-15 NOTE — PROGRESS NOTES
"Alba Merlos, LORE CNS 4/15/2020 1:08 PM    Gianluca FLYNN [id number]  Call completed with  services  TELEPHONE APPOINTMENT   Start time: 1:20 pm  End time: 1:31 pm  Total time: 11 minutes  Gianluca FLYNN is a 64 year old male who is being evaluated via a billable telephone visit.      The patient has been notified of following:     \"This telephone visit will be conducted via a call between you and your physician/provider. We have found that certain health care needs can be provided without the need for a physical exam.  This service lets us provide the care you need with a short phone conversation.  If a prescription is necessary we can send it directly to your pharmacy.  If lab work is needed we can place an order for that and you can then stop by our lab to have the test done at a later time.    Telephone visits are billed at different rates depending on your insurance coverage. During this emergency period, for some insurers they may be billed the same as an in-person visit.  Please reach out to your insurance provider with any questions.    If during the course of the call the physician/provider feels a telephone visit is not appropriate, you will not be charged for this service.\"    Patient has given verbal consent for Telephone visit?  Yes      Gianluca FLYNN requested phone appointment due to COVID 19    Last appointment date 1/16/20  At that time   Assessment & Plan       Gianluca FLYNN is seen today for follow up with an  and reports his mood has been stable. Agrees with plan to continue current medications     Diagnosis       Encounter Diagnoses   Name Primary?     Severe recurrent major depression without psychotic features (H) Yes     Social anxiety disorder       Insomnia, unspecified type       Polysubstance dependence, non-opioid, in remission (H)           Plan:  Medication: Continue current medications  OTC Recommendations: none  Lab " Orders:  none  Referrals: none  Release of Information: none  Future Treatment Considerations:per symptoms  Return for Follow Up:8 weeks      Patient concerns/ problems:  Reports feeling well and no new concerns  Pertinent History and review of systems:  His mood has stable and sleeping well. Continues sober.  Has food and is eating well.  Not seeing therapist at this time although phone is an option he prefers in person  Assessment:  Stable and continue current medications  Plan:   Will transfer to Dang Hanley NP in 8 weeks    Alba Merlos APRN CNS  4/15/2020

## 2020-06-02 ENCOUNTER — VIRTUAL VISIT (OUTPATIENT)
Dept: PSYCHIATRY | Facility: CLINIC | Age: 65
End: 2020-06-02
Attending: NURSE PRACTITIONER
Payer: MEDICARE

## 2020-06-02 DIAGNOSIS — F33.3 MDD (MAJOR DEPRESSIVE DISORDER), RECURRENT, SEVERE, WITH PSYCHOSIS (H): Primary | ICD-10-CM

## 2020-06-02 DIAGNOSIS — Z79.899 MEDICATION MANAGEMENT: ICD-10-CM

## 2020-06-02 DIAGNOSIS — G47.00 INSOMNIA, UNSPECIFIED TYPE: ICD-10-CM

## 2020-06-02 DIAGNOSIS — F40.10 SOCIAL ANXIETY DISORDER: ICD-10-CM

## 2020-06-02 RX ORDER — DOXEPIN HYDROCHLORIDE 10 MG/1
10 CAPSULE ORAL AT BEDTIME
Qty: 30 CAPSULE | Refills: 2 | Status: SHIPPED | OUTPATIENT
Start: 2020-06-02 | End: 2020-08-07

## 2020-06-02 RX ORDER — VENLAFAXINE HYDROCHLORIDE 150 MG/1
300 CAPSULE, EXTENDED RELEASE ORAL DAILY
Qty: 60 CAPSULE | Refills: 2 | Status: SHIPPED | OUTPATIENT
Start: 2020-06-02 | End: 2020-09-08 | Stop reason: DRUGHIGH

## 2020-06-02 RX ORDER — OLANZAPINE 15 MG/1
15 TABLET ORAL AT BEDTIME
Qty: 30 TABLET | Refills: 1 | Status: SHIPPED | OUTPATIENT
Start: 2020-06-02 | End: 2020-07-28

## 2020-06-02 NOTE — PROGRESS NOTES
Gianluca FLYNN is a 64 year old year old adult who is being evaluated via a billable telephone visit for ongoing psychiatric care.     The patient has been notified of the following:    We have found that certain health care needs can be provided without the need for a physical exam. This service lets us provide the care you need with a short phone conversation. If a prescription is necessary we can send it directly to your pharmacy. If lab work is needed we can place an order for that and you can then stop by our lab to have the test done at a later time. Insurers are generally covering virtual visits as they would in-office visits so billing should not be different than normal.  If for some reason you do get billed incorrectly, you should contact the billing office to correct it and that number is in the AVS .      Patient has given verbal consent for Telephone visit?: Yes    Time Service Began: 1355    Time Service Ended: 1445    Phone Call Duration:  50 minutes    Pt was seen with  throughout the duration of the appointment.  Psychiatry Clinic Progress Note                                                                  Patient Name: Gianluca FLYNN  YOB: 1955  MRN: [id number]  Date of Service:  6/2/2020  Last Seen:4/15/2020    Gianluca FLYNN is a 64 year old male who uses the name Gianluca and pronoun mayra.        Gianluca FLYNN is a 64 year old year old adult who presents for ongoing psychiatric care.  Gianluca FLYNN was last seen on 4/15/2020.     At that time,     Assessment:  Stable and continue current medications  Plan:   Will transfer to Dang Hanley NP in 8 weeks      Pertinent Background:  Diagnoses include MDD, social anxiety, polysubstance use disorder.  Psych critical item history includes trauma hx, violent behavior, SUBSTANCE USE: alcohol and substance use treatment suicidal ideation, multiple suicide attempts, most recent in  2013. Original DA 11/11/2013.    Previous medication trials include: Klonopin, Wellbutrin (not effective), Seroquel (oversedated at 900 mg)    Interim History                                                                                                        4, 4     Since the last visit, reports sleeping 6-7 hrs/night, but having difficulties with appetite.  Does not feel hungry, maybe eating every other day x 1.5 months.  Fatigued, difficulties with ADL, expecially showering x 7 months.  Denies current SI, SIB Or HI, but reports occasional suicidal ideation without any plan or intent.  Occasionally he hears somebody calling him, but nobody is around, this is chronic.  Denies any other psychotic symptoms.    Anxiety is fairly well managed as he cannot go outside of his apartment.  Reports increase in anxiety when he needs to go outside.    Has not used any substance.    Denies any symptoms suggestive of hypomania.    Current Suicidality/Hx of Suicide Attempts: Denies currently, hx of multiple suicide attempt, most recent in 2013.  CoCominent Medical concerns: Denies    Medication Side Effects: The patient denies all medication side effects.      Medical Review of Systems     Apart from the symptoms mentioned int he HPI, the 14 point review of systems, including constitutional, HEENT, cardiovascular, respiratory, gastrointestinal, genitourinary, musculoskeletal, integumentary, endocrine, neurological, hematologic and allergic is entirely negative.      Substance Use   Pt has been staying substance free since last seen.     Medical / Surgical History                                                                                                                  Patient Active Problem List   Diagnosis     Non-English speaking patient     Chronic nasal congestion     HEIKE (obstructive sleep apnea)     Chronic hepatitis C (H)     Esophageal reflux     Headache     Short-term memory loss     Polysubstance dependence,  non-opioid, in remission (H)     Social anxiety disorder     Severe recurrent major depression without psychotic features (H)     Heterozygous MTHFR mutation C677T (H)     Insomnia, unspecified type       Past Surgical History:   Procedure Laterality Date     COLONOSCOPY N/A 3/8/2018    Procedure: COLONOSCOPY;  colonoscopy;  Surgeon: Iona Lutz MD;  Location:  GI     HERNIA REPAIR  2006    boith sides     LASER CO2 TYMPANOMASTOIDECTOMY  9/7/2011    Procedure:LASER CO2 TYMPANOMASTOIDECTOMY; Right Tympanoplasty , Cartilage Backed Right Tympanomastoidectomy, Omni Guide Laser on Standby  *Latex Safe*; Surgeon:BENNETT MAXWELL; Location: OR     SEPTOPLASTY  9/17/2012    Procedure: SEPTOPLASTY;  Septoplasty *Latex Safe* Turbinate reduction ;  Surgeon: Babar Dickerson MD;  Location:  OR     UVULOPALATOPHARYNGOPLASTY  7/9/2012    Procedure: UVULOPALATOPHARYNGOPLASTY;  Uvulopalatopharyngoplasty * Latex Safe*;  Surgeon: Babar Dickerson MD;  Location:  OR        Social/ Family History                                  [per patient report]                                 1ea,1ea     Living arrangements: lives alone in apartment, close to [employer] in [facility] and feels safe  Social Support: CM  Access to gun: lorene  Originally from [place], spent 10 years imprisonment in [place] and [place] with domestic abuse and drug charges.    Allergy                                Trazodone    Current Medications                                                                                                       Current Outpatient Medications   Medication Sig Dispense Refill     ASPIRIN PO Take 81 mg by mouth daily       atorvastatin (LIPITOR) 40 MG tablet Take 1 tablet (40 mg) by mouth daily       doxepin (SINEQUAN) 10 MG capsule Take 1 capsule (10 mg) by mouth At Bedtime 30 capsule 2     lisinopril-hydrochlorothiazide (PRINZIDE,ZESTORETIC) 20-25 MG per tablet Take 1 tablet by mouth daily       metFORMIN  (GLUCOPHAGE) 500 MG tablet Take one tablet daily in the am and two tablets at bedtime.  R Skolar NP at [facility]       OLANZapine (ZYPREXA) 10 MG tablet Take 1 tablet (10 mg) by mouth At Bedtime 30 tablet 2     ranitidine (ZANTAC) 150 MG tablet Take 1 tablet (150 mg) by mouth 2 times daily       venlafaxine (EFFEXOR-XR) 150 MG 24 hr capsule Take 2 capsules (300 mg) by mouth daily 60 capsule 2     VITAMIN D3 1000 units tablet TK 2 TS PO D  1          Mental Status Exam                                                                                   9, 14 cog        Alertness: alert  and oriented  Behavior/Demeanor: cooperative and calm  Speech: regular rate and rhythm  Mood :  anhedonia and tired  Affect: blunted; was congruent to mood; was congruent to content  Thought Process (Associations):  Goal directed, linear  Thought process (Rate):  Normal  Thought content:  no overt psychosis, denies suicidal ideation, intent or thoughts, patient does not appear to be responding to internal stimuli and denies suicidal intent or plan  Perception:  Reports occasional auditory hallucinations;  Denies visual hallucinations, depersonalization and derealization  Attention/Concentration:  Fair  Memory:  Immediate recall intact and Short-term memory intact  Language: intact  Fund of Knowledge/Intelligence:  Average  Abstraction:  [place]  Insight:  Adequate   Judgment:  Adequate for safety  Cognition: (6) does  appear grossly intact; formal cognitive testing was not done    Labs and Results      Pertinent findings on review include: Review of records with relevant information reported in the HPI.  Reviewed pt's past medical record and obtained collateral information.      [place] PRESCRIPTION MONITORING PROGRAM [] was checked today:  not using controlled substances.    PHQ9 Today:  N/A  PHQ 9/25/2019 11/20/2019 1/16/2020   PHQ-9 Total Score 22 22 22   Q9: Thoughts of better off dead/self-harm past 2 weeks More than half the days More  than half the days More than half the days       CHIKIS 7 Today: N/A  CHIKIS-7 SCORE 1/14/2015   Total Score BEH Adult 13       Recent Labs   Lab Test 06/25/18  1114 02/09/18  1031 11/13/17  1113   CR 0.92 0.81 0.91   GFRESTIMATED 83 >90 84     Recent Labs   Lab Test 06/25/18  1114 02/09/18  1031   AST 16 21   ALT 25 22   ALKPHOS 96 82     7/9/2012:     PSYCHOTROPIC DRUG INTERACTIONS:    Doxepin---Effexor: Concurrent use of VENLAFAXINE and TRICYCLIC ANTIDEPRESSANTS may result in an increased risk of cardiotoxicity (QT prolongation, torsades de pointes, cardiac arrest) and adverse effects of both drugs.     MANAGEMENT:  Monitoring for adverse effects, routine vitals, periodic EKGs and patient is aware of risks    Impression/Assessment      Gianluca FLYNN is a 64 year old adult  who presents for med management follow up.  Pt sounds depressed, denies current SI, SIB Or HI, but reports occasional suicidal ideation without any plan or intent. He has notable risk factors for self-harm, including age, single status, anxiety, psychosis and previous suicide attempts. However, risk is mitigated by [employer] beliefs and sobriety. Therefore, based on all available evidence including the factors cited above, he does not appear to be at imminent risk for self-harm, does not meet criteria for a 72-hr hold, and therefore remains appropriate for ongoing outpatient level of care. Voluntary referral for weekly therapy was offered, he accepted this offer.  Pt was strongly encouraged to restart therapy at [facility].  Pt is having difficulties with appetite and anhedonia and has occasional AH of somebody calling his name who is not there.  Discussed possibility of increasing Zyprexa to 15 mg HS for depression augmentation and possible AH while continuing to monitor fatigue.      Pt has not had EKG since 2012 and he is on Effexor and Doxepin.  May need to review past medication trials to have better medication treatment option in the  future.  Will order EKG to complete in the future, but pt may complete this at PCP along with antipsychotic labs which have not been done since 2018.    Diagnosis                                                                    MDD  Social Anxiety disorder  R/out psychosis    Treatment Recommendation & Plan       Medication Ordered/Consults/Labs/tests Ordered:     Medication:   -Increase Zyprexa to 15 mg at bedtime for depression and hearing voice  -Continue all other medications for now  OTC Recommendations: none  Lab Orders:  Antipsychotics lab and EKG in the future  Referrals: none  Release of Information: none  Future Treatment Considerations: Per symptoms.   Return for Follow Up: in 4 weeks    -Discussed safety plan for suicidal thoughts  -Discussed plan for suicidality  -Discussed available emergency services  -Patient agrees with the treatment plan  -Encouraged to continue outpatient therapy to gain more coping mechanism for stress.      Treatment Risk Statement: Discussed with the patient my impressions, as well as recommended studies. I educated patient on the differential diagnosis and prognosis. I discussed with the patient the risks and benefits of medications versus no interventions, including efficacy, dose, possible side effects and length of treatment and the importance of medication compliance.  The patient understands the risks, benefits, adverse effects and alternatives. Agrees to treatment with the capacity to do so. No medical contraindications to treatment. The patient also understands the risks of using street drugs or alcohol. I also discussed the potential metabolic side effects of antipsychotics including weight gain, diabetes and lipid abnormalities, risk of tardive dyskinesia and indicates understanding of this and agrees to regular medical monitoring      CRISIS NUMBERS:   Provided routinely in AVS.       Interactive complexity is appropriate for this visit due to use of   (incl hearing impaired) to overcome significant communication barriers.  This is discussed in the body of the note.       Dang Stevens, GILMER,  6/2/2020

## 2020-06-02 NOTE — PATIENT INSTRUCTIONS
-Increase Zyprexa to 15 mg at bedtime for depression and hearing voice  -Continue all other medications for now    Your next appointment is scheduled on 7/3 (Fri) at 1030am.

## 2020-07-03 DIAGNOSIS — F51.5 NIGHTMARE: ICD-10-CM

## 2020-07-07 RX ORDER — PRAZOSIN HYDROCHLORIDE 1 MG/1
CAPSULE ORAL
Qty: 90 CAPSULE | OUTPATIENT
Start: 2020-07-07

## 2020-07-27 DIAGNOSIS — F33.3 MDD (MAJOR DEPRESSIVE DISORDER), RECURRENT, SEVERE, WITH PSYCHOSIS (H): ICD-10-CM

## 2020-07-28 RX ORDER — OLANZAPINE 15 MG/1
TABLET ORAL
Qty: 30 TABLET | Refills: 1 | Status: SHIPPED | OUTPATIENT
Start: 2020-07-28 | End: 2020-09-08

## 2020-07-28 NOTE — TELEPHONE ENCOUNTER
OLANZapine (ZYPREXA) 15 MG  Last refilled:  6/2/20  Qty: 30  : 1    Last seen: 6/2/20  RTC: 1 MOS  Cancel: 0  No-show: 7/3/20  Next appt: 8/7/20  Refill pended and routed to the provider for review/determination due to  Pt outside of RTC timeframe   WITH NOSHOW X1  7/3/20

## 2020-08-07 ENCOUNTER — DOCUMENTATION ONLY (OUTPATIENT)
Dept: PSYCHIATRY | Facility: CLINIC | Age: 65
End: 2020-08-07

## 2020-08-07 ENCOUNTER — TELEPHONE (OUTPATIENT)
Dept: PSYCHIATRY | Facility: CLINIC | Age: 65
End: 2020-08-07

## 2020-08-07 ENCOUNTER — VIRTUAL VISIT (OUTPATIENT)
Dept: PSYCHIATRY | Facility: CLINIC | Age: 65
End: 2020-08-07
Attending: NURSE PRACTITIONER
Payer: MEDICARE

## 2020-08-07 DIAGNOSIS — F43.10 PTSD (POST-TRAUMATIC STRESS DISORDER): ICD-10-CM

## 2020-08-07 DIAGNOSIS — G47.00 INSOMNIA, UNSPECIFIED TYPE: ICD-10-CM

## 2020-08-07 DIAGNOSIS — F12.10 CANNABIS ABUSE: ICD-10-CM

## 2020-08-07 DIAGNOSIS — F33.3 MDD (MAJOR DEPRESSIVE DISORDER), RECURRENT, SEVERE, WITH PSYCHOSIS (H): Primary | ICD-10-CM

## 2020-08-07 RX ORDER — VENLAFAXINE HYDROCHLORIDE 150 MG/1
CAPSULE, EXTENDED RELEASE ORAL
Qty: 30 CAPSULE | Refills: 1 | Status: SHIPPED | OUTPATIENT
Start: 2020-08-07 | End: 2020-09-08

## 2020-08-07 RX ORDER — DOXEPIN HYDROCHLORIDE 10 MG/1
10 CAPSULE ORAL AT BEDTIME
Qty: 30 CAPSULE | Refills: 2 | Status: SHIPPED | OUTPATIENT
Start: 2020-08-07 | End: 2020-11-11

## 2020-08-07 RX ORDER — PRAZOSIN HYDROCHLORIDE 2 MG/1
2 CAPSULE ORAL AT BEDTIME
Qty: 30 CAPSULE | Refills: 1 | Status: SHIPPED | OUTPATIENT
Start: 2020-08-07 | End: 2020-09-08

## 2020-08-07 RX ORDER — VENLAFAXINE HYDROCHLORIDE 75 MG/1
CAPSULE, EXTENDED RELEASE ORAL
Qty: 30 CAPSULE | Refills: 1 | Status: SHIPPED | OUTPATIENT
Start: 2020-08-07 | End: 2020-09-08

## 2020-08-07 NOTE — PROGRESS NOTES
Gianluca FLYNN is a 65 year old year old adult who is being evaluated via a billable telephone visit for ongoing psychiatric care.     The patient has been notified of the following:    We have found that certain health care needs can be provided without the need for a physical exam. This service lets us provide the care you need with a short phone conversation. If a prescription is necessary we can send it directly to your pharmacy. If lab work is needed we can place an order for that and you can then stop by our lab to have the test done at a later time. Insurers are generally covering virtual visits as they would in-office visits so billing should not be different than normal.  If for some reason you do get billed incorrectly, you should contact the billing office to correct it and that number is in the AVS .      Patient has given verbal consent for Telephone visit?: Yes    Time Service Began: 1234    Time Service Ended: 1320    Phone Call Duration:  46 minutes    Psychiatry Clinic Progress Note                                                                  Patient Name: Gianluca FLYNN  YOB: 1955  MRN: [id number]  Date of Service:  8/7/2020  Last Seen:7/3/2020    Gianluca FLYNN is a 65 year old male who uses the name Gianluca and pronoun mayra.        Gianluca FLYNN is a 65 year old year old adult who presents for ongoing psychiatric care.  Gianluca FLYNN was last seen on 7/3/2020.     At that time,     Medication Ordered/Consults/Labs/tests Ordered:      Medication:   -Start Prazosin 1 mg HS, monitor dizziness, if dizzy, contact this writer or PCP  -Continue all other medications for now  OTC Recommendations: none  Lab Orders:  EKG in the future at PCP?  Referrals: none  Release of Information: none  Future Treatment Considerations: Per symptoms. Switch Zyprexa to Abilify?  Doxepin to Remeron?  Return for Follow Up: in 3 weeks      Pertinent  "Background:  Diagnoses include MDD, social anxiety, polysubstance use disorder.  Psych critical item history includes trauma hx, violent behavior, SUBSTANCE USE: alcohol and substance use treatment suicidal ideation, multiple suicide attempts, most recent in 2013. Original DA 11/11/2013.     Previous medication trials include: Klonopin (effective, tried in [place], [place]ian name Rivotril), Wellbutrin (not effective), Seroquel (oversedated at 900 mg), few other medications in [place], but does not know the name in English    Pt was seen with telephone , Andrey throughout the appointment.    Interim History                                                                                                        4, 4     Since the last visit, notes has not noticed any changes in nightmares.  Continues to wake up x 3-4/night from nightmares, but mostly easily falls back to sleep.  Sleeping 6-7 hours/night though this is interrupted with middle awakening due to nightmares.    Pt noted significant anxiety that disrupts his behavior, currently does not think he is on the right medication to treat anxiety.  When he was in [place], he was mainly taking Rivotril (Klonopin in [place]) and this has helped his anxiety.  Pt is requesting Klonopin to restart.    Pt's primary concern today is \"being lazy.\"  States not necessarily feeling depressed, but anhedonic.  Completes ADL without any problem, but not because he wants to because he feels he has to.  Pt does not think current medication is working well to help him.  Denies SI, SIB or HI.    Pt notes smoking a joint daily so that he does not get high.    Denies any symptoms suggestive of hypomania or psychosis.    Current Suicidality/Hx of Suicide Attempts: Denies currently, hx of multiple suicide attempt, most recent in 2013.  CoCominent Medical concerns: Denies      Medication Side Effects: The patient denies all medication side effects.      Medical Review of Systems "     Apart from the symptoms mentioned int he HPI, the 14 point review of systems, including constitutional, HEENT, cardiovascular, respiratory, gastrointestinal, genitourinary, musculoskeletal, integumentary, endocrine, neurological, hematologic and allergic is entirely negative.      Substance Use   Daily cannabis use, 1 joint/day      Medical / Surgical History                                                                                                                  Patient Active Problem List   Diagnosis     Non-English speaking patient     Chronic nasal congestion     HEIKE (obstructive sleep apnea)     Chronic hepatitis C (H)     Esophageal reflux     Headache     Short-term memory loss     Polysubstance dependence, non-opioid, in remission (H)     Social anxiety disorder     Severe recurrent major depression without psychotic features (H)     Heterozygous MTHFR mutation C677T (H)     Insomnia, unspecified type       Past Surgical History:   Procedure Laterality Date     COLONOSCOPY N/A 3/8/2018    Procedure: COLONOSCOPY;  colonoscopy;  Surgeon: Iona Lutz MD;  Location:  GI     HERNIA REPAIR  2006    boith sides     LASER CO2 TYMPANOMASTOIDECTOMY  9/7/2011    Procedure:LASER CO2 TYMPANOMASTOIDECTOMY; Right Tympanoplasty , Cartilage Backed Right Tympanomastoidectomy, Omni Guide Laser on Standby  *Latex Safe*; Surgeon:BENNETT MAXWELL; Location: OR     SEPTOPLASTY  9/17/2012    Procedure: SEPTOPLASTY;  Septoplasty *Latex Safe* Turbinate reduction ;  Surgeon: Babar Dickerson MD;  Location:  OR     UVULOPALATOPHARYNGOPLASTY  7/9/2012    Procedure: UVULOPALATOPHARYNGOPLASTY;  Uvulopalatopharyngoplasty * Latex Safe*;  Surgeon: Babar Dickerson MD;  Location:  OR        Social/ Family History                                  [per patient report]                                 1ea,1ea   Living arrangements: lives alone in apartment, close to [employer] in [facility] and feels  safe  Social Support: CM  Access to gun: lorene  Originally from [place], spent 10 years in senior care in [place] and [place] with domestic abuse and drug charges.      Allergy                                Trazodone    Current Medications                                                                                                       Current Outpatient Medications   Medication Sig Dispense Refill     ASPIRIN PO Take 81 mg by mouth daily       atorvastatin (LIPITOR) 40 MG tablet Take 1 tablet (40 mg) by mouth daily       doxepin (SINEQUAN) 10 MG capsule Take 1 capsule (10 mg) by mouth At Bedtime 30 capsule 2     lisinopril-hydrochlorothiazide (PRINZIDE,ZESTORETIC) 20-25 MG per tablet Take 1 tablet by mouth daily       metFORMIN (GLUCOPHAGE) 500 MG tablet Take one tablet daily in the am and two tablets at bedtime.  R Skolar NP at [facility]       OLANZapine (ZYPREXA) 15 MG tablet TAKE 1 TABLET BY MOUTH AT BEDTIME 30 tablet 1     prazosin (MINIPRESS) 1 MG capsule Take 1 capsule (1 mg) by mouth At Bedtime 30 capsule 1     ranitidine (ZANTAC) 150 MG tablet Take 1 tablet (150 mg) by mouth 2 times daily       venlafaxine (EFFEXOR-XR) 150 MG 24 hr capsule Take 2 capsules (300 mg) by mouth daily 60 capsule 2     VITAMIN D3 1000 units tablet TK 2 TS PO D  1          Mental Status Exam                                                                                   9, 14 cog        Alertness: alert  and oriented  Behavior/Demeanor: cooperative, pleasant and calm  Speech: regular rate and rhythm  Mood :  anhedonia  Affect: slightly blunted; was congruent to mood; was congruent to content  Thought Process (Associations):  Linear and Goal directed  Thought process (Rate):  Normal  Thought content:  no overt psychosis, denies suicidal ideation, intent or thoughts and patient does not appear to be responding to internal stimuli  Perception:  Reports none;  Denies auditory hallucinations and visual hallucinations  Attention/Concentration:   Fair  Memory:  Immediate recall intact and Short-term memory intact  Language: intact  Fund of Knowledge/Intelligence:  Average  Abstraction:  [place]  Insight:  Adequate  Judgment:  Adequate for safety  Cognition: (6) does  appear grossly intact; formal cognitive testing was not done    Labs and Results      Pertinent findings on review include: Review of records with relevant information reported in the HPI.  Reviewed pt's past medical record and obtained collateral information.      [place] PRESCRIPTION MONITORING PROGRAM [] was checked today:  not using controlled substances.    PHQ9 Today:  N/A  PHQ 9/25/2019 11/20/2019 1/16/2020   PHQ-9 Total Score 22 22 22   Q9: Thoughts of better off dead/self-harm past 2 weeks More than half the days More than half the days More than half the days       CHIKIS 7 Today: N/A  CHIKIS-7 SCORE 1/14/2015   Total Score BEH Adult 13       Recent Labs   Lab Test 06/25/18  1114 02/09/18  1031 11/13/17  1113   CR 0.92 0.81 0.91   GFRESTIMATED 83 >90 84     Recent Labs   Lab Test 06/25/18  1114 02/09/18  1031   AST 16 21   ALT 25 22   ALKPHOS 96 82     7/9/2012:      PSYCHOTROPIC DRUG INTERACTIONS:    Doxepin---Effexor: Concurrent use of VENLAFAXINE and TRICYCLIC ANTIDEPRESSANTS may result in an increased risk of cardiotoxicity (QT prolongation, torsades de pointes, cardiac arrest) and adverse effects of both drugs.      MANAGEMENT:  Monitoring for adverse effects, routine vitals, periodic EKGs and patient is aware of risks      Impression/Assessment      Gianluca FLYNN is a 65 year old adult  who presents for med management follow up.  Pt sounds slightly depressed, but not anxious, denies SI, SIB or HI.  Pt continues to have nightmares few times/night that wakes him up and has not noted any changes in nightmares since last seen.  Discussed current Prazosin dose may not be sufficient and recommended to increase the dose to 2 mg HS while monitoring dizziness.    Pt noted  "anxiety, but his primary concern today is \"being lazy.\"  Pt denies depressed mood, but notes some anhedonia and his current medications are not helping for long time.  Discussed risk of long term use of benzodiazapine and recommended against Klonopin restart.  However, discussed possibility of cross tapering Effexor to Zoloft as he has only tried Wellbutrin in the past and pt is having elevated BP.  Explained pt to reduce Effexor to 225 mg daily while starting Zoloft 50 mg daily x 7 days and if tolerated to increase it to 100 mg daily.  Pt noted difficulties understanding of instructions initially, but then when this writer asked if he would understand it with written Ukrainian instruction, pt agreed.  Sent AVS to  and once when we receive translation, will also mail AVS as pt declined Mychart enrollment.  Will also send a copy of AVS to CM and also recommend Ukrainian speaking psych prescriber in the future at [facility] as she used to work at [facility] [employer].    Also discussed concurrent daily cannabis use. Difficult to comment on current psychiatric symptoms given close proximity to substance use. Diagnostic clarification will require a period of sobriety in addition to longitudinal follow-up and reassessment.  Pt was also explained about unknown interaction of cannabis and psychiatric medications.    Diagnosis                                                                    PTSD  MDD  Social Anxiety disorder  Cannabis abuse  R/out psychosis    Treatment Recommendation & Plan       Medication Ordered/Consults/Labs/tests Ordered:     Medication:   -Increase Prazosin to 2 mg at bedtime for nightmares  (for next refill, I ordered with 2 mg capsule, please read the label carefully)  -Decrease Effexor XR to 225 mg daily.  I ordered it with 150 mg capsule and 75 mg capsule to take together to make 225 mg daily.  -Start Zoloft 50 mg daily x 7 days, then if tolerated, increase it to 100 mg daily for " depression and anxiety  -Continue all other medications for now  OTC Recommendations: none  Lab Orders:  EKG in the future  Referrals: Shila Contreras [facility], [email or web address]  Release of Information: none  Future Treatment Considerations: Per symptoms.   Return for Follow Up: in 1 month    -Discussed safety plan for suicidal thoughts  -Discussed plan for suicidality  -Discussed available emergency services  -Patient agrees with the treatment plan  -Encouraged to continue outpatient therapy to gain more coping mechanism for stress.      Treatment Risk Statement: Discussed with the patient my impressions, as well as recommended studies. I educated patient on the differential diagnosis and prognosis. I discussed with the patient the risks and benefits of medications versus no interventions, including efficacy, dose, possible side effects and length of treatment and the importance of medication compliance.  The patient understands the risks, benefits, adverse effects and alternatives. Agrees to treatment with the capacity to do so. No medical contraindications to treatment. The patient also understands the risks of using street drugs or alcohol. I also discussed the potential metabolic side effects of antipsychotics including weight gain, diabetes and lipid abnormalities, risk of tardive dyskinesia and indicates understanding of this and agrees to regular medical monitoring      CRISIS NUMBERS:   Provided routinely in AVS.    Interactive complexity is appropriate for this visit due to use of  (incl hearing impaired) to overcome significant communication barriers.  This is discussed in the body of the note.         Dang Stevens, CNP,  8/7/2020

## 2020-08-07 NOTE — TELEPHONE ENCOUNTER
----- Message from LORE Reynoso CNP sent at 8/7/2020  3:54 PM CDT -----  Would you print the letter I adderessed to his CM and therapist and fax it to [facility]?  (the number is in the letter).  If somebody could send it on behalf of me, I would appreciate it.    Thank you!        Writer printed, signed, faxed letter to [phone].

## 2020-08-07 NOTE — PATIENT INSTRUCTIONS
-Increase Prazosin to 2 mg at bedtime for nightmares  (for next refill, I ordered with 2 mg capsule, please read the label carefully)  -Decrease Effexor XR to 225 mg daily.  I ordered it with 150 mg capsule and 75 mg capsule to take together to make 225 mg daily.  -Start Zoloft 50 mg daily x 7 days, then if tolerated, increase it to 100 mg daily for depression and anxiety  -Continue all other medications for now    Your next appointment is scheduled on 9/8 (Tue) noon.

## 2020-08-11 RX ORDER — PRAZOSIN HYDROCHLORIDE 2 MG/1
CAPSULE ORAL
Qty: 90 CAPSULE | OUTPATIENT
Start: 2020-08-11

## 2020-08-19 ENCOUNTER — TELEPHONE (OUTPATIENT)
Dept: PSYCHIATRY | Facility: CLINIC | Age: 65
End: 2020-08-19

## 2020-08-19 NOTE — TELEPHONE ENCOUNTER
---- Message -----  From: Dang Stevens APRN CNP  Sent: 8/12/2020   1:23 PM CDT  To: Thuy Miller RN  Subject: RE: Chinese AVS                                  Yes please!  Thank you for this!  When I spoke with [employer] , they gave me a number to call back and the email I got was the one.  I thought this was part of [employer]...  but I would like most recent AVS translated to Bolivian and mail them please!  Thank you!    Follow-up:  Coordinated with [employer]; submitted AVS to for translation to Bolivian. Once translation is completed, form to be mailed to pt.

## 2020-08-24 DIAGNOSIS — F33.2 SEVERE RECURRENT MAJOR DEPRESSION WITHOUT PSYCHOTIC FEATURES (H): ICD-10-CM

## 2020-08-26 NOTE — TELEPHONE ENCOUNTER
--Received completed AVS translation from 8/7/20 visit with Dang Stevens.   --Placed in sealed envelope addressed to pt's current address on file.  --Copy printed and routed to scanning for future access as needed.

## 2020-08-28 RX ORDER — OLANZAPINE 10 MG/1
TABLET ORAL
Qty: 30 TABLET | Refills: 5 | OUTPATIENT
Start: 2020-08-28

## 2020-08-31 DIAGNOSIS — G47.00 INSOMNIA, UNSPECIFIED TYPE: ICD-10-CM

## 2020-08-31 DIAGNOSIS — F33.3 MDD (MAJOR DEPRESSIVE DISORDER), RECURRENT, SEVERE, WITH PSYCHOSIS (H): ICD-10-CM

## 2020-09-03 DIAGNOSIS — F51.5 NIGHTMARE: ICD-10-CM

## 2020-09-03 RX ORDER — PRAZOSIN HYDROCHLORIDE 1 MG/1
CAPSULE ORAL
Qty: 30 CAPSULE | Refills: 1 | OUTPATIENT
Start: 2020-09-03

## 2020-09-03 RX ORDER — DOXEPIN HYDROCHLORIDE 10 MG/1
CAPSULE ORAL
Qty: 30 CAPSULE | Refills: 2 | OUTPATIENT
Start: 2020-09-03

## 2020-09-04 NOTE — TELEPHONE ENCOUNTER
Medication requested:DOXEPIN 10MG CAPSULES  Take 1 capsule (10 mg) by mouth At Bedtime    Last refilled: 8/7/20  Qty: 30/2  [id number] - [place] [address] [address] AT [facility] [address]      Refill decision:    Denied>8/7/20 doxepin 10 mG #30/2 RF to [facility]/ refills should be on file

## 2020-09-07 DIAGNOSIS — F43.10 PTSD (POST-TRAUMATIC STRESS DISORDER): ICD-10-CM

## 2020-09-08 ENCOUNTER — VIRTUAL VISIT (OUTPATIENT)
Dept: PSYCHIATRY | Facility: CLINIC | Age: 65
End: 2020-09-08
Attending: NURSE PRACTITIONER
Payer: MEDICARE

## 2020-09-08 DIAGNOSIS — F33.3 MDD (MAJOR DEPRESSIVE DISORDER), RECURRENT, SEVERE, WITH PSYCHOSIS (H): ICD-10-CM

## 2020-09-08 DIAGNOSIS — F43.10 PTSD (POST-TRAUMATIC STRESS DISORDER): ICD-10-CM

## 2020-09-08 DIAGNOSIS — F40.10 SOCIAL ANXIETY DISORDER: ICD-10-CM

## 2020-09-08 DIAGNOSIS — F51.5 NIGHTMARE: Primary | ICD-10-CM

## 2020-09-08 DIAGNOSIS — F12.10 CANNABIS ABUSE: ICD-10-CM

## 2020-09-08 PROCEDURE — T1013 SIGN LANG/ORAL INTERPRETER: HCPCS | Mod: U3,TEL,ZF

## 2020-09-08 RX ORDER — SERTRALINE HYDROCHLORIDE 100 MG/1
100 TABLET, FILM COATED ORAL DAILY
Qty: 30 TABLET | Refills: 1 | Status: SHIPPED | OUTPATIENT
Start: 2020-09-08 | End: 2020-10-08

## 2020-09-08 RX ORDER — VENLAFAXINE HYDROCHLORIDE 150 MG/1
CAPSULE, EXTENDED RELEASE ORAL
Qty: 30 CAPSULE | Refills: 1 | Status: SHIPPED | OUTPATIENT
Start: 2020-09-08 | End: 2020-11-02

## 2020-09-08 RX ORDER — OLANZAPINE 15 MG/1
15 TABLET ORAL AT BEDTIME
Qty: 30 TABLET | Refills: 1 | Status: SHIPPED | OUTPATIENT
Start: 2020-09-08 | End: 2020-11-11

## 2020-09-08 RX ORDER — PRAZOSIN HYDROCHLORIDE 2 MG/1
CAPSULE ORAL
Qty: 30 CAPSULE | Refills: 1 | Status: SHIPPED | OUTPATIENT
Start: 2020-09-08 | End: 2020-12-09 | Stop reason: DRUGHIGH

## 2020-09-08 RX ORDER — PRAZOSIN HYDROCHLORIDE 1 MG/1
CAPSULE ORAL
Qty: 30 CAPSULE | Refills: 1 | Status: SHIPPED | OUTPATIENT
Start: 2020-09-08 | End: 2020-12-09 | Stop reason: DRUGHIGH

## 2020-09-08 RX ORDER — VENLAFAXINE HYDROCHLORIDE 75 MG/1
CAPSULE, EXTENDED RELEASE ORAL
Qty: 30 CAPSULE | Refills: 1 | Status: SHIPPED | OUTPATIENT
Start: 2020-09-08 | End: 2020-11-11

## 2020-09-08 NOTE — PATIENT INSTRUCTIONS
-Increase Prazosin to 3 mg at bedtime for nightmare.  Please note I ordered both 1 mg and 2 mg capsule to take together to make 3 mg  -Increase Zoloft to 100 mg daily for depression  -Continue all other medications for now    -Make an appointment with Shila Contreras, Moh[place] speaking psychiatric nurse practitioner to transfer care at [facility] [phone].    Your next appointment is scheduled on 10/6 (TUe) at 11:30am.    Thank you for coming to the PSYCHIATRY CLINIC.    Lab Testing:  If you had lab testing today and your results are reassuring or normal they will be mailed to you or sent through [employer] within 7 days. If the lab tests need quick action we will call you with the results. The phone number we will call with results is # [phone] (home) . If this is not the best number please call our clinic and change the number.    Medication Refills:  If you need any refills please call your pharmacy and they will contact us. Our fax number for refills is [phone]. Please allow three business for refill processing. If you need to  your refill at a new pharmacy, please contact the new pharmacy directly. The new pharmacy will help you get your medications transferred.     Scheduling:  If you have any concerns about today's visit or wish to schedule another appointment please call our office during normal business hours [phone] (8-5:00 M-F)    Contact Us:  Please call [phone] during business hours (8-5:00 M-F).  If after clinic hours, or on the weekend, please call  [phone].    Financial Assistance [phone]  [facility] Billing [phone]  Central Billing Office, [facility]: [phone]  [facility] Billing [phone]  Medical Records [phone]      MENTAL HEALTH CRISIS NUMBERS:  For a medical emergency please call  911 or go to the nearest ER.     [facility]:   [facility] -[phone]   Crisis Residence [facility] -[phone]   Walk-In  [facility]sel[facility] -[phone]   COPE 24/7 [facility] Mobile Team -[phone] (adults)/[phone] (child)  CHILD: [facility] needs assessment team - [phone]      [facility]:   [facility] - [phone]   Walk-in counseling [facility] - [phone]   Walk-in counseling [facility] - [phone]   Crisis Residence [facility] - [phone]  Urgent Care Adult Mental Asmmrg-[phone] mobile unit/ 24/7 crisis line    National Crisis Numbers:   National Suicide Prevention Lifeline: 5-528-489-TALK (172-251-5347)  Poison Control Center - 1-923.569.7745  [employer]/resources for a list of additional resources (SOS)  Trans Lifeline a hotline for transgender people 1-988.843.1682  [employer] a hotline for LGBT youth 1-179.696.5891  Crisis Text Line: For any crisis 24/7   To: 667742  see www.crisistextline.org  - IF MAKING A CALL FEELS TOO HARD, send a text!         Again thank you for choosing PSYCHIATRY CLINIC and please let us know how we can best partner with you to improve you and your family's health.    You may be receiving a survey regarding this appointment. We would love to have your feedback, both positive and negative. The survey is done by an external company, so your answers are anonymous.
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
## 2020-09-09 ENCOUNTER — TELEPHONE (OUTPATIENT)
Dept: PSYCHIATRY | Facility: CLINIC | Age: 65
End: 2020-09-09

## 2020-09-09 NOTE — TELEPHONE ENCOUNTER
From: Dang Stevens APRN CNP  Sent: 9/8/2020  12:32 PM CDT  To: Thuy Miller RN  Subject: Chadian translation                              Hi Thuy    Would you help me having AVS translated into Chadian (pt found very helpful!) for this pt and mail it to him please?    Thank you!    Follow-up:  AVS from 9/8/20 visit with Dang Stevens printed and submitted to [facility] [employer] for translation to Chadian. Once translated form has been received back, will mail copy to pt's home address.

## 2020-09-11 RX ORDER — PRAZOSIN HYDROCHLORIDE 2 MG/1
CAPSULE ORAL
Qty: 90 CAPSULE | OUTPATIENT
Start: 2020-09-11

## 2020-09-12 RX ORDER — PRAZOSIN HYDROCHLORIDE 1 MG/1
CAPSULE ORAL
Qty: 90 CAPSULE | OUTPATIENT
Start: 2020-09-12

## 2020-09-12 RX ORDER — PRAZOSIN HYDROCHLORIDE 2 MG/1
CAPSULE ORAL
Qty: 90 CAPSULE | OUTPATIENT
Start: 2020-09-12

## 2020-09-21 NOTE — TELEPHONE ENCOUNTER
--Received translated AVS back from [employer].  --Document placed in envelope address to pt's home address and placed in clinic outgoing mail.  --Copy of document sent to scanning to be incorporated into pt's chart.

## 2020-10-05 ENCOUNTER — APPOINTMENT (OUTPATIENT)
Dept: INTERPRETER SERVICES | Facility: CLINIC | Age: 65
End: 2020-10-05
Payer: MEDICARE

## 2020-10-08 DIAGNOSIS — F33.3 MDD (MAJOR DEPRESSIVE DISORDER), RECURRENT, SEVERE, WITH PSYCHOSIS (H): ICD-10-CM

## 2020-10-08 RX ORDER — SERTRALINE HYDROCHLORIDE 100 MG/1
100 TABLET, FILM COATED ORAL DAILY
Qty: 30 TABLET | Refills: 1 | Status: SHIPPED | OUTPATIENT
Start: 2020-10-08 | End: 2020-11-11

## 2020-10-08 NOTE — TELEPHONE ENCOUNTER
Last Seen 09/08/20    RTC 1 month    Cancel 0  No-Show 0    Next Appt 10/14/20 1400    Incoming Refill From [facility] via Fax    Medication Requested Sertraline 100 mg Tablets    Directions Take 1 Tablet by mouth daily    Qty 60 x 1 refill    Last Refill 10/06/20 Qty 60 with no refills       Medication Refill Approved Per Refill Protocol

## 2020-10-09 RX ORDER — VENLAFAXINE HYDROCHLORIDE 75 MG/1
CAPSULE, EXTENDED RELEASE ORAL
Qty: 30 CAPSULE | Refills: 1 | OUTPATIENT
Start: 2020-10-09

## 2020-10-14 ENCOUNTER — VIRTUAL VISIT (OUTPATIENT)
Dept: PSYCHIATRY | Facility: CLINIC | Age: 65
End: 2020-10-14
Attending: NURSE PRACTITIONER
Payer: MEDICARE

## 2020-10-14 DIAGNOSIS — F33.3 MDD (MAJOR DEPRESSIVE DISORDER), RECURRENT, SEVERE, WITH PSYCHOSIS (H): Primary | ICD-10-CM

## 2020-10-14 DIAGNOSIS — F12.10 CANNABIS ABUSE: ICD-10-CM

## 2020-10-14 DIAGNOSIS — F40.10 SOCIAL ANXIETY DISORDER: ICD-10-CM

## 2020-10-14 DIAGNOSIS — F43.10 PTSD (POST-TRAUMATIC STRESS DISORDER): ICD-10-CM

## 2020-10-14 PROCEDURE — 99443 PR PHYSICIAN TELEPHONE EVALUATION 21-30 MIN: CPT | Mod: TEL | Performed by: NURSE PRACTITIONER

## 2020-10-14 PROCEDURE — 90785 PSYTX COMPLEX INTERACTIVE: CPT | Mod: TEL | Performed by: NURSE PRACTITIONER
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
## 2020-10-20 ENCOUNTER — MEDICAL CORRESPONDENCE (OUTPATIENT)
Dept: HEALTH INFORMATION MANAGEMENT | Facility: CLINIC | Age: 65
End: 2020-10-20

## 2020-10-20 ENCOUNTER — TRANSFERRED RECORDS (OUTPATIENT)
Dept: HEALTH INFORMATION MANAGEMENT | Facility: CLINIC | Age: 65
End: 2020-10-20

## 2020-10-21 ENCOUNTER — TRANSCRIBE ORDERS (OUTPATIENT)
Dept: OTHER | Age: 65
End: 2020-10-21

## 2020-10-21 DIAGNOSIS — E11.41 DIABETIC MONONEUROPATHY ASSOCIATED WITH TYPE 2 DIABETES MELLITUS (H): Primary | ICD-10-CM

## 2020-10-22 ENCOUNTER — APPOINTMENT (OUTPATIENT)
Dept: INTERPRETER SERVICES | Facility: CLINIC | Age: 65
End: 2020-10-22
Payer: MEDICARE

## 2020-10-23 NOTE — TELEPHONE ENCOUNTER
DIAGNOSIS: Diabetic Mononeuropathy referred by Moira Cuadra NP from [facility]   APPOINTMENT DATE: 11/16/2020   NOTES STATUS DETAILS   OFFICE NOTE from referring provider Care Everywhere [facility]:  10/20/20 - [facility] with Moira Cuadra NP   OFFICE NOTE from other specialist Internal [facility]:  7/31/12 - [facility] with Dr. Lloyd   DISCHARGE SUMMARY from hospital N/A    DISCHARGE REPORT from the ER N/A    OPERATIVE REPORT N/A    MEDICATION LIST Internal    EMG (for Spine) N/A    IMPLANT RECORD/STICKER N/A    LABS     CBC/DIFF Care Everywhere 5/26/20   CULTURES N/A    INJECTIONS DONE IN RADIOLOGY N/A    MRI N/A    CT SCAN N/A    XRAYS (IMAGES & REPORTS) N/A    TUMOR     PATHOLOGY  Slides & report N/A

## 2020-10-30 DIAGNOSIS — F33.3 MDD (MAJOR DEPRESSIVE DISORDER), RECURRENT, SEVERE, WITH PSYCHOSIS (H): ICD-10-CM

## 2020-11-02 RX ORDER — VENLAFAXINE HYDROCHLORIDE 150 MG/1
150 CAPSULE, EXTENDED RELEASE ORAL DAILY
Qty: 30 CAPSULE | Refills: 0 | Status: SHIPPED | OUTPATIENT
Start: 2020-11-02 | End: 2020-11-11

## 2020-11-03 NOTE — TELEPHONE ENCOUNTER
Medication requested: venlafaxine (EFFEXOR-XR) 150 MG 24 hr capsule  Last refilled: 10/1/20  Qty: 30      Last seen: 10/14/20  RTC: 1 month  Cancel: 0  No-show: 0  Next appt: 0    Refill decision:   30 day naye refill sent to the pharmacy - including instructions for patient to call the clinic and schedule an appointment.  Scheduling has been notified to contact the pt for appointment.    Overridden by Dang Stevens APRN CNP on Sep 8, 2020 12:30 PM   Drug-Drug   1. TRICYCLIC ANTIDEPRESSANTS / SEROTONIN/NOREPINEPHRINE REUPTAKE INHIBITORS [Level: Major] [Reason: Will monitor drug levels/drug effects ]   Other Orders: doxepin (SINEQUAN) 10 MG capsule

## 2020-11-07 DIAGNOSIS — F43.10 PTSD (POST-TRAUMATIC STRESS DISORDER): ICD-10-CM

## 2020-11-11 ENCOUNTER — VIRTUAL VISIT (OUTPATIENT)
Dept: PSYCHIATRY | Facility: CLINIC | Age: 65
End: 2020-11-11
Attending: NURSE PRACTITIONER
Payer: MEDICARE

## 2020-11-11 DIAGNOSIS — F43.10 PTSD (POST-TRAUMATIC STRESS DISORDER): ICD-10-CM

## 2020-11-11 DIAGNOSIS — F43.10 PTSD (POST-TRAUMATIC STRESS DISORDER): Primary | ICD-10-CM

## 2020-11-11 DIAGNOSIS — G47.00 INSOMNIA, UNSPECIFIED TYPE: ICD-10-CM

## 2020-11-11 DIAGNOSIS — F33.3 MDD (MAJOR DEPRESSIVE DISORDER), RECURRENT, SEVERE, WITH PSYCHOSIS (H): ICD-10-CM

## 2020-11-11 PROCEDURE — 99443 PR PHYSICIAN TELEPHONE EVALUATION 21-30 MIN: CPT | Mod: 95 | Performed by: NURSE PRACTITIONER

## 2020-11-11 PROCEDURE — 90785 PSYTX COMPLEX INTERACTIVE: CPT | Performed by: NURSE PRACTITIONER

## 2020-11-11 RX ORDER — VENLAFAXINE HYDROCHLORIDE 75 MG/1
CAPSULE, EXTENDED RELEASE ORAL
Qty: 30 CAPSULE | Refills: 1 | Status: SHIPPED | OUTPATIENT
Start: 2020-11-11 | End: 2020-12-09 | Stop reason: DRUGHIGH

## 2020-11-11 RX ORDER — DOXEPIN HYDROCHLORIDE 10 MG/1
10 CAPSULE ORAL AT BEDTIME
Qty: 30 CAPSULE | Refills: 2 | Status: SHIPPED | OUTPATIENT
Start: 2020-11-11 | End: 2020-12-24

## 2020-11-11 RX ORDER — OLANZAPINE 15 MG/1
15 TABLET ORAL AT BEDTIME
Qty: 30 TABLET | Refills: 2 | Status: SHIPPED | OUTPATIENT
Start: 2020-11-11 | End: 2020-12-24

## 2020-11-11 RX ORDER — PRAZOSIN HYDROCHLORIDE 2 MG/1
4 CAPSULE ORAL AT BEDTIME
Qty: 60 CAPSULE | Refills: 1 | Status: SHIPPED | OUTPATIENT
Start: 2020-11-11 | End: 2020-12-24

## 2020-11-11 RX ORDER — PRAZOSIN HYDROCHLORIDE 1 MG/1
CAPSULE ORAL
Qty: 90 CAPSULE | Refills: 0 | OUTPATIENT
Start: 2020-11-11

## 2020-11-11 RX ORDER — PRAZOSIN HYDROCHLORIDE 2 MG/1
CAPSULE ORAL
Qty: 90 CAPSULE | Refills: 0 | OUTPATIENT
Start: 2020-11-11

## 2020-11-11 RX ORDER — VENLAFAXINE HYDROCHLORIDE 150 MG/1
CAPSULE, EXTENDED RELEASE ORAL
Qty: 30 CAPSULE | Refills: 1 | Status: SHIPPED | OUTPATIENT
Start: 2020-11-11 | End: 2020-12-09

## 2020-11-11 RX ORDER — SERTRALINE HYDROCHLORIDE 100 MG/1
100 TABLET, FILM COATED ORAL DAILY
Qty: 30 TABLET | Refills: 1 | Status: SHIPPED | OUTPATIENT
Start: 2020-11-11 | End: 2020-12-09

## 2020-11-11 NOTE — PATIENT INSTRUCTIONS
-Increase Prazosin to 4 mg at bedtime for nightmare.  Monitor for dizziness, if you feel dizzy, only take 3 mg.  -Continue all other medications for now    Your next appointment is scheduled on 12/9 (Wed) at 3pm.    Thank you for coming to the [facility] [place] [facility].    Lab Testing:  If you had lab testing today and your results are reassuring or normal they will be mailed to you or sent through [employer] within 7 days. If the lab tests need quick action we will call you with the results. The phone number we will call with results is # [phone] (home) . If this is not the best number please call our clinic and change the number.    Medication Refills:  If you need any refills please call your pharmacy and they will contact us. Our fax number for refills is [phone]. Please allow three business for refill processing. If you need to  your refill at a new pharmacy, please contact the new pharmacy directly. The new pharmacy will help you get your medications transferred.     Scheduling:  If you have any concerns about today's visit or wish to schedule another appointment please call our office during normal business hours [phone] (8-5:00 M-F)    Contact Us:  Please call [phone] during business hours (8-5:00 M-F).  If after clinic hours, or on the weekend, please call  [phone].    Financial Assistance [phone]  [facility] [phone]  Central Billing Office, [facility]: [phone]  [place] Billing [phone]  Medical Records [phone]      MENTAL HEALTH CRISIS NUMBERS:  For a medical emergency please call  911 or go to the nearest ER.     [facility]:   [facility] -[phone]   Crisis Residence [facility] -[phone]   Walk-In Counseling Center [facility] -[phone]   COPE 24/7 [place] Mobile Team -[phone] (adults)/[phone] (child)  CHILD: [facility] needs assessment team - [phone]       [facility]:   [facility] - [phone]   Walk-in counseling [facility] - [phone]   Walk-in counseling [facility] - [phone]   Crisis Residence [facility] - [phone]  Urgent Care Adult Mental [facility]-[phone] mobile unit/ 24/7 crisis line    National Crisis Numbers:   National Suicide Prevention Lifeline: 3-123-505-TALK (250-718-3043)  Poison Control Center - 9-606-861-6426  [employer]/resources for a list of additional resources (SOS)  Trans Lifeline a hotline for transgender people 6-945-907-3794  [employer] a hotline for LGBT youth 1-114.871.7236  Crisis Text Line: For any crisis 24/7   To: 232812  see www.crisistextline.org  - IF MAKING A CALL FEELS TOO HARD, send a text!         Again thank you for choosing [facility] M[facility] & ADDICTION [facility] and please let us know how we can best partner with you to improve you and your family's health.    You may be receiving a survey regarding this appointment. We would love to have your feedback, both positive and negative. The survey is done by an external company, so your answers are anonymous.
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
## 2020-11-11 NOTE — TELEPHONE ENCOUNTER
Last Seen 10/14/20    RTC Unknown    Cancel 0  No-Show 0    Next Appt 11/11/20 at 1500    Incoming Refill From [facility] via Fax    Medication Requested Prazosin 1 mg Capsules    Directions Take 1 Capsule by mouth, along with one 2 mg Capsule to make total of 3 mg at Bedtime    Qty 90    Last Refill 10/07/20 Qty 30 with no refills       Medication Requested Prazosin 2 mg Capsules    Directions Take 1 Capsule by mouth, along with one 1 mg Capsule to make total of 3 mg at Bedtime    Qty 90    Last Refill 10/07/20 Qty 30 with no refills     Medication Refill Pended Per Refill Protocol

## 2020-11-12 ENCOUNTER — TELEPHONE (OUTPATIENT)
Dept: PSYCHIATRY | Facility: CLINIC | Age: 65
End: 2020-11-12

## 2020-11-12 RX ORDER — PRAZOSIN HYDROCHLORIDE 1 MG/1
CAPSULE ORAL
Qty: 90 CAPSULE | Refills: 1 | OUTPATIENT
Start: 2020-11-12

## 2020-11-12 RX ORDER — PRAZOSIN HYDROCHLORIDE 2 MG/1
CAPSULE ORAL
Qty: 90 CAPSULE | Refills: 1 | OUTPATIENT
Start: 2020-11-12

## 2020-11-12 NOTE — TELEPHONE ENCOUNTER
----- Message from LORE Reynoso CNP sent at 11/11/2020  3:28 PM CST -----  Would you send AVS to his CM noting that there is medication change?  Since [place] translation is taking about 3 weeks, I feel this is the best way to get medication information to pt.        Penny Garcia, [facility]  [address] [place], [place] [id number] [phone] (Fax)        Thank you!        Writer printed 11/11/20 AVS and faxed to indicated number.

## 2020-11-16 ENCOUNTER — PRE VISIT (OUTPATIENT)
Dept: ORTHOPEDICS | Facility: CLINIC | Age: 65
End: 2020-11-16

## 2020-11-16 ENCOUNTER — OFFICE VISIT (OUTPATIENT)
Dept: ORTHOPEDICS | Facility: CLINIC | Age: 65
End: 2020-11-16
Payer: MEDICARE

## 2020-11-16 DIAGNOSIS — M79.671 RIGHT FOOT PAIN: ICD-10-CM

## 2020-11-16 DIAGNOSIS — B35.1 OM (ONYCHOMYCOSIS): ICD-10-CM

## 2020-11-16 DIAGNOSIS — M20.5X1 HALLUX LIMITUS, RIGHT: ICD-10-CM

## 2020-11-16 DIAGNOSIS — M21.41 PES PLANUS OF BOTH FEET: ICD-10-CM

## 2020-11-16 DIAGNOSIS — M20.5X2 HALLUX LIMITUS OF LEFT FOOT: ICD-10-CM

## 2020-11-16 DIAGNOSIS — M21.42 PES PLANUS OF BOTH FEET: ICD-10-CM

## 2020-11-16 DIAGNOSIS — E11.41 DIABETIC MONONEUROPATHY ASSOCIATED WITH TYPE 2 DIABETES MELLITUS (H): ICD-10-CM

## 2020-11-16 DIAGNOSIS — E11.49 TYPE II OR UNSPECIFIED TYPE DIABETES MELLITUS WITH NEUROLOGICAL MANIFESTATIONS, NOT STATED AS UNCONTROLLED(250.60) (H): Primary | ICD-10-CM

## 2020-11-16 PROCEDURE — 99203 OFFICE O/P NEW LOW 30 MIN: CPT | Performed by: PODIATRIST
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
## 2020-11-18 ENCOUNTER — APPOINTMENT (OUTPATIENT)
Dept: INTERPRETER SERVICES | Facility: CLINIC | Age: 65
End: 2020-11-18
Payer: MEDICARE

## 2020-12-09 ENCOUNTER — VIRTUAL VISIT (OUTPATIENT)
Dept: PSYCHIATRY | Facility: CLINIC | Age: 65
End: 2020-12-09
Attending: NURSE PRACTITIONER
Payer: MEDICARE

## 2020-12-09 DIAGNOSIS — F43.10 PTSD (POST-TRAUMATIC STRESS DISORDER): ICD-10-CM

## 2020-12-09 DIAGNOSIS — F40.10 SOCIAL ANXIETY DISORDER: ICD-10-CM

## 2020-12-09 DIAGNOSIS — F12.10 CANNABIS ABUSE: ICD-10-CM

## 2020-12-09 DIAGNOSIS — F33.3 MDD (MAJOR DEPRESSIVE DISORDER), RECURRENT, SEVERE, WITH PSYCHOSIS (H): Primary | ICD-10-CM

## 2020-12-09 PROCEDURE — 99443 PR PHYSICIAN TELEPHONE EVALUATION 21-30 MIN: CPT | Mod: 95 | Performed by: NURSE PRACTITIONER

## 2020-12-09 PROCEDURE — T1013 SIGN LANG/ORAL INTERPRETER: HCPCS | Mod: U4,TEL

## 2020-12-09 PROCEDURE — 90785 PSYTX COMPLEX INTERACTIVE: CPT | Performed by: NURSE PRACTITIONER

## 2020-12-09 RX ORDER — SERTRALINE HYDROCHLORIDE 100 MG/1
150 TABLET, FILM COATED ORAL DAILY
Qty: 45 TABLET | Refills: 1 | Status: SHIPPED | OUTPATIENT
Start: 2020-12-09 | End: 2020-12-24 | Stop reason: DRUGHIGH

## 2020-12-09 RX ORDER — VENLAFAXINE HYDROCHLORIDE 150 MG/1
150 CAPSULE, EXTENDED RELEASE ORAL DAILY
Qty: 30 CAPSULE | Refills: 1 | Status: SHIPPED | OUTPATIENT
Start: 2020-12-09 | End: 2020-12-24 | Stop reason: DRUGHIGH
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
## 2020-12-09 NOTE — PATIENT INSTRUCTIONS
-Increase Zoloft to 150 mg daily for your mood and anxiety  -Decrease Effexor to 150 mg daily  -Continue all other medications for now    Your next appointment is scheduled on 12/24 (Thu) at 8am.    Thank you for coming to the [facility] [place] [facility].    Lab Testing:  If you had lab testing today and your results are reassuring or normal they will be mailed to you or sent through [employer] within 7 days. If the lab tests need quick action we will call you with the results. The phone number we will call with results is # [phone] (home) . If this is not the best number please call our clinic and change the number.    Medication Refills:  If you need any refills please call your pharmacy and they will contact us. Our fax number for refills is [phone]. Please allow three business for refill processing. If you need to  your refill at a new pharmacy, please contact the new pharmacy directly. The new pharmacy will help you get your medications transferred.     Scheduling:  If you have any concerns about today's visit or wish to schedule another appointment please call our office during normal business hours [phone] (8-5:00 M-F)    Contact Us:  Please call [phone] during business hours (8-5:00 M-F).  If after clinic hours, or on the weekend, please call  [phone].    Financial Assistance [phone]  [facility] Billing [phone]  Central Billing Office, [facility]: [phone]  [facility] Billing [phone]  Medical Records [phone]      MENTAL HEALTH CRISIS NUMBERS:  For a medical emergency please call  911 or go to the nearest ER.     [facility]:   [facility] -[phone]   Crisis Residence [facility] -[phone]   Walk-In Counseling Center [facility] -[phone]   COPE 24/7 [facility] Mobile Team -[phone] (adults)/[phone] (child)  CHILD: [facility] needs assessment team - [phone]      [facility]:    [facility] - [phone]   Walk-in counseling [facility] - [phone]   Walk-in counseling [facility] - [phone]   Crisis Residence [facility] - [phone]  Urgent Care Adult Mental [facility]-[phone] mobile unit/ 24/7 crisis line    National Crisis Numbers:   National Suicide Prevention Lifeline: 4-209-366-TALK (895-426-2186)  Poison Control Center - 4-676-623-0354  Nodality/resources for a list of additional resources (SOS)  Trans Lifeline a hotline for transgender people 0-465-117-9029  [employer] a hotline for LGBT youth 1-385.933.8097  Crisis Text Line: For any crisis 24/7   To: 699942  see www.crisistextline.org  - IF MAKING A CALL FEELS TOO HARD, send a text!         Again thank you for choosing [facility] MENTAL HEALTH & ADDICTION [facility] and please let us know how we can best partner with you to improve you and your family's health.    You may be receiving a survey regarding this appointment. We would love to have your feedback, both positive and negative. The survey is done by an external company, so your answers are anonymous.

## 2020-12-24 ENCOUNTER — TELEPHONE (OUTPATIENT)
Dept: PSYCHIATRY | Facility: CLINIC | Age: 65
End: 2020-12-24

## 2020-12-24 ENCOUNTER — VIRTUAL VISIT (OUTPATIENT)
Dept: PSYCHIATRY | Facility: CLINIC | Age: 65
End: 2020-12-24
Attending: NURSE PRACTITIONER
Payer: MEDICARE

## 2020-12-24 DIAGNOSIS — F43.10 PTSD (POST-TRAUMATIC STRESS DISORDER): Primary | ICD-10-CM

## 2020-12-24 DIAGNOSIS — G47.00 INSOMNIA, UNSPECIFIED TYPE: ICD-10-CM

## 2020-12-24 DIAGNOSIS — F33.3 MDD (MAJOR DEPRESSIVE DISORDER), RECURRENT, SEVERE, WITH PSYCHOSIS (H): ICD-10-CM

## 2020-12-24 DIAGNOSIS — F40.10 SOCIAL ANXIETY DISORDER: ICD-10-CM

## 2020-12-24 DIAGNOSIS — F12.10 CANNABIS ABUSE: ICD-10-CM

## 2020-12-24 PROCEDURE — 99443 PR PHYSICIAN TELEPHONE EVALUATION 21-30 MIN: CPT | Mod: 95 | Performed by: NURSE PRACTITIONER

## 2020-12-24 RX ORDER — SERTRALINE HYDROCHLORIDE 100 MG/1
200 TABLET, FILM COATED ORAL DAILY
Qty: 60 TABLET | Refills: 1 | Status: SHIPPED | OUTPATIENT
Start: 2020-12-24 | End: 2021-01-21

## 2020-12-24 RX ORDER — PRAZOSIN HYDROCHLORIDE 2 MG/1
4 CAPSULE ORAL AT BEDTIME
Qty: 60 CAPSULE | Refills: 1 | Status: SHIPPED | OUTPATIENT
Start: 2020-12-24 | End: 2021-01-21

## 2020-12-24 RX ORDER — VENLAFAXINE HYDROCHLORIDE 75 MG/1
75 CAPSULE, EXTENDED RELEASE ORAL DAILY
Qty: 30 CAPSULE | Refills: 1 | Status: SHIPPED | OUTPATIENT
Start: 2020-12-24 | End: 2021-01-21 | Stop reason: DRUGHIGH

## 2020-12-24 RX ORDER — DOXEPIN HYDROCHLORIDE 10 MG/1
10 CAPSULE ORAL AT BEDTIME
Qty: 30 CAPSULE | Refills: 2 | Status: SHIPPED | OUTPATIENT
Start: 2020-12-24 | End: 2021-02-18

## 2020-12-24 RX ORDER — OLANZAPINE 7.5 MG/1
7.5-15 TABLET, FILM COATED ORAL AT BEDTIME
Qty: 30 TABLET | Refills: 1 | Status: SHIPPED | OUTPATIENT
Start: 2020-12-24 | End: 2021-01-04

## 2020-12-24 NOTE — PATIENT INSTRUCTIONS
-Increase Zoloft to 200 mg daily for your mood and anxiety  -Decrease Effexor to 75 mg daily  -If you have difficulties with being oversedated, may change Zyprexa to 7.5-15 mg at bedtime  -Continue all other medications (Prazosin should be 4 mg at bedtime while monitoring dizziness closely)    Your next appointment is scheduled on 1/21/2021 (Thu) at 9am    Thank you for coming to the [facility] [place] [facility].    Lab Testing:  If you had lab testing today and your results are reassuring or normal they will be mailed to you or sent through [employer] within 7 days. If the lab tests need quick action we will call you with the results. The phone number we will call with results is # [phone] (home) . If this is not the best number please call our clinic and change the number.    Medication Refills:  If you need any refills please call your pharmacy and they will contact us. Our fax number for refills is [phone]. Please allow three business for refill processing. If you need to  your refill at a new pharmacy, please contact the new pharmacy directly. The new pharmacy will help you get your medications transferred.     Scheduling:  If you have any concerns about today's visit or wish to schedule another appointment please call our office during normal business hours [phone] (8-5:00 M-F)    Contact Us:  Please call [phone] during business hours (8-5:00 M-F).  If after clinic hours, or on the weekend, please call  [phone].    Financial Assistance [phone]  [facility] Billing [phone]  Central Billing Office, [facility]: [phone]  [facility] Billing [phone]  Medical Records [phone]      MENTAL HEALTH CRISIS NUMBERS:  For a medical emergency please call  391 or go to the nearest ER.     [facility]:   [facility] -[phone]   Crisis Residence [facility] -[phone]   Walk-In Counseling Center [facility]  -[phone]   COPE 24/7 [facility] Mobile Team -[phone] (adults)/[phone] (child)  CHILD: [facility] needs assessment team - [phone]      [facility]:   [facility] - [phone]   Walk-in counseling [facility] - [phone]   Walk-in counseling [facility] - [phone]   Crisis Residence [facility] Residence - [phone]  Urgent Care Adult Mental [place]-[phone] mobile unit/ 24/7 crisis line    National Crisis Numbers:   National Suicide Prevention Lifeline: 1-991-904-TALK (419-690-6120)  Poison Control Center - 1-286.593.1949  eTask.it/resources for a list of additional resources (SOS)  Trans Lifeline a hotline for transgender people 5-151-203-9008  The Wild [employer] a hotline for LGBT youth 1-190.155.4136  Crisis Text Line: For any crisis 24/7   To: 202548  see www.crisistextline.org  - IF MAKING A CALL FEELS TOO HARD, send a text!         Again thank you for choosing [facility] MENTAL HE[facility] & ADDICTION [facility] and please let us know how we can best partner with you to improve you and your family's health.    You may be receiving a survey regarding this appointment. We would love to have your feedback, both positive and negative. The survey is done by an external company, so your answers are anonymous.
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
## 2020-12-24 NOTE — TELEPHONE ENCOUNTER
----- Message from LORE Reynoso CNP sent at 12/24/2020  8:42 AM CST -----  Pls fax AVS to  and therapist both  Penny Garcia   ()  [address], [place] [id number] [phone] (Fax)      Therapist   Cristine Chan, HANH    [address], [place] [id number] [phone] (Fax)      I called [facility] and spoke to  to leave messages for both of them, but in fax, would you also pls indicate in fax that they should go over medication list (pls send them medication list as well) because pt may seemed to be confused on what dosage of medications and what medication he is taking (granted today's  was awful).  Also please indicate that pt requested Cypriot speaking provider.  I discussed this with CM before and CM said he declined, but he actually said today that nobody talked to him about the possibility.  I recommend Shila Contreras, at [facility] who is also at [facility] is Fijian speaking psych NP and transfer pt to her in either of practice.    Thank you!

## 2020-12-28 ENCOUNTER — TELEPHONE (OUTPATIENT)
Dept: PSYCHIATRY | Facility: CLINIC | Age: 65
End: 2020-12-28

## 2020-12-28 DIAGNOSIS — F33.3 MDD (MAJOR DEPRESSIVE DISORDER), RECURRENT, SEVERE, WITH PSYCHOSIS (H): Primary | ICD-10-CM

## 2020-12-28 NOTE — TELEPHONE ENCOUNTER
On 12/28/2020 a PA came via fax from [employer] requesting prior authorization for Olanzapine 7.5 mg tabs, this was routed to Anabelle.  Georgina Robin, CMA

## 2020-12-29 NOTE — TELEPHONE ENCOUNTER
Faxed AVS to:    Penny Garcia   ()  [address] [place] [address] [phone] (Fax)       The[facility]   [facility]    [address] [place] [address] [phone] (Fax)      Included a message to CM regarding transferring patient to a Belarusian-speaking provider and gave information.

## 2021-01-04 RX ORDER — OLANZAPINE 15 MG/1
TABLET ORAL
Qty: 30 TABLET | Refills: 1 | Status: SHIPPED | OUTPATIENT
Start: 2021-01-04 | End: 2021-02-18

## 2021-01-04 NOTE — TELEPHONE ENCOUNTER
Dang Stevens APRN CNP Snyder, David J, RN   Caller: Unspecified (1 week ago, 11:50 AM)             Yes!  Thank you!    Previous Messages    ----- Message -----   From: Brown Leahy RN   Sent: 12/31/2020   1:40 PM CST   To: Brown Leahy RN, LORE Reynoso CNP     Can I re-order this as one-half to one 15 mg tablet?     Malcom         Writer ordered 15 mg tablets.

## 2021-01-21 ENCOUNTER — VIRTUAL VISIT (OUTPATIENT)
Dept: PSYCHIATRY | Facility: CLINIC | Age: 66
End: 2021-01-21
Attending: NURSE PRACTITIONER
Payer: MEDICARE

## 2021-01-21 DIAGNOSIS — F33.3 MDD (MAJOR DEPRESSIVE DISORDER), RECURRENT, SEVERE, WITH PSYCHOSIS (H): Primary | ICD-10-CM

## 2021-01-21 DIAGNOSIS — F43.10 PTSD (POST-TRAUMATIC STRESS DISORDER): ICD-10-CM

## 2021-01-21 DIAGNOSIS — F12.10 CANNABIS ABUSE: ICD-10-CM

## 2021-01-21 DIAGNOSIS — F40.10 SOCIAL ANXIETY DISORDER: ICD-10-CM

## 2021-01-21 PROCEDURE — 90785 PSYTX COMPLEX INTERACTIVE: CPT | Mod: 95 | Performed by: NURSE PRACTITIONER

## 2021-01-21 PROCEDURE — T1013 SIGN LANG/ORAL INTERPRETER: HCPCS | Mod: U4,TEL

## 2021-01-21 PROCEDURE — 2894A VOIDCORRECT: CPT | Performed by: NURSE PRACTITIONER

## 2021-01-21 PROCEDURE — 99215 OFFICE O/P EST HI 40 MIN: CPT | Mod: 95 | Performed by: NURSE PRACTITIONER

## 2021-01-21 RX ORDER — SERTRALINE HYDROCHLORIDE 100 MG/1
200 TABLET, FILM COATED ORAL DAILY
Qty: 60 TABLET | Refills: 1 | Status: SHIPPED | OUTPATIENT
Start: 2021-01-21 | End: 2021-02-18

## 2021-01-21 RX ORDER — VENLAFAXINE HYDROCHLORIDE 37.5 MG/1
75 CAPSULE, EXTENDED RELEASE ORAL DAILY
Qty: 60 CAPSULE | Refills: 1 | Status: SHIPPED | OUTPATIENT
Start: 2021-01-21

## 2021-01-21 RX ORDER — PRAZOSIN HYDROCHLORIDE 2 MG/1
4 CAPSULE ORAL AT BEDTIME
Qty: 60 CAPSULE | Refills: 1 | Status: SHIPPED | OUTPATIENT
Start: 2021-01-21 | End: 2021-02-18

## 2021-01-21 RX ORDER — BUPROPION HYDROCHLORIDE 150 MG/1
150 TABLET ORAL EVERY MORNING
Qty: 30 TABLET | Refills: 1 | Status: SHIPPED | OUTPATIENT
Start: 2021-01-21
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
## 2021-01-21 NOTE — PATIENT INSTRUCTIONS
-Start Wellbutrin  mg daily in AM for your mood.  Monitor your anxiety and sleep.  -Continue all other medications for now.  Please note Effexor XR is now ordered with 37.5 mg capsule, so please take 2 capsules daily.  This is to prepare tapering off of the medication.    -Talk to your  to transfer care to Kosovan speaking provider.      [facility]. [phone]     Your next appointment is scheduled on 2/18 (Thu) at 10:30am.  However, if you can get into Zoila's appointment sooner, you can follow up with Zoila instead of Dang.    Thank you for coming to the [facility] [place] [facility].    Lab Testing:  If you had lab testing today and your results are reassuring or normal they will be mailed to you or sent through [employer] within 7 days. If the lab tests need quick action we will call you with the results. The phone number we will call with results is # [phone] (home) . If this is not the best number please call our clinic and change the number.    Medication Refills:  If you need any refills please call your pharmacy and they will contact us. Our fax number for refills is [phone]. Please allow three business for refill processing. If you need to  your refill at a new pharmacy, please contact the new pharmacy directly. The new pharmacy will help you get your medications transferred.     Scheduling:  If you have any concerns about today's visit or wish to schedule another appointment please call our office during normal business hours [phone] (8-5:00 M-F)    Contact Us:  Please call [phone] during business hours (8-5:00 M-F).  If after clinic hours, or on the weekend, please call  [phone].    Financial Assistance [phone]  [facility] Billing [phone]  Central Billing Office, [facility]: [phone]  [facility] Billing [phone]  Medical Records [phone]      MENTAL HEALTH CRISIS  NUMBERS:  For a medical emergency please call  911 or go to the nearest ER.     [facility] [facility]:   [facility] -[phone]   Crisis Residence [facility] -[phone]   Walk-In Counseling Center [facility] -[phone]   COPE 24/7 [place] Mobile Team -[phone] (adults)/[phone] (child)  CHILD: [facility] needs assessment team - [phone]      [facility]:   [facility] - [phone]   Walk-in counseling [facility] - [phone]   Walk-in counseling [facility] - [phone]   Crisis Residence [facility]idence - [phone]  Urgent Care Adult Mental Nbvnrq-[phone] mobile unit/ 24/7 crisis line    National Crisis Numbers:   National Suicide Prevention Lifeline: 0-949-992-TALK (084-082-7637)  Poison Control Center - 1-625.337.3739  [employer]/resources for a list of additional resources (SOS)  Trans Lifeline a hotline for transgender people 1-600.996.9372  [employer] a hotline for LGBT youth 1-442.502.4559  Crisis Text Line: For any crisis 24/7   To: 276032  see www.crisistextline.org  - IF MAKING A CALL FEELS TOO HARD, send a text!         Again thank you for choosing [facility] MENTAL HEALTH & ADDICTION [facility] and please let us know how we can best partner with you to improve you and your family's health.    You may be receiving a survey regarding this appointment. We would love to have your feedback, both positive and negative. The survey is done by an external company, so your answers are anonymous.

## 2021-02-15 ENCOUNTER — OFFICE VISIT (OUTPATIENT)
Dept: ORTHOPEDICS | Facility: CLINIC | Age: 66
End: 2021-02-15
Payer: MEDICARE

## 2021-02-15 DIAGNOSIS — E11.49 TYPE II OR UNSPECIFIED TYPE DIABETES MELLITUS WITH NEUROLOGICAL MANIFESTATIONS, NOT STATED AS UNCONTROLLED(250.60) (H): Primary | ICD-10-CM

## 2021-02-15 DIAGNOSIS — B35.1 OM (ONYCHOMYCOSIS): ICD-10-CM

## 2021-02-15 PROCEDURE — 99213 OFFICE O/P EST LOW 20 MIN: CPT | Performed by: PODIATRIST
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
## 2021-02-15 NOTE — NURSING NOTE
Reason For Visit:   Chief Complaint   Patient presents with     Follow Up     3 month follow up. Diabetic foot care.        Pain Assessment  Patient Currently in Pain: Denies        Allergies   Allergen Reactions     Trazodone Other (See Comments)     Very depressed and suicidal  Other reaction(s): Other (See Comments)  Very depressed and suicidal     Thiamine Nausea     [employer]#[id number]           Rola Grace LPN
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
## 2021-02-18 ENCOUNTER — TELEPHONE (OUTPATIENT)
Dept: PSYCHIATRY | Facility: CLINIC | Age: 66
End: 2021-02-18

## 2021-02-18 ENCOUNTER — VIRTUAL VISIT (OUTPATIENT)
Dept: PSYCHIATRY | Facility: CLINIC | Age: 66
End: 2021-02-18
Attending: NURSE PRACTITIONER
Payer: MEDICARE

## 2021-02-18 DIAGNOSIS — F33.3 MDD (MAJOR DEPRESSIVE DISORDER), RECURRENT, SEVERE, WITH PSYCHOSIS (H): ICD-10-CM

## 2021-02-18 DIAGNOSIS — F43.10 PTSD (POST-TRAUMATIC STRESS DISORDER): ICD-10-CM

## 2021-02-18 DIAGNOSIS — G47.00 INSOMNIA, UNSPECIFIED TYPE: ICD-10-CM

## 2021-02-18 DIAGNOSIS — F33.3 MDD (MAJOR DEPRESSIVE DISORDER), RECURRENT, SEVERE, WITH PSYCHOSIS (H): Primary | ICD-10-CM

## 2021-02-18 PROCEDURE — 99443 PR PHYSICIAN TELEPHONE EVALUATION 21-30 MIN: CPT | Mod: 95 | Performed by: NURSE PRACTITIONER

## 2021-02-18 RX ORDER — OLANZAPINE 15 MG/1
TABLET ORAL
Qty: 30 TABLET | Refills: 1 | Status: SHIPPED | OUTPATIENT
Start: 2021-02-18

## 2021-02-18 RX ORDER — PRAZOSIN HYDROCHLORIDE 2 MG/1
4 CAPSULE ORAL AT BEDTIME
Qty: 60 CAPSULE | Refills: 1 | Status: SHIPPED | OUTPATIENT
Start: 2021-02-18

## 2021-02-18 RX ORDER — SERTRALINE HYDROCHLORIDE 100 MG/1
200 TABLET, FILM COATED ORAL DAILY
Qty: 60 TABLET | Refills: 1 | Status: SHIPPED | OUTPATIENT
Start: 2021-02-18

## 2021-02-18 RX ORDER — DOXEPIN HYDROCHLORIDE 10 MG/1
10 CAPSULE ORAL AT BEDTIME
Qty: 30 CAPSULE | Refills: 2 | Status: SHIPPED | OUTPATIENT
Start: 2021-02-18
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
## 2021-02-18 NOTE — PATIENT INSTRUCTIONS
-Decrease Effexor XR to 150 mg daily until seen next  -Take all other medications for now    -Call [facility] [phone])to see if Faroese speaking psychiatrist is taking new pt.  If he is, transfer care.  -Talk with  to see if Faroese speaking provider is taking new pt at [facility], if they are, transfer care    Your next appointment is scheduled on 3/11/2021 (Thu) at 1pm.    Thank you for coming to the [facility] [place] [facility].    Lab Testing:  If you had lab testing today and your results are reassuring or normal they will be mailed to you or sent through [place] within 7 days. If the lab tests need quick action we will call you with the results. The phone number we will call with results is # [phone] (home) . If this is not the best number please call our clinic and change the number.    Medication Refills:  If you need any refills please call your pharmacy and they will contact us. Our fax number for refills is [phone]. Please allow three business for refill processing. If you need to  your refill at a new pharmacy, please contact the new pharmacy directly. The new pharmacy will help you get your medications transferred.     Scheduling:  If you have any concerns about today's visit or wish to schedule another appointment please call our office during normal business hours [phone] (8-5:00 M-F)    Contact Us:  Please call [phone] during business hours (8-5:00 M-F).  If after clinic hours, or on the weekend, please call  [phone].    Financial Assistance [phone]  [facility] Billing [phone]  Central Billing Office, [facility]: [phone]  [facility] Billing [phone]  Medical Records [phone]      MENTAL HEALTH CRISIS NUMBERS:  For a medical emergency please call  [phone] or go to the nearest ER.     [facility]:   [facility] -[phone]   Crisis Residence [facility] -[phone]    Walk-In Counseling Center [facility] -[phone]   COPE 24/7 [facility] Team -[phone] (adults)/[phone] (child)  CHILD: [facility] needs assessment team - [phone]      [facility]:   [facility] - [phone]   Walk-in counseling [facility] - [phone]   Walk-in counseling [facility] - [phone]   Crisis Residence [facility] - [phone]  Urgent Care Adult Mental [facility]-[phone] mobile unit/ 24/7 crisis line    National Crisis Numbers:   National Suicide Prevention Lifeline: 7-780-893-TALK (857-108-8615)  Poison Control Center - 1-699.135.5352  Circle of Moms/resources for a list of additional resources (SOS)  Trans Lifeline a hotline for transgender people 1-102.872.7764  [employer] a hotline for LGBT youth 1-979.160.8543  Crisis Text Line: For any crisis 24/7   To: 759018  see www.crisistextline.org  - IF MAKING A CALL FEELS TOO HARD, send a text!         Again thank you for choosing [facility] and please let us know how we can best partner with you to improve you and your family's health.    You may be receiving a survey regarding this appointment. We would love to have your feedback, both positive and negative. The survey is done by an external company, so your answers are anonymous.

## 2021-02-18 NOTE — TELEPHONE ENCOUNTER
Dang Stevens APRN CNP Labossiere, Laura, RN   Caller: Unspecified (Today, 10:01 AM)             So we are having difficulties communicating and he has been taking wrong dose of medication, but sounds like still has lot of 37.5 mg left so at this time, I don't think we need PA.  Pt has been taking 225mg, not what we discussed :-(        Will not proceed with PA at this time.

## 2021-02-18 NOTE — TELEPHONE ENCOUNTER
----- Message from LORE Reynoso CNP sent at 2/18/2021  2:53 PM CST -----  Would you fax AVS to CM?  Thank you!    Penny Garcia    [address] [place] [address]    [phone] (Fax)            =========================================      Writer coordinated with in-clinic staff to fax report.

## 2021-03-04 DIAGNOSIS — F33.3 MDD (MAJOR DEPRESSIVE DISORDER), RECURRENT, SEVERE, WITH PSYCHOSIS (H): ICD-10-CM

## 2021-03-04 DIAGNOSIS — F43.10 PTSD (POST-TRAUMATIC STRESS DISORDER): ICD-10-CM

## 2021-03-05 RX ORDER — VENLAFAXINE HYDROCHLORIDE 75 MG/1
75 CAPSULE, EXTENDED RELEASE ORAL DAILY
Qty: 30 CAPSULE | Refills: 1 | OUTPATIENT
Start: 2021-03-05

## 2021-03-22 DIAGNOSIS — F33.3 MDD (MAJOR DEPRESSIVE DISORDER), RECURRENT, SEVERE, WITH PSYCHOSIS (H): ICD-10-CM

## 2021-03-23 RX ORDER — BUPROPION HYDROCHLORIDE 150 MG/1
150 TABLET ORAL EVERY MORNING
Qty: 30 TABLET | Refills: 1 | OUTPATIENT
Start: 2021-03-23

## 2021-04-12 NOTE — Clinical Note
LOV 8.6.2020    No future visit    The patient's mother called to refill  dexmethylphenidate (FOCALIN) 10 MG tablet.     Send to [facility] in [facility] Saw your patient for a med review before starting Mavyret.   FYI, patient will be holding Atorvastatin while on Mavyret therapy x 8 weeks. Should restart after.

## 2021-05-17 ENCOUNTER — OFFICE VISIT (OUTPATIENT)
Dept: ORTHOPEDICS | Facility: CLINIC | Age: 66
End: 2021-05-17
Payer: MEDICARE

## 2021-05-17 DIAGNOSIS — M79.671 RIGHT FOOT PAIN: ICD-10-CM

## 2021-05-17 DIAGNOSIS — E11.49 TYPE II OR UNSPECIFIED TYPE DIABETES MELLITUS WITH NEUROLOGICAL MANIFESTATIONS, NOT STATED AS UNCONTROLLED(250.60) (H): Primary | ICD-10-CM

## 2021-05-17 DIAGNOSIS — B35.1 OM (ONYCHOMYCOSIS): ICD-10-CM

## 2021-05-17 PROCEDURE — 11721 DEBRIDE NAIL 6 OR MORE: CPT | Performed by: PODIATRIST

## 2021-05-17 PROCEDURE — 99213 OFFICE O/P EST LOW 20 MIN: CPT | Mod: 25 | Performed by: PODIATRIST

## 2021-05-17 NOTE — PROGRESS NOTES
Date of Service: 11/16/2020    Chief Complaint   Patient presents with     RECHECK     3 months followup           HPI: Gianluca is a 65 year old male who presents today for a diabetic foot exam and management. He relates that he has been diabetic for years. He gets his primary care at [facility].  He has diabetic shoes from January.  was used. Relates that he does have pain sometimes when walking in the balls of the feet. Rest alleviates this.     Review of Systems: no n/v/d/f/c/ns/sob/cp    PMH:   Past Medical History:   Diagnosis Date     Depression      Gastro-oesophageal reflux disease      Headache(784.0)      Hypertension      LOC (loss of consciousness) (H) age 45    out for 20 mintes after hit on top of head with board     Major depression 5/7/2013     Otitis media, chronic      Sleep apnea     cant tolerate cpap       PSxH:   Past Surgical History:   Procedure Laterality Date     COLONOSCOPY N/A 3/8/2018    Procedure: COLONOSCOPY;  colonoscopy;  Surgeon: Iona Lutz MD;  [facility]: [facility]     [facility]     LASER CO2 TYMPANOMASTOIDECTOMY  9/7/2011    Procedure:LASER CO2 TYMPANOMASTOIDECTOMY; Right Tympanoplasty , Cartilage Backed Right Tympanomastoidectomy, Omni Guide Laser on Standby  *Latex Safe*; Surgeon:BENNETT MAXWELL; Location:[facility] OR     SEPTOPLASTY  9/17/2012    Procedure: SEPTOPLASTY;  Septoplasty *Latex Safe* Turbinate reduction ;  Surgeon: Babar Dickerson MD;  [facility]: [facility]     UVULOPALATOPHARYNGOPLASTY  7/9/2012    Procedure: UVULOPALATOPHARYNGOPLASTY;  Uvulopalatopharyngoplasty * Latex Safe*;  Surgeon: Babar Dickerson MD;  [facility]       Allergies: Trazodone and Thiamine    SH:   Social History     Socioeconomic History     Marital status: Single     Spouse name: Not on file     Number of children: Not on file     Years of education: Not on file     Highest education level: Not on file   Occupational History     Not on file   Social Needs      Financial resource strain: Not on file     Food insecurity     Worry: Not on file     Inability: Not on file     Transportation needs     Medical: Not on file     Non-medical: Not on file   Tobacco Use     Smoking status: Current Every Day Smoker     Packs/day: 0.50     Types: Cigarettes     Smokeless tobacco: Never Used   Substance and Sexual Activity     Alcohol use: No     Alcohol/week: 0.0 standard drinks     Drug use: No     Comment: hx polysubstance now in remission     Sexual activity: Not on file   Lifestyle     Physical activity     Days per week: Not on file     Minutes per session: Not on file     Stress: Not on file   Relationships     Social connections     Talks on phone: Not on file     Gets together: Not on file     Attends Anglican service: Not on file     Active member of club or organization: Not on file     Attends meetings of clubs or organizations: Not on file     Relationship status: Not on file     Intimate partner violence     Fear of current or ex partner: Not on file     Emotionally abused: Not on file     Physically abused: Not on file     Forced sexual activity: Not on file   Other Topics Concern     Parent/sibling w/ CABG, MI or angioplasty before 65F 55M? Not Asked   Social History Narrative     Not on file       FH:   Family History   Problem Relation Age of Onset     Depression Mother      Depression Father      Substance Abuse Father      Mental Illness Other         institutionalized     Substance Abuse Brother      Substance Abuse Brother      Substance Abuse Brother      Substance Abuse Brother      Substance Abuse Brother      Liver Disease No family hx of        Objective:  Data Unavailable Data Unavailable Data Unavailable Data Unavailable Data Unavailable 0 lbs 0 oz    Hemoglobin A1C   Date Value Ref Range Status   03/09/2017 5.9 4.3 - 6.0 % Final   08/14/2014 6.4 (A) 4.3 - 6.0 % Final         PT and DP pulses are 2/4 bilaterally. CRT is instant. Positive pedal hair.    Gross sensation is diminished bilaterally. Protective sensation is absent on the right foot and diminished on the left as demonstrated by a 5.07G monofilament.   Equinus is noted bilaterally. No pain with active or passive ROM of the ankle, MTJ, 1st ray, or halluces bilaterally,. Hallux limitus BL.  No pain with palpation of the right 2nd MTPJ, but this is where he would have pain when walking.   Nails thickened and mycotic x 10 bilaterally. No open lesions are noted.     Assessment: Gianluca is a 66 YO diabetic with neuropathy.  Pain in the right 2nd toe - likely overload from hallux limitus. Continues to cause pain.   Onychomycosis.      Plan:  - Pt seen and evaluated  - Nails debrided x 10.  - Daily foot exams and moisturizing.   - Start Budin splints on BL feet.   - See again in 4 months.

## 2021-05-17 NOTE — NURSING NOTE
Reason For Visit:   Chief Complaint   Patient presents with     RECHECK     3 months followup        There were no vitals taken for this visit.    Pain Assessment  Patient Currently in Pain: Yes  0-10 Pain Scale: 6  Primary Pain Location: Foot(bottom of bilateral feet)    [facility]

## 2021-05-17 NOTE — LETTER
5/17/2021         RE: Gianluca Cooper  [address]  [address]  [facility] [address]        Dear Colleague,    Thank you for referring your patient, Gianluca Cooper, to the [facility] [place]. Please see a copy of my visit note below.    Date of Service: 11/16/2020    Chief Complaint   Patient presents with     RECHECK     3 months followup           HPI: Gianluca is a 65 year old male who presents today for a diabetic foot exam and management. He relates that he has been diabetic for years. He gets his primary care at [facility].  He has diabetic shoes from January.  was used. Relates that he does have pain sometimes when walking in the balls of the feet. Rest alleviates this.     Review of Systems: no n/v/d/f/c/ns/sob/cp    PMH:   Past Medical History:   Diagnosis Date     Depression      Gastro-oesophageal reflux disease      Headache(784.0)      Hypertension      LOC (loss of consciousness) (H) age 45    out for 20 mintes after hit on top of head with board     Major depression 5/7/2013     Otitis media, chronic      Sleep apnea     cant tolerate cpap       PSxH:   Past Surgical History:   Procedure Laterality Date     COLONOSCOPY N/A 3/8/2018    Procedure: COLONOSCOPY;  colonoscopy;  Surgeon: Iona Lutz MD;  Location:  GI     HERNIA REPAIR  2006    [facility]     LASER CO2 TYMPANOMASTOIDECTOMY  9/7/2011    Procedure:LASER CO2 TYMPANOMASTOIDECTOMY; Right Tympanoplasty , Cartilage Backed Right Tympanomastoidectomy, Omni Guide Laser on Standby  *Latex Safe*; Surgeon:BENNETT MAXWELL; [facility]     SEPTOPLASTY  9/17/2012    Procedure: SEPTOPLASTY;  Septoplasty *Latex Safe* Turbinate reduction ;  Surgeon: Babar Dickerson MD;  [facility]     UVULOPALATOPHARYNGOPLASTY  7/9/2012    Procedure: UVULOPALATOPHARYNGOPLASTY;  Uvulopalatopharyngoplasty * Latex Safe*;  Surgeon: Babar Dickerson MD;  [facility]       Allergies:  Trazodone and Thiamine    SH:   Social History     Socioeconomic History     Marital status: Single     Spouse name: Not on file     Number of children: Not on file     Years of education: Not on file     Highest education level: Not on file   Occupational History     Not on file   Social Needs     Financial resource strain: Not on file     Food insecurity     Worry: Not on file     Inability: Not on file     Transportation needs     Medical: Not on file     Non-medical: Not on file   Tobacco Use     Smoking status: Current Every Day Smoker     Packs/day: 0.50     Types: Cigarettes     Smokeless tobacco: Never Used   Substance and Sexual Activity     Alcohol use: No     Alcohol/week: 0.0 standard drinks     Drug use: No     Comment: hx polysubstance now in remission     Sexual activity: Not on file   Lifestyle     Physical activity     Days per week: Not on file     Minutes per session: Not on file     Stress: Not on file   Relationships     Social connections     Talks on phone: Not on file     Gets together: Not on file     Attends Anabaptism service: Not on file     Active member of club or organization: Not on file     Attends meetings of clubs or organizations: Not on file     Relationship status: Not on file     Intimate partner violence     Fear of current or ex partner: Not on file     Emotionally abused: Not on file     Physically abused: Not on file     Forced sexual activity: Not on file   Other Topics Concern     Parent/sibling w/ CABG, MI or angioplasty before 65F 55M? Not Asked   Social History Narrative     Not on file       FH:   Family History   Problem Relation Age of Onset     Depression Mother      Depression Father      Substance Abuse Father      Mental Illness Other         institutionalized     Substance Abuse Brother      Substance Abuse Brother      Substance Abuse Brother      Substance Abuse Brother      Substance Abuse Brother      Liver Disease No family hx of        Objective:  Data  Unavailable Data Unavailable Data Unavailable Data Unavailable Data Unavailable 0 lbs 0 oz    Hemoglobin A1C   Date Value Ref Range Status   03/09/2017 5.9 4.3 - 6.0 % Final   08/14/2014 6.4 (A) 4.3 - 6.0 % Final         PT and DP pulses are 2/4 bilaterally. CRT is instant. Positive pedal hair.   Gross sensation is diminished bilaterally. Protective sensation is absent on the right foot and diminished on the left as demonstrated by a 5.07G monofilament.   Equinus is noted bilaterally. No pain with active or passive ROM of the ankle, MTJ, 1st ray, or halluces bilaterally,. Hallux limitus BL.  No pain with palpation of the right 2nd MTPJ, but this is where he would have pain when walking.   Nails thickened and mycotic x 10 bilaterally. No open lesions are noted.     Assessment: Gianluca is a 64 YO diabetic with neuropathy.  Pain in the right 2nd toe - likely overload from hallux limitus. Continues to cause pain.   Onychomycosis.      Plan:  - Pt seen and evaluated  - Nails debrided x 10.  - Daily foot exams and moisturizing.   - Start Budin splints on BL feet.   - See again in 4 months.         Carl Martinez, MYNOR

## 2021-06-15 DIAGNOSIS — F43.10 PTSD (POST-TRAUMATIC STRESS DISORDER): ICD-10-CM

## 2021-06-16 RX ORDER — PRAZOSIN HYDROCHLORIDE 2 MG/1
4 CAPSULE ORAL AT BEDTIME
Qty: 60 CAPSULE | Refills: 1 | OUTPATIENT
Start: 2021-06-16

## 2021-06-21 ENCOUNTER — OFFICE VISIT (OUTPATIENT)
Dept: OPHTHALMOLOGY | Facility: CLINIC | Age: 66
End: 2021-06-21
Attending: OPHTHALMOLOGY
Payer: MEDICARE

## 2021-06-21 DIAGNOSIS — E11.9 DM TYPE 2 WITHOUT RETINOPATHY (H): Primary | ICD-10-CM

## 2021-06-21 DIAGNOSIS — H25.13 NUCLEAR SENILE CATARACT OF BOTH EYES: ICD-10-CM

## 2021-06-21 DIAGNOSIS — H52.7 REFRACTIVE ERROR: ICD-10-CM

## 2021-06-21 DIAGNOSIS — H40.039 ANATOMICAL NARROW ANGLE: ICD-10-CM

## 2021-06-21 PROCEDURE — 92014 COMPRE OPH EXAM EST PT 1/>: CPT | Mod: GC | Performed by: OPHTHALMOLOGY

## 2021-06-21 PROCEDURE — G0463 HOSPITAL OUTPT CLINIC VISIT: HCPCS

## 2021-06-21 ASSESSMENT — TONOMETRY
OS_IOP_MMHG: 18
OD_IOP_MMHG: 17
IOP_METHOD: TONOPEN

## 2021-06-21 ASSESSMENT — SLIT LAMP EXAM - LIDS
COMMENTS: NORMAL
COMMENTS: NORMAL

## 2021-06-21 ASSESSMENT — CUP TO DISC RATIO
OS_RATIO: 0.4
OD_RATIO: 0.3

## 2021-06-21 ASSESSMENT — CONF VISUAL FIELD
OS_NORMAL: 1
METHOD: COUNTING FINGERS
OD_NORMAL: 1

## 2021-06-21 ASSESSMENT — VISUAL ACUITY
OD_SC: 20/70
OD_PH_SC: 20/30
METHOD: SNELLEN - LINEAR
OD_SC+: -1
OS_SC: 20/30
OS_PH_SC: 20/25

## 2021-06-21 ASSESSMENT — EXTERNAL EXAM - LEFT EYE: OS_EXAM: NORMAL

## 2021-06-21 ASSESSMENT — EXTERNAL EXAM - RIGHT EYE: OD_EXAM: NORMAL

## 2021-06-21 NOTE — PROGRESS NOTES
HPI: Gianluca Singh is a 64 year old male here for diabetic eye exam. Denies any changes in vision. Only using readers OTC. He does not wear any distance glasses. No driving. No pain, redness, discharge. No flashes/floaters.    PMH:  DM2 x 2 year  HTN    POHx:  Glasses    FH:  Pt denies    Assessment & Plan     (H40.033) Anatomical narrow angle of both eyes  (primary encounter diagnosis)  Comment: Not occludable on gonioscopy in the past. Open to TM and scleral spur in both eyes. Appears open today on slit lamp exam while dilated.  Plan: Discussed the diagnosis and recommend continued observation    (E11.9) Diabetes mellitus type 2 without retinopathy (H)  Comment: On metformin. No recent A1c in EPIC. No diabetic retinopathy.  Plan: Discussed the importance of tight blood glucose control in the prevention of diabetic retinopathy. Recommend yearly dilated eye exam.    (H25.13) Nuclear sclerotic cataract of both eyes  Comment: Not visually significant   Plan: Observe    (H52.7) Refractive error/(H52.4) Presbyopia  Comment: Defers refraction today.  Plan: He states he cannot afford to buy glasses. Overall good acuity left eye without correction that allows him to function well. Pt content with current vision and using readers prn.    Return in about 1 year (around 6/21/2022) for diabetic eye exam, or sooner as needed.      Breonna Chen MD  PGY-2 Resident Physician  Department of Ophthalmology    Teaching statement:  Complete documentation of historical and exam elements from today's encounter can be found in the full encounter summary report (not reduplicated in this progress note). I personally obtained the chief complaint(s) and history of present illness.  I confirmed and edited as necessary the review of systems, past medical/surgical history, family history, social history, and examination findings as documented by others; and I examined the patient myself. I personally reviewed the relevant tests, images, and  reports as documented above.     I formulated and edited as necessary the assessment and plan and discussed the findings and management plan with the patient and family.    Carole Dietz MD  Comprehensive Ophthalmology & Ocular Pathology  Department of Ophthalmology and Visual Neurosciences  [email or web address]  Pager [phone]

## 2021-06-21 NOTE — NURSING NOTE
"Chief Complaints and History of Present Illnesses   Patient presents with     Diabetic Eye Exam     Chief Complaint(s) and History of Present Illness(es)     Diabetic Eye Exam     Vision: is stable    Associated symptoms: headaches.  Negative for floaters, flashes and blurred vision              Comments     Pt states no change in VA since last visit  Last BS: Does not check  Last A1c:\"good\"  Lab Results       Component                Value               Date                       A1C                      5.9                 03/09/2017                 A1C                      6.4                 08/14/2014          Angela Almaraz COT 12:52 PM June 21, 2021                         "

## 2021-06-22 ENCOUNTER — TRANSCRIBE ORDERS (OUTPATIENT)
Dept: OTHER | Age: 66
End: 2021-06-22

## 2021-06-22 DIAGNOSIS — D22.9 ATYPICAL NEVI: Primary | ICD-10-CM

## 2021-06-23 ENCOUNTER — APPOINTMENT (OUTPATIENT)
Dept: INTERPRETER SERVICES | Facility: CLINIC | Age: 66
End: 2021-06-23
Payer: MEDICARE

## 2021-08-12 DIAGNOSIS — F43.10 PTSD (POST-TRAUMATIC STRESS DISORDER): ICD-10-CM

## 2021-08-12 RX ORDER — PRAZOSIN HYDROCHLORIDE 2 MG/1
4 CAPSULE ORAL AT BEDTIME
Qty: 60 CAPSULE | Refills: 1 | OUTPATIENT
Start: 2021-08-12

## 2021-08-12 NOTE — TELEPHONE ENCOUNTER
Refill denied  No longer seen here  Pharmacy called and notified that pt is seen by..  Tr Cook, APRN,CNP    [address] [place] [address]    [phone] (Work)    [phone] (Fax)

## 2021-08-18 ENCOUNTER — OFFICE VISIT (OUTPATIENT)
Dept: ORTHOPEDICS | Facility: CLINIC | Age: 66
End: 2021-08-18
Payer: MEDICARE

## 2021-08-18 DIAGNOSIS — B35.1 OM (ONYCHOMYCOSIS): ICD-10-CM

## 2021-08-18 DIAGNOSIS — E11.49 TYPE II OR UNSPECIFIED TYPE DIABETES MELLITUS WITH NEUROLOGICAL MANIFESTATIONS, NOT STATED AS UNCONTROLLED(250.60) (H): Primary | ICD-10-CM

## 2021-08-18 PROCEDURE — 99213 OFFICE O/P EST LOW 20 MIN: CPT | Performed by: PODIATRIST

## 2021-08-18 PROCEDURE — 11721 DEBRIDE NAIL 6 OR MORE: CPT | Performed by: PODIATRIST

## 2021-08-18 NOTE — PROGRESS NOTES
Chief Complaint   Patient presents with     RECHECK     3 month Follow up Diabetic Mononeuropathy          HPI: Gianluca is a 65 year old male who presents today for a diabetic foot exam and management. He relates that he has been diabetic for years. He gets his primary care at [facility].  He has diabetic shoes from January.  was used.    Review of Systems: no n/v/d/f/c/ns/sob/cp    PMH:   Past Medical History:   Diagnosis Date     Depression      Gastro-oesophageal reflux disease      Headache(784.0)      Hypertension      LOC (loss of consciousness) (H) age 45    out for 20 mintes after hit on top of head with board     Major depression 5/7/2013     Otitis media, chronic      Sleep apnea     cant tolerate cpap       PSxH:   Past Surgical History:   Procedure Laterality Date     COLONOSCOPY N/A 3/8/2018    Procedure: COLONOSCOPY;  colonoscopy;  Surgeon: Iona Lutz MD;  [facility]     HERNIA REPAIR  2006    [facility]     LASER CO2 TYMPANOMASTOIDECTOMY  9/7/2011    Procedure:LASER CO2 TYMPANOMASTOIDECTOMY; Right Tympanoplasty , Cartilage Backed Right Tympanomastoidectomy, Omni Guide Laser on Standby  *Latex Safe*; Surgeon:BENNETT MAXWELL; Location:[facility]     SEPTOPLASTY  9/17/2012    Procedure: SEPTOPLASTY;  Septoplasty *Latex Safe* Turbinate reduction ;  Surgeon: Babar Dickerson MD;  [facility]     UVULOPALATOPHARYNGOPLASTY  7/9/2012    Procedure: UVULOPALATOPHARYNGOPLASTY;  Uvulopalatopharyngoplasty * Latex Safe*;  Surgeon: Babar Dickerson MD;  [facility]       Allergies: Trazodone and Thiamine    SH:   Social History     Socioeconomic History     Marital status: Single     Spouse name: Not on file     Number of children: Not on file     Years of education: Not on file     Highest education level: Not on file   Occupational History     Not on file   Tobacco Use     Smoking status: Current Every Day Smoker     Packs/day: 0.50     Types: Cigarettes     Smokeless tobacco:  Never Used   Substance and Sexual Activity     Alcohol use: No     Alcohol/week: 0.0 standard drinks     Drug use: No     Comment: hx polysubstance now in remission     Sexual activity: Not on file   Other Topics Concern     Parent/sibling w/ CABG, MI or angioplasty before 65F 55M? Not Asked   Social History Narrative     Not on file     Social Determinants of Health     Financial Resource Strain:      Difficulty of Paying Living Expenses:    Food Insecurity:      Worried About Running Out of Food in the Last Year:      Ran Out of Food in the Last Year:    Transportation Needs:      Lack of Transportation (Medical):      Lack of Transportation (Non-Medical):    Physical Activity:      Days of Exercise per Week:      Minutes of Exercise per Session:    Stress:      Feeling of Stress :    Social Connections:      Frequency of Communication with Friends and Family:      Frequency of Social Gatherings with Friends and Family:      Attends Judaism Services:      Active Member of Clubs or Organizations:      Attends Club or Organization Meetings:      Marital Status:    Intimate Partner Violence:      Fear of Current or Ex-Partner:      Emotionally Abused:      Physically Abused:      Sexually Abused:        FH:   Family History   Problem Relation Age of Onset     Depression Mother      Depression Father      Substance Abuse Father      Mental Illness Other         institutionalized     Substance Abuse Brother      Substance Abuse Brother      Substance Abuse Brother      Substance Abuse Brother      Substance Abuse Brother      Liver Disease No family hx of        Objective:  Data Unavailable Data Unavailable Data Unavailable Data Unavailable Data Unavailable 0 lbs 0 oz    Hemoglobin A1C   Date Value Ref Range Status   03/09/2017 5.9 4.3 - 6.0 % Final   08/14/2014 6.4 (A) 4.3 - 6.0 % Final         PT and DP pulses are 2/4 bilaterally. CRT is instant. Positive pedal hair.   Gross sensation is diminished bilaterally.  Protective sensation is absent on the right foot and diminished on the left as demonstrated by a 5.07G monofilament.   Equinus is noted bilaterally. No pain with active or passive ROM of the ankle, MTJ, 1st ray, or halluces bilaterally,. Hallux limitus BL.  No pain with palpation of the right 2nd MTPJ, but this is where he would have pain when walking.   Nails thickened and mycotic x 10 bilaterally. No open lesions are noted.     Assessment: Gianluca is a 64 YO diabetic with neuropathy.   Onychomycosis.      Plan:  - Pt seen and evaluated  - Nails debrided x 10.  - Daily foot exams and moisturizing.   - See again in 4 months.

## 2021-08-18 NOTE — NURSING NOTE
Reason For Visit:   Chief Complaint   Patient presents with     RECHECK     3 month Follow up Diabetic Mononeuropathy       There were no vitals taken for this visit.    Pain Assessment  Patient Currently in Pain: Yes  0-10 Pain Scale: 6 (pain on bottom of right foot)    Merrick Ramirez, EMT

## 2021-08-18 NOTE — LETTER
8/18/2021         RE: Gianluca Cooper  [address]  [address]  [facility] [address]        Dear Colleague,    Thank you for referring your patient, Gianluca Cooper, to the [facility] [place]. Please see a copy of my visit note below.    Chief Complaint   Patient presents with     RECHECK     3 month Follow up Diabetic Mononeuropathy          HPI: Gianluca is a 65 year old male who presents today for a diabetic foot exam and management. He relates that he has been diabetic for years. He gets his primary care at [facility].  He has diabetic shoes from January.  was used.    Review of Systems: no n/v/d/f/c/ns/sob/cp    PMH:   Past Medical History:   Diagnosis Date     Depression      Gastro-oesophageal reflux disease      Headache(784.0)      Hypertension      LOC (loss of consciousness) (H) age 45    out for 20 mintes after hit on top of head with board     Major depression 5/7/2013     Otitis media, chronic      Sleep apnea     cant tolerate cpap       PSxH:   Past Surgical History:   Procedure Laterality Date     COLONOSCOPY N/A 3/8/2018    Procedure: COLONOSCOPY;  colonoscopy;  Surgeon: Iona Lutz MD;  [facility]     HERNIA REPAIR  2006    [facility]     LASER CO2 TYMPANOMASTOIDECTOMY  9/7/2011    Procedure:LASER CO2 TYMPANOMASTOIDECTOMY; Right Tympanoplasty , Cartilage Backed Right Tympanomastoidectomy, Omni Guide Laser on Standby  *Latex Safe*; Surgeon:BENNETT MAXWELL; Location[facility] OR     SEPTOPLASTY  9/17/2012    Procedure: SEPTOPLASTY;  Septoplasty *Latex Safe* Turbinate reduction ;  Surgeon: Babar Dickerson MD;  [facility]: [facility]     UVULOPALATOPHARYNGOPLASTY  7/9/2012    Procedure: UVULOPALATOPHARYNGOPLASTY;  Uvulopalatopharyngoplasty * Latex Safe*;  Surgeon: Babar Dickerson MD;  [facility]: [facility]       Allergies: Trazodone and Thiamine    SH:   Social History     Socioeconomic History     Marital status: Single     Spouse name:  Not on file     Number of children: Not on file     Years of education: Not on file     Highest education level: Not on file   Occupational History     Not on file   Tobacco Use     Smoking status: Current Every Day Smoker     Packs/day: 0.50     Types: Cigarettes     Smokeless tobacco: Never Used   Substance and Sexual Activity     Alcohol use: No     Alcohol/week: 0.0 standard drinks     Drug use: No     Comment: hx polysubstance now in remission     Sexual activity: Not on file   Other Topics Concern     Parent/sibling w/ CABG, MI or angioplasty before 65F 55M? Not Asked   Social History Narrative     Not on file     Social Determinants of Health     Financial Resource Strain:      Difficulty of Paying Living Expenses:    Food Insecurity:      Worried About Running Out of Food in the Last Year:      Ran Out of Food in the Last Year:    Transportation Needs:      Lack of Transportation (Medical):      Lack of Transportation (Non-Medical):    Physical Activity:      Days of Exercise per Week:      Minutes of Exercise per Session:    Stress:      Feeling of Stress :    Social Connections:      Frequency of Communication with Friends and Family:      Frequency of Social Gatherings with Friends and Family:      Attends Yazdanism Services:      Active Member of Clubs or Organizations:      Attends Club or Organization Meetings:      Marital Status:    Intimate Partner Violence:      Fear of Current or Ex-Partner:      Emotionally Abused:      Physically Abused:      Sexually Abused:        FH:   Family History   Problem Relation Age of Onset     Depression Mother      Depression Father      Substance Abuse Father      Mental Illness Other         institutionalized     Substance Abuse Brother      Substance Abuse Brother      Substance Abuse Brother      Substance Abuse Brother      Substance Abuse Brother      Liver Disease No family hx of        Objective:  Data Unavailable Data Unavailable Data Unavailable Data  Unavailable Data Unavailable 0 lbs 0 oz    Hemoglobin A1C   Date Value Ref Range Status   03/09/2017 5.9 4.3 - 6.0 % Final   08/14/2014 6.4 (A) 4.3 - 6.0 % Final         PT and DP pulses are 2/4 bilaterally. CRT is instant. Positive pedal hair.   Gross sensation is diminished bilaterally. Protective sensation is absent on the right foot and diminished on the left as demonstrated by a 5.07G monofilament.   Equinus is noted bilaterally. No pain with active or passive ROM of the ankle, MTJ, 1st ray, or halluces bilaterally,. Hallux limitus BL.  No pain with palpation of the right 2nd MTPJ, but this is where he would have pain when walking.   Nails thickened and mycotic x 10 bilaterally. No open lesions are noted.     Assessment: Gianluca is a 66 YO diabetic with neuropathy.   Onychomycosis.      Plan:  - Pt seen and evaluated  - Nails debrided x 10.  - Daily foot exams and moisturizing.   - See again in 4 months.           Carl Martinez DPM

## 2021-09-08 DIAGNOSIS — F33.2 SEVERE RECURRENT MAJOR DEPRESSION WITHOUT PSYCHOTIC FEATURES (H): ICD-10-CM

## 2021-09-09 RX ORDER — OLANZAPINE 10 MG/1
10 TABLET ORAL AT BEDTIME
Qty: 30 TABLET | Refills: 2 | OUTPATIENT
Start: 2021-09-09

## 2021-09-09 NOTE — TELEPHONE ENCOUNTER
Medication requested:OLANZapine (ZYPREXA) 10 MG tablet Take one-half (7.5 mg) to one tablet (15 mg) by mouth at bedtime  Last refilled: 2/18/21  Qty: 30/1      Last seen: 2/18/21     Refill denied  No longer seen here  Pharmacy called and notified that pt is seen by..  Tr Cook, APRN,CNP    [address] [place], [place] [id number] [phone] (Work)    [phone] (Fax)

## 2021-09-30 ENCOUNTER — OFFICE VISIT (OUTPATIENT)
Dept: DERMATOLOGY | Facility: CLINIC | Age: 66
End: 2021-09-30
Attending: NURSE PRACTITIONER
Payer: MEDICARE

## 2021-09-30 DIAGNOSIS — D18.01 CHERRY ANGIOMA: ICD-10-CM

## 2021-09-30 DIAGNOSIS — L82.1 SEBORRHEIC KERATOSIS: ICD-10-CM

## 2021-09-30 DIAGNOSIS — L91.8 ACROCHORDON: ICD-10-CM

## 2021-09-30 DIAGNOSIS — D22.9 NEVUS: ICD-10-CM

## 2021-09-30 DIAGNOSIS — L81.4 SOLAR LENTIGO: Primary | ICD-10-CM

## 2021-09-30 PROCEDURE — 99203 OFFICE O/P NEW LOW 30 MIN: CPT | Mod: GC | Performed by: STUDENT IN AN ORGANIZED HEALTH CARE EDUCATION/TRAINING PROGRAM

## 2021-09-30 ASSESSMENT — PAIN SCALES - GENERAL: PAINLEVEL: NO PAIN (0)

## 2021-09-30 NOTE — PATIENT INSTRUCTIONS
Por favor usa bob crema para bloquear el sol que puede causar christiana a dowd piel. Por favor peteros

## 2021-09-30 NOTE — NURSING NOTE
Chief Complaint   Patient presents with     Skin Check     Gianluca, is here for a skin check      PT states he has no areas of concern.  He does not know why he is here.  Pt has a referral for moles.  Radha Germain LPN on 9/30/2021 at 8:20 AM

## 2021-09-30 NOTE — PROGRESS NOTES
[facility] Dermatology Clinic Note  Date: 09/30/21     Dermatology Problem List:  [facility], Anoop VILLALPANDO 09/30/21   1. LTM  -L central abdomen, 4mm macule  -L upper back, 4mm macule     Assessment & Plan    Solar lentigo  Seborrheic keratosis  Nevi  Cherry angioma  Acrochordon  Reassured patient of benign etiology.    Counseled on monitoring for ABCDEs of melanoma    Counseled on moisturizing with good OTC moisturizer, such as Cerave, Vanicream, Vaseline, or Cetaphil    Patient to continue monitoring at home with self-skin exams    Counseled on sun protection and use of SPF 30+ sunscreen, UPF clothing, sun avoidance, tanning bed avoidance    Procedures:  None      Follow Up: PRN    Staff: Kwasi    Time: 30 minutes in total spent on the day of the encounter (including chart review, patient visit, counseling, review of results, documentation, discussion with other providers, coordination of care, discussion with in chart review, patient visit, documentation, coordination of care, discussion with other providers, and/or discussion with family). A Icelandic  was used during this visit.    CC: Moira Cuadra NP  [facility]  [address] [place] [address] on close of this encounter.    I, Keturah Villalpando MD, saw this patient with the resident and agree with the resident s findings and plan of care as documented in the resident s note.      Subjective  Gianluca Cooper is a 66 year old male who presents as a new patient for skin evaluation.      Not sure why he is here    Says he thinks that there is some mole that he was sent here for     Avoids the sun    Information is obtained from chart review and the patient, who seems to be a reliable informant. A review of systems was performed, and relevant findings are noted in the HPI. The patient is otherwise feeling well without additional skin concerns.  The patient's past medical history, past  surgical history, immunizations, family history (no history of skin cancer), social history, allergies, and medications were reviewed and are noted, as relevant.     Objective  Vitals, labs, and imaging reviewed, as relevant.    General: well-appearing, in NAD  Eyes: Anicteric sclerae  Cardiovascular: Extremities well-perfused without digital cyanosis  Respiratory: Normal respiratory effort  Musculoskeletal: Moving extremities well  Neuro: Alert & oriented  Psych: Euthymic  Skin: Full body skin exam was performed, including: scalp, hair, face, eyelids, neck, lips, chest, back, abdomen, right upper extremity, left upper extremity, buttocks, groin, right lower extremity, left lower extremity.       On the L upper back is a dark 4mm macule with normal reticulated pattern and central darkening of pigment    On the L central abdomen is a 4mm macule with reticulated pattern and haziness at L superolateral portion near hair follicle    On the trunk/extremities are skin-colored pedunculated papules with erythema     On the trunk/extremities are dome-shaped firm bright red papules.     On the trunk/extremities and sun-exposed areas are multiple regular brown pigmented macules and papules.     On the trunk/extremities are tan to brown waxy, stuck-on papules.     Anoop Guzman MD  Resident Physician, PGY-2  Internal Medicine/Dermatology  Pager: [phone]

## 2021-09-30 NOTE — LETTER
9/30/2021       RE: Gianluca Cooper  [address]  [address]  [place] [place] [address]     Dear Colleague,    Thank you for referring your patient, Gianluca Cooper, to the [facility] [place] at [facility]. Please see a copy of my visit note below.    [facility] Dermatology Clinic Note  Date: 09/30/21     Dermatology Problem List:  [facility], Anoop Guzman  [facility] 09/30/21   1. LTM  -L central abdomen, 4mm macule  -L upper back, 4mm macule     Assessment & Plan    Solar lentigo  Seborrheic keratosis  Nevi  Cherry angioma  Acrochordon  Reassured patient of benign etiology.    Counseled on monitoring for ABCDEs of melanoma    Counseled on moisturizing with good OTC moisturizer, such as Cerave, Vanicream, Vaseline, or Cetaphil    Patient to continue monitoring at home with self-skin exams    Counseled on sun protection and use of SPF 30+ sunscreen, UPF clothing, sun avoidance, tanning bed avoidance    Procedures:  None      Follow Up: PRN    Staff: Kwasi    Time: 30 minutes in total spent on the day of the encounter (including chart review, patient visit, counseling, review of results, documentation, discussion with other providers, coordination of care, discussion with in chart review, patient visit, documentation, coordination of care, discussion with other providers, and/or discussion with family). A French  was used during this visit.    CC: Moira Cuadra NP  [facility]  [address] [place] [address] on close of this encounter.    I, Keturah Villalpando MD, saw this patient with the resident and agree with the resident s findings and plan of care as documented in the resident s note.      Subjective  Gianluca Cooper is a 66 year old male who presents as a new patient for skin evaluation.      Not sure why he is here    Says he thinks that there is some  mole that he was sent here for     Avoids the sun    Information is obtained from chart review and the patient, who seems to be a reliable informant. A review of systems was performed, and relevant findings are noted in the HPI. The patient is otherwise feeling well without additional skin concerns.  The patient's past medical history, past surgical history, immunizations, family history (no history of skin cancer), social history, allergies, and medications were reviewed and are noted, as relevant.     Objective  Vitals, labs, and imaging reviewed, as relevant.    General: well-appearing, in NAD  Eyes: Anicteric sclerae  Cardiovascular: Extremities well-perfused without digital cyanosis  Respiratory: Normal respiratory effort  Musculoskeletal: Moving extremities well  Neuro: Alert & oriented  Psych: Euthymic  Skin: Full body skin exam was performed, including: scalp, hair, face, eyelids, neck, lips, chest, back, abdomen, right upper extremity, left upper extremity, buttocks, groin, right lower extremity, left lower extremity.       On the L upper back is a dark 4mm macule with normal reticulated pattern and central darkening of pigment    On the L central abdomen is a 4mm macule with reticulated pattern and haziness at L superolateral portion near hair follicle    On the trunk/extremities are skin-colored pedunculated papules with erythema     On the trunk/extremities are dome-shaped firm bright red papules.     On the trunk/extremities and sun-exposed areas are multiple regular brown pigmented macules and papules.     On the trunk/extremities are tan to brown waxy, stuck-on papules.     Anoop Guzman MD  Resident Physician, PGY-2  Internal Medicine/Dermatology  Pager: [phone]

## 2021-10-18 ENCOUNTER — LAB REQUISITION (OUTPATIENT)
Dept: LAB | Facility: CLINIC | Age: 66
End: 2021-10-18
Payer: MEDICARE

## 2021-10-18 DIAGNOSIS — Z12.11 ENCOUNTER FOR SCREENING FOR MALIGNANT NEOPLASM OF COLON: ICD-10-CM

## 2021-10-18 LAB — HEMOCCULT STL QL IA: NEGATIVE

## 2021-10-18 PROCEDURE — 82274 ASSAY TEST FOR BLOOD FECAL: CPT | Mod: ORL | Performed by: NURSE PRACTITIONER

## 2021-11-17 ENCOUNTER — OFFICE VISIT (OUTPATIENT)
Dept: ORTHOPEDICS | Facility: CLINIC | Age: 66
End: 2021-11-17
Payer: MEDICARE

## 2021-11-17 DIAGNOSIS — E11.49 TYPE II OR UNSPECIFIED TYPE DIABETES MELLITUS WITH NEUROLOGICAL MANIFESTATIONS, NOT STATED AS UNCONTROLLED(250.60) (H): Primary | ICD-10-CM

## 2021-11-17 DIAGNOSIS — B35.1 OM (ONYCHOMYCOSIS): ICD-10-CM

## 2021-11-17 PROCEDURE — 99213 OFFICE O/P EST LOW 20 MIN: CPT | Performed by: PODIATRIST

## 2021-11-17 PROCEDURE — T1013 SIGN LANG/ORAL INTERPRETER: HCPCS | Mod: U3

## 2021-11-17 NOTE — LETTER
11/17/2021         RE: Gianluca Cooper  [address]  [address]  [facility] [place] [facility] [address]        Dear Colleague,    Thank you for referring your patient, Gianluca Cooper, to the [facility] [place]. Please see a copy of my visit note below.    Chief Complaint   Patient presents with     Follow Up     3 month folow up.           HPI: Gianluca is a 65 year old male who presents today for a diabetic foot exam and management. He relates that he has been diabetic for years. He gets his primary care at [facility].  He has diabetic shoes from January.  was used.    Review of Systems: no n/v/d/f/c/ns/sob/cp    PMH:   Past Medical History:   Diagnosis Date     Depression      Gastro-oesophageal reflux disease      Headache(784.0)      Hypertension      LOC (loss of consciousness) (H) age 45    out for 20 mintes after hit on top of head with board     Major depression 5/7/2013     Otitis media, chronic      Sleep apnea     cant tolerate cpap       PSxH:   Past Surgical History:   Procedure Laterality Date     COLONOSCOPY N/A 3/8/2018    Procedure: COLONOSCOPY;  colonoscopy;  Surgeon: Iona Lutz MD;  Location: [facility] GI     HERNIA REPAIR  2006    boith sides     LASER CO2 TYMPANOMASTOIDECTOMY  9/7/2011    Procedure:LASER CO2 TYMPANOMASTOIDECTOMY; Right Tympanoplasty , Cartilage Backed Right Tympanomastoidectomy, Omni Guide Laser on Standby  *Latex Safe*; Surgeon:BENNETT MAXWELL; Location:[facility] OR     SEPTOPLASTY  9/17/2012    Procedure: SEPTOPLASTY;  Septoplasty *Latex Safe* Turbinate reduction ;  Surgeon: Babar Dickerson MD;  Location: [facility] OR     UVULOPALATOPHARYNGOPLASTY  7/9/2012    Procedure: UVULOPALATOPHARYNGOPLASTY;  Uvulopalatopharyngoplasty * Latex Safe*;  Surgeon: Babar Dickerson MD;  Location: [facility] OR       Allergies: Trazodone and Thiamine    SH:   Social History     Socioeconomic History     Marital status: Single     Spouse name: Not on file      Number of children: Not on file     Years of education: Not on file     Highest education level: Not on file   Occupational History     Not on file   Tobacco Use     Smoking status: Current Every Day Smoker     Packs/day: 0.50     Types: Cigarettes     Smokeless tobacco: Never Used   Substance and Sexual Activity     Alcohol use: No     Alcohol/week: 0.0 standard drinks     Drug use: No     Comment: hx polysubstance now in remission     Sexual activity: Not on file   Other Topics Concern     Parent/sibling w/ CABG, MI or angioplasty before 65F 55M? Not Asked   Social History Narrative     Not on file     Social Determinants of Health     Financial Resource Strain: Not on file   Food Insecurity: Not on file   Transportation Needs: Not on file   Physical Activity: Not on file   Stress: Not on file   Social Connections: Not on file   Intimate Partner Violence: Not on file   Housing Stability: Not on file       FH:   Family History   Problem Relation Age of Onset     Depression Mother      Depression Father      Substance Abuse Father      Mental Illness Other         institutionalized     Substance Abuse Brother      Substance Abuse Brother      Substance Abuse Brother      Substance Abuse Brother      Substance Abuse Brother      Liver Disease No family hx of        Objective:  Data Unavailable Data Unavailable Data Unavailable Data Unavailable Data Unavailable 0 lbs 0 oz    Hemoglobin A1C   Date Value Ref Range Status   03/09/2017 5.9 4.3 - 6.0 % Final   08/14/2014 6.4 (A) 4.3 - 6.0 % Final         PT and DP pulses are 2/4 bilaterally. CRT is instant. Positive pedal hair.   Gross sensation is diminished bilaterally. Protective sensation is absent on the right foot and diminished on the left as demonstrated by a 5.07G monofilament.   Equinus is noted bilaterally. No pain with active or passive ROM of the ankle, MTJ, 1st ray, or halluces bilaterally,. Hallux limitus BL.  No pain with palpation of the right 2nd MTPJ,  but this is where he would have pain when walking.   Nails thickened and mycotic x 10 bilaterally. No open lesions are noted.     Assessment: Gianluca is a 64 YO diabetic with neuropathy.   Onychomycosis.      Plan:  - Pt seen and evaluated  - Nails debrided x 10.  - Daily foot exams and moisturizing.   - See again in 4 months.         Sincerely,        Carl Martinez DPM

## 2021-11-17 NOTE — PROGRESS NOTES
Chief Complaint   Patient presents with     Follow Up     3 month folow up.           HPI: Gianluca is a 65 year old male who presents today for a diabetic foot exam and management. He relates that he has been diabetic for years. He gets his primary care at [facility].  He has diabetic shoes from January.  was used.    Review of Systems: no n/v/d/f/c/ns/sob/cp    PMH:   Past Medical History:   Diagnosis Date     Depression      Gastro-oesophageal reflux disease      Headache(784.0)      Hypertension      LOC (loss of consciousness) (H) age 45    out for 20 mintes after hit on top of head with board     Major depression 5/7/2013     Otitis media, chronic      Sleep apnea     cant tolerate cpap       PSxH:   Past Surgical History:   Procedure Laterality Date     COLONOSCOPY N/A 3/8/2018    Procedure: COLONOSCOPY;  colonoscopy;  Surgeon: Iona Lutz MD;  Location: [facility]     HERNIA REPAIR  2006    boith sides     LASER CO2 TYMPANOMASTOIDECTOMY  9/7/2011    Procedure:LASER CO2 TYMPANOMASTOIDECTOMY; Right Tympanoplasty , Cartilage Backed Right Tympanomastoidectomy, Omni Guide Laser on Standby  *Latex Safe*; Surgeon:BENNETT MAXWELL; Location:[facility] OR     SEPTOPLASTY  9/17/2012    Procedure: SEPTOPLASTY;  Septoplasty *Latex Safe* Turbinate reduction ;  Surgeon: Babar Dickerson MD;  Location: [facility]     UVULOPALATOPHARYNGOPLASTY  7/9/2012    Procedure: UVULOPALATOPHARYNGOPLASTY;  Uvulopalatopharyngoplasty * Latex Safe*;  Surgeon: Babar Dickerson MD;  Location: [facility] OR       Allergies: Trazodone and Thiamine    SH:   Social History     Socioeconomic History     Marital status: Single     Spouse name: Not on file     Number of children: Not on file     Years of education: Not on file     Highest education level: Not on file   Occupational History     Not on file   Tobacco Use     Smoking status: Current Every Day Smoker     Packs/day: 0.50     Types: Cigarettes     Smokeless tobacco: Never Used   Substance  and Sexual Activity     Alcohol use: No     Alcohol/week: 0.0 standard drinks     Drug use: No     Comment: hx polysubstance now in remission     Sexual activity: Not on file   Other Topics Concern     Parent/sibling w/ CABG, MI or angioplasty before 65F 55M? Not Asked   Social History Narrative     Not on file     Social Determinants of Health     Financial Resource Strain: Not on file   Food Insecurity: Not on file   Transportation Needs: Not on file   Physical Activity: Not on file   Stress: Not on file   Social Connections: Not on file   Intimate Partner Violence: Not on file   Housing Stability: Not on file       FH:   Family History   Problem Relation Age of Onset     Depression Mother      Depression Father      Substance Abuse Father      Mental Illness Other         institutionalized     Substance Abuse Brother      Substance Abuse Brother      Substance Abuse Brother      Substance Abuse Brother      Substance Abuse Brother      Liver Disease No family hx of        Objective:  Data Unavailable Data Unavailable Data Unavailable Data Unavailable Data Unavailable 0 lbs 0 oz    Hemoglobin A1C   Date Value Ref Range Status   03/09/2017 5.9 4.3 - 6.0 % Final   08/14/2014 6.4 (A) 4.3 - 6.0 % Final         PT and DP pulses are 2/4 bilaterally. CRT is instant. Positive pedal hair.   Gross sensation is diminished bilaterally. Protective sensation is absent on the right foot and diminished on the left as demonstrated by a 5.07G monofilament.   Equinus is noted bilaterally. No pain with active or passive ROM of the ankle, MTJ, 1st ray, or halluces bilaterally,. Hallux limitus BL.  No pain with palpation of the right 2nd MTPJ, but this is where he would have pain when walking.   Nails thickened and mycotic x 10 bilaterally. No open lesions are noted.     Assessment: Gianluca is a 66 YO diabetic with neuropathy.   Onychomycosis.      Plan:  - Pt seen and evaluated  - Nails debrided x 10.  - Daily foot exams and  moisturizing.   - See again in 4 months.

## 2021-11-17 NOTE — NURSING NOTE
Reason For Visit:   Chief Complaint   Patient presents with     Follow Up     3 month folow up.        Pain Assessment  Patient Currently in Pain: Denies        Allergies   Allergen Reactions     Trazodone Other (See Comments)     Very depressed and suicidal  Other reaction(s): Other (See Comments)  Very depressed and suicidal     Thiamine Nausea     LegacyRecord#[id number]           Rola Grace LPN

## 2021-12-15 ENCOUNTER — LAB REQUISITION (OUTPATIENT)
Dept: LAB | Facility: CLINIC | Age: 66
End: 2021-12-15
Payer: MEDICARE

## 2021-12-15 DIAGNOSIS — I10 ESSENTIAL (PRIMARY) HYPERTENSION: ICD-10-CM

## 2021-12-15 LAB
ANION GAP SERPL CALCULATED.3IONS-SCNC: 9 MMOL/L (ref 3–14)
BUN SERPL-MCNC: 16 MG/DL (ref 7–30)
CALCIUM SERPL-MCNC: 9.3 MG/DL (ref 8.5–10.1)
CHLORIDE BLD-SCNC: 108 MMOL/L (ref 94–109)
CO2 SERPL-SCNC: 21 MMOL/L (ref 20–32)
CREAT SERPL-MCNC: 0.84 MG/DL (ref 0.66–1.25)
GFR SERPL CREATININE-BSD FRML MDRD: >90 ML/MIN/1.73M2
GLUCOSE BLD-MCNC: 89 MG/DL (ref 70–99)
POTASSIUM BLD-SCNC: 3.5 MMOL/L (ref 3.4–5.3)
SODIUM SERPL-SCNC: 138 MMOL/L (ref 133–144)

## 2021-12-15 PROCEDURE — 80048 BASIC METABOLIC PNL TOTAL CA: CPT | Mod: ORL | Performed by: FAMILY MEDICINE

## 2021-12-21 ENCOUNTER — LAB REQUISITION (OUTPATIENT)
Dept: LAB | Facility: CLINIC | Age: 66
End: 2021-12-21
Payer: MEDICARE

## 2021-12-21 DIAGNOSIS — R63.4 ABNORMAL WEIGHT LOSS: ICD-10-CM

## 2021-12-21 DIAGNOSIS — I10 ESSENTIAL (PRIMARY) HYPERTENSION: ICD-10-CM

## 2021-12-21 LAB
MAGNESIUM SERPL-MCNC: 1.6 MG/DL (ref 1.6–2.3)
POTASSIUM BLD-SCNC: 3.2 MMOL/L (ref 3.4–5.3)
TSH SERPL DL<=0.005 MIU/L-ACNC: 0.74 MU/L (ref 0.4–4)

## 2021-12-21 PROCEDURE — 84443 ASSAY THYROID STIM HORMONE: CPT | Mod: ORL | Performed by: NURSE PRACTITIONER

## 2021-12-21 PROCEDURE — 84132 ASSAY OF SERUM POTASSIUM: CPT | Mod: ORL | Performed by: NURSE PRACTITIONER

## 2021-12-21 PROCEDURE — 83735 ASSAY OF MAGNESIUM: CPT | Mod: ORL | Performed by: NURSE PRACTITIONER

## 2022-01-12 ENCOUNTER — LAB REQUISITION (OUTPATIENT)
Dept: LAB | Facility: CLINIC | Age: 67
End: 2022-01-12
Payer: MEDICARE

## 2022-01-12 DIAGNOSIS — E87.6 HYPOKALEMIA: ICD-10-CM

## 2022-01-12 LAB — POTASSIUM BLD-SCNC: 4.6 MMOL/L (ref 3.4–5.3)

## 2022-01-12 PROCEDURE — 84132 ASSAY OF SERUM POTASSIUM: CPT | Mod: ORL | Performed by: NURSE PRACTITIONER

## 2022-01-25 ENCOUNTER — APPOINTMENT (OUTPATIENT)
Dept: INTERPRETER SERVICES | Facility: CLINIC | Age: 67
End: 2022-01-25
Payer: MEDICARE

## 2022-01-26 ENCOUNTER — ANCILLARY PROCEDURE (OUTPATIENT)
Dept: CARDIOLOGY | Facility: CLINIC | Age: 67
End: 2022-01-26
Attending: NURSE PRACTITIONER
Payer: MEDICARE

## 2022-01-26 ENCOUNTER — ANCILLARY PROCEDURE (OUTPATIENT)
Dept: CT IMAGING | Facility: CLINIC | Age: 67
End: 2022-01-26
Attending: NURSE PRACTITIONER
Payer: MEDICARE

## 2022-01-26 DIAGNOSIS — F17.210 SMOKING GREATER THAN 40 PACK YEARS: ICD-10-CM

## 2022-01-26 DIAGNOSIS — R01.1 CARDIAC MURMUR: ICD-10-CM

## 2022-01-26 DIAGNOSIS — F17.200 SMOKER: ICD-10-CM

## 2022-01-26 LAB — LVEF ECHO: NORMAL

## 2022-01-26 PROCEDURE — 71271 CT THORAX LUNG CANCER SCR C-: CPT | Performed by: RADIOLOGY

## 2022-01-26 PROCEDURE — 93306 TTE W/DOPPLER COMPLETE: CPT | Performed by: INTERNAL MEDICINE

## 2022-02-03 ENCOUNTER — LAB REQUISITION (OUTPATIENT)
Dept: LAB | Facility: CLINIC | Age: 67
End: 2022-02-03
Payer: MEDICARE

## 2022-02-03 DIAGNOSIS — E87.6 HYPOKALEMIA: ICD-10-CM

## 2022-02-03 DIAGNOSIS — E78.00 PURE HYPERCHOLESTEROLEMIA, UNSPECIFIED: ICD-10-CM

## 2022-02-03 LAB
CHOLEST SERPL-MCNC: 117 MG/DL
FASTING STATUS PATIENT QL REPORTED: NORMAL
HDLC SERPL-MCNC: 47 MG/DL
LDLC SERPL CALC-MCNC: 58 MG/DL
NONHDLC SERPL-MCNC: 70 MG/DL
POTASSIUM BLD-SCNC: 4.4 MMOL/L (ref 3.4–5.3)
TRIGL SERPL-MCNC: 61 MG/DL

## 2022-02-03 PROCEDURE — 80061 LIPID PANEL: CPT | Mod: ORL | Performed by: FAMILY MEDICINE

## 2022-02-03 PROCEDURE — 84132 ASSAY OF SERUM POTASSIUM: CPT | Mod: ORL | Performed by: FAMILY MEDICINE

## 2022-02-16 ENCOUNTER — OFFICE VISIT (OUTPATIENT)
Dept: ORTHOPEDICS | Facility: CLINIC | Age: 67
End: 2022-02-16
Payer: MEDICARE

## 2022-02-16 DIAGNOSIS — E11.49 TYPE II OR UNSPECIFIED TYPE DIABETES MELLITUS WITH NEUROLOGICAL MANIFESTATIONS, NOT STATED AS UNCONTROLLED(250.60) (H): Primary | ICD-10-CM

## 2022-02-16 DIAGNOSIS — M20.5X1 HALLUX LIMITUS, RIGHT: ICD-10-CM

## 2022-02-16 DIAGNOSIS — M20.5X2 HALLUX LIMITUS OF LEFT FOOT: ICD-10-CM

## 2022-02-16 DIAGNOSIS — M21.42 PES PLANUS OF BOTH FEET: ICD-10-CM

## 2022-02-16 DIAGNOSIS — M21.41 PES PLANUS OF BOTH FEET: ICD-10-CM

## 2022-02-16 DIAGNOSIS — B35.1 OM (ONYCHOMYCOSIS): ICD-10-CM

## 2022-02-16 PROCEDURE — 99213 OFFICE O/P EST LOW 20 MIN: CPT | Performed by: PODIATRIST

## 2022-02-16 NOTE — PROGRESS NOTES
Chief Complaint   Patient presents with     Follow Up     3 month follow up. Diabetic foot care.           HPI: Gianluca is a 65 year old male who presents today for a diabetic foot exam and management. He relates that he has been diabetic for years. He gets his primary care at [facility].  He needs new diabetic shoes.     Review of Systems: no n/v/d/f/c/ns/sob/cp    PMH:   Past Medical History:   Diagnosis Date     Depression      Gastro-oesophageal reflux disease      Headache(784.0)      Hypertension      LOC (loss of consciousness) (H) age 45    out for 20 mintes after hit on top of head with board     Major depression 5/7/2013     Otitis media, chronic      Sleep apnea     cant tolerate cpap       PSxH:   Past Surgical History:   Procedure Laterality Date     COLONOSCOPY N/A 3/8/2018    Procedure: COLONOSCOPY;  colonoscopy;  Surgeon: Iona Lutz MD;  Location: [facility]     HERNIA REPAIR  2006    boith sides     LASER CO2 TYMPANOMASTOIDECTOMY  9/7/2011    Procedure:LASER CO2 TYMPANOMASTOIDECTOMY; Right Tympanoplasty , Cartilage Backed Right Tympanomastoidectomy, Omni Guide Laser on Standby  *Latex Safe*; Surgeon:BENNETT MAXWELL; Location:[facility] OR     SEPTOPLASTY  9/17/2012    Procedure: SEPTOPLASTY;  Septoplasty *Latex Safe* Turbinate reduction ;  Surgeon: Babar Dickerson MD;  Location: [facility]     UVULOPALATOPHARYNGOPLASTY  7/9/2012    Procedure: UVULOPALATOPHARYNGOPLASTY;  Uvulopalatopharyngoplasty * Latex Safe*;  Surgeon: Babar Dickerson MD;  Location: [facility] OR       Allergies: Trazodone and Thiamine    SH:   Social History     Socioeconomic History     Marital status: Single     Spouse name: Not on file     Number of children: Not on file     Years of education: Not on file     Highest education level: Not on file   Occupational History     Not on file   Tobacco Use     Smoking status: Current Every Day Smoker     Packs/day: 0.50     Types: Cigarettes     Smokeless tobacco: Never Used   Substance and Sexual  Activity     Alcohol use: No     Alcohol/week: 0.0 standard drinks     Drug use: No     Comment: hx polysubstance now in remission     Sexual activity: Not on file   Other Topics Concern     Parent/sibling w/ CABG, MI or angioplasty before 65F 55M? Not Asked   Social History Narrative     Not on file     Social Determinants of Health     Financial Resource Strain: Not on file   Food Insecurity: Not on file   Transportation Needs: Not on file   Physical Activity: Not on file   Stress: Not on file   Social Connections: Not on file   Intimate Partner Violence: Not on file   Housing Stability: Not on file       FH:   Family History   Problem Relation Age of Onset     Depression Mother      Depression Father      Substance Abuse Father      Mental Illness Other         institutionalized     Substance Abuse Brother      Substance Abuse Brother      Substance Abuse Brother      Substance Abuse Brother      Substance Abuse Brother      Liver Disease No family hx of        Objective:  Data Unavailable Data Unavailable Data Unavailable Data Unavailable Data Unavailable 0 lbs 0 oz    Hemoglobin A1C POCT   Date Value Ref Range Status   03/09/2017 5.9 4.3 - 6.0 % Final   08/14/2014 6.4 (A) 4.3 - 6.0 % Final         PT and DP pulses are 2/4 bilaterally. CRT is instant. Positive pedal hair.   Gross sensation is diminished bilaterally. Protective sensation is absent on the right foot and diminished on the left as demonstrated by a 5.07G monofilament.   Equinus is noted bilaterally. No pain with active or passive ROM of the ankle, MTJ, 1st ray, or halluces bilaterally,. Hallux limitus BL.  No pain with palpation of the right 2nd MTPJ, but this is where he would have pain when walking.   Nails thickened and mycotic x 10 bilaterally. No open lesions are noted.     Assessment: Gianluca is a 66 YO diabetic with neuropathy.   Onychomycosis.    Pes planus.   Hallux limitus BL.     Plan:  - Pt seen and evaluated  - Nails debrided x 10.  - Rx  for diabetic shoes.   - Daily foot exams and moisturizing.   - See again in 4 months.

## 2022-02-16 NOTE — LETTER
2/16/2022         RE: Gianluca Cooper  [address]  [address]  [facility] [address]        Dear Colleague,    Thank you for referring your patient, Gianluca Cooper, to the [facility] [place]. Please see a copy of my visit note below.    Chief Complaint   Patient presents with     Follow Up     3 month follow up. Diabetic foot care.           HPI: Gianluca is a 65 year old male who presents today for a diabetic foot exam and management. He relates that he has been diabetic for years. He gets his primary care at [facility].  He needs new diabetic shoes.     Review of Systems: no n/v/d/f/c/ns/sob/cp    PMH:   Past Medical History:   Diagnosis Date     Depression      Gastro-oesophageal reflux disease      Headache(784.0)      Hypertension      LOC (loss of consciousness) (H) age 45    out for 20 mintes after hit on top of head with board     Major depression 5/7/2013     Otitis media, chronic      Sleep apnea     cant tolerate cpap       PSxH:   Past Surgical History:   Procedure Laterality Date     COLONOSCOPY N/A 3/8/2018    Procedure: COLONOSCOPY;  colonoscopy;  Surgeon: Iona Lutz MD;  [facility]     [facility]     LASER CO2 TYMPANOMASTOIDECTOMY  9/7/2011    Procedure:LASER CO2 TYMPANOMASTOIDECTOMY; Right Tympanoplasty , Cartilage Backed Right Tympanomastoidectomy, Omni Guide Laser on Standby  *Latex Safe*; Surgeon:BENNETT MAXWELL; Location:[facility] OR     SEPTOPLASTY  9/17/2012    Procedure: SEPTOPLASTY;  Septoplasty *Latex Safe* Turbinate reduction ;  Surgeon: Babar Dickerson MD;  [facility] OR     UVULOPALATOPHARYNGOPLASTY  7/9/2012    Procedure: UVULOPALATOPHARYNGOPLASTY;  Uvulopalatopharyngoplasty * Latex Safe*;  Surgeon: Babar Dickerson MD;  [facility]       Allergies: Trazodone and Thiamine    SH:   Social History     Socioeconomic History     Marital status: Single     Spouse name: Not on file     Number of children:  Not on file     Years of education: Not on file     Highest education level: Not on file   Occupational History     Not on file   Tobacco Use     Smoking status: Current Every Day Smoker     Packs/day: 0.50     Types: Cigarettes     Smokeless tobacco: Never Used   Substance and Sexual Activity     Alcohol use: No     Alcohol/week: 0.0 standard drinks     Drug use: No     Comment: hx polysubstance now in remission     Sexual activity: Not on file   Other Topics Concern     Parent/sibling w/ CABG, MI or angioplasty before 65F 55M? Not Asked   Social History Narrative     Not on file     Social Determinants of Health     Financial Resource Strain: Not on file   Food Insecurity: Not on file   Transportation Needs: Not on file   Physical Activity: Not on file   Stress: Not on file   Social Connections: Not on file   Intimate Partner Violence: Not on file   Housing Stability: Not on file       FH:   Family History   Problem Relation Age of Onset     Depression Mother      Depression Father      Substance Abuse Father      Mental Illness Other         institutionalized     Substance Abuse Brother      Substance Abuse Brother      Substance Abuse Brother      Substance Abuse Brother      Substance Abuse Brother      Liver Disease No family hx of        Objective:  Data Unavailable Data Unavailable Data Unavailable Data Unavailable Data Unavailable 0 lbs 0 oz    Hemoglobin A1C POCT   Date Value Ref Range Status   03/09/2017 5.9 4.3 - 6.0 % Final   08/14/2014 6.4 (A) 4.3 - 6.0 % Final         PT and DP pulses are 2/4 bilaterally. CRT is instant. Positive pedal hair.   Gross sensation is diminished bilaterally. Protective sensation is absent on the right foot and diminished on the left as demonstrated by a 5.07G monofilament.   Equinus is noted bilaterally. No pain with active or passive ROM of the ankle, MTJ, 1st ray, or halluces bilaterally,. Hallux limitus BL.  No pain with palpation of the right 2nd MTPJ, but this is where  he would have pain when walking.   Nails thickened and mycotic x 10 bilaterally. No open lesions are noted.     Assessment: Gianluca is a 66 YO diabetic with neuropathy.   Onychomycosis.    Pes planus.   Hallux limitus BL.     Plan:  - Pt seen and evaluated  - Nails debrided x 10.  - Rx for diabetic shoes.   - Daily foot exams and moisturizing.   - See again in 4 months.           Carl Martinez DPM

## 2022-04-13 ENCOUNTER — APPOINTMENT (OUTPATIENT)
Dept: INTERPRETER SERVICES | Facility: CLINIC | Age: 67
End: 2022-04-13
Payer: MEDICARE

## 2022-07-11 ENCOUNTER — LAB REQUISITION (OUTPATIENT)
Dept: LAB | Facility: CLINIC | Age: 67
End: 2022-07-11
Payer: MEDICARE

## 2022-07-11 DIAGNOSIS — E11.9 TYPE 2 DIABETES MELLITUS WITHOUT COMPLICATIONS (H): ICD-10-CM

## 2022-07-11 LAB
ALBUMIN SERPL BCG-MCNC: 3.9 G/DL (ref 3.5–5.2)
ALP SERPL-CCNC: 78 U/L (ref 40–129)
ALT SERPL W P-5'-P-CCNC: 13 U/L (ref 10–50)
AST SERPL W P-5'-P-CCNC: NORMAL U/L
BILIRUB DIRECT SERPL-MCNC: NORMAL MG/DL
BILIRUB SERPL-MCNC: 0.2 MG/DL
PROT SERPL-MCNC: 6.6 G/DL (ref 6.4–8.3)

## 2022-07-11 PROCEDURE — 84155 ASSAY OF PROTEIN SERUM: CPT | Mod: ORL | Performed by: PEDIATRICS

## 2022-08-03 ENCOUNTER — LAB REQUISITION (OUTPATIENT)
Dept: LAB | Facility: CLINIC | Age: 67
End: 2022-08-03
Payer: MEDICARE

## 2022-08-03 DIAGNOSIS — N52.8 OTHER MALE ERECTILE DYSFUNCTION: ICD-10-CM

## 2022-08-03 DIAGNOSIS — Z12.11 ENCOUNTER FOR SCREENING FOR MALIGNANT NEOPLASM OF COLON: ICD-10-CM

## 2022-08-03 DIAGNOSIS — E11.9 TYPE 2 DIABETES MELLITUS WITHOUT COMPLICATIONS (H): ICD-10-CM

## 2022-08-03 PROCEDURE — 82274 ASSAY TEST FOR BLOOD FECAL: CPT | Mod: ORL | Performed by: PEDIATRICS

## 2022-08-04 LAB — HEMOCCULT STL QL IA: NEGATIVE

## 2022-12-01 ENCOUNTER — LAB REQUISITION (OUTPATIENT)
Dept: LAB | Facility: CLINIC | Age: 67
End: 2022-12-01
Payer: MEDICARE

## 2022-12-01 DIAGNOSIS — Z00.00 ENCOUNTER FOR GENERAL ADULT MEDICAL EXAMINATION WITHOUT ABNORMAL FINDINGS: ICD-10-CM

## 2022-12-01 LAB
ANION GAP SERPL CALCULATED.3IONS-SCNC: 17 MMOL/L (ref 7–15)
BUN SERPL-MCNC: 14.8 MG/DL (ref 8–23)
CALCIUM SERPL-MCNC: 9.7 MG/DL (ref 8.8–10.2)
CHLORIDE SERPL-SCNC: 103 MMOL/L (ref 98–107)
CHOLEST SERPL-MCNC: 132 MG/DL
CREAT SERPL-MCNC: 1.03 MG/DL (ref 0.67–1.17)
DEPRECATED HCO3 PLAS-SCNC: 17 MMOL/L (ref 22–29)
GFR SERPL CREATININE-BSD FRML MDRD: 79 ML/MIN/1.73M2
GLUCOSE SERPL-MCNC: 94 MG/DL (ref 70–99)
HDLC SERPL-MCNC: 42 MG/DL
LDLC SERPL CALC-MCNC: 63 MG/DL
NONHDLC SERPL-MCNC: 90 MG/DL
POTASSIUM SERPL-SCNC: 4.5 MMOL/L (ref 3.4–5.3)
SODIUM SERPL-SCNC: 137 MMOL/L (ref 136–145)
TRIGL SERPL-MCNC: 133 MG/DL

## 2022-12-01 PROCEDURE — 80061 LIPID PANEL: CPT | Mod: ORL | Performed by: INTERNAL MEDICINE

## 2022-12-01 PROCEDURE — 80048 BASIC METABOLIC PNL TOTAL CA: CPT | Mod: ORL | Performed by: INTERNAL MEDICINE

## 2023-05-15 ENCOUNTER — LAB REQUISITION (OUTPATIENT)
Dept: LAB | Facility: CLINIC | Age: 68
End: 2023-05-15
Payer: MEDICARE

## 2023-05-15 ENCOUNTER — MEDICAL CORRESPONDENCE (OUTPATIENT)
Dept: HEALTH INFORMATION MANAGEMENT | Facility: CLINIC | Age: 68
End: 2023-05-15

## 2023-05-15 DIAGNOSIS — K40.91 UNILATERAL INGUINAL HERNIA, WITHOUT OBSTRUCTION OR GANGRENE, RECURRENT: ICD-10-CM

## 2023-05-15 DIAGNOSIS — E11.9 TYPE 2 DIABETES MELLITUS WITHOUT COMPLICATIONS (H): ICD-10-CM

## 2023-05-15 LAB
ALBUMIN SERPL BCG-MCNC: 4.3 G/DL (ref 3.5–5.2)
ALP SERPL-CCNC: 84 U/L (ref 40–129)
ALT SERPL W P-5'-P-CCNC: 14 U/L (ref 10–50)
ANION GAP SERPL CALCULATED.3IONS-SCNC: 16 MMOL/L (ref 7–15)
AST SERPL W P-5'-P-CCNC: 21 U/L (ref 10–50)
BILIRUB SERPL-MCNC: 0.4 MG/DL
BUN SERPL-MCNC: 17.3 MG/DL (ref 8–23)
CALCIUM SERPL-MCNC: 9.9 MG/DL (ref 8.8–10.2)
CHLORIDE SERPL-SCNC: 101 MMOL/L (ref 98–107)
CREAT SERPL-MCNC: 0.97 MG/DL (ref 0.67–1.17)
DEPRECATED HCO3 PLAS-SCNC: 18 MMOL/L (ref 22–29)
GFR SERPL CREATININE-BSD FRML MDRD: 85 ML/MIN/1.73M2
GLUCOSE SERPL-MCNC: 107 MG/DL (ref 70–99)
POTASSIUM SERPL-SCNC: 4.4 MMOL/L (ref 3.4–5.3)
PROT SERPL-MCNC: 7.2 G/DL (ref 6.4–8.3)
SODIUM SERPL-SCNC: 135 MMOL/L (ref 136–145)

## 2023-05-15 PROCEDURE — 80053 COMPREHEN METABOLIC PANEL: CPT | Mod: ORL | Performed by: PEDIATRICS

## 2023-05-19 ENCOUNTER — TRANSCRIBE ORDERS (OUTPATIENT)
Dept: OTHER | Age: 68
End: 2023-05-19

## 2023-05-19 DIAGNOSIS — K40.91 UNILATERAL RECURRENT INGUINAL HERNIA WITHOUT OBSTRUCTION OR GANGRENE: Primary | ICD-10-CM

## 2023-05-19 NOTE — TELEPHONE ENCOUNTER
REFERRAL INFORMATION:    Referring Provider:  Dr. Mariia Hurley     Referring Clinic:  [facility]    Reason for Visit/Diagnosis: Inguinal hernia       FUTURE VISIT INFORMATION:    Appointment Date: 05-23-23    Appointment Time: @ 10:30am      NOTES RECORD STATUS  DETAILS   OFFICE NOTE from Referring Provider Care Everywhere 05-19-23, 05-15-23 Dr. Mariia Hurley    OFFICE NOTE from Other Specialists N/A    HOSPITAL DISCHARGE SUMMARY/ ED VISITS  N/A    OPERATIVE REPORT N/A    ENDOSCOPY (EGD)  N/A    PERTINENT LABS Internal/CE C-diff: 05-26-20, 11-13-17   PATHOLOGY REPORTS (RELATED) N/A    IMAGING (CT, MRI, US, XR)  N/A

## 2023-05-23 ENCOUNTER — PRE VISIT (OUTPATIENT)
Dept: SURGERY | Facility: CLINIC | Age: 68
End: 2023-05-23

## 2023-05-23 ENCOUNTER — OFFICE VISIT (OUTPATIENT)
Dept: SURGERY | Facility: CLINIC | Age: 68
End: 2023-05-23
Attending: PEDIATRICS
Payer: MEDICARE

## 2023-05-23 VITALS
BODY MASS INDEX: 28.04 KG/M2 | WEIGHT: 174.5 LBS | SYSTOLIC BLOOD PRESSURE: 115 MMHG | HEART RATE: 72 BPM | HEIGHT: 66 IN | DIASTOLIC BLOOD PRESSURE: 81 MMHG | OXYGEN SATURATION: 96 %

## 2023-05-23 DIAGNOSIS — K40.90 RIGHT INGUINAL HERNIA: Primary | ICD-10-CM

## 2023-05-23 PROCEDURE — 99203 OFFICE O/P NEW LOW 30 MIN: CPT | Performed by: SURGERY

## 2023-05-23 RX ORDER — CEFAZOLIN SODIUM 2 G/50ML
2 SOLUTION INTRAVENOUS
Status: CANCELLED | OUTPATIENT
Start: 2023-05-23

## 2023-05-23 RX ORDER — CEFAZOLIN SODIUM 2 G/50ML
2 SOLUTION INTRAVENOUS SEE ADMIN INSTRUCTIONS
Status: CANCELLED | OUTPATIENT
Start: 2023-05-23

## 2023-05-23 ASSESSMENT — PAIN SCALES - GENERAL: PAINLEVEL: MODERATE PAIN (5)

## 2023-05-23 NOTE — PATIENT INSTRUCTIONS
You met with Dr. Frank Carlson.      Today's visit instructions:    Reminder:  Surgery Requirements  Your surgery will be at [facility] [place]- 5th Floor  You will need to arrive 1.5 - 2 hours early based on the location of your surgery ([facility] 1.5 hours, [facility] [place] [facility]\""  2 hours).  You will need someone to drive you home (over 18 years old) and stay with you for 24 hours after the procedure.  You will need a preop physical with your regular doctor (or PAC if requested by your surgeon) within 30 days of surgery- closer is always better.  Stop any blood thinners, vitamins, minerals, or herbal supplements 5 days before surgery.  If you are taking a prescribed blood thinner please let us know for specific instructions.  Fasting- a nurse from [facility] will call you 1-2 days before surgery to confirm your procedure and tell you when to stop eating and drinking.   Wash with the soap (Antibacterial, Dial Complete Foam, Hibiclense, or soap given/mailed from the clinic) the night before surgery and morning of surgery. See instructions in the Surgery Packet.  If you would like a procedure estimate please call Cost of Care at [phone].    If you have questions please contact Cherelle RN or Luly RN during regular clinic hours, Monday through Friday 7:30 AM - 4:00 PM, or you can contact us via [employer] at anytime.       If you have urgent needs after-hours, weekends, or holidays please call the hospital at [phone] and ask to speak with our on-call General Surgery Team.    Appointment [phone]  Nurse Advice (Cherelle or Luly): [phone]   Surgery Scheduler (Sherin): [phone]  Fax: [phone]    After Your Open Inguinal Hernia Repair         Incision care   You may take a shower the day after surgery. Carefully wash your incision with soap and water. Do not submerge yourself in water (bath, whirlpool, hot tub, pool, lake) for 14 days after surgery.   Remove the bandage  the day after surgery, but leave the medical tape (Steri-Strips) or glue in place. These will loosen and fall off on their own 1-2 weeks after surgery.    Always wash your hands before touching your incisions or removing bandages.   It is not unusual to form a collection of fluid or blood under your incision that may feel firm or squishy- it can take several weeks to months for your body to reabsorb it.  At times, it may even drain.  If that should happen keep the area clean with soap, water,  and cover with a clean gauze dressing. You can change this daily or as needed.  It is not unusual to develop swelling and/or bruising of the scrotum and penis and it can take several weeks or a month to improve.      Other medicines   Wait to start aspirin or blood thinners until the day after surgery. You can take any other regular medicines at your normal time the day after surgery.   Your pain medicine may cause constipation (hard, dry stools). To help with this, take the stool softener your doctor gave you or an over-the-counter stool softener or laxative. You can stop taking this when you are no longer taking pain medicine and your bowel movements are back to normal.      For pain or discomfort   Take the narcotic pain medicine your doctor gave you as needed and as instructed on the bottle. If you prefer to use over-the-counter medication, use acetaminophen (Tylenol) or ibuprofen (Advil, Motrin) as instructed on the box. Do not take Tylenol if it is in your narcotic pain medication.    Use an ice pack on your groin for 20 minutes at a time as needed for the first 24 hours. Be sure to protect your skin by putting a cloth between the ice pack and your skin.   After 24 hours you can switch to heat for 20 minutes as needed. Be sure to protect your skin by putting a cloth between the heat pack and your skin.    It is normal to feel a lump in your groin after surgery. This lump may take up to 8 weeks to go away.      Activities    No driving until you feel it s safe to do so. Don t drive while taking narcotic pain medicine.   Don t lift anything heavier than 20 pounds for 3 weeks after surgery.      Special equipment   Male Patients:  We recommend that you wear scrotal support for the first 3 days after surgery; however, you can wear it longer if you wish.  We suggest using an athletic supporter (Jock Strap), snug briefs, or Spandex biker shorts.     Diet   You can eat your regular meals after surgery.      When to call the doctor   Call your doctor if you have:   A fever above 101 F (38.3 C) (taken under the tongue), or a fever or chills lasting more than a day.   Redness at the incision site.   Any fluid or blood draining from the incision, especially if it smells bad.    Severe pain that doesn t improve with pain medicine.      We will call you 2 to 4 days after surgery to review this handout, answer questions and help arrange after-surgery care. If you have questions or concerns, please call [phone] during regular office hours. If you need to call after business hours, call [phone] and ask to page the surgeon on-call.     IMPORTANT:  Prior to your surgical procedure, a nurse will be contacting you to obtain a health history.   If they do not reach you by noon the day prior to your surgery, your surgery will be cancelled. If you have your Pre-Op Surgical Physical (H&P) with the Anesthesia Team (PAC), you are exempt from this call. Phone:  [phone] ([facility]) or [phone] ([place]).

## 2023-05-23 NOTE — PROGRESS NOTES
Gianluca Cooper is a 67 year old male with a 1 month history of a right inguinal mass with the following symptoms of lump.    Onset did not occur with lifting.  Obstructive symptoms:  no  Urinary difficulties:  no  Chronic cough: no  Constipation:  yes  Current level of activity:  Lives alone with friend available    Past medical and surgical history, medications, allergies, family history, and social history were reviewed with the patient.  S/p lap BIH 20 years ago.    ROS: 10 point review of systems negative except noted in HPI  PHYSICAL EXAM  General appearance- healthy, alert, and in no distress.  Skin- Skin color and turgor normal.  No obvious rashes.  Neck- Neck is supple without obvious adenopathy.  Lungs- Respiratory effort unlabored.  Gait- Normal.  Abdomen - soft non distended, non tender without obvious masses.  [facility]  Impression:  Inguinal hernia, right Recurrent  Recommendation:  Right Inguinal Hernioplasty open mesh repair under mac.    A full discussion regarding the alternatives, risks, goals, and potential complications for this surgery was completed today.  The patient understood I would use non recalled mesh and  that the potential problems included but are not limited to:  Infection, bleeding, hematoma, seroma, recurrence, injury to nerve/muscle or involved testicle(if male), ischemic orchitis(if male), and chronic pain.    The patient verbally expressed understanding, was given the opportunity for questions, and gives full informed consent for the procedure.        The total time spent with this patient was 30 minutes.  The total time was spent on the date of encounter doing chart review, history and physical, dressing changes, documentation, patient education, and any further activity as noted above.

## 2023-05-23 NOTE — NURSING NOTE
"Chief Complaint   Patient presents with     New Patient     Recurrent inguinal hernia       Vitals:    05/23/23 0945   BP: 115/81   BP Location: Left arm   Patient Position: Sitting   Cuff Size: Adult Regular   Pulse: 72   SpO2: 96%   Weight: 79.2 kg (174 lb 8 oz)   Height: 1.67 m (5' 5.75\")       Body mass index is 28.38 kg/m .                          [place]per Grande EMT    "

## 2023-05-23 NOTE — LETTER
5/23/2023       RE: Gianluca Cooper  [address]  [address]  [facility] [address]       Dear Colleague,    Thank you for referring your patient, Gianluca Cooper, to the [facility] [place] [facility]. Please see a copy of my visit note below.    Gianluca Cooper is a 67 year old male with a 1 month history of a right inguinal mass with the following symptoms of lump.    Onset did not occur with lifting.  Obstructive symptoms:  no  Urinary difficulties:  no  Chronic cough: no  Constipation:  yes  Current level of activity:  Lives alone with friend available    Past medical and surgical history, medications, allergies, family history, and social history were reviewed with the patient.  S/p lap BIH 20 years ago.    ROS: 10 point review of systems negative except noted in HPI  PHYSICAL EXAM  General appearance- healthy, alert, and in no distress.  Skin- Skin color and turgor normal.  No obvious rashes.  Neck- Neck is supple without obvious adenopathy.  Lungs- Respiratory effort unlabored.  Gait- Normal.  Abdomen - soft non distended, non tender without obvious masses.  [facility]  Impression:  Inguinal hernia, right Recurrent  Recommendation:  Right Inguinal Hernioplasty open mesh repair under mac.    A full discussion regarding the alternatives, risks, goals, and potential complications for this surgery was completed today.  The patient understood I would use non recalled mesh and  that the potential problems included but are not limited to:  Infection, bleeding, hematoma, seroma, recurrence, injury to nerve/muscle or involved testicle(if male), ischemic orchitis(if male), and chronic pain.    The patient verbally expressed understanding, was given the opportunity for questions, and gives full informed consent for the procedure.        The total time spent with this patient was 30 minutes.  The total time was spent on the  date of encounter doing chart review, history and physical, dressing changes, documentation, patient education, and any further activity as noted above.          Again, thank you for allowing me to participate in the care of your patient.      Sincerely,    Frank Carlson MD

## 2023-05-23 NOTE — NURSING NOTE
Pre and Post op Patient Education/Teaching Flowsheet  Relevant Diagnosis:  Inguinal Hernia (K40.90)  Teaching Topic:  Pre and post op teaching  Person(s) Involved in teaching:  Patient and      Motivation Level:  Asks Questions:  Yes  Eager to Learn:  Yes  Cooperative:  Yes  Receptive (willing/able to accept information):  Yes  Any cultural factors/[place] beliefs that may influence understanding or compliance?  No    Patient/caregiver/family demonstrates understanding of the following:  Reason for the appointment, diagnosis, and treatment plan:  Yes  Patient demonstrates understanding of the following:  Pre-op bowel prep:  No  Post-op pain management recommendations (medications, ice compress, binder/athletic supporter (if applicable), etc.:  Yes  Inguinal hernia patients:  Post-op urinary retention- discussed signs/symptoms and visit to ER for Nieves catheter placement and to stay in place for at least 48 hours:  NA  Restrictions:  Yes  Medications to take the day of surgery:  Per PCP  Blood thinner medications discussed and when to stop (if applicable):  Yes  Wound care:  Yes  Diabetes medication management (if applicable):  Per PCP  Which situations necessitate calling provider and whom to contact:  Discussed how to contact the hospital, nurse, and clinic scheduling staff if necessary      Date and time of surgery:  TBD  Location of surgery: [facility] [place]- 5th Floor  History and Physical and any other testing necessary prior to surgery:  Yes  Required time line for completion of History and Physical and any pre-op testing:  Yes  Discuss need for someone to drive patient home and stay with them for 24 hours:  Yes  Pre-op showering/scrub information with Surgical Scrub:  Yes  NPO Guidelines:  NPO per Anesthesia Guidelines    Infection Prevention: Patient demonstrates understanding of the following:  Patient instructed on hand hygiene:  Yes  Surgical procedure  site care will be taught and will be reviewed at the time of discharge  Signs and symptoms of infection taught:  Yes  Wound care reviewed and will be taught at the time of discharge  Central venous catheter care will be taught at the time of discharge (if applicable)    Post-op follow-up:  Instructional materials used/given/mailed:  Surgical logistics, post op teaching sheet, and surgical scrub

## 2023-05-24 ENCOUNTER — TELEPHONE (OUTPATIENT)
Dept: SURGERY | Facility: CLINIC | Age: 68
End: 2023-05-24
Payer: MEDICARE

## 2023-05-24 ENCOUNTER — APPOINTMENT (OUTPATIENT)
Dept: INTERPRETER SERVICES | Facility: CLINIC | Age: 68
End: 2023-05-24
Payer: MEDICARE

## 2023-05-24 PROBLEM — K40.90 RIGHT INGUINAL HERNIA: Status: ACTIVE | Noted: 2023-05-24

## 2023-05-24 NOTE — TELEPHONE ENCOUNTER
Patient is scheduled for surgery with Dr. Carlson    Spoke with: Gianluca via     Date of Surgery: 6/29/2023    Location: [facility]    Informed patient they will need an adult  Yes    Pre op with Provider n/a    H&P: Scheduled with PCP - Mariia Hurley MD    Additional imaging/appointments: n/a    Surgery packet: To be sent in the [place] mail     Additional comments: n/a        Sherin Barone on 5/24/2023 at 3:54 PM

## 2023-06-24 ENCOUNTER — LAB REQUISITION (OUTPATIENT)
Dept: LAB | Facility: CLINIC | Age: 68
End: 2023-06-24
Payer: MEDICARE

## 2023-06-24 DIAGNOSIS — Z01.810 ENCOUNTER FOR PREPROCEDURAL CARDIOVASCULAR EXAMINATION: ICD-10-CM

## 2023-06-24 LAB
ANION GAP SERPL CALCULATED.3IONS-SCNC: 12 MMOL/L (ref 7–15)
BUN SERPL-MCNC: 17.5 MG/DL (ref 8–23)
CALCIUM SERPL-MCNC: 9.7 MG/DL (ref 8.8–10.2)
CHLORIDE SERPL-SCNC: 105 MMOL/L (ref 98–107)
CREAT SERPL-MCNC: 0.85 MG/DL (ref 0.67–1.17)
DEPRECATED HCO3 PLAS-SCNC: 21 MMOL/L (ref 22–29)
GFR SERPL CREATININE-BSD FRML MDRD: >90 ML/MIN/1.73M2
GLUCOSE SERPL-MCNC: 117 MG/DL (ref 70–99)
POTASSIUM SERPL-SCNC: 4.6 MMOL/L (ref 3.4–5.3)
SODIUM SERPL-SCNC: 138 MMOL/L (ref 136–145)

## 2023-06-24 PROCEDURE — 80048 BASIC METABOLIC PNL TOTAL CA: CPT | Mod: ORL | Performed by: INTERNAL MEDICINE

## 2023-06-26 ENCOUNTER — APPOINTMENT (OUTPATIENT)
Dept: INTERPRETER SERVICES | Facility: CLINIC | Age: 68
End: 2023-06-26
Payer: MEDICARE

## 2023-06-28 ENCOUNTER — ANESTHESIA EVENT (OUTPATIENT)
Dept: SURGERY | Facility: AMBULATORY SURGERY CENTER | Age: 68
End: 2023-06-28
Payer: MEDICARE

## 2023-06-29 ENCOUNTER — HOSPITAL ENCOUNTER (OUTPATIENT)
Facility: AMBULATORY SURGERY CENTER | Age: 68
Discharge: HOME | End: 2023-06-29
Attending: SURGERY
Payer: MEDICARE

## 2023-06-29 ENCOUNTER — ANESTHESIA (OUTPATIENT)
Dept: SURGERY | Facility: AMBULATORY SURGERY CENTER | Age: 68
End: 2023-06-29
Payer: MEDICARE

## 2023-06-29 VITALS
WEIGHT: 170 LBS | HEIGHT: 67 IN | OXYGEN SATURATION: 95 % | SYSTOLIC BLOOD PRESSURE: 144 MMHG | RESPIRATION RATE: 16 BRPM | HEART RATE: 66 BPM | TEMPERATURE: 97.3 F | DIASTOLIC BLOOD PRESSURE: 80 MMHG | BODY MASS INDEX: 26.68 KG/M2

## 2023-06-29 DIAGNOSIS — K40.90 RIGHT INGUINAL HERNIA: ICD-10-CM

## 2023-06-29 LAB — GLUCOSE BLDC GLUCOMTR-MCNC: 144 MG/DL (ref 70–99)

## 2023-06-29 PROCEDURE — 88342 IMHCHEM/IMCYTCHM 1ST ANTB: CPT | Mod: 26 | Performed by: PATHOLOGY

## 2023-06-29 PROCEDURE — 88189 FLOWCYTOMETRY/READ 16 & >: CPT | Mod: GC | Performed by: PATHOLOGY

## 2023-06-29 PROCEDURE — 88264 CHROMOSOME ANALYSIS 20-25: CPT | Performed by: SURGERY

## 2023-06-29 PROCEDURE — 82962 GLUCOSE BLOOD TEST: CPT | Performed by: PATHOLOGY

## 2023-06-29 PROCEDURE — 38531 OPEN BX/EXC INGUINOFEM NODES: CPT | Mod: RT | Performed by: SURGERY

## 2023-06-29 PROCEDURE — 88307 TISSUE EXAM BY PATHOLOGIST: CPT | Mod: TC | Performed by: SURGERY

## 2023-06-29 PROCEDURE — 88307 TISSUE EXAM BY PATHOLOGIST: CPT | Mod: 26 | Performed by: PATHOLOGY

## 2023-06-29 PROCEDURE — 88341 IMHCHEM/IMCYTCHM EA ADD ANTB: CPT | Mod: 26 | Performed by: PATHOLOGY

## 2023-06-29 PROCEDURE — 99000 SPECIMEN HANDLING OFFICE-LAB: CPT | Performed by: PATHOLOGY

## 2023-06-29 PROCEDURE — 88185 FLOWCYTOMETRY/TC ADD-ON: CPT | Performed by: SURGERY

## 2023-06-29 PROCEDURE — 999N000127 HC STATISTIC PERIPHERAL IV START W US GUIDANCE

## 2023-06-29 RX ORDER — ONDANSETRON 2 MG/ML
4 INJECTION INTRAMUSCULAR; INTRAVENOUS EVERY 30 MIN PRN
Status: DISCONTINUED | OUTPATIENT
Start: 2023-06-29 | End: 2023-06-30 | Stop reason: HOSPADM

## 2023-06-29 RX ORDER — SODIUM CHLORIDE, SODIUM LACTATE, POTASSIUM CHLORIDE, CALCIUM CHLORIDE 600; 310; 30; 20 MG/100ML; MG/100ML; MG/100ML; MG/100ML
INJECTION, SOLUTION INTRAVENOUS CONTINUOUS
Status: DISCONTINUED | OUTPATIENT
Start: 2023-06-29 | End: 2023-06-29 | Stop reason: HOSPADM

## 2023-06-29 RX ORDER — AMOXICILLIN 250 MG
1-2 CAPSULE ORAL 2 TIMES DAILY
Qty: 15 TABLET | Refills: 0 | Status: SHIPPED | OUTPATIENT
Start: 2023-06-29

## 2023-06-29 RX ORDER — KETOROLAC TROMETHAMINE 30 MG/ML
INJECTION, SOLUTION INTRAMUSCULAR; INTRAVENOUS PRN
Status: DISCONTINUED | OUTPATIENT
Start: 2023-06-29 | End: 2023-06-29

## 2023-06-29 RX ORDER — LIDOCAINE 40 MG/G
CREAM TOPICAL
Status: DISCONTINUED | OUTPATIENT
Start: 2023-06-29 | End: 2023-06-29 | Stop reason: HOSPADM

## 2023-06-29 RX ORDER — HYDROMORPHONE HYDROCHLORIDE 1 MG/ML
0.2 INJECTION, SOLUTION INTRAMUSCULAR; INTRAVENOUS; SUBCUTANEOUS EVERY 5 MIN PRN
Status: DISCONTINUED | OUTPATIENT
Start: 2023-06-29 | End: 2023-06-29 | Stop reason: HOSPADM

## 2023-06-29 RX ORDER — CEFAZOLIN SODIUM 2 G/50ML
2 SOLUTION INTRAVENOUS
Status: COMPLETED | OUTPATIENT
Start: 2023-06-29 | End: 2023-06-29

## 2023-06-29 RX ORDER — KETAMINE HYDROCHLORIDE 10 MG/ML
INJECTION INTRAMUSCULAR; INTRAVENOUS PRN
Status: DISCONTINUED | OUTPATIENT
Start: 2023-06-29 | End: 2023-06-29

## 2023-06-29 RX ORDER — OXYCODONE HYDROCHLORIDE 5 MG/1
5 TABLET ORAL
Status: DISCONTINUED | OUTPATIENT
Start: 2023-06-29 | End: 2023-06-30 | Stop reason: HOSPADM

## 2023-06-29 RX ORDER — CEFAZOLIN SODIUM 2 G/50ML
2 SOLUTION INTRAVENOUS SEE ADMIN INSTRUCTIONS
Status: DISCONTINUED | OUTPATIENT
Start: 2023-06-29 | End: 2023-06-29 | Stop reason: HOSPADM

## 2023-06-29 RX ORDER — FENTANYL CITRATE 50 UG/ML
50 INJECTION, SOLUTION INTRAMUSCULAR; INTRAVENOUS EVERY 5 MIN PRN
Status: DISCONTINUED | OUTPATIENT
Start: 2023-06-29 | End: 2023-06-29 | Stop reason: HOSPADM

## 2023-06-29 RX ORDER — EPHEDRINE SULFATE 50 MG/ML
INJECTION, SOLUTION INTRAMUSCULAR; INTRAVENOUS; SUBCUTANEOUS PRN
Status: DISCONTINUED | OUTPATIENT
Start: 2023-06-29 | End: 2023-06-29

## 2023-06-29 RX ORDER — ONDANSETRON 4 MG/1
4 TABLET, ORALLY DISINTEGRATING ORAL EVERY 30 MIN PRN
Status: DISCONTINUED | OUTPATIENT
Start: 2023-06-29 | End: 2023-06-30 | Stop reason: HOSPADM

## 2023-06-29 RX ORDER — FENTANYL CITRATE 50 UG/ML
25 INJECTION, SOLUTION INTRAMUSCULAR; INTRAVENOUS EVERY 5 MIN PRN
Status: DISCONTINUED | OUTPATIENT
Start: 2023-06-29 | End: 2023-06-29 | Stop reason: HOSPADM

## 2023-06-29 RX ORDER — PROPOFOL 10 MG/ML
INJECTION, EMULSION INTRAVENOUS PRN
Status: DISCONTINUED | OUTPATIENT
Start: 2023-06-29 | End: 2023-06-29

## 2023-06-29 RX ORDER — HYDROCODONE BITARTRATE AND ACETAMINOPHEN 5; 325 MG/1; MG/1
1-2 TABLET ORAL EVERY 4 HOURS PRN
Qty: 15 TABLET | Refills: 0 | Status: SHIPPED | OUTPATIENT
Start: 2023-06-29

## 2023-06-29 RX ORDER — HYDROMORPHONE HYDROCHLORIDE 1 MG/ML
0.4 INJECTION, SOLUTION INTRAMUSCULAR; INTRAVENOUS; SUBCUTANEOUS EVERY 5 MIN PRN
Status: DISCONTINUED | OUTPATIENT
Start: 2023-06-29 | End: 2023-06-29 | Stop reason: HOSPADM

## 2023-06-29 RX ORDER — ONDANSETRON 2 MG/ML
4 INJECTION INTRAMUSCULAR; INTRAVENOUS EVERY 30 MIN PRN
Status: DISCONTINUED | OUTPATIENT
Start: 2023-06-29 | End: 2023-06-29 | Stop reason: HOSPADM

## 2023-06-29 RX ORDER — LIDOCAINE HYDROCHLORIDE 20 MG/ML
INJECTION, SOLUTION INFILTRATION; PERINEURAL PRN
Status: DISCONTINUED | OUTPATIENT
Start: 2023-06-29 | End: 2023-06-29

## 2023-06-29 RX ORDER — FENTANYL CITRATE 50 UG/ML
INJECTION, SOLUTION INTRAMUSCULAR; INTRAVENOUS PRN
Status: DISCONTINUED | OUTPATIENT
Start: 2023-06-29 | End: 2023-06-29

## 2023-06-29 RX ORDER — PROPOFOL 10 MG/ML
INJECTION, EMULSION INTRAVENOUS CONTINUOUS PRN
Status: DISCONTINUED | OUTPATIENT
Start: 2023-06-29 | End: 2023-06-29

## 2023-06-29 RX ORDER — OXYCODONE HYDROCHLORIDE 5 MG/1
10 TABLET ORAL
Status: DISCONTINUED | OUTPATIENT
Start: 2023-06-29 | End: 2023-06-30 | Stop reason: HOSPADM

## 2023-06-29 RX ORDER — ONDANSETRON 2 MG/ML
INJECTION INTRAMUSCULAR; INTRAVENOUS PRN
Status: DISCONTINUED | OUTPATIENT
Start: 2023-06-29 | End: 2023-06-29

## 2023-06-29 RX ORDER — ONDANSETRON 4 MG/1
4 TABLET, ORALLY DISINTEGRATING ORAL EVERY 30 MIN PRN
Status: DISCONTINUED | OUTPATIENT
Start: 2023-06-29 | End: 2023-06-29 | Stop reason: HOSPADM

## 2023-06-29 RX ORDER — ACETAMINOPHEN 325 MG/1
975 TABLET ORAL ONCE
Status: DISCONTINUED | OUTPATIENT
Start: 2023-06-29 | End: 2023-06-29 | Stop reason: HOSPADM

## 2023-06-29 RX ADMIN — FENTANYL CITRATE 25 MCG: 50 INJECTION, SOLUTION INTRAMUSCULAR; INTRAVENOUS at 09:02

## 2023-06-29 RX ADMIN — LIDOCAINE HYDROCHLORIDE 60 MG: 20 INJECTION, SOLUTION INFILTRATION; PERINEURAL at 08:37

## 2023-06-29 RX ADMIN — KETAMINE HYDROCHLORIDE 10 MG: 10 INJECTION INTRAMUSCULAR; INTRAVENOUS at 08:38

## 2023-06-29 RX ADMIN — PROPOFOL 30 MG: 10 INJECTION, EMULSION INTRAVENOUS at 08:37

## 2023-06-29 RX ADMIN — KETOROLAC TROMETHAMINE 15 MG: 30 INJECTION, SOLUTION INTRAMUSCULAR; INTRAVENOUS at 09:28

## 2023-06-29 RX ADMIN — PROPOFOL 20 MG: 10 INJECTION, EMULSION INTRAVENOUS at 08:55

## 2023-06-29 RX ADMIN — EPHEDRINE SULFATE 5 MG: 50 INJECTION, SOLUTION INTRAMUSCULAR; INTRAVENOUS; SUBCUTANEOUS at 09:29

## 2023-06-29 RX ADMIN — SODIUM CHLORIDE, SODIUM LACTATE, POTASSIUM CHLORIDE, CALCIUM CHLORIDE: 600; 310; 30; 20 INJECTION, SOLUTION INTRAVENOUS at 08:34

## 2023-06-29 RX ADMIN — EPHEDRINE SULFATE 5 MG: 50 INJECTION, SOLUTION INTRAMUSCULAR; INTRAVENOUS; SUBCUTANEOUS at 09:15

## 2023-06-29 RX ADMIN — ONDANSETRON 4 MG: 2 INJECTION INTRAMUSCULAR; INTRAVENOUS at 09:09

## 2023-06-29 RX ADMIN — CEFAZOLIN SODIUM 2 G: 2 SOLUTION INTRAVENOUS at 08:36

## 2023-06-29 RX ADMIN — PROPOFOL 150 MCG/KG/MIN: 10 INJECTION, EMULSION INTRAVENOUS at 08:37

## 2023-06-29 NOTE — ANESTHESIA PREPROCEDURE EVALUATION
Anesthesia Pre-Procedure Evaluation    Patient: Gianluca Cooper   MRN: [id number] : 1955        Procedure : Procedure(s):  HERNIORRHAPHY, INGUINAL, OPEN          Past Medical History:   Diagnosis Date     Depression      Gastro-oesophageal reflux disease      Headache(784.0)      Hypertension      LOC (loss of consciousness) (H) age 45    out for 20 mintes after hit on top of head with board     Major depression 2013     Otitis media, chronic      Sleep apnea     cant tolerate cpap      Past Surgical History:   Procedure Laterality Date     COLONOSCOPY N/A 3/8/2018    Procedure: COLONOSCOPY;  colonoscopy;  Surgeon: Iona Lutz MD;  Location: [facility]     HERNIA REPAIR      boith sides     LASER CO2 TYMPANOMASTOIDECTOMY  2011    Procedure:LASER CO2 TYMPANOMASTOIDECTOMY; Right Tympanoplasty , Cartilage Backed Right Tympanomastoidectomy, Omni Guide Laser on Standby  *Latex Safe*; Surgeon:BENNETT MAXWELL; Location:[facility] OR     SEPTOPLASTY  2012    Procedure: SEPTOPLASTY;  Septoplasty *Latex Safe* Turbinate reduction ;  Surgeon: Babar Dickerson MD;  Location: [facility] OR     UVULOPALATOPHARYNGOPLASTY  2012    Procedure: UVULOPALATOPHARYNGOPLASTY;  Uvulopalatopharyngoplasty * Latex Safe*;  Surgeon: Babar Dickerson MD;  [facility] OR      Allergies   Allergen Reactions     Trazodone Other (See Comments)     Very depressed and suicidal  Other reaction(s): Other (See Comments)  Very depressed and suicidal     Thiamine Nausea     [employer][id number]      Social History     Tobacco Use     Smoking status: Every Day     Packs/day: 0.50     Types: Cigarettes     Smokeless tobacco: Never   Substance Use Topics     Alcohol use: No     Alcohol/week: 0.0 standard drinks of alcohol      Wt Readings from Last 1 Encounters:   23 77.1 kg (170 lb)        Anesthesia Evaluation   Pt has had prior anesthetic.         ROS/MED HX  ENT/Pulmonary:     (+) sleep apnea,     Neurologic:  - neg  neurologic ROS     Cardiovascular:     (+) hypertension-----    METS/Exercise Tolerance:     Hematologic:  - neg hematologic  ROS     Musculoskeletal:  - neg musculoskeletal ROS     GI/Hepatic:     (+) GERD, hepatitis type C,     Renal/Genitourinary:  - neg Renal ROS     Endo: Comment: Pre-DM      Psychiatric/Substance Use:  - neg psychiatric ROS     Infectious Disease:  - neg infectious disease ROS     Malignancy:  - neg malignancy ROS     Other:               OUTSIDE LABS:  CBC:   Lab Results   Component Value Date    WBC 5.6 06/25/2018    WBC 6.3 11/13/2017    HGB 13.7 06/25/2018    HGB 14.6 11/13/2017    HCT 40.1 06/25/2018    HCT 42.9 11/13/2017     06/25/2018     11/13/2017     BMP:   Lab Results   Component Value Date     06/24/2023     (L) 05/15/2023    POTASSIUM 4.6 06/24/2023    POTASSIUM 4.4 05/15/2023    CHLORIDE 105 06/24/2023    CHLORIDE 101 05/15/2023    CO2 21 (L) 06/24/2023    CO2 18 (L) 05/15/2023    BUN 17.5 06/24/2023    BUN 17.3 05/15/2023    CR 0.85 06/24/2023    CR 0.97 05/15/2023     (H) 06/24/2023     (H) 05/15/2023     COAGS:   Lab Results   Component Value Date    INR 0.88 06/25/2018     POC:   Lab Results   Component Value Date     (H) 07/10/2012     HEPATIC:   Lab Results   Component Value Date    ALBUMIN 4.3 05/15/2023    PROTTOTAL 7.2 05/15/2023    ALT 14 05/15/2023    AST 21 05/15/2023    ALKPHOS 84 05/15/2023    BILITOTAL 0.4 05/15/2023     OTHER:   Lab Results   Component Value Date    A1C 5.9 03/09/2017    CHAPINCITO 9.7 06/24/2023    PHOS 4.9 (H) 09/08/2011    MAG 1.6 12/21/2021    TSH 0.74 12/21/2021       Anesthesia Plan    ASA Status:  2   NPO Status:  NPO Appropriate    Anesthesia Type: MAC.     - Reason for MAC: straight local not clinically adequate   Induction: Intravenous.   Maintenance: TIVA.        Consents    Anesthesia Plan(s) and associated risks, benefits, and realistic alternatives discussed. Questions answered and  patient/representative(s) expressed understanding.    - Discussed:     - Discussed with:  Patient,          Postoperative Care    Pain management: Multi-modal analgesia.   PONV prophylaxis: Background Propofol Infusion, Ondansetron (or other 5HT-3)     Comments:           H&P reviewed: Unable to attach H&P to encounter due to EHR limitations. H&P Update: appropriate H&P reviewed, patient examined. No interval changes since H&P (within 30 days).         Mayuri Staley MD   ABRASION/BLEEDING/BRUISING

## 2023-06-29 NOTE — BRIEF OP NOTE
[facility] Brief Operative Note    Pre-operative diagnosis: Right inguinal hernia [K40.90]   Post-operative diagnosis * No post-op diagnosis entered *  Right inguinal adenopathy   Procedure: Procedure(s):  Right INGUINAL, lymph node excisional biopsy   Surgeon(s): Surgeon(s) and Role:     * Frank Carlson MD - Primary   Estimated blood loss: 1 mL    Specimens: ID Type Source Tests Collected by Time Destination   1 : Right inguinal lymph nodes for lymphoma protocol Tissue Lymph Node(s), Groin, Right FLOW CYTOMETRY, SURGICAL PATHOLOGY EXAM Frank Carlson MD 6/29/2023  9:17 AM       Findings: No hernia, multiple large LN at the right inguinal canal, excisional biopsy of LN with nodes sent to pathology

## 2023-06-29 NOTE — DISCHARGE INSTRUCTIONS
[facility]  Home Care Following Anesthesia  For 24 hours after surgery:  Get plenty of rest.  A responsible adult must stay with you for at least 24 hours after you leave the surgery center.  Do not drive or use heavy equipment.  If you have weakness or tingling, don't drive or use heavy equipment until this feeling goes away.   Do not drink alcohol.   Avoid strenuous or risky activities.  Ask for help when climbing stairs.  You may feel lightheaded.  IF so, sit for a few minutes before standing.  Have someone help you get up.   If you have nausea (feel sick to your stomach): Drink only clear liquids such as apple juice, ginger ale, broth or 7-Up.  Rest may also help.  Be sure to drink enough fluids.  Move to a regular diet as you feel able.   You may have a slight fever.  Call the doctor if your fever is over 100 F (37.7 C) (taken under the tongue) or lasts longer than 24 hours.  You may have a dry mouth, a sore throat, muscle aches or trouble sleeping. These should go away after 24 hours.  Do not make important or legal decisions.   It is recommended to avoid smoking.               Tips for taking pain medications  To get the best pain relief possible, remember these points:  Take pain medications as directed, before pain becomes severe.  Pain medication can upset your stomach: taking it with food may help.  Constipation is a common side effect of pain medication. Drink plenty of  fluids.  Eat foods high in fiber. Take a stool softener if recommended by your doctor or pharmacist.  Do not drink alcohol, drive or operate machinery while taking pain medications.  Ask about other ways to control pain, such as with heat, ice or relaxation.    Tylenol/Acetaminophen Consumption    If you feel your pain relief is insufficient, you may take Tylenol/Acetaminophen in addition to your narcotic pain medication.   Be careful not to exceed 4,000 mg of Tylenol/Acetaminophen in a 24 hour  period from all sources.  If you are taking extra strength Tylenol/acetaminophen (500 mg), the maximum dose is 8 tablets in 24 hours.  If you are taking regular strength acetaminophen (325 mg), the maximum dose is 12 tablets in 24 hours.    Call a doctor for any of the following:  Signs of infection (fever, growing tenderness at the surgery site, a large amount of drainage or bleeding, severe pain, foul-smelling drainage, redness, swelling).  It has been over 8 to 10 hours since surgery and you are still not able to urinate (pass water).  Headache for over 24 hours.  Numbness, tingling or weakness the day after surgery (if you had spinal anesthesia).  Signs of Covid-19 infection (temperature over 100 degrees, shortness of breath, cough, loss of taste/smell, generalized body aches, persistent headache, chills, sore throat, nausea/vomiting/diarrhea)  Your doctor is:  Dr. Frank Carlson, General Surgery: [phone]                    Or dial [phone] and ask for the resident on call for:  General Surgery  For emergency care, call the:  [facility]:  [phone] (TTY for hearing impaired: [phone])

## 2023-06-29 NOTE — ANESTHESIA CARE TRANSFER NOTE
Patient: Gianluca Cooper    Procedure: Procedure(s):  Right INGUINAL, lymph node excisional biopsy       Diagnosis: Right inguinal hernia [K40.90]  Diagnosis Additional Information: No value filed.    Anesthesia Type:   MAC     Note:    Oropharynx: oropharynx clear of all foreign objects and spontaneously breathing  Level of Consciousness: awake  Oxygen Supplementation: room air    Independent Airway: airway patency satisfactory and stable  Dentition: dentition unchanged  Vital Signs Stable: post-procedure vital signs reviewed and stable  Report to RN Given: handoff report given  Patient transferred to: Phase II    Handoff Report: Identifed the Patient, Identified the Reponsible Provider, Reviewed the pertinent medical history, Discussed the surgical course, Reviewed Intra-OP anesthesia mangement and issues during anesthesia, Set expectations for post-procedure period and Allowed opportunity for questions and acknowledgement of understanding      Vitals:  Vitals Value Taken Time   /76 06/29/23 0938   Temp 36.1  C (97  F) 06/29/23 0938   Pulse 70 06/29/23 0938   Resp 16 06/29/23 0938   SpO2 94 % 06/29/23 0938       Electronically Signed By: LORE Gonzalez CRNA  June 29, 2023  9:40 AM

## 2023-06-29 NOTE — ANESTHESIA POSTPROCEDURE EVALUATION
Patient: Gianluca Cooper    Procedure: Procedure(s):  Right INGUINAL, lymph node excisional biopsy       Anesthesia Type:  MAC    Note:  Disposition: Outpatient   Postop Pain Control: Uneventful            Sign Out: Well controlled pain   PONV: No   Neuro/Psych: Uneventful            Sign Out: Acceptable/Baseline neuro status   Airway/Respiratory: Uneventful            Sign Out: Acceptable/Baseline resp. status   CV/Hemodynamics: Uneventful            Sign Out: Acceptable CV status; No obvious hypovolemia; No obvious fluid overload   Other NRE: NONE   DID A NON-ROUTINE EVENT OCCUR? No           Last vitals:  Vitals Value Taken Time   /80 06/29/23 0947   Temp 36.3  C (97.3  F) 06/29/23 0947   Pulse 66 06/29/23 0947   Resp 16 06/29/23 0947   SpO2 95 % 06/29/23 0947       Electronically Signed By: Mayuri Staley MD  June 29, 2023  1:32 PM

## 2023-06-29 NOTE — OP NOTE
Procedure Date: 06/29/2023    PREOPERATIVE DIAGNOSIS:  Right inguinal hernia.    POSTOPERATIVE DIAGNOSIS:  Right inguinal adenopathy.    OPERATIVE PROCEDURE:  Right inguinal exploration with excisional biopsy, right inguinal lymph nodes.    SURGEON:  Frank Carlson MD    ANESTHESIA:  IV sedation plus local infiltration of Marcaine and Zynrelef.    INDICATIONS FOR PROCEDURE:  The patient presents through a clinic appointment, where a right inguinal mass was felt to be a hernia at that time.  He gave informed consent.      OPERATIVE FINDINGS:  Right inguinal exploration showed enlarged lymph nodes with one of significant size.  This was excised with surrounding lymph nodes and sent to Pathology for permanent section.  The inguinal canal was then explored.  I noted no direct or indirect defects, and as such, I elected to proceed with closure as noted below.    OPERATIVE PROCEDURE:  The patient was brought to the operating room and put under IV sedation.  The right inguinal region was widely prepped and draped in the usual sterile fashion.  I injected with Marcaine throughout for adequate anesthetic.  A skin incision was made along the inguinal crease, across a skin tag that was excised and then dissection carried down to the right inguinal region, where there was a significant number of enlarged lymph nodes with the largest being almost 5 cm in greatest dimension.  As such, I elected to excise these lymph nodes by clamping lymphatic channels with clamps and using stick ties of 0 Vicryl.  I was thus able to excise the lymph nodes, and these were sent to Pathology fresh for lymphoma workup and pathology.  Once this was completed, the external oblique was opened, and the cord was lightly mobilized and anteriorly opened to note that there was no indirect sac, and then the posterior wall appeared to be intact, and so I elected to proceed with closure of the external oblique without mesh repair, as I did not find any  obvious inguinal hernias.  At this point, I closed in multiple layers using Vicryl and Monocryl 4-0 and Steri-Strips were applied.  Estimated blood loss was minimal.  The patient tolerated the procedure well and was taken to the recovery room, where he was without difficulty or apparent complication.  The above findings were discussed with the patient and his family member in the postoperative room, and he understands.  He will follow up with me in clinic for a review of the pathology.    Frank Carlson MD        D: 2023   T: 2023   MT: amna    Name:     PRIETO CHAPARROSiobhan  MRN:      [id number]        Account:        [id number]   :      1955           Procedure Date: 2023     Document: [id number]

## 2023-06-30 LAB
PATH REPORT.COMMENTS IMP SPEC: NORMAL
PATH REPORT.FINAL DX SPEC: NORMAL
PATH REPORT.MICROSCOPIC SPEC OTHER STN: NORMAL
PATH REPORT.RELEVANT HX SPEC: NORMAL

## 2023-07-03 ENCOUNTER — PATIENT OUTREACH (OUTPATIENT)
Dept: SURGERY | Facility: CLINIC | Age: 68
End: 2023-07-03
Payer: MEDICARE

## 2023-07-03 NOTE — PROGRESS NOTES
RN Post-Op/Post-Discharge Care Coordination Note    Mr. Gianluca Cooper is a 67 year old male who underwent right inguinal lymph node excisional biopsy on 6/29 with  Dr. Frank Carlson.  Spoke with Patient via .    Support  Patient able to care for self independently     Health Status  Nausea/Vomiting: Patient denies nausea/vomiting.  Eating/drinking: Patient is able to eat and drink without any complaints.  Bowel habits: Patient reports having a normal bowel movement.  Fevers/chills: Patient denies any fever or chills.  Incisions: Patient denies any signs and symptoms of infection.  Wound closure: Steri-strips (he took these off already)  Pain: denies pain, using Norco as prescribed. Advised on use of OTC Tylenol or ibuprofen per package instructions. Also advised on use of ice/heat for 20min intervals protecting skin from injury.   New Medications: Norco, Senna    Activity/Restrictions  No lifting in excess of 15-20 pounds for 3-4 weeks    Equipment  None    Pathology reviewed with patient:  No, not yet available    Forms/Letters  No    All of his questions were answered including reviewing restrictions and wound care.  He will call this office if he has any further questions and/or concerns.      Scheduled PO appointment with Dr. Carlson on 7/10 at 1230.     A copy of this note was routed to the primary surgeon.      Whom and When to Call  Patient acknowledges understanding of how to manage any medication changes and when to seek medical care.     Patient advised that if after hour medical concerns arise to please call [phone] and choose option 4 to speak to the physician on call.

## 2023-07-05 LAB
PATH REPORT.COMMENTS IMP SPEC: ABNORMAL
PATH REPORT.COMMENTS IMP SPEC: YES
PATH REPORT.FINAL DX SPEC: ABNORMAL
PATH REPORT.GROSS SPEC: ABNORMAL
PATH REPORT.MICROSCOPIC SPEC OTHER STN: ABNORMAL
PATH REPORT.RELEVANT HX SPEC: ABNORMAL
PHOTO IMAGE: ABNORMAL

## 2023-07-06 ENCOUNTER — PATIENT OUTREACH (OUTPATIENT)
Dept: SURGERY | Facility: CLINIC | Age: 68
End: 2023-07-06
Payer: MEDICARE

## 2023-07-06 NOTE — PROGRESS NOTES
Pathology resulted and shows malignancy. Dr. Carlson reviewed. Scheduled appointment with Dr. Carlson on 7/10 at 1230 for PO and to discuss pathology. Patient aware of appointment date/time.

## 2023-07-10 ENCOUNTER — OFFICE VISIT (OUTPATIENT)
Dept: SURGERY | Facility: CLINIC | Age: 68
End: 2023-07-10
Payer: MEDICARE

## 2023-07-10 VITALS
BODY MASS INDEX: 29.3 KG/M2 | OXYGEN SATURATION: 98 % | SYSTOLIC BLOOD PRESSURE: 127 MMHG | DIASTOLIC BLOOD PRESSURE: 83 MMHG | WEIGHT: 182.3 LBS | HEART RATE: 64 BPM | HEIGHT: 66 IN

## 2023-07-10 DIAGNOSIS — Z98.890 POSTOPERATIVE STATE: ICD-10-CM

## 2023-07-10 DIAGNOSIS — C61 PROSTATE CANCER, PRIMARY, WITH METASTASIS FROM PROSTATE TO OTHER SITE (H): Primary | ICD-10-CM

## 2023-07-10 LAB
CULTURE HARVEST COMPLETE DATE: NORMAL
INTERPRETATION: NORMAL

## 2023-07-10 PROCEDURE — 99024 POSTOP FOLLOW-UP VISIT: CPT | Performed by: SURGERY

## 2023-07-10 ASSESSMENT — PAIN SCALES - GENERAL: PAINLEVEL: MODERATE PAIN (5)

## 2023-07-10 NOTE — PATIENT INSTRUCTIONS
You met with Dr. Frank Carlson.      Today's visit instructions:    Dr. Carlson has referred your to our Urology department. Please call [phone] to schedule this appointment ASAP.       If you have questions please contact Cherelle RN or Luly RN during regular clinic hours, Monday through Friday 7:30 AM - 4:00 PM, or you can contact us via Invenra at anytime.       If you have urgent needs after-hours, weekends, or holidays please call the hospital at [phone] and ask to speak with our on-call General Surgery Team.    Appointment [phone]  Nurse Advice (Cherelle or Luly): [phone]   Surgery Scheduler (Sherin): [phone]  Fax: [phone]

## 2023-07-10 NOTE — LETTER
7/10/2023       RE: Gianluca Cooper  [address]  [address]  [facility] [address]     Dear Colleague,    Thank you for referring your patient, Gianluca Cooper, to the [facility] GENERAL SURGERY CLINIC [place] at [facility]. Please see a copy of my visit note below.    Gianluca Cooper is status post:  6/29/2023  Herniorrhaphy inguinal (Right) Procedure: Right INGUINAL, lymph node excisional biopsy;  Surgeon: Frank Carlson MD;  Location: [facility] OR  At surgery no hernia found but did have significant adenopathy with excision done at time.    Surgery without complications.  Post-op course without complications.  Incisions examined, no signs of infection.  Pathology:  Final Diagnosis   Lymph nodes, right inguinal, excision:  - Metastatic prostatic adenocarcinoma involving 2 of 5 lymph nodes  - Deposit size: 6.5 cm  - Extranodal extension is present     S/P excision right inguinal LN with metastatic prostatic adenocarcinoma found.  Discussed with patient surgery and pathology findings  No restrictions from my stand point.  Will refer to Urology Prostate Center.              Again, thank you for allowing me to participate in the care of your patient.      Sincerely,    Frank Carlson MD

## 2023-07-10 NOTE — NURSING NOTE
"Chief Complaint   Patient presents with     Post-Op - General Surgery     Post-op, discuss pathology       Vitals:    07/10/23 1129   BP: 127/83   BP Location: Left arm   Patient Position: Sitting   Cuff Size: Adult Regular   Pulse: 64   SpO2: 98%   Weight: 82.7 kg (182 lb 4.8 oz)   Height: 1.67 m (5' 5.75\")       Body mass index is 29.65 kg/m .                          Prosper Grande, EMT    "

## 2023-07-10 NOTE — PROGRESS NOTES
Gianluca Cooper is status post:  6/29/2023  Herniorrhaphy inguinal (Right) Procedure: Right INGUINAL, lymph node excisional biopsy;  Surgeon: Frank Carlson MD;  Location: [facility] OR  At surgery no hernia found but did have significant adenopathy with excision done at time.    Surgery without complications.  Post-op course without complications.  Incisions examined, no signs of infection.  Pathology:  Final Diagnosis   Lymph nodes, right inguinal, excision:  - Metastatic prostatic adenocarcinoma involving 2 of 5 lymph nodes  - Deposit size: 6.5 cm  - Extranodal extension is present     S/P excision right inguinal LN with metastatic prostatic adenocarcinoma found.  Discussed with patient surgery and pathology findings  No restrictions from my stand point.  Will refer to Urology Prostate Center.

## 2023-07-11 NOTE — TELEPHONE ENCOUNTER
MEDICAL RECORDS REQUEST   [facility]  Urology Clinic  [address] [place] [address]  PHONE: [phone]  Fax: [phone]        FUTURE VISIT INFORMATION                                                   Gianluca Cooper, : 1955 scheduled for future visit at [facility]    APPOINTMENT INFORMATION:    Date: 2023    Provider:  Arden Maria MD    Reason for Visit/Diagnosis: NEW PROSTATE CANCER    REFERRAL INFORMATION:    Referring provider:  Frank Carlson MD in [facility] GENERAL SURGERY    RECORDS REQUESTED FOR VISIT                                                     NOTES  STATUS/DETAILS   OFFICE NOTE from referring provider  yes, 07/10/2023 -- Frank Carlson MD in [facility] GENERAL SURGERY   MEDICATION LIST  yes   LABS     PATHOLOGY  yes, 2023 -- [id number]     PRE-VISIT CHECKLIST      Joint diagnostic appointment coordinated correctly          (ensure right order & amount of time) Yes   RECORD COLLECTION COMPLETE Yes

## 2023-07-12 ENCOUNTER — PATIENT OUTREACH (OUTPATIENT)
Dept: UROLOGY | Facility: CLINIC | Age: 68
End: 2023-07-12
Payer: MEDICARE

## 2023-07-12 NOTE — TELEPHONE ENCOUNTER
Writer reached out to pt with the use of  services to schedule pt for an appt on 7/13.     Pt agreeable to date and time.     Will continue to follow as needed.

## 2023-07-13 ENCOUNTER — VIRTUAL VISIT (OUTPATIENT)
Dept: UROLOGY | Facility: CLINIC | Age: 68
End: 2023-07-13
Payer: MEDICARE

## 2023-07-13 DIAGNOSIS — C61 PROSTATE CANCER, PRIMARY, WITH METASTASIS FROM PROSTATE TO OTHER SITE (H): ICD-10-CM

## 2023-07-13 PROCEDURE — 99204 OFFICE O/P NEW MOD 45 MIN: CPT | Mod: VID | Performed by: UROLOGY

## 2023-07-13 ASSESSMENT — PATIENT HEALTH QUESTIONNAIRE - PHQ9: SUM OF ALL RESPONSES TO PHQ QUESTIONS 1-9: 17

## 2023-07-13 NOTE — LETTER
7/13/2023       RE: Gianluca Cooper  [address]  [address]  [facility] [place] [address]     Dear Colleague,    Thank you for referring your patient, Gianluca Cooper, to the [facility]OLOGY CLINIC [place] at [facility]. Please see a copy of my visit note below.    Virtual Visit Details    Type of service: Phone visit   Video Start Time: 130 PM   Video End Time:2 PM     Originating Location (pt. Location): Home    Distant Location (provider location):  On-site  Platform used for Video Visit: Nataly             Chief Complaint:   Metastatic prostate cancer found in the inguinal LN          Consult or Referral:     Mr. Gianluca Cooper is a 67 year old male seen in consultation from Dr. Carlson.         History of Present Illness:    Gianluca Cooper is a very pleasant 67 year old male who was referred to Dr. Carlson for right inguinal hernia. Dr. Carlson noticed multiple enlarged LNs at the time of exploration and therefore he performed inguinal LN dissection. Pathology revealed metastatic prostate cancer and therefore he was referred to me for further evaluation. He denies any urinary issues or any family history of prostate cancer.          Past Medical History:     Past Medical History:   Diagnosis Date    Depression     Gastro-oesophageal reflux disease     Headache(784.0)     Hypertension     LOC (loss of consciousness) (H) age 45    out for 20 mintes after hit on top of head with board    Major depression 5/7/2013    Otitis media, chronic     Sleep apnea     cant tolerate cpap            Past Surgical History:     Past Surgical History:   Procedure Laterality Date    COLONOSCOPY N/A 3/8/2018    Procedure: COLONOSCOPY;  colonoscopy;  Surgeon: Iona Lutz MD;  Location: [facility] GI    HERNIA REPAIR  2006    boith sides    HERNIORRHAPHY INGUINAL Right 6/29/2023    Procedure: Right INGUINAL, lymph node excisional biopsy;   Surgeon: Frank Carlson MD;  Location: [facility] OR    LASER CO2 TYMPANOMASTOIDECTOMY  9/7/2011    Procedure:LASER CO2 TYMPANOMASTOIDECTOMY; Right Tympanoplasty , Cartilage Backed Right Tympanomastoidectomy, Omni Guide Laser on Standby  *Latex Safe*; Surgeon:BENNETT MAXWELL; Location:[facility] OR    SEPTOPLASTY  9/17/2012    Procedure: SEPTOPLASTY;  Septoplasty *Latex Safe* Turbinate reduction ;  Surgeon: Babar Dickerson MD;  Location: [facility] OR    UVULOPALATOPHARYNGOPLASTY  7/9/2012    Procedure: UVULOPALATOPHARYNGOPLASTY;  Uvulopalatopharyngoplasty * Latex Safe*;  Surgeon: Babar Dickerson MD;  Location: [facility] OR            Medications     Current Outpatient Medications   Medication    ASPIRIN PO    atorvastatin (LIPITOR) 40 MG tablet    buPROPion (WELLBUTRIN XL) 150 MG 24 hr tablet    doxepin (SINEQUAN) 10 MG capsule    HYDROcodone-acetaminophen (NORCO) 5-325 MG tablet    lisinopril-hydrochlorothiazide (PRINZIDE,ZESTORETIC) 20-25 MG per tablet    metFORMIN (GLUCOPHAGE) 500 MG tablet    OLANZapine (ZYPREXA) 15 MG tablet    prazosin (MINIPRESS) 2 MG capsule    ranitidine (ZANTAC) 150 MG tablet    senna-docusate (SENOKOT-S/PERICOLACE) 8.6-50 MG tablet    sertraline (ZOLOFT) 100 MG tablet    venlafaxine (EFFEXOR-XR) 37.5 MG 24 hr capsule    VITAMIN D3 1000 units tablet     No current facility-administered medications for this visit.            Family History:     Family History   Problem Relation Age of Onset    Depression Mother     Depression Father     Substance Abuse Father     Mental Illness Other         institutionalized    Substance Abuse Brother     Substance Abuse Brother     Substance Abuse Brother     Substance Abuse Brother     Substance Abuse Brother     Liver Disease No family hx of             Social History:     Social History     Socioeconomic History    Marital status: Single     Spouse name: Not on file    Number of children: Not on file    Years of education: Not on file    Highest education level: Not on  file   Occupational History    Not on file   Tobacco Use    Smoking status: Every Day     Packs/day: 0.50     Types: Cigarettes    Smokeless tobacco: Never   Substance and Sexual Activity    Alcohol use: No     Alcohol/week: 0.0 standard drinks of alcohol    Drug use: No     Comment: hx polysubstance now in remission    Sexual activity: Not on file   Other Topics Concern    Parent/sibling w/ CABG, MI or angioplasty before 65F 55M? Not Asked   Social History Narrative    Not on file     Social Determinants of Health     Financial Resource Strain: Not on file   Food Insecurity: Not on file   Transportation Needs: Not on file   Physical Activity: Not on file   Stress: Not on file   Social Connections: Not on file   Intimate Partner Violence: Not on file   Housing Stability: Not on file            Allergies:   Trazodone and Thiamine         Review of Systems     10 point ROS of systems including Constitutional, Eyes, Respiratory, Cardiovascular, Gastroenterology, Genitourinary, Integumentary, Muscularskeletal, Psychiatric were all negative except for pertinent positives noted in my HPI.          Physical Exam:   No vitals were obtained or physical exam was done today due to virtual visit.      Labs and Pathology:    No relevant labs available to review       Imaging:    No relevant imaging to review today          Assessment and Plan:     Assessment:  67 year old male with metastatic prostate cancer found in inguinal nodes       Plan:  Check PSA   Obtain PET PSMA scan (prostate MRI and bone scan as an alternative)   Referral to medical oncology         45 total minutes spent on the date of the encounter including direct interaction with the patient, performing chart review, documentation and further activities as noted above.        Arden Maria MD   Department of Urology   [facility]

## 2023-07-13 NOTE — PROGRESS NOTES
Virtual Visit Details    Type of service: Phone visit   Video Start Time: 130 PM   Video End Time:2 PM     Originating Location (pt. Location): Home    Distant Location (provider location):  On-site  Platform used for Video Visit: Nataly             Chief Complaint:   Metastatic prostate cancer found in the inguinal LN          Consult or Referral:     Mr. Gianluca Cooper is a 67 year old male seen in consultation from Dr. Carlson.         History of Present Illness:    Gianluca Cooper is a very pleasant 67 year old male who was referred to Dr. Carlson for right inguinal hernia. Dr. Carlson noticed multiple enlarged LNs at the time of exploration and therefore he performed inguinal LN dissection. Pathology revealed metastatic prostate cancer and therefore he was referred to me for further evaluation. He denies any urinary issues or any family history of prostate cancer.          Past Medical History:     Past Medical History:   Diagnosis Date     Depression      Gastro-oesophageal reflux disease      Headache(784.0)      Hypertension      LOC (loss of consciousness) (H) age 45    out for 20 mintes after hit on top of head with board     Major depression 5/7/2013     Otitis media, chronic      Sleep apnea     cant tolerate cpap            Past Surgical History:     Past Surgical History:   Procedure Laterality Date     COLONOSCOPY N/A 3/8/2018    Procedure: COLONOSCOPY;  colonoscopy;  Surgeon: Iona Lutz MD;  Location:  GI     HERNIA REPAIR  2006    boith sides     HERNIORRHAPHY INGUINAL Right 6/29/2023    Procedure: Right INGUINAL, lymph node excisional biopsy;  Surgeon: Frank Carlson MD;  Location: [facility] [place]     LASER CO2 TYMPANOMASTOIDECTOMY  9/7/2011    Procedure:LASER CO2 TYMPANOMASTOIDECTOMY; Right Tympanoplasty , Cartilage Backed Right Tympanomastoidectomy, Omni Guide Laser on Standby  *Latex Safe*; Surgeon:BENNTET MAXWELL; Location: OR     SEPTOPLASTY  9/17/2012     Procedure: SEPTOPLASTY;  Septoplasty *Latex Safe* Turbinate reduction ;  Surgeon: Babar Dickerson MD;  Location: [facility] OR     UVULOPALATOPHARYNGOPLASTY  7/9/2012    Procedure: UVULOPALATOPHARYNGOPLASTY;  Uvulopalatopharyngoplasty * Latex Safe*;  Surgeon: Babar Dickerson MD;  Location:  OR            Medications     Current Outpatient Medications   Medication     ASPIRIN PO     atorvastatin (LIPITOR) 40 MG tablet     buPROPion (WELLBUTRIN XL) 150 MG 24 hr tablet     doxepin (SINEQUAN) 10 MG capsule     HYDROcodone-acetaminophen (NORCO) 5-325 MG tablet     lisinopril-hydrochlorothiazide (PRINZIDE,ZESTORETIC) 20-25 MG per tablet     metFORMIN (GLUCOPHAGE) 500 MG tablet     OLANZapine (ZYPREXA) 15 MG tablet     prazosin (MINIPRESS) 2 MG capsule     ranitidine (ZANTAC) 150 MG tablet     senna-docusate (SENOKOT-S/PERICOLACE) 8.6-50 MG tablet     sertraline (ZOLOFT) 100 MG tablet     venlafaxine (EFFEXOR-XR) 37.5 MG 24 hr capsule     VITAMIN D3 1000 units tablet     No current facility-administered medications for this visit.            Family History:     Family History   Problem Relation Age of Onset     Depression Mother      Depression Father      Substance Abuse Father      Mental Illness Other         institutionalized     Substance Abuse Brother      Substance Abuse Brother      Substance Abuse Brother      Substance Abuse Brother      Substance Abuse Brother      Liver Disease No family hx of             Social History:     Social History     Socioeconomic History     Marital status: Single     Spouse name: Not on file     Number of children: Not on file     Years of education: Not on file     Highest education level: Not on file   Occupational History     Not on file   Tobacco Use     Smoking status: Every Day     Packs/day: 0.50     Types: Cigarettes     Smokeless tobacco: Never   Substance and Sexual Activity     Alcohol use: No     Alcohol/week: 0.0 standard drinks of alcohol     Drug use: No     Comment: hx  polysubstance now in remission     Sexual activity: Not on file   Other Topics Concern     Parent/sibling w/ CABG, MI or angioplasty before 65F 55M? Not Asked   Social History Narrative     Not on file     Social Determinants of Health     Financial Resource Strain: Not on file   Food Insecurity: Not on file   Transportation Needs: Not on file   Physical Activity: Not on file   Stress: Not on file   Social Connections: Not on file   Intimate Partner Violence: Not on file   Housing Stability: Not on file            Allergies:   Trazodone and Thiamine         Review of Systems     10 point ROS of systems including Constitutional, Eyes, Respiratory, Cardiovascular, Gastroenterology, Genitourinary, Integumentary, Muscularskeletal, Psychiatric were all negative except for pertinent positives noted in my HPI.          Physical Exam:   No vitals were obtained or physical exam was done today due to virtual visit.      Labs and Pathology:    No relevant labs available to review       Imaging:    No relevant imaging to review today          Assessment and Plan:     Assessment:  67 year old male with metastatic prostate cancer found in inguinal nodes       Plan:  Check PSA   Obtain PET PSMA scan (prostate MRI and bone scan as an alternative)   Referral to medical oncology         45 total minutes spent on the date of the encounter including direct interaction with the patient, performing chart review, documentation and further activities as noted above.        Arden Maria MD   Department of Urology   [facility]

## 2023-07-13 NOTE — NURSING NOTE
Is the patient currently in the state of [place]? YES    Visit mode:VIDEO    If the visit is dropped, the patient can be reconnected by: VIDEO VISIT: Text to cell phone: [phone]    Will anyone else be joining the visit? NO      How would you like to obtain your AVS? Mail a copy    Are changes needed to the allergy or medication list? NO    Reason for visit: Consult (Prostate Cancer, metastatic prostatic adenocarcinoma involving 2 of 5 lymph nodes found on recent pathology)    Pt states pain in only where he had surgery- groin area.     Depression Response    Patient completed the PHQ-9 assessment for depression and scored >9? Yes  Question 9 on the PHQ-9 was positive for suicidality? Yes  Does patient have current mental health provider? Yes- pt has a therapist, and psycologist and takes medication     Is this a virtual visit? Yes   Does patient have suicidal ideation (positive question 9)? Yes (adult) - transfer to Red Flag Triage ([phone]) Patient declined transfer.  Notify provider.     I personally notified the following: visit provider    PT states he only gets feelings once in awhile of feeling he would be better off dead.  But not very often.  Pt declined triage- pt has a therapist and a psychologist.      Ceci Riojas on 7/13/2023 at 1:36 PM

## 2023-07-14 ENCOUNTER — PATIENT OUTREACH (OUTPATIENT)
Dept: ONCOLOGY | Facility: CLINIC | Age: 68
End: 2023-07-14
Payer: MEDICARE

## 2023-07-14 NOTE — PROGRESS NOTES
[facility]: Cancer Care                                                                   Hem/Onc  Referral reviewed  Referred By  Referred To   Arden Maria MD   [address]   [facility] [address]   Phone: [phone]   Fax: [phone]    Diagnoses: Prostate cancer, primary, with metastasis from prostate to other site (H)   Order: Adult Oncology/Hematology  Referral    Medical Oncology         ASSESSMENT      Clinical History (per Nurse review of records provided):    67 year old male patient with newly diagnosed metastatic prostate cancer, PSA lab draw and PET scheduled:  Future Appointments   Date Time Provider Department Center   7/18/2023 12:30 PM  LAB [facility]   7/25/2023  7:30 AM [address]     Referred to Dr. Carlson for right inguinal hernia. Dr. Carlson noticed multiple enlarged LNs at the time of exploration and therefore he performed inguinal LN dissection. Pathology revealed metastatic prostate cancer and therefore he was referred to Urology for further evaluation. denies any urinary issues or any family history of prostate cancer    Lives in [facility]    PCP: Moira Cuadra  Language: Andorran - needs   Records Location: [facility]     Pertinent labs -- no prior PSAs in chart    Pertinent pathology report(s) -- BOOKMARKED    Pertinent imaging -- BOOKMARKED    Referring provider note(s)-- BOOKMARKED    INTERVENTION(S)                                                      -OUTGOING CALL to pt with [facility] [phone] :   Introduced my role as nurse navigator with [facility] Hematology/Oncology dept and that we have recd the referral for dx of prostate cancer from Dr Maria.  Identity verified.   Pt confirms they are aware of the referral and ready to schedule and I explained that we will schedule the consult after the PET appt so those results are available to the consulting  MD    Pt voiced understanding of above instructions and information and denied  Questions,   was   warm-transferred to Oncology Patient Access rep to schedule the consultation.    -Referral Triage Scheduling Instructions added to Hem/Onc  referral for Patient Access rep    PLAN                                                      Medical oncology consult after lab draw +PET PSMA ordered by urology      See records team's Pre-Visit encounter documentation for additional records retrieval information.    She Rivera, RN, BSN, OCN  Hematology/Oncology New Patient Nurse Navigator   [facility]  [phone]

## 2023-07-18 ENCOUNTER — LAB (OUTPATIENT)
Dept: LAB | Facility: CLINIC | Age: 68
End: 2023-07-18
Payer: MEDICARE

## 2023-07-18 DIAGNOSIS — C61 PROSTATE CANCER, PRIMARY, WITH METASTASIS FROM PROSTATE TO OTHER SITE (H): ICD-10-CM

## 2023-07-18 LAB — PSA SERPL DL<=0.01 NG/ML-MCNC: 145.6 NG/ML (ref 0–4.5)

## 2023-07-18 PROCEDURE — 36415 COLL VENOUS BLD VENIPUNCTURE: CPT | Performed by: PATHOLOGY

## 2023-07-18 PROCEDURE — 84153 ASSAY OF PSA TOTAL: CPT | Performed by: PATHOLOGY

## 2023-07-25 ENCOUNTER — HOSPITAL ENCOUNTER (OUTPATIENT)
Dept: PET IMAGING | Facility: CLINIC | Age: 68
Discharge: HOME | End: 2023-07-25
Attending: UROLOGY | Admitting: UROLOGY
Payer: MEDICARE

## 2023-07-25 DIAGNOSIS — C61 PROSTATE CANCER, PRIMARY, WITH METASTASIS FROM PROSTATE TO OTHER SITE (H): ICD-10-CM

## 2023-07-25 PROCEDURE — A9596 HC RX 343: HCPCS | Performed by: UROLOGY

## 2023-07-25 PROCEDURE — 343N000001 HC RX 343: Performed by: UROLOGY

## 2023-07-25 PROCEDURE — 999N000127 HC STATISTIC PERIPHERAL IV START W US GUIDANCE

## 2023-07-25 PROCEDURE — 78815 PET IMAGE W/CT SKULL-THIGH: CPT | Mod: 26 | Performed by: RADIOLOGY

## 2023-07-25 PROCEDURE — 74177 CT ABD & PELVIS W/CONTRAST: CPT

## 2023-07-25 PROCEDURE — 74177 CT ABD & PELVIS W/CONTRAST: CPT | Mod: 26 | Performed by: RADIOLOGY

## 2023-07-25 PROCEDURE — 71260 CT THORAX DX C+: CPT | Mod: 26 | Performed by: RADIOLOGY

## 2023-07-25 PROCEDURE — G1010 CDSM STANSON: HCPCS | Mod: PS

## 2023-07-25 PROCEDURE — 250N000011 HC RX IP 250 OP 636: Performed by: UROLOGY

## 2023-07-25 PROCEDURE — G1010 CDSM STANSON: HCPCS | Performed by: RADIOLOGY

## 2023-07-25 RX ORDER — IOPAMIDOL 755 MG/ML
10-135 INJECTION, SOLUTION INTRAVASCULAR ONCE
Status: COMPLETED | OUTPATIENT
Start: 2023-07-25 | End: 2023-07-25

## 2023-07-25 RX ADMIN — KIT FOR THE PREPARATION OF GALLIUM GA 68 GOZETOTIDE INJECTION 5.11 MILLICURIE: KIT INTRAVENOUS at 08:19

## 2023-07-25 RX ADMIN — IOPAMIDOL 100 ML: 755 INJECTION, SOLUTION INTRAVENOUS at 10:04

## 2023-07-25 NOTE — TELEPHONE ENCOUNTER
RECORDS STATUS - ALL OTHER DIAGNOSIS      RECORDS RECEIVED FROM: [facility]   DATE RECEIVED: 7/25   NOTES STATUS DETAILS   OFFICE NOTE from referring provider Epic Dr. Arden Maria: 7/13/23   OPERATIVE REPORT Epic    MEDICATION LIST [facility]    LABS     PATHOLOGY REPORTS [facility] 6/29/23: Surg Path   ANYTHING RELATED TO DIAGNOSIS Epic 7/18/23   GENONOMIC TESTING     TYPE: Epic 6/29/23   IMAGING (NEED IMAGES & REPORT)     CT SCANS PACS Epic   PET PACS Epic

## 2023-07-26 ENCOUNTER — PRE VISIT (OUTPATIENT)
Dept: UROLOGY | Facility: CLINIC | Age: 68
End: 2023-07-26

## 2023-07-26 PROBLEM — C61 ADENOCARCINOMA OF PROSTATE (H): Status: ACTIVE | Noted: 2023-07-26

## 2023-07-26 NOTE — PROGRESS NOTES
[facility]  New patient history and physical  2023    Diagnosis: de aries metastatic hormone-sensitive prostate adenocarcinoma  Other heme/onc providers: none  Current Treatment and intent: Palliative, plan to start leuprolide next week  Treatment Summary:   TBD    Assessment and Plan:   Gianluca Cooper is a 67 year old male with de aries mHRPC.     Stage IV hormone-sensitive prostate adenocarcinoma de aries metastatic to lymph nodes  Erectile dysfunction despite sildenafil -- anticipate continued issues with starting anti androgen therapy   Incidental pancreatic uncinate mass, suspect IPMN  In the setting of:  Metabolic syndrome  Hypertension  Pre-diabetes  Hyperlipidemia  HEIKE  GERD  COPD  Mood d/o  MDD with psychotic features  PTSD  Hx of accidental opiate overdose x2 c/b respiratory arrest 2023 -- reports his cocaine was laced with fentanyl  Cocaine and marijuana use disorders  Hx HBV and HCV, cleared as of 18  Hx IVDU last ~  Tobacco use    Access: Peripheral  ECO  Medical proxy: none    Discussion and Medical Decision Making (2023):  Today utilizing a teleinterpreter I discussed with Jaylen his diagnosis of metastatic castration sensitive prostate cancer.  We discussed the incurable nature of metastatic prostate cancer, the slow-growing nature and likely development over months to years, the biology of prostate cancer growth in response to testosterone, the general treatment strategy of hormone blockade +/- chemotherapy with a palliative intent of prolonging life and improving quality of life, and a prognosis with treatment on the order of years.   made it clear he is not interested in chemotherapy, but would be excepting of leuprolide shots and potentially abiraterone in the future.  We discussed the general side effects of antihormonal therapy including but not limited to cardiac and cerebrovascular disease, osteoporosis, mood changes, anhedonia.     Plan:  Start  leuprolide 45mg every 6 months next Thursday after insurance approval  Discussed second anti-androgen such as abiraterone, will consider adding on this therapy in the future depending on reliability with follow up (requires q2w LFT monitoring for the first 3 months)  No current role for chemotherapy (triple therapy) with low volume disease, he declined this regardless  Return to clinic next thursday 8/3 for a follow up MD visit and leuprolide initiation    This plan was discussed with Dr. Bajwa.     Ramiro Headley MD   PGY5 Hematology/Oncology Fellow       Hem/onc History:     Oncology History Overview Note   Gianluca initially presented to his PCP with right inguinal swelling similar to previously when he had a right inguinal hernia that was repaired in 2006.  He was referred to a surgeon and during exploration of the right groin in the operating room he was noted to have multiple enlarged lymph nodes without a hernia for which he underwent a sentinel lymph node biopsy with pathology consistent with metastatic prostate adenocarcinoma.  A PSMA PET scan showed evidence of metastatic disease in nonregional lymph nodes of the chest, abdomen, and pelvis without bony or visceral metastases.      Adenocarcinoma of prostate (H)   6/29/2023 Biopsy    Right inguinal excisional lymph node biopsy  Final Diagnosis   Lymph nodes, right inguinal, excision:  - Metastatic prostatic adenocarcinoma involving 2 of 5 lymph nodes  - Deposit size: 6.5 cm  - Extranodal extension is present      7/18/2023 Tumor Markers    PSA Tumor Marker 0.00 - 4.50 ng/mL 145.60 High          7/25/2023 Imaging    PSMA PET-CT  IMPRESSION: In this patient with biopsy-proven metastatic prostate  cancer:  1. Marked uptake (SUV mean 14)  in the prostate gland with  extraprostatic extension, compatible with prostatic adenocarcinoma.  2. Moderate to marked uptake in the multiple enlarged lymph nodes (SUV  mean 7-15) in the chest, abdomen and pelvis, consistent with  extensive  warren metastases.  3. No bone metastases.  4. Incidentally noted hypoattenuating lesion in the uncinate process  1.4 cm, indeterminant, may represent an IPMN.  Attention on follow-up.     7/26/2023 Initial Diagnosis    Adenocarcinoma of prostate (H)     8/3/2023 -  Chemotherapy    OP ONC Prostate Cancer - Leuprolide (Eligard) EVERY 6 MONTHS  Plan Provider: Ramiro Headley MD  Treatment goal: Palliative  Line of treatment: First Line         History of Present Illness/Interval History:   Gianluca initially presented to his PCP with right inguinal swelling similar to previously when he had a right inguinal hernia that was repaired in 2006.  He was referred to a surgeon and during exploration of the right groin in the operating room he was noted to have multiple enlarged lymph nodes without a hernia for which he underwent a sentinel lymph node biopsy with pathology consistent with metastatic prostate adenocarcinoma.  A PSMA PET scan showed evidence of metastatic disease in nonregional lymph nodes of the chest, abdomen, and pelvis without bony or visceral metastases.  He presents today to establish care.    Overall he is relatively asymptomatic, though about a year ago he started noticing erectile dysfunction for which he was prescribed sildenafil which has not helped his symptoms.    He currently lives independently in [place].  He previously worked as a .  He is not currently working and is on Social Security.  He reports he does not have any friends or family locally.  He spends most of his day at home and tells me he does not have any activities that bring him tay.  He does most of his ADLs himself such as cooking, cleaning, self-care but feels he has been slacking on this.  He identifies a point in time when his psychiatrist took him off of his antianxiety medication as a change in his mental wellbeing and he feels very depressed now.  He has been smoking tobacco products since the age of  10 and smokes about half a pack a day, approximately 30 pack years in total.  He uses marijuana daily and will smoke a joint in the morning and one at night.  He does not drink alcohol regularly.  He occasionally uses cocaine, most recently 4 months ago, and has not used recently due to not having the funds for it.  He had an episode in January when he used cocaine that was laced with fentanyl resulting in an accidental overdose requiring hospitalization.  He has a remote history of IV drug use including heroin in the early 2000's, he does not use IV drugs now.    Chart Review:  1/11/2023 admitted for respiratory arrest with in the setting of opioid overdose with bystander administration of naloxone.  PMH notable for HTN, HLD, GERD, MDD with psychotic features, LTBI, chronic HCV inguinal hernia repair 2006, HEIKE/OHS.     Subjective   Past Medical History:   Diagnosis Date    Depression     Gastro-oesophageal reflux disease     Headache(784.0)     Hypertension     LOC (loss of consciousness) (H) age 45    out for 20 mintes after hit on top of head with board    Major depression 5/7/2013    Otitis media, chronic     Sleep apnea     cant tolerate cpap     Past Surgical History:   Procedure Laterality Date    COLONOSCOPY N/A 3/8/2018    Procedure: COLONOSCOPY;  colonoscopy;  Surgeon: Iona Lutz MD;  Location:  GI    HERNIA REPAIR  2006    boith sides    HERNIORRHAPHY INGUINAL Right 6/29/2023    Procedure: Right INGUINAL, lymph node excisional biopsy;  Surgeon: Frank Carlson MD;  [place] [facility] [place]    [place]ER CO2 TYMPANOMASTOIDECTOMY  9/7/2011    Procedure:LASER CO2 TYMPANOMASTOIDECTOMY; Right Tympanoplasty , Cartilage Backed Right Tympanomastoidectomy, Omni Guide Laser on Standby  *Latex Safe*; Surgeon:BENNETT MAXWELL; Location: OR    SEPTOPLASTY  9/17/2012    Procedure: SEPTOPLASTY;  Septoplasty *Latex Safe* Turbinate reduction ;  Surgeon: Babar Dickerson MD;  Location:  OR     UVULOPALATOPHARYNGOPLASTY  7/9/2012    Procedure: UVULOPALATOPHARYNGOPLASTY;  Uvulopalatopharyngoplasty * Latex Safe*;  Surgeon: Babar Dickerson MD;  Location:  OR     Social History     Socioeconomic History    Marital status: Single     Spouse name: None    Number of children: None    Years of education: None    Highest education level: None   Tobacco Use    Smoking status: Every Day     Packs/day: 0.50     Types: Cigarettes    Smokeless tobacco: Never   Substance and Sexual Activity    Alcohol use: No     Alcohol/week: 0.0 standard drinks of alcohol    Drug use: No     Comment: hx polysubstance now in remission      Family History   Problem Relation Age of Onset    Depression Mother     Depression Father     Substance Abuse Father     Mental Illness Other         institutionalized    Substance Abuse Brother     Substance Abuse Brother     Substance Abuse Brother     Substance Abuse Brother     Substance Abuse Brother     Liver Disease No family hx of          Current Outpatient Medications   Medication Sig    ASPIRIN PO Take 81 mg by mouth daily    atorvastatin (LIPITOR) 40 MG tablet Take 1 tablet (40 mg) by mouth daily    buPROPion (WELLBUTRIN XL) 150 MG 24 hr tablet Take 1 tablet (150 mg) by mouth every morning    doxepin (SINEQUAN) 10 MG capsule Take 1 capsule (10 mg) by mouth At Bedtime    HYDROcodone-acetaminophen (NORCO) 5-325 MG tablet Take 1-2 tablets by mouth every 4 hours as needed for moderate to severe pain    lisinopril-hydrochlorothiazide (PRINZIDE,ZESTORETIC) 20-25 MG per tablet Take 1 tablet by mouth daily    metFORMIN (GLUCOPHAGE) 500 MG tablet Take one tablet daily in the am and two tablets at bedtime.  R Skolar NP at [facility]    OLANZapine (ZYPREXA) 15 MG tablet Take one-half (7.5 mg) to one tablet (15 mg) by mouth at bedtime    prazosin (MINIPRESS) 2 MG capsule Take 2 capsules (4 mg) by mouth At Bedtime    ranitidine (ZANTAC) 150 MG tablet Take 1 tablet (150 mg) by mouth 2 times daily     "senna-docusate (SENOKOT-S/PERICOLACE) 8.6-50 MG tablet Take 1-2 tablets by mouth 2 times daily    sertraline (ZOLOFT) 100 MG tablet Take 2 tablets (200 mg) by mouth daily    venlafaxine (EFFEXOR-XR) 37.5 MG 24 hr capsule Take 2 capsules (75 mg) by mouth daily    VITAMIN D3 1000 units tablet TK 2 TS PO D     No current facility-administered medications for this visit.        Objective   Physical Exam:   Blood pressure 113/75, pulse 70, temperature 98.3  F (36.8  C), temperature source Oral, height 1.671 m (5' 5.79\"), weight 80.3 kg (177 lb 1.6 oz).  Physical Exam  Constitutional:       General: He is not in acute distress.     Appearance: He is not ill-appearing or toxic-appearing.   HENT:      Head: Normocephalic and atraumatic.      Mouth/Throat:      Mouth: Mucous membranes are moist.   Eyes:      General: No scleral icterus.     Extraocular Movements: Extraocular movements intact.   Cardiovascular:      Rate and Rhythm: Normal rate and regular rhythm.   Pulmonary:      Effort: Pulmonary effort is normal. No respiratory distress.   Abdominal:      General: There is no distension.      Palpations: Abdomen is soft.      Tenderness: There is no abdominal tenderness. There is no guarding.   Skin:     General: Skin is warm and dry.      Coloration: Skin is not jaundiced.   Neurological:      Mental Status: He is alert. Mental status is at baseline.         Data:     I personally reviewed the following studies:  Recent Labs   Lab Test 06/25/18  1114 11/13/17  1113 03/09/17  0000   WBC 5.6 6.3 6.5   % NEUTROPHILS  --  63.7  --    HEMOGLOBIN 13.7 14.6 14.6   PLATELET COUNT 222 248 278     Recent Labs   Lab Test 06/24/23  0946 05/15/23  1037 12/01/22  1347 12/01/22  1347 02/03/22  0947 01/12/22  0942 12/21/21  1213 12/15/21  1432 06/25/18  1114   SODIUM 138 135*  --  137  --   --   --  138 138   POTASSIUM  --   --   --   --  4.4 4.6   < > 3.5 4.1   POTASSIUM (R) 4.6 4.4  --  4.5  --   --   --   --   --    CHLORIDE  --   -- "   --   --   --   --   --  108 106   CHLORIDE (R) 105 101  --  103  --   --   --   --   --    CARBON DIOXIDE  --   --   --   --   --   --   --  21 22   CARBON DIOXIDE (R) 21* 18*  --  17*  --   --   --   --   --    ANION GAP 12 16*   < > 17*  --   --   --  9 11   UREA NITROGEN  --   --   --   --   --   --   --  16 20   UREA NITROGEN (R) 17.5 17.3  --  14.8  --   --   --   --   --    CREATININE 0.85 0.97  --  1.03  --   --   --  0.84 0.92   CALCIUM, TOTAL 9.7 9.9  --  9.7  --   --   --  9.3 9.6    < > = values in this interval not displayed.     Recent Labs   Lab Test 12/21/21  1213   MAGNESIUM 1.6     Recent Labs   Lab Test 05/15/23  1037 07/11/22  1202 06/25/18  1114   BILIRUBIN TOTAL 0.4 0.2 0.3   ALKALINE PHOSPHATASE  --   --  96   ALKPHOS 84 78  --    ALT 14 13 25   AST 21  --  16   ALBUMIN  --   --  3.7   ALBUMIN (R) 4.3 3.9  --        No results found for this or any previous visit (from the past 24 hour(s)).

## 2023-07-27 ENCOUNTER — PRE VISIT (OUTPATIENT)
Dept: ONCOLOGY | Facility: CLINIC | Age: 68
End: 2023-07-27
Payer: MEDICARE

## 2023-07-27 ENCOUNTER — ONCOLOGY VISIT (OUTPATIENT)
Dept: ONCOLOGY | Facility: CLINIC | Age: 68
End: 2023-07-27
Attending: UROLOGY
Payer: MEDICARE

## 2023-07-27 VITALS
DIASTOLIC BLOOD PRESSURE: 75 MMHG | TEMPERATURE: 98.3 F | WEIGHT: 177.1 LBS | HEART RATE: 70 BPM | BODY MASS INDEX: 28.46 KG/M2 | HEIGHT: 66 IN | SYSTOLIC BLOOD PRESSURE: 113 MMHG

## 2023-07-27 DIAGNOSIS — C61 PROSTATE CANCER, PRIMARY, WITH METASTASIS FROM PROSTATE TO OTHER SITE (H): ICD-10-CM

## 2023-07-27 DIAGNOSIS — C61 ADENOCARCINOMA OF PROSTATE (H): ICD-10-CM

## 2023-07-27 PROCEDURE — G0463 HOSPITAL OUTPT CLINIC VISIT: HCPCS | Performed by: INTERNAL MEDICINE

## 2023-07-27 PROCEDURE — 99205 OFFICE O/P NEW HI 60 MIN: CPT | Mod: GC | Performed by: INTERNAL MEDICINE

## 2023-07-27 NOTE — NURSING NOTE
"Oncology Rooming Note    July 27, 2023 3:17 PM   Gianluca Cooper is a 67 year old male who presents for:    Chief Complaint   Patient presents with    Oncology Clinic Visit     Adenocarcinoma of prostate     Initial Vitals: /75   Pulse 70   Temp 98.3  F (36.8  C) (Oral)   Ht 1.671 m (5' 5.79\")   Wt 80.3 kg (177 lb 1.6 oz)   BMI 28.77 kg/m   Estimated body mass index is 28.77 kg/m  as calculated from the following:    Height as of this encounter: 1.671 m (5' 5.79\").    Weight as of this encounter: 80.3 kg (177 lb 1.6 oz). Body surface area is 1.93 meters squared.  Data Unavailable Comment: Data Unavailable   No LMP for male patient.  Allergies reviewed: Yes  Medications reviewed: No  Pt unsure of what meds he's taking.      Medications: Medication refills not needed today.  Pharmacy name entered into [employer]:    [facility] - [place], [place] - [address]   "[id number] - [place], [place] - [address] [facility] [address]  "[facility] #[id number] - [place], [place] - [address] AT [facility] [address]    Clinical concerns: No new clinical concerns other than reason for visit today.    Elysia Ellis, EMT    "

## 2023-07-27 NOTE — PROGRESS NOTES
[facility]  New patient history and physical  2023    Diagnosis: de aries metastatic castration-sensitive prostate adenocarcinoma  Other heme/onc providers: none  Current Treatment and intent: Palliative, plan to start leuprolide next week  Treatment Summary:   TBD    Assessment and Plan:   Gianluca Cooper is a 67 year old male with de aries mCSPC.     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  Erectile dysfunction despite sildenafil -- anticipate continued issues with starting anti androgen therapy   Incidental pancreatic uncinate mass, suspect IPMN  In the setting of:  Metabolic syndrome  Hypertension  Pre-diabetes  Hyperlipidemia  HEIKE  GERD  COPD  Mood d/o  MDD with psychotic features  PTSD  Hx of accidental opiate overdose x2 c/b respiratory arrest 2023 -- reports his cocaine was laced with fentanyl  Cocaine and marijuana use disorders  Hx HBV and HCV, cleared as of 18  Hx IVDU last ~  Tobacco use    Access: Peripheral  ECO  Medical proxy: none    Discussion and Medical Decision Making (2023):  Today utilizing a teleinterpreter I discussed with Jaylen his diagnosis of metastatic castration sensitive prostate cancer.  We discussed the incurable nature of metastatic prostate cancer, the slow-growing nature and likely development over months to years, the biology of prostate cancer growth in response to testosterone, the general treatment strategy of hormone blockade +/- chemotherapy with a palliative intent of prolonging life and improving quality of life, and a prognosis with treatment on the order of years.  Gianluca made it clear he is not interested in chemotherapy, but would be accepting of leuprolide shots and potentially abiraterone in the future.  We discussed the general side effects of antihormonal therapy including but not limited to cardiac and cerebrovascular disease, osteoporosis, mood changes, anhedonia.      Plan:  Start leuprolide 45mg every 6 months next Thursday after insurance approval  Discussed second anti-androgen such as abiraterone, will consider adding on this therapy in the future depending on reliability with follow up (requires q2w LFT monitoring for the first 3 months)  No current role for chemotherapy (triple therapy) with low volume disease, he declined this regardless  Return to clinic next thursday 8/3 for a follow up MD visit and leuprolide initiation    This plan was discussed with Dr. Bajwa.     Ramiro Headley MD   PGY5 Hematology/Oncology Fellow       Hem/onc History:     Oncology History Overview Note   Gianluca initially presented to his PCP with right inguinal swelling similar to previously when he had a right inguinal hernia that was repaired in 2006.  He was referred to a surgeon and during exploration of the right groin in the operating room he was noted to have multiple enlarged lymph nodes without a hernia for which he underwent a sentinel lymph node biopsy with pathology consistent with metastatic prostate adenocarcinoma.  A PSMA PET scan showed evidence of metastatic disease in nonregional lymph nodes of the chest, abdomen, and pelvis without bony or visceral metastases.      Adenocarcinoma of prostate (H)   6/29/2023 Biopsy    Right inguinal excisional lymph node biopsy  Final Diagnosis   Lymph nodes, right inguinal, excision:  - Metastatic prostatic adenocarcinoma involving 2 of 5 lymph nodes  - Deposit size: 6.5 cm  - Extranodal extension is present        7/18/2023 Tumor Markers    PSA Tumor Marker 0.00 - 4.50 ng/mL 145.60 High          7/25/2023 Imaging    PSMA PET-CT  IMPRESSION: In this patient with biopsy-proven metastatic prostate  cancer:  1. Marked uptake (SUV mean 14)  in the prostate gland with  extraprostatic extension, compatible with prostatic adenocarcinoma.  2. Moderate to marked uptake in the multiple enlarged lymph nodes (SUV  mean 7-15) in the chest, abdomen and  pelvis, consistent with extensive  warren metastases.  3. No bone metastases.  4. Incidentally noted hypoattenuating lesion in the uncinate process  1.4 cm, indeterminant, may represent an IPMN.  Attention on follow-up.     7/26/2023 Initial Diagnosis    Adenocarcinoma of prostate (H)     8/3/2023 -  Chemotherapy    OP ONC Prostate Cancer - Leuprolide (Eligard) EVERY 6 MONTHS  Plan Provider: Ramiro Headley MD  Treatment goal: Palliative  Line of treatment: First Line         History of Present Illness/Interval History:   Gianluca initially presented to his PCP with right inguinal swelling similar to previously when he had a right inguinal hernia that was repaired in 2006.  He was referred to a surgeon and during exploration of the right groin in the operating room he was noted to have multiple enlarged lymph nodes without a hernia for which he underwent a sentinel lymph node biopsy with pathology consistent with metastatic prostate adenocarcinoma.  A PSMA PET scan showed evidence of metastatic disease in nonregional lymph nodes of the chest, abdomen, and pelvis without bony or visceral metastases.  He presents today to establish care.    Overall he is relatively asymptomatic, though about a year ago he started noticing erectile dysfunction for which he was prescribed sildenafil which has not helped his symptoms.    He currently lives independently in [place].  He previously worked as a .  He is not currently working and is on Social Security.  He reports he does not have any friends or family locally.  He spends most of his day at home and tells me he does not have any activities that bring him tay.  He does most of his ADLs himself such as cooking, cleaning, self-care but feels he has been slacking on this.  He identifies a point in time when his psychiatrist took him off of his antianxiety medication as a change in his mental wellbeing and he feels very depressed now.  He has been smoking tobacco  products since the age of 10 and smokes about half a pack a day, approximately 30 pack years in total.  He uses marijuana daily and will smoke a joint in the morning and one at night.  He does not drink alcohol regularly.  He occasionally uses cocaine, most recently 4 months ago, and has not used recently due to not having the funds for it.  He had an episode in January when he used cocaine that was laced with fentanyl resulting in an accidental overdose requiring hospitalization.  He has a remote history of IV drug use including heroin in the early 2000's, he does not use IV drugs now.    Chart Review:  1/11/2023 admitted for respiratory arrest with in the setting of opioid overdose with bystander administration of naloxone.  PMH notable for HTN, HLD, GERD, MDD with psychotic features, LTBI, chronic HCV inguinal hernia repair 2006, HEIKE/OHS.     Subjective   Past Medical History:   Diagnosis Date    Depression     Gastro-oesophageal reflux disease     Headache(784.0)     Hypertension     LOC (loss of consciousness) (H) age 45    out for 20 mintes after hit on top of head with board    Major depression 5/7/2013    Otitis media, chronic     Sleep apnea     cant tolerate cpap     Past Surgical History:   Procedure Laterality Date    COLONOSCOPY N/A 3/8/2018    Procedure: COLONOSCOPY;  colonoscopy;  Surgeon: Iona Lutz MD;  Location:  GI    HERNIA REPAIR  2006    boith sides    HERNIORRHAPHY INGUINAL Right 6/29/2023    Procedure: Right INGUINAL, lymph node excisional biopsy;  Surgeon: Frank Carlson MD;  Location: [facility] OR    LASER CO2 TYMPANOMASTOIDECTOMY  9/7/2011    Procedure:LASER CO2 TYMPANOMASTOIDECTOMY; Right Tympanoplasty , Cartilage Backed Right Tympanomastoidectomy, Omni Guide Laser on Standby  *Latex Safe*; Surgeon:BENNETT MAXWELL; Location: OR    SEPTOPLASTY  9/17/2012    Procedure: SEPTOPLASTY;  Septoplasty *Latex Safe* Turbinate reduction ;  Surgeon: Babar Dickerson MD;   Location: [facility] OR    UVULOPALATOPHARYNGOPLASTY  7/9/2012    Procedure: UVULOPALATOPHARYNGOPLASTY;  Uvulopalatopharyngoplasty * Latex Safe*;  Surgeon: Babar Dickerson MD;  Location:  OR     Social History     Socioeconomic History    Marital status: Single     Spouse name: None    Number of children: None    Years of education: None    Highest education level: None   Tobacco Use    Smoking status: Every Day     Packs/day: 0.50     Types: Cigarettes    Smokeless tobacco: Never   Substance and Sexual Activity    Alcohol use: No     Alcohol/week: 0.0 standard drinks of alcohol    Drug use: No     Comment: hx polysubstance now in remission      Family History   Problem Relation Age of Onset    Depression Mother     Depression Father     Substance Abuse Father     Mental Illness Other         institutionalized    Substance Abuse Brother     Substance Abuse Brother     Substance Abuse Brother     Substance Abuse Brother     Substance Abuse Brother     Liver Disease No family hx of          Current Outpatient Medications   Medication Sig    ASPIRIN PO Take 81 mg by mouth daily    atorvastatin (LIPITOR) 40 MG tablet Take 1 tablet (40 mg) by mouth daily    buPROPion (WELLBUTRIN XL) 150 MG 24 hr tablet Take 1 tablet (150 mg) by mouth every morning    doxepin (SINEQUAN) 10 MG capsule Take 1 capsule (10 mg) by mouth At Bedtime    HYDROcodone-acetaminophen (NORCO) 5-325 MG tablet Take 1-2 tablets by mouth every 4 hours as needed for moderate to severe pain    lisinopril-hydrochlorothiazide (PRINZIDE,ZESTORETIC) 20-25 MG per tablet Take 1 tablet by mouth daily    metFORMIN (GLUCOPHAGE) 500 MG tablet Take one tablet daily in the am and two tablets at bedtime.  R Skolar NP at [facility]    OLANZapine (ZYPREXA) 15 MG tablet Take one-half (7.5 mg) to one tablet (15 mg) by mouth at bedtime    prazosin (MINIPRESS) 2 MG capsule Take 2 capsules (4 mg) by mouth At Bedtime    ranitidine (ZANTAC) 150 MG tablet Take 1 tablet (150 mg) by mouth 2  "times daily    senna-docusate (SENOKOT-S/PERICOLACE) 8.6-50 MG tablet Take 1-2 tablets by mouth 2 times daily    sertraline (ZOLOFT) 100 MG tablet Take 2 tablets (200 mg) by mouth daily    venlafaxine (EFFEXOR-XR) 37.5 MG 24 hr capsule Take 2 capsules (75 mg) by mouth daily    VITAMIN D3 1000 units tablet TK 2 TS PO D     No current facility-administered medications for this visit.        Objective   Physical Exam:   Blood pressure 113/75, pulse 70, temperature 98.3  F (36.8  C), temperature source Oral, height 1.671 m (5' 5.79\"), weight 80.3 kg (177 lb 1.6 oz).  Physical Exam  Constitutional:       General: He is not in acute distress.     Appearance: He is not ill-appearing or toxic-appearing.   HENT:      Head: Normocephalic and atraumatic.      Mouth/Throat:      Mouth: Mucous membranes are moist.   Eyes:      General: No scleral icterus.     Extraocular Movements: Extraocular movements intact.   Cardiovascular:      Rate and Rhythm: Normal rate and regular rhythm.   Pulmonary:      Effort: Pulmonary effort is normal. No respiratory distress.   Abdominal:      General: There is no distension.      Palpations: Abdomen is soft.      Tenderness: There is no abdominal tenderness. There is no guarding.   Skin:     General: Skin is warm and dry.      Coloration: Skin is not jaundiced.   Neurological:      Mental Status: He is alert. Mental status is at baseline.         Data:     I personally reviewed the following studies:  Recent Labs   Lab Test 06/25/18  1114 11/13/17  1113 03/09/17  0000   WBC 5.6 6.3 6.5   % NEUTROPHILS  --  63.7  --    HEMOGLOBIN 13.7 14.6 14.6   PLATELET COUNT 222 248 278       Recent Labs   Lab Test 06/24/23  0946 05/15/23  1037 12/01/22  1347 12/01/22  1347 02/03/22  0947 01/12/22  0942 12/21/21  1213 12/15/21  1432 06/25/18  1114   SODIUM 138 135*  --  137  --   --   --  138 138   POTASSIUM  --   --   --   --  4.4 4.6   < > 3.5 4.1   POTASSIUM (R) 4.6 4.4  --  4.5  --   --   --   --   --  "   CHLORIDE  --   --   --   --   --   --   --  108 106   CHLORIDE (R) 105 101  --  103  --   --   --   --   --    CARBON DIOXIDE  --   --   --   --   --   --   --  21 22   CARBON DIOXIDE (R) 21* 18*  --  17*  --   --   --   --   --    ANION GAP 12 16*   < > 17*  --   --   --  9 11   UREA NITROGEN  --   --   --   --   --   --   --  16 20   UREA NITROGEN (R) 17.5 17.3  --  14.8  --   --   --   --   --    CREATININE 0.85 0.97  --  1.03  --   --   --  0.84 0.92   CALCIUM, TOTAL 9.7 9.9  --  9.7  --   --   --  9.3 9.6    < > = values in this interval not displayed.       Recent Labs   Lab Test 12/21/21  1213   MAGNESIUM 1.6       Recent Labs   Lab Test 05/15/23  1037 07/11/22  1202 06/25/18  1114   BILIRUBIN TOTAL 0.4 0.2 0.3   ALKALINE PHOSPHATASE  --   --  96   ALKPHOS 84 78  --    ALT 14 13 25   AST 21  --  16   ALBUMIN  --   --  3.7   ALBUMIN (R) 4.3 3.9  --          No results found for this or any previous visit (from the past 24 hour(s)).

## 2023-07-28 ENCOUNTER — APPOINTMENT (OUTPATIENT)
Dept: INTERPRETER SERVICES | Facility: CLINIC | Age: 68
End: 2023-07-28
Payer: MEDICARE

## 2023-08-03 ENCOUNTER — ONCOLOGY VISIT (OUTPATIENT)
Dept: ONCOLOGY | Facility: CLINIC | Age: 68
End: 2023-08-03
Attending: INTERNAL MEDICINE
Payer: COMMERCIAL

## 2023-08-03 VITALS
SYSTOLIC BLOOD PRESSURE: 114 MMHG | OXYGEN SATURATION: 96 % | HEART RATE: 56 BPM | TEMPERATURE: 98.4 F | BODY MASS INDEX: 28.09 KG/M2 | RESPIRATION RATE: 16 BRPM | DIASTOLIC BLOOD PRESSURE: 77 MMHG | WEIGHT: 172.9 LBS

## 2023-08-03 DIAGNOSIS — C61 ADENOCARCINOMA OF PROSTATE (H): Primary | ICD-10-CM

## 2023-08-03 PROCEDURE — G0463 HOSPITAL OUTPT CLINIC VISIT: HCPCS | Mod: 25 | Performed by: INTERNAL MEDICINE

## 2023-08-03 PROCEDURE — 250N000011 HC RX IP 250 OP 636: Mod: JZ | Performed by: INTERNAL MEDICINE

## 2023-08-03 PROCEDURE — 96402 CHEMO HORMON ANTINEOPL SQ/IM: CPT | Performed by: INTERNAL MEDICINE

## 2023-08-03 PROCEDURE — 99214 OFFICE O/P EST MOD 30 MIN: CPT | Mod: GC | Performed by: INTERNAL MEDICINE

## 2023-08-03 RX ADMIN — LEUPROLIDE ACETATE 45 MG: KIT SUBCUTANEOUS at 13:24

## 2023-08-03 ASSESSMENT — PAIN SCALES - GENERAL: PAINLEVEL: NO PAIN (0)

## 2023-08-03 NOTE — PROGRESS NOTES
[facility]  New patient history and physical  2023    Diagnosis: de aries metastatic castration-sensitive prostate adenocarcinoma  Other heme/onc providers: none  Current Treatment and intent: Palliative leuprolide  Treatment Summary:   8/3/23 Leuprolide    Assessment and Plan:   Gianluca Cooper is a 67 year old male with de aries mCSPC.     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  Erectile dysfunction despite sildenafil -- anticipate continued issues with starting anti androgen therapy   Incidental pancreatic uncinate mass, suspect IPMN  In the setting of:  Metabolic syndrome -- HTN, prediabetes, HLD   HEIKE  GERD  COPD  Mood d/o -- MDD with psychotic features, PTSD  Cocaine and marijuana use disorders  Hx of accidental opiate overdose x2 c/b respiratory arrest 2023 -- reports his cocaine was laced with fentanyl  Hx HBV and HCV, cleared as of 18  Hx IVDU last ~  Tobacco use    Access: Peripheral  ECO  Medical proxy: none    Plan:  Start leuprolide 45mg every 6 months today, next ~2023  RTC in 6 months, will consider adding abiraterone/prednisone at that time    This plan was discussed with Dr. Bajwa.     Ramiro Headley MD   PGY5 Hematology/Oncology Fellow       Hem/onc History:   Edit Oncology History  Oncology History Overview Note   Gianluca initially presented to his PCP with right inguinal swelling similar to previously when he had a right inguinal hernia that was repaired in .  He was referred to a surgeon and during exploration of the right groin in the operating room he was noted to have multiple enlarged lymph nodes without a hernia for which he underwent a sentinel lymph node biopsy with pathology consistent with metastatic prostate adenocarcinoma.  A PSMA PET scan showed evidence of metastatic disease in nonregional lymph nodes of the chest, abdomen, and pelvis without bony or visceral metastases.      Adenocarcinoma of prostate  (H)   6/29/2023 Biopsy    Right inguinal excisional lymph node biopsy  Final Diagnosis   Lymph nodes, right inguinal, excision:  - Metastatic prostatic adenocarcinoma involving 2 of 5 lymph nodes  - Deposit size: 6.5 cm  - Extranodal extension is present      7/18/2023 Tumor Markers    PSA Tumor Marker 0.00 - 4.50 ng/mL 145.60 High          7/25/2023 Imaging    PSMA PET-CT  IMPRESSION: In this patient with biopsy-proven metastatic prostate  cancer:  1. Marked uptake (SUV mean 14)  in the prostate gland with  extraprostatic extension, compatible with prostatic adenocarcinoma.  2. Moderate to marked uptake in the multiple enlarged lymph nodes (SUV  mean 7-15) in the chest, abdomen and pelvis, consistent with extensive  warren metastases.  3. No bone metastases.  4. Incidentally noted hypoattenuating lesion in the uncinate process  1.4 cm, indeterminant, may represent an IPMN.  Attention on follow-up.     8/3/2023 -  Chemotherapy    OP ONC Prostate Cancer - Leuprolide (Eligard) EVERY 6 MONTHS  Plan Provider: Ramiro Headley MD  Treatment goal: Palliative  Line of treatment: First Line         Interval History:   No new issues since last weeks visit  He is ready to start lupron today    Overall he is relatively asymptomatic, though about a year ago he started noticing erectile dysfunction for which he was prescribed sildenafil which has not helped his symptoms.    Subjective   Past Medical History:   Diagnosis Date    Depression     Gastro-oesophageal reflux disease     Headache(784.0)     Hypertension     LOC (loss of consciousness) (H) age 45    out for 20 mintes after hit on top of head with board    Major depression 5/7/2013    Otitis media, chronic     Sleep apnea     cant tolerate cpap     Chart Review:  1/11/2023 admitted for respiratory arrest with in the setting of opioid overdose with bystander administration of naloxone.  PMH notable for HTN, HLD, GERD, MDD with psychotic features, LTBI, chronic HCV inguinal  hernia repair 2006, HEIKE/OHS.     Past Surgical History:   Procedure Laterality Date    COLONOSCOPY N/A 3/8/2018    Procedure: COLONOSCOPY;  colonoscopy;  Surgeon: Iona Lutz MD;  Location:  GI    HERNIA REPAIR  2006    boith sides    HERNIORRHAPHY INGUINAL Right 6/29/2023    Procedure: Right INGUINAL, lymph node excisional biopsy;  Surgeon: Frank Carlson MD;  Location: [facility] OR    LASER CO2 TYMPANOMASTOIDECTOMY  9/7/2011    Procedure:LASER CO2 TYMPANOMASTOIDECTOMY; Right Tympanoplasty , Cartilage Backed Right Tympanomastoidectomy, Omni Guide Laser on Standby  *Latex Safe*; Surgeon:BENNETT MAXWELL; Location:[facility]    SEPTOPLASTY  9/17/2012    Procedure: SEPTOPLASTY;  Septoplasty *Latex Safe* Turbinate reduction ;  Surgeon: Babar Dickerson MD;  Location:  OR    UVULOPALATOPHARYNGOPLASTY  7/9/2012    Procedure: UVULOPALATOPHARYNGOPLASTY;  Uvulopalatopharyngoplasty * Latex Safe*;  Surgeon: Babar Dickerson MD;  Location:  OR     Social History     Socioeconomic History    Marital status: Single   Tobacco Use    Smoking status: Every Day     Packs/day: 0.50     Types: Cigarettes    Smokeless tobacco: Never   Substance and Sexual Activity    Alcohol use: No     Alcohol/week: 0.0 standard drinks of alcohol    Drug use: No     Comment: hx polysubstance now in remission      SH obtained at initial visit 7/27/23:  He currently lives independently in [place].  He previously worked as a .  He is not currently working and is on Social Security.  He reports he does not have any friends or family locally.  He spends most of his day at home and tells me he does not have any activities that bring him tay.  He does most of his ADLs himself such as cooking, cleaning, self-care but feels he has been slacking on this.  He identifies a point in time when his psychiatrist took him off of his antianxiety medication as a change in his mental wellbeing and he feels very depressed now.  He  has been smoking tobacco products since the age of 10 and smokes about half a pack a day, approximately 30 pack years in total.  He uses marijuana daily and will smoke a joint in the morning and one at night.  He does not drink alcohol regularly.  He occasionally uses cocaine, most recently 4 months ago, and has not used recently due to not having the funds for it.  He had an episode in January when he used cocaine that was laced with fentanyl resulting in an accidental overdose requiring hospitalization.  He has a remote history of IV drug use including heroin in the early 2000's, he does not use IV drugs now.    Family History   Problem Relation Age of Onset    Depression Mother     Depression Father     Substance Abuse Father     Mental Illness Other         institutionalized    Substance Abuse Brother     Substance Abuse Brother     Substance Abuse Brother     Substance Abuse Brother     Substance Abuse Brother     Liver Disease No family hx of          Current Outpatient Medications   Medication Sig    ASPIRIN PO Take 81 mg by mouth daily    atorvastatin (LIPITOR) 40 MG tablet Take 1 tablet (40 mg) by mouth daily    buPROPion (WELLBUTRIN XL) 150 MG 24 hr tablet Take 1 tablet (150 mg) by mouth every morning    doxepin (SINEQUAN) 10 MG capsule Take 1 capsule (10 mg) by mouth At Bedtime    HYDROcodone-acetaminophen (NORCO) 5-325 MG tablet Take 1-2 tablets by mouth every 4 hours as needed for moderate to severe pain    lisinopril-hydrochlorothiazide (PRINZIDE,ZESTORETIC) 20-25 MG per tablet Take 1 tablet by mouth daily    metFORMIN (GLUCOPHAGE) 500 MG tablet Take one tablet daily in the am and two tablets at bedtime.  R Skolar NP at [facility]    OLANZapine (ZYPREXA) 15 MG tablet Take one-half (7.5 mg) to one tablet (15 mg) by mouth at bedtime    prazosin (MINIPRESS) 2 MG capsule Take 2 capsules (4 mg) by mouth At Bedtime    ranitidine (ZANTAC) 150 MG tablet Take 1 tablet (150 mg) by mouth 2 times daily     senna-docusate (SENOKOT-S/PERICOLACE) 8.6-50 MG tablet Take 1-2 tablets by mouth 2 times daily    sertraline (ZOLOFT) 100 MG tablet Take 2 tablets (200 mg) by mouth daily    venlafaxine (EFFEXOR-XR) 37.5 MG 24 hr capsule Take 2 capsules (75 mg) by mouth daily    VITAMIN D3 1000 units tablet TK 2 TS PO D     No current facility-administered medications for this visit.      Visit history:  Discussion and Medical Decision Making (07/27/2023):  Today utilizing a teleinterpreter I discussed with Jaylen his diagnosis of metastatic castration sensitive prostate cancer.  We discussed the incurable nature of metastatic prostate cancer, the slow-growing nature and likely development over months to years, the biology of prostate cancer growth in response to testosterone, the general treatment strategy of hormone blockade +/- chemotherapy with a palliative intent of prolonging life and improving quality of life, and a prognosis with treatment on the order of years.  Gianluca made it clear he is not interested in chemotherapy, but would be accepting of leuprolide shots and potentially abiraterone in the future.  We discussed the general side effects of antihormonal therapy including but not limited to cardiac and cerebrovascular disease, osteoporosis, mood changes, anhedonia.     Objective   Physical Exam:   There were no vitals taken for this visit.  Physical Exam  Constitutional:       General: He is not in acute distress.     Appearance: He is not ill-appearing or toxic-appearing.   HENT:      Head: Normocephalic and atraumatic.      Mouth/Throat:      Mouth: Mucous membranes are moist.   Eyes:      General: No scleral icterus.     Extraocular Movements: Extraocular movements intact.   Cardiovascular:      Rate and Rhythm: Normal rate and regular rhythm.   Pulmonary:      Effort: Pulmonary effort is normal. No respiratory distress.   Abdominal:      General: There is no distension.      Palpations: Abdomen is soft.      Tenderness:  There is no abdominal tenderness. There is no guarding.   Skin:     General: Skin is warm and dry.      Coloration: Skin is not jaundiced.   Neurological:      Mental Status: He is alert. Mental status is at baseline.         Data:     I personally reviewed the following studies:  Recent Labs   Lab Test 06/25/18  1114 11/13/17  1113 03/09/17  0000   WBC 5.6 6.3 6.5   % NEUTROPHILS  --  63.7  --    HEMOGLOBIN 13.7 14.6 14.6   PLATELET COUNT 222 248 278     Recent Labs   Lab Test 06/24/23  0946 05/15/23  1037 12/01/22  1347 12/01/22  1347 02/03/22  0947 01/12/22  0942 12/21/21  1213 12/15/21  1432 06/25/18  1114   SODIUM 138 135*  --  137  --   --   --  138 138   POTASSIUM  --   --   --   --  4.4 4.6   < > 3.5 4.1   POTASSIUM (R) 4.6 4.4  --  4.5  --   --   --   --   --    CHLORIDE  --   --   --   --   --   --   --  108 106   CHLORIDE (R) 105 101  --  103  --   --   --   --   --    CARBON DIOXIDE  --   --   --   --   --   --   --  21 22   CARBON DIOXIDE (R) 21* 18*  --  17*  --   --   --   --   --    ANION GAP 12 16*   < > 17*  --   --   --  9 11   UREA NITROGEN  --   --   --   --   --   --   --  16 20   UREA NITROGEN (R) 17.5 17.3  --  14.8  --   --   --   --   --    CREATININE 0.85 0.97  --  1.03  --   --   --  0.84 0.92   CALCIUM, TOTAL 9.7 9.9  --  9.7  --   --   --  9.3 9.6    < > = values in this interval not displayed.     Recent Labs   Lab Test 12/21/21  1213   MAGNESIUM 1.6     Recent Labs   Lab Test 05/15/23  1037 07/11/22  1202 06/25/18  1114   BILIRUBIN TOTAL 0.4 0.2 0.3   ALKALINE PHOSPHATASE  --   --  96   ALKPHOS 84 78  --    ALT 14 13 25   AST 21  --  16   ALBUMIN  --   --  3.7   ALBUMIN (R) 4.3 3.9  --        No results found for this or any previous visit (from the past 24 hour(s)).

## 2023-08-03 NOTE — NURSING NOTE
"Oncology Rooming Note    August 3, 2023 12:37 PM   Gianluca Cooper is a 68 year old male who presents for:    Chief Complaint   Patient presents with    Oncology Clinic Visit     Prostate cancer     Initial Vitals: /77 (BP Location: Left arm, Patient Position: Sitting, Cuff Size: Adult Regular)   Pulse 56   Temp 98.4  F (36.9  C) (Oral)   Resp 16   Wt 78.4 kg (172 lb 14.4 oz)   SpO2 96%   BMI 28.09 kg/m   Estimated body mass index is 28.09 kg/m  as calculated from the following:    Height as of 7/27/23: 1.671 m (5' 5.79\").    Weight as of this encounter: 78.4 kg (172 lb 14.4 oz). Body surface area is 1.91 meters squared.  No Pain (0) Comment: Data Unavailable   No LMP for male patient.  Allergies reviewed: Yes  Medications reviewed: Yes    Medications: Medication refills not needed today.  Pharmacy name entered into [employer]:    [facility] - [place], [place] - [address]   [id number] - [place], [place] - [address] AT [facility] [address]  [id number] - [place], [place] - [address] AT [facility] [address]    Clinical concerns:       Triston Rodríguez              "

## 2023-08-03 NOTE — LETTER
8/3/2023         RE: Gianluca Cooper  [address]  [address]  [facility] [address]        Dear Colleague,    Thank you for referring your patient, Gianluca Cooper, to the [facility]. Please see a copy of my visit note below.    [facility]  New patient history and physical  2023    Diagnosis: de aries metastatic castration-sensitive prostate adenocarcinoma  Other heme/onc providers: none  Current Treatment and intent: Palliative leuprolide  Treatment Summary:   8/3/23 Leuprolide    Assessment and Plan:   Gianluca Cooper is a 67 year old male with de aries mCSPC.     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  Erectile dysfunction despite sildenafil -- anticipate continued issues with starting anti androgen therapy   Incidental pancreatic uncinate mass, suspect IPMN  In the setting of:  Metabolic syndrome -- HTN, prediabetes, HLD   HEIKE  GERD  COPD  Mood d/o -- MDD with psychotic features, PTSD  Cocaine and marijuana use disorders  Hx of accidental opiate overdose x2 c/b respiratory arrest 2023 -- reports his cocaine was laced with fentanyl  Hx HBV and HCV, cleared as of 18  Hx IVDU last ~  Tobacco use    Access: Peripheral  ECO  Medical proxy: none    Plan:  Start leuprolide 45mg every 6 months today, next ~2023  RTC in 6 months, will consider adding abiraterone/prednisone at that time    This plan was discussed with Dr. Bajwa.     Ramiro Headley MD   PGY5 Hematology/Oncology Fellow       Hem/onc History:   Edit Oncology History  Oncology History Overview Note   Gianluca initially presented to his PCP with right inguinal swelling similar to previously when he had a right inguinal hernia that was repaired in .  He was referred to a surgeon and during exploration of the right groin in the operating room he was noted to have multiple enlarged lymph nodes without a hernia for which he underwent a  sentinel lymph node biopsy with pathology consistent with metastatic prostate adenocarcinoma.  A PSMA PET scan showed evidence of metastatic disease in nonregional lymph nodes of the chest, abdomen, and pelvis without bony or visceral metastases.      Adenocarcinoma of prostate (H)   6/29/2023 Biopsy    Right inguinal excisional lymph node biopsy  Final Diagnosis   Lymph nodes, right inguinal, excision:  - Metastatic prostatic adenocarcinoma involving 2 of 5 lymph nodes  - Deposit size: 6.5 cm  - Extranodal extension is present      7/18/2023 Tumor Markers    PSA Tumor Marker 0.00 - 4.50 ng/mL 145.60 High          7/25/2023 Imaging    PSMA PET-CT  IMPRESSION: In this patient with biopsy-proven metastatic prostate  cancer:  1. Marked uptake (SUV mean 14)  in the prostate gland with  extraprostatic extension, compatible with prostatic adenocarcinoma.  2. Moderate to marked uptake in the multiple enlarged lymph nodes (SUV  mean 7-15) in the chest, abdomen and pelvis, consistent with extensive  warren metastases.  3. No bone metastases.  4. Incidentally noted hypoattenuating lesion in the uncinate process  1.4 cm, indeterminant, may represent an IPMN.  Attention on follow-up.     8/3/2023 -  Chemotherapy    OP ONC Prostate Cancer - Leuprolide (Eligard) EVERY 6 MONTHS  Plan Provider: Ramiro Headley MD  Treatment goal: Palliative  Line of treatment: First Line         Interval History:   No new issues since last weeks visit  He is ready to start lupron today    Overall he is relatively asymptomatic, though about a year ago he started noticing erectile dysfunction for which he was prescribed sildenafil which has not helped his symptoms.    Subjective  Past Medical History:   Diagnosis Date    Depression     Gastro-oesophageal reflux disease     Headache(784.0)     Hypertension     LOC (loss of consciousness) (H) age 45    out for 20 mintes after hit on top of head with board    Major depression 5/7/2013    Otitis media,  chronic     Sleep apnea     cant tolerate cpap     Chart Review:  1/11/2023 admitted for respiratory arrest with in the setting of opioid overdose with bystander administration of naloxone.  PMH notable for HTN, HLD, GERD, MDD with psychotic features, LTBI, chronic HCV inguinal hernia repair 2006, HEIKE/OHS.     Past Surgical History:   Procedure Laterality Date    COLONOSCOPY N/A 3/8/2018    Procedure: COLONOSCOPY;  colonoscopy;  Surgeon: Iona Lutz MD;  Location:  GI    HERNIA REPAIR  2006    boith sides    HERNIORRHAPHY INGUINAL Right 6/29/2023    Procedure: Right INGUINAL, lymph node excisional biopsy;  Surgeon: Frank Carlson MD;  Location: [facility] OR    LASER CO2 TYMPANOMASTOIDECTOMY  9/7/2011    Procedure:LASER CO2 TYMPANOMASTOIDECTOMY; Right Tympanoplasty , Cartilage Backed Right Tympanomastoidectomy, Omni Guide Laser on Standby  *Latex Safe*; Surgeon:BENNETT MAXWELL; Location:[facility] OR    SEPTOPLASTY  9/17/2012    Procedure: SEPTOPLASTY;  Septoplasty *Latex Safe* Turbinate reduction ;  Surgeon: Babar Dickerson MD;  Location:  OR    UVULOPALATOPHARYNGOPLASTY  7/9/2012    Procedure: UVULOPALATOPHARYNGOPLASTY;  Uvulopalatopharyngoplasty * Latex Safe*;  Surgeon: Babar Dickerson MD;  Location:  OR     Social History     Socioeconomic History    Marital status: Single   Tobacco Use    Smoking status: Every Day     Packs/day: 0.50     Types: Cigarettes    Smokeless tobacco: Never   Substance and Sexual Activity    Alcohol use: No     Alcohol/week: 0.0 standard drinks of alcohol    Drug use: No     Comment: hx polysubstance now in remission      SH obtained at initial visit 7/27/23:  He currently lives independently in [place].  He previously worked as a .  He is not currently working and is on Social Security.  He reports he does not have any friends or family locally.  He spends most of his day at home and tells me he does not have any activities that bring him tay.  He  does most of his ADLs himself such as cooking, cleaning, self-care but feels he has been slacking on this.  He identifies a point in time when his psychiatrist took him off of his antianxiety medication as a change in his mental wellbeing and he feels very depressed now.  He has been smoking tobacco products since the age of 10 and smokes about half a pack a day, approximately 30 pack years in total.  He uses marijuana daily and will smoke a joint in the morning and one at night.  He does not drink alcohol regularly.  He occasionally uses cocaine, most recently 4 months ago, and has not used recently due to not having the funds for it.  He had an episode in January when he used cocaine that was laced with fentanyl resulting in an accidental overdose requiring hospitalization.  He has a remote history of IV drug use including heroin in the early 2000's, he does not use IV drugs now.    Family History   Problem Relation Age of Onset    Depression Mother     Depression Father     Substance Abuse Father     Mental Illness Other         institutionalized    Substance Abuse Brother     Substance Abuse Brother     Substance Abuse Brother     Substance Abuse Brother     Substance Abuse Brother     Liver Disease No family hx of          Current Outpatient Medications   Medication Sig    ASPIRIN PO Take 81 mg by mouth daily    atorvastatin (LIPITOR) 40 MG tablet Take 1 tablet (40 mg) by mouth daily    buPROPion (WELLBUTRIN XL) 150 MG 24 hr tablet Take 1 tablet (150 mg) by mouth every morning    doxepin (SINEQUAN) 10 MG capsule Take 1 capsule (10 mg) by mouth At Bedtime    HYDROcodone-acetaminophen (NORCO) 5-325 MG tablet Take 1-2 tablets by mouth every 4 hours as needed for moderate to severe pain    lisinopril-hydrochlorothiazide (PRINZIDE,ZESTORETIC) 20-25 MG per tablet Take 1 tablet by mouth daily    metFORMIN (GLUCOPHAGE) 500 MG tablet Take one tablet daily in the am and two tablets at bedtime.  HOME Samuels NP at [facility]     OLANZapine (ZYPREXA) 15 MG tablet Take one-half (7.5 mg) to one tablet (15 mg) by mouth at bedtime    prazosin (MINIPRESS) 2 MG capsule Take 2 capsules (4 mg) by mouth At Bedtime    ranitidine (ZANTAC) 150 MG tablet Take 1 tablet (150 mg) by mouth 2 times daily    senna-docusate (SENOKOT-S/PERICOLACE) 8.6-50 MG tablet Take 1-2 tablets by mouth 2 times daily    sertraline (ZOLOFT) 100 MG tablet Take 2 tablets (200 mg) by mouth daily    venlafaxine (EFFEXOR-XR) 37.5 MG 24 hr capsule Take 2 capsules (75 mg) by mouth daily    VITAMIN D3 1000 units tablet TK 2 TS PO D     No current facility-administered medications for this visit.      Visit history:  Discussion and Medical Decision Making (07/27/2023):  Today utilizing a teleinterpreter I discussed with Jaylen his diagnosis of metastatic castration sensitive prostate cancer.  We discussed the incurable nature of metastatic prostate cancer, the slow-growing nature and likely development over months to years, the biology of prostate cancer growth in response to testosterone, the general treatment strategy of hormone blockade +/- chemotherapy with a palliative intent of prolonging life and improving quality of life, and a prognosis with treatment on the order of years.  Gianluca made it clear he is not interested in chemotherapy, but would be accepting of leuprolide shots and potentially abiraterone in the future.  We discussed the general side effects of antihormonal therapy including but not limited to cardiac and cerebrovascular disease, osteoporosis, mood changes, anhedonia.     Objective  Physical Exam:   There were no vitals taken for this visit.  Physical Exam  Constitutional:       General: He is not in acute distress.     Appearance: He is not ill-appearing or toxic-appearing.   HENT:      Head: Normocephalic and atraumatic.      Mouth/Throat:      Mouth: Mucous membranes are moist.   Eyes:      General: No scleral icterus.     Extraocular Movements: Extraocular  movements intact.   Cardiovascular:      Rate and Rhythm: Normal rate and regular rhythm.   Pulmonary:      Effort: Pulmonary effort is normal. No respiratory distress.   Abdominal:      General: There is no distension.      Palpations: Abdomen is soft.      Tenderness: There is no abdominal tenderness. There is no guarding.   Skin:     General: Skin is warm and dry.      Coloration: Skin is not jaundiced.   Neurological:      Mental Status: He is alert. Mental status is at baseline.         Data:     I personally reviewed the following studies:  Recent Labs   Lab Test 06/25/18  1114 11/13/17  1113 03/09/17  0000   WBC 5.6 6.3 6.5   % NEUTROPHILS  --  63.7  --    HEMOGLOBIN 13.7 14.6 14.6   PLATELET COUNT 222 248 278     Recent Labs   Lab Test 06/24/23  0946 05/15/23  1037 12/01/22  1347 12/01/22  1347 02/03/22  0947 01/12/22  0942 12/21/21  1213 12/15/21  1432 06/25/18  1114   SODIUM 138 135*  --  137  --   --   --  138 138   POTASSIUM  --   --   --   --  4.4 4.6   < > 3.5 4.1   POTASSIUM (R) 4.6 4.4  --  4.5  --   --   --   --   --    CHLORIDE  --   --   --   --   --   --   --  108 106   CHLORIDE (R) 105 101  --  103  --   --   --   --   --    CARBON DIOXIDE  --   --   --   --   --   --   --  21 22   CARBON DIOXIDE (R) 21* 18*  --  17*  --   --   --   --   --    ANION GAP 12 16*   < > 17*  --   --   --  9 11   UREA NITROGEN  --   --   --   --   --   --   --  16 20   UREA NITROGEN (R) 17.5 17.3  --  14.8  --   --   --   --   --    CREATININE 0.85 0.97  --  1.03  --   --   --  0.84 0.92   CALCIUM, TOTAL 9.7 9.9  --  9.7  --   --   --  9.3 9.6    < > = values in this interval not displayed.     Recent Labs   Lab Test 12/21/21  1213   MAGNESIUM 1.6     Recent Labs   Lab Test 05/15/23  1037 07/11/22  1202 06/25/18  1114   BILIRUBIN TOTAL 0.4 0.2 0.3   ALKALINE PHOSPHATASE  --   --  96   ALKPHOS 84 78  --    ALT 14 13 25   AST 21  --  16   ALBUMIN  --   --  3.7   ALBUMIN (R) 4.3 3.9  --        No results found for this  or any previous visit (from the past 24 hour(s)).         Attestation signed by Brigido Bajwa MD at 8/4/2023  7:29 AM:  ATTENDING CLINIC NOTE  8/3/2023    I was there for the critical part of the history and physical. I discussed the patient with Dr. Headley and his note reflects our joint assessment and plan.    Comments:  Big first step to get started on ADT.  Will add abiraterone if there is not an appropriate drop in PSA.      Brigido Bajwa MD, MSc  Associate Professor of Medicine   of Physician and Faculty Well-Being  Division of Hematology, Oncology and Transplantation  Faculty, [employer]  [employer]  [facility]  [address] [place] [address] [id number] [phone]

## 2023-08-21 ENCOUNTER — MEDICAL CORRESPONDENCE (OUTPATIENT)
Dept: HEALTH INFORMATION MANAGEMENT | Facility: CLINIC | Age: 68
End: 2023-08-21

## 2023-08-21 ENCOUNTER — LAB REQUISITION (OUTPATIENT)
Dept: LAB | Facility: CLINIC | Age: 68
End: 2023-08-21
Payer: COMMERCIAL

## 2023-08-21 DIAGNOSIS — B35.3 TINEA PEDIS: ICD-10-CM

## 2023-08-21 DIAGNOSIS — E11.9 TYPE 2 DIABETES MELLITUS WITHOUT COMPLICATIONS (H): ICD-10-CM

## 2023-08-21 DIAGNOSIS — M79.671 PAIN IN RIGHT FOOT: ICD-10-CM

## 2023-08-21 DIAGNOSIS — M79.672 PAIN IN LEFT FOOT: ICD-10-CM

## 2023-08-21 LAB
ANION GAP SERPL CALCULATED.3IONS-SCNC: 18 MMOL/L (ref 7–15)
BUN SERPL-MCNC: 14.3 MG/DL (ref 8–23)
CALCIUM SERPL-MCNC: 10.2 MG/DL (ref 8.8–10.2)
CHLORIDE SERPL-SCNC: 100 MMOL/L (ref 98–107)
CREAT SERPL-MCNC: 0.82 MG/DL (ref 0.67–1.17)
DEPRECATED HCO3 PLAS-SCNC: 19 MMOL/L (ref 22–29)
GFR SERPL CREATININE-BSD FRML MDRD: >90 ML/MIN/1.73M2
GLUCOSE SERPL-MCNC: 105 MG/DL (ref 70–99)
POTASSIUM SERPL-SCNC: 4.2 MMOL/L (ref 3.4–5.3)
SODIUM SERPL-SCNC: 137 MMOL/L (ref 136–145)

## 2023-08-21 PROCEDURE — 80048 BASIC METABOLIC PNL TOTAL CA: CPT | Mod: ORL | Performed by: PEDIATRICS

## 2023-08-22 ENCOUNTER — TRANSCRIBE ORDERS (OUTPATIENT)
Dept: OTHER | Age: 68
End: 2023-08-22

## 2023-08-22 DIAGNOSIS — M79.672 PAIN IN BOTH FEET: ICD-10-CM

## 2023-08-22 DIAGNOSIS — M79.671 PAIN IN BOTH FEET: ICD-10-CM

## 2023-08-22 DIAGNOSIS — E11.9 CONTROLLED TYPE 2 DIABETES MELLITUS WITHOUT COMPLICATION, WITHOUT LONG-TERM CURRENT USE OF INSULIN (H): Primary | ICD-10-CM

## 2023-08-22 DIAGNOSIS — B35.3 TINEA PEDIS OF BOTH FEET: ICD-10-CM

## 2023-11-06 ENCOUNTER — MEDICAL CORRESPONDENCE (OUTPATIENT)
Dept: HEALTH INFORMATION MANAGEMENT | Facility: CLINIC | Age: 68
End: 2023-11-06

## 2023-11-06 ENCOUNTER — LAB REQUISITION (OUTPATIENT)
Dept: LAB | Facility: CLINIC | Age: 68
End: 2023-11-06
Payer: COMMERCIAL

## 2023-11-06 DIAGNOSIS — E11.9 TYPE 2 DIABETES MELLITUS WITHOUT COMPLICATIONS (H): ICD-10-CM

## 2023-11-06 DIAGNOSIS — E78.00 PURE HYPERCHOLESTEROLEMIA, UNSPECIFIED: ICD-10-CM

## 2023-11-06 LAB
ALBUMIN SERPL BCG-MCNC: 3.9 G/DL (ref 3.5–5.2)
ALP SERPL-CCNC: 74 U/L (ref 40–129)
ALT SERPL W P-5'-P-CCNC: 11 U/L (ref 0–70)
ANION GAP SERPL CALCULATED.3IONS-SCNC: 12 MMOL/L (ref 7–15)
AST SERPL W P-5'-P-CCNC: 16 U/L (ref 0–45)
BILIRUB DIRECT SERPL-MCNC: <0.2 MG/DL (ref 0–0.3)
BILIRUB SERPL-MCNC: <0.2 MG/DL
BUN SERPL-MCNC: 15.7 MG/DL (ref 8–23)
CALCIUM SERPL-MCNC: 9.7 MG/DL (ref 8.8–10.2)
CHLORIDE SERPL-SCNC: 102 MMOL/L (ref 98–107)
CHOLEST SERPL-MCNC: 110 MG/DL
CREAT SERPL-MCNC: 0.77 MG/DL (ref 0.67–1.17)
DEPRECATED HCO3 PLAS-SCNC: 21 MMOL/L (ref 22–29)
EGFRCR SERPLBLD CKD-EPI 2021: >90 ML/MIN/1.73M2
GLUCOSE SERPL-MCNC: 114 MG/DL (ref 70–99)
HDLC SERPL-MCNC: 43 MG/DL
LDLC SERPL CALC-MCNC: 53 MG/DL
NONHDLC SERPL-MCNC: 67 MG/DL
POTASSIUM SERPL-SCNC: 4.4 MMOL/L (ref 3.4–5.3)
PROT SERPL-MCNC: 6.6 G/DL (ref 6.4–8.3)
SODIUM SERPL-SCNC: 135 MMOL/L (ref 135–145)
TRIGL SERPL-MCNC: 69 MG/DL

## 2023-11-06 PROCEDURE — 80053 COMPREHEN METABOLIC PANEL: CPT | Mod: ORL | Performed by: PEDIATRICS

## 2023-11-06 PROCEDURE — 80061 LIPID PANEL: CPT | Mod: ORL | Performed by: PEDIATRICS

## 2023-11-06 PROCEDURE — 82248 BILIRUBIN DIRECT: CPT | Mod: ORL | Performed by: PEDIATRICS

## 2023-11-07 ENCOUNTER — TRANSCRIBE ORDERS (OUTPATIENT)
Dept: OTHER | Age: 68
End: 2023-11-07

## 2023-11-07 DIAGNOSIS — N52.9 ERECTILE DYSFUNCTION, UNSPECIFIED ERECTILE DYSFUNCTION TYPE: Primary | ICD-10-CM

## 2024-01-29 ENCOUNTER — LAB REQUISITION (OUTPATIENT)
Dept: LAB | Facility: CLINIC | Age: 69
End: 2024-01-29
Payer: COMMERCIAL

## 2024-01-29 DIAGNOSIS — Z79.4 LONG TERM (CURRENT) USE OF INSULIN (H): ICD-10-CM

## 2024-01-29 DIAGNOSIS — R31.9 HEMATURIA, UNSPECIFIED: ICD-10-CM

## 2024-01-29 DIAGNOSIS — E11.9 TYPE 2 DIABETES MELLITUS WITHOUT COMPLICATIONS (H): ICD-10-CM

## 2024-01-29 PROCEDURE — 87086 URINE CULTURE/COLONY COUNT: CPT | Mod: ORL | Performed by: PEDIATRICS

## 2024-01-30 ENCOUNTER — PATIENT OUTREACH (OUTPATIENT)
Dept: ONCOLOGY | Facility: CLINIC | Age: 69
End: 2024-01-30
Payer: COMMERCIAL

## 2024-01-30 DIAGNOSIS — C61 ADENOCARCINOMA OF PROSTATE (H): Primary | ICD-10-CM

## 2024-01-30 LAB — BACTERIA UR CULT: NORMAL

## 2024-01-30 NOTE — PROGRESS NOTES
[facility]: Cancer Care                                                                                          RN received a call from Mariia Hurley, provider at a community site.  Apparently, patient came to the visit last Friday c/o acute gross hematuria with clots even to soak through his undergarments. He was also c/o of LLQ pain and had clear colored after. His hgb was 14.5 3 months prior and is now 13.0 yesterday. His UA/UC was negative. Provider felt that he had bulky inguinal nodes.  He is scheduled to see Dr. Bajwa on Friday. RN sent a message to Dr. Bajwa so he is aware of the findings.     Farzana BILLSN, RN, OCN  Care Coordinator  [facility]

## 2024-01-31 NOTE — PROGRESS NOTES
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 Gianluca presented with inguinal adenopathy that led to the biopsy. PSA at the time of diagnosis was 145.6. PSMA PET showed warren involvement of the chest, abdomen and pelvis, wothout visceral or bone metastases.  Current Treatment and intent: Palliative leuprolide  Treatment Summary:   8/3/23 Leuprolide    Assessment and Plan:   Gianluca Cooper is a 67 year old male with de aries mCSPC.     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  We know without a doubt that the inguinal nodes contain metastatic prostate cancer because this is how he was diagnosed.  We await the results of today's PSA.  If there has not been a corresponding significant drop, then androgen deprivation therapy alone is not going to cut it for him.  I would add abiraterone.  This is an unusual prostate cancer and will get Caris testing on his biopsy to see if there are any targetable gene abnormalities, that would help with prognosis or treatment.  Recent  Gross hematuria likely due to the prostate cancer.  His creatinine is fine and his hemoglobin is still in the 12 range.  This is not clinically significant bleeding, though he was bleeding out clots and does have the potential for obstruction.  Retiming will hold on referral back to urology but if the gross hematuria recurs, then I would try to get him in ASAP.  Metabolic syndrome -- HTN, prediabetes, HLD   HEIKE  GERD  COPD  Mood d/o -- MDD with psychotic features, PTSD  Cocaine and marijuana use disorders  Hx of accidental opiate overdose x2 c/b respiratory arrest 1/2023 -- reports his cocaine was laced with fentanyl  Hx HBV and HCV, cleared as of  6/25/18  Hx IVDU last ~2002  Tobacco use    Plan:  Next leuprolide 45mg today.  Follow-up the results of today's PSA  Return to clinic with me in 3 months with labs just prior.  Again, if there has not been a robust PSA response, I would add abiraterone.    Brigido Bajwa MD, MSc  Associate Professor of Medicine  [employer]  [address] [address] [phone]      Hem/onc History:   Edit Oncology History  Oncology History Overview Note   Gianluca initially presented to his PCP with right inguinal swelling similar to previously when he had a right inguinal hernia that was repaired in 2006.  He was referred to a surgeon and during exploration of the right groin in the operating room he was noted to have multiple enlarged lymph nodes without a hernia for which he underwent a sentinel lymph node biopsy with pathology consistent with metastatic prostate adenocarcinoma.  A PSMA PET scan showed evidence of metastatic disease in nonregional lymph nodes of the chest, abdomen, and pelvis without bony or visceral metastases.      Adenocarcinoma of prostate (H)   6/29/2023 Biopsy    Right inguinal excisional lymph node biopsy  Final Diagnosis   Lymph nodes, right inguinal, excision:  - Metastatic prostatic adenocarcinoma involving 2 of 5 lymph nodes  - Deposit size: 6.5 cm  - Extranodal extension is present      7/18/2023 Tumor Markers    PSA Tumor Marker 0.00 - 4.50 ng/mL 145.60 High          7/25/2023 Imaging    PSMA PET-CT  IMPRESSION: In this patient with biopsy-proven metastatic prostate  cancer:  1. Marked uptake (SUV mean 14)  in the prostate gland with  extraprostatic extension, compatible with prostatic adenocarcinoma.  2. Moderate to marked uptake in the multiple enlarged lymph nodes (SUV  mean 7-15) in the chest, abdomen and pelvis, consistent with extensive  warren metastases.  3. No bone metastases.  4. Incidentally noted hypoattenuating lesion  in the uncinate process  1.4 cm, indeterminant, may represent an IPMN.  Attention on follow-up.     8/3/2023 -  Chemotherapy    OP ONC Prostate Cancer - Leuprolide (Eligard) EVERY 6 MONTHS  Plan Provider: Ramiro Headley MD  Treatment goal: Palliative  Line of treatment: First Line         Interval History:   Noted bleeding from the penis 3 days ago.  It lasted for about a day or 2.  He did pee out some clots.  Now he is urinating fine and his urine is clear.  He gets some pain in his testicles when he walks too far.  Other than that he is actually feeling pretty well.  He has not noted much of a change in his inguinal lymphadenopathy.  He does not note any hot flashes.  He has no other concerns today.    Subjective   Past Medical History:   Diagnosis Date    Depression     Gastro-oesophageal reflux disease     Headache(784.0)     Hypertension     LOC (loss of consciousness) (H) age 45    out for 20 mintes after hit on top of head with board    Major depression 5/7/2013    Otitis media, chronic     Sleep apnea     cant tolerate cpap     Chart Review:  1/11/2023 admitted for respiratory arrest with in the setting of opioid overdose with bystander administration of naloxone.  PMH notable for HTN, HLD, GERD, MDD with psychotic features, LTBI, chronic HCV inguinal hernia repair 2006, HEIKE/OHS.     Past Surgical History:   Procedure Laterality Date    COLONOSCOPY N/A 3/8/2018    Procedure: COLONOSCOPY;  colonoscopy;  Surgeon: Iona Lutz MD;  Location:  GI    HERNIA REPAIR  2006    boith sides    HERNIORRHAPHY INGUINAL Right 6/29/2023    Procedure: Right INGUINAL, lymph node excisional biopsy;  Surgeon: Frank Carlson MD;  Location: [facility] OR    LASER CO2 TYMPANOMASTOIDECTOMY  9/7/2011    Procedure:LASER CO2 TYMPANOMASTOIDECTOMY; Right Tympanoplasty , Cartilage Backed Right Tympanomastoidectomy, Omni Guide Laser on Standby  *Latex Safe*; Surgeon:BENNETT MAXWELL; Location: OR    SEPTOPLASTY   9/17/2012    Procedure: SEPTOPLASTY;  Septoplasty *Latex Safe* Turbinate reduction ;  Surgeon: Babar Dickerson MD;  Location:  OR    UVULOPALATOPHARYNGOPLASTY  7/9/2012    Procedure: UVULOPALATOPHARYNGOPLASTY;  Uvulopalatopharyngoplasty * Latex Safe*;  Surgeon: Babar Dickerson MD;  Location:  OR     Social History     Socioeconomic History    Marital status: Single   Tobacco Use    Smoking status: Every Day     Packs/day: .5     Types: Cigarettes    Smokeless tobacco: Never   Substance and Sexual Activity    Alcohol use: No     Alcohol/week: 0.0 standard drinks of alcohol    Drug use: No     Comment: hx polysubstance now in remission      SH obtained at initial visit 7/27/23:  He currently lives independently in [place].  He previously worked as a .  He is not currently working and is on Social Security. He is from [place]. He has 3 children who live in [place].  He has not seen them for over 10 years because they will not see him.  He has 1 friend with whom he is close.  Otherwise he is pretty much on his own.  He reports he does not have any friends or family locally.  He spends most of his day at home and tells me he does not have any activities that bring him tay.  He does most of his ADLs himself such as cooking, cleaning, self-care but feels he has been slacking on this.  He identifies a point in time when his psychiatrist took him off of his antianxiety medication as a change in his mental wellbeing and he feels very depressed now.  He has been smoking tobacco products since the age of 10 and smokes about half a pack a day, approximately 30 pack years in total.  He uses marijuana daily and will smoke a joint in the morning and one at night.  He does not drink alcohol regularly.  He occasionally uses cocaine, most recently 4 months ago, and has not used recently due to not having the funds for it.  He had an episode in January when he used cocaine that was laced with fentanyl resulting  in an accidental overdose requiring hospitalization.  He has a remote history of IV drug use including heroin in the early 2000's, he does not use IV drugs now.    Family History   Problem Relation Age of Onset    Depression Mother     Depression Father     Substance Abuse Father     Mental Illness Other         institutionalized    Substance Abuse Brother     Substance Abuse Brother     Substance Abuse Brother     Substance Abuse Brother     Substance Abuse Brother     Liver Disease No family hx of          Current Outpatient Medications   Medication Sig    ASPIRIN PO Take 81 mg by mouth daily    atorvastatin (LIPITOR) 40 MG tablet Take 1 tablet (40 mg) by mouth daily    buPROPion (WELLBUTRIN XL) 150 MG 24 hr tablet Take 1 tablet (150 mg) by mouth every morning    doxepin (SINEQUAN) 10 MG capsule Take 1 capsule (10 mg) by mouth At Bedtime    HYDROcodone-acetaminophen (NORCO) 5-325 MG tablet Take 1-2 tablets by mouth every 4 hours as needed for moderate to severe pain    lisinopril-hydrochlorothiazide (PRINZIDE,ZESTORETIC) 20-25 MG per tablet Take 1 tablet by mouth daily    metFORMIN (GLUCOPHAGE) 500 MG tablet Take one tablet daily in the am and two tablets at bedtime.  R Skolar NP at [facility]    OLANZapine (ZYPREXA) 15 MG tablet Take one-half (7.5 mg) to one tablet (15 mg) by mouth at bedtime    prazosin (MINIPRESS) 2 MG capsule Take 2 capsules (4 mg) by mouth At Bedtime    ranitidine (ZANTAC) 150 MG tablet Take 1 tablet (150 mg) by mouth 2 times daily    senna-docusate (SENOKOT-S/PERICOLACE) 8.6-50 MG tablet Take 1-2 tablets by mouth 2 times daily    sertraline (ZOLOFT) 100 MG tablet Take 2 tablets (200 mg) by mouth daily    venlafaxine (EFFEXOR-XR) 37.5 MG 24 hr capsule Take 2 capsules (75 mg) by mouth daily    VITAMIN D3 1000 units tablet TK 2 TS PO D     No current facility-administered medications for this visit.      Visit history:  Discussion and Medical Decision Making (07/27/2023):  Today utilizing a  teleinterpreter I discussed with Jaylen his diagnosis of metastatic castration sensitive prostate cancer.  We discussed the incurable nature of metastatic prostate cancer, the slow-growing nature and likely development over months to years, the biology of prostate cancer growth in response to testosterone, the general treatment strategy of hormone blockade +/- chemotherapy with a palliative intent of prolonging life and improving quality of life, and a prognosis with treatment on the order of years.  Gianluca made it clear he is not interested in chemotherapy, but would be accepting of leuprolide shots and potentially abiraterone in the future.  We discussed the general side effects of antihormonal therapy including but not limited to cardiac and cerebrovascular disease, osteoporosis, mood changes, anhedonia.     Objective   Physical Exam:   There were no vitals taken for this visit.  Physical Exam  Constitutional:       General: He is not in acute distress.     Appearance: He is not ill-appearing or toxic-appearing.   HENT:      Head: Normocephalic and atraumatic.      Mouth/Throat:      Mouth: Mucous membranes are moist.   Eyes:      General: No scleral icterus.     Extraocular Movements: Extraocular movements intact.   Cardiovascular:      Rate and Rhythm: Normal rate and regular rhythm.   Pulmonary:      Effort: Pulmonary effort is normal. No respiratory distress.   Abdominal:      General: There is no distension.      Palpations: Abdomen is soft.      Tenderness: There is no abdominal tenderness. There is no guarding.   Skin:     General: Skin is warm and dry.      Coloration: Skin is not jaundiced.   Neurological:      Mental Status: He is alert. Mental status is at baseline.     Lymph node survey reveals bilateral 3-5 cm very firm lymph nodes the medial aspect of the inguinal bands.  The meatus of the penis does not reveal any blood.  The penis exam is negative.  The testicles are small without masses.  There is no  palpable hernia within the scrotum.  No,    Data:     I personally reviewed the following studies:  Recent Labs   Lab Test 06/25/18  1114 11/13/17  1113 03/09/17  0000   WBC 5.6 6.3 6.5   % NEUTROPHILS  --  63.7  --    HEMOGLOBIN 13.7 14.6 14.6   PLATELET COUNT 222 248 278     Recent Labs   Lab Test 11/06/23  0850 08/21/23  1013 06/24/23  0946 05/15/23  1037 12/01/22  1347   SODIUM 135 137 138 135* 137   POTASSIUM (R) 4.4 4.2 4.6 4.4 4.5   CHLORIDE (R) 102 100 105 101 103   CARBON DIOXIDE (R) 21* 19* 21* 18* 17*   ANION GAP 12 18* 12 16* 17*   UREA NITROGEN (R) 15.7 14.3 17.5 17.3 14.8   CREATININE 0.77 0.82 0.85 0.97 1.03   CALCIUM, TOTAL 9.7 10.2 9.7 9.9 9.7     Recent Labs   Lab Test 12/21/21  1213   MAGNESIUM 1.6     Recent Labs   Lab Test 11/06/23  0850 05/15/23  1037 07/11/22  1202   BILIRUBIN TOTAL <0.2 0.4 0.2   ALKPHOS 74 84 78   ALT 11 14 13   AST 16 21  --    ALBUMIN (R) 3.9 4.3 3.9       No results found for this or any previous visit (from the past 24 hour(s)).

## 2024-02-01 ENCOUNTER — ANCILLARY PROCEDURE (OUTPATIENT)
Dept: ULTRASOUND IMAGING | Facility: CLINIC | Age: 69
End: 2024-02-01
Attending: PEDIATRICS
Payer: COMMERCIAL

## 2024-02-01 DIAGNOSIS — R31.9 HEMATURIA, UNSPECIFIED: ICD-10-CM

## 2024-02-01 PROCEDURE — 76857 US EXAM PELVIC LIMITED: CPT | Performed by: RADIOLOGY

## 2024-02-02 ENCOUNTER — LAB (OUTPATIENT)
Dept: LAB | Facility: CLINIC | Age: 69
End: 2024-02-02
Attending: INTERNAL MEDICINE
Payer: COMMERCIAL

## 2024-02-02 ENCOUNTER — ONCOLOGY VISIT (OUTPATIENT)
Dept: ONCOLOGY | Facility: CLINIC | Age: 69
End: 2024-02-02
Attending: INTERNAL MEDICINE
Payer: COMMERCIAL

## 2024-02-02 VITALS
SYSTOLIC BLOOD PRESSURE: 110 MMHG | OXYGEN SATURATION: 95 % | WEIGHT: 176.5 LBS | BODY MASS INDEX: 28.67 KG/M2 | HEART RATE: 68 BPM | TEMPERATURE: 98.1 F | RESPIRATION RATE: 16 BRPM | DIASTOLIC BLOOD PRESSURE: 68 MMHG

## 2024-02-02 DIAGNOSIS — C61 ADENOCARCINOMA OF PROSTATE (H): Primary | ICD-10-CM

## 2024-02-02 LAB
ALBUMIN SERPL BCG-MCNC: 4.2 G/DL (ref 3.5–5.2)
ALP SERPL-CCNC: 75 U/L (ref 40–150)
ALT SERPL W P-5'-P-CCNC: 10 U/L (ref 0–70)
ANION GAP SERPL CALCULATED.3IONS-SCNC: 9 MMOL/L (ref 7–15)
AST SERPL W P-5'-P-CCNC: 15 U/L (ref 0–45)
BASOPHILS # BLD AUTO: 0 10E3/UL (ref 0–0.2)
BASOPHILS NFR BLD AUTO: 1 %
BILIRUB SERPL-MCNC: 0.2 MG/DL
BUN SERPL-MCNC: 20.9 MG/DL (ref 8–23)
CALCIUM SERPL-MCNC: 9.7 MG/DL (ref 8.8–10.2)
CHLORIDE SERPL-SCNC: 101 MMOL/L (ref 98–107)
CREAT SERPL-MCNC: 0.81 MG/DL (ref 0.67–1.17)
DEPRECATED HCO3 PLAS-SCNC: 25 MMOL/L (ref 22–29)
EGFRCR SERPLBLD CKD-EPI 2021: >90 ML/MIN/1.73M2
EOSINOPHIL # BLD AUTO: 0 10E3/UL (ref 0–0.7)
EOSINOPHIL NFR BLD AUTO: 1 %
ERYTHROCYTE [DISTWIDTH] IN BLOOD BY AUTOMATED COUNT: 13.5 % (ref 10–15)
GLUCOSE SERPL-MCNC: 117 MG/DL (ref 70–99)
HCT VFR BLD AUTO: 36.6 % (ref 40–53)
HGB BLD-MCNC: 12.6 G/DL (ref 13.3–17.7)
IMM GRANULOCYTES # BLD: 0 10E3/UL
IMM GRANULOCYTES NFR BLD: 1 %
LYMPHOCYTES # BLD AUTO: 1.2 10E3/UL (ref 0.8–5.3)
LYMPHOCYTES NFR BLD AUTO: 24 %
MCH RBC QN AUTO: 30.4 PG (ref 26.5–33)
MCHC RBC AUTO-ENTMCNC: 34.4 G/DL (ref 31.5–36.5)
MCV RBC AUTO: 88 FL (ref 78–100)
MONOCYTES # BLD AUTO: 0.4 10E3/UL (ref 0–1.3)
MONOCYTES NFR BLD AUTO: 7 %
NEUTROPHILS # BLD AUTO: 3.5 10E3/UL (ref 1.6–8.3)
NEUTROPHILS NFR BLD AUTO: 66 %
NRBC # BLD AUTO: 0 10E3/UL
NRBC BLD AUTO-RTO: 0 /100
PLATELET # BLD AUTO: 384 10E3/UL (ref 150–450)
POTASSIUM SERPL-SCNC: 4.5 MMOL/L (ref 3.4–5.3)
PROT SERPL-MCNC: 7.4 G/DL (ref 6.4–8.3)
PSA SERPL DL<=0.01 NG/ML-MCNC: 377.1 NG/ML (ref 0–4.5)
RBC # BLD AUTO: 4.14 10E6/UL (ref 4.4–5.9)
SODIUM SERPL-SCNC: 135 MMOL/L (ref 135–145)
WBC # BLD AUTO: 5.2 10E3/UL (ref 4–11)

## 2024-02-02 PROCEDURE — 85025 COMPLETE CBC W/AUTO DIFF WBC: CPT | Performed by: INTERNAL MEDICINE

## 2024-02-02 PROCEDURE — G0463 HOSPITAL OUTPT CLINIC VISIT: HCPCS | Mod: 25 | Performed by: INTERNAL MEDICINE

## 2024-02-02 PROCEDURE — 96402 CHEMO HORMON ANTINEOPL SQ/IM: CPT

## 2024-02-02 PROCEDURE — 84153 ASSAY OF PSA TOTAL: CPT | Performed by: INTERNAL MEDICINE

## 2024-02-02 PROCEDURE — 84403 ASSAY OF TOTAL TESTOSTERONE: CPT | Performed by: INTERNAL MEDICINE

## 2024-02-02 PROCEDURE — 82040 ASSAY OF SERUM ALBUMIN: CPT | Performed by: INTERNAL MEDICINE

## 2024-02-02 PROCEDURE — 99214 OFFICE O/P EST MOD 30 MIN: CPT | Performed by: INTERNAL MEDICINE

## 2024-02-02 PROCEDURE — 36415 COLL VENOUS BLD VENIPUNCTURE: CPT | Performed by: INTERNAL MEDICINE

## 2024-02-02 PROCEDURE — 250N000011 HC RX IP 250 OP 636: Mod: JZ | Performed by: INTERNAL MEDICINE

## 2024-02-02 RX ADMIN — LEUPROLIDE ACETATE 45 MG: KIT SUBCUTANEOUS at 13:36

## 2024-02-02 ASSESSMENT — PAIN SCALES - GENERAL: PAINLEVEL: MILD PAIN (2)

## 2024-02-02 NOTE — LETTER
2/2/2024       RE: Gianluca Cooper  [address]   [address]  [facility] [address]    Dear Colleague,    Thank you for referring your patient, Gianluca Cooper, to the [facility]. Please see a copy of my visit note below.    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 Gianluca presented with inguinal adenopathy that led to the biopsy. PSA at the time of diagnosis was 145.6. PSMA PET showed warren involvement of the chest, abdomen and pelvis, wothout visceral or bone metastases.  Current Treatment and intent: Palliative leuprolide  Treatment Summary:   8/3/23 Leuprolide    Assessment and Plan:   Gianluca Cooper is a 67 year old male with de aries mCSPC.     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  We know without a doubt that the inguinal nodes contain metastatic prostate cancer because this is how he was diagnosed.  We await the results of today's PSA.  If there has not been a corresponding significant drop, then androgen deprivation therapy alone is not going to cut it for him.  I would add abiraterone.  This is an unusual prostate cancer and will get Caris testing on his biopsy to see if there are any targetable gene abnormalities, that would help with prognosis or treatment.  Recent  Gross hematuria likely due to the prostate cancer.  His creatinine is fine and his hemoglobin is still in the 12 range.  This is not clinically significant bleeding, though he was bleeding out clots and does have the potential for obstruction.  Retiming will hold on referral back to urology but if the gross hematuria recurs, then I would try to get him in ASAP.  Metabolic syndrome --  HTN, prediabetes, HLD   HEIKE  GERD  COPD  Mood d/o -- MDD with psychotic features, PTSD  Cocaine and marijuana use disorders  Hx of accidental opiate overdose x2 c/b respiratory arrest 1/2023 -- reports his cocaine was laced with fentanyl  Hx HBV and HCV, cleared as of 6/25/18  Hx IVDU last ~2002  Tobacco use    Plan:  Next leuprolide 45mg today.  Follow-up the results of today's PSA  Return to clinic with me in 3 months with labs just prior.  Again, if there has not been a robust PSA response, I would add abiraterone.    Brigido Bajwa MD, MSc  Associate Professor of Medicine  [facility] [employer]  [place] [id number] [phone]      Hem/onc History:   Edit Oncology History  Oncology History Overview Note   Gianluca initially presented to his PCP with right inguinal swelling similar to previously when he had a right inguinal hernia that was repaired in 2006.  He was referred to a surgeon and during exploration of the right groin in the operating room he was noted to have multiple enlarged lymph nodes without a hernia for which he underwent a sentinel lymph node biopsy with pathology consistent with metastatic prostate adenocarcinoma.  A PSMA PET scan showed evidence of metastatic disease in nonregional lymph nodes of the chest, abdomen, and pelvis without bony or visceral metastases.      Adenocarcinoma of prostate (H)   6/29/2023 Biopsy    Right inguinal excisional lymph node biopsy  Final Diagnosis   Lymph nodes, right inguinal, excision:  - Metastatic prostatic adenocarcinoma involving 2 of 5 lymph nodes  - Deposit size: 6.5 cm  - Extranodal extension is present      7/18/2023 Tumor Markers    PSA Tumor Marker 0.00 - 4.50 ng/mL 145.60 High          7/25/2023 Imaging    PSMA PET-CT  IMPRESSION: In this patient with biopsy-proven metastatic prostate  cancer:  1. Marked uptake (SUV mean 14)  in the prostate gland with  extraprostatic extension,  compatible with prostatic adenocarcinoma.  2. Moderate to marked uptake in the multiple enlarged lymph nodes (SUV  mean 7-15) in the chest, abdomen and pelvis, consistent with extensive  warren metastases.  3. No bone metastases.  4. Incidentally noted hypoattenuating lesion in the uncinate process  1.4 cm, indeterminant, may represent an IPMN.  Attention on follow-up.     8/3/2023 -  Chemotherapy    OP ONC Prostate Cancer - Leuprolide (Eligard) EVERY 6 MONTHS  Plan Provider: Ramiro Headley MD  Treatment goal: Palliative  Line of treatment: First Line         Interval History:   Noted bleeding from the penis 3 days ago.  It lasted for about a day or 2.  He did pee out some clots.  Now he is urinating fine and his urine is clear.  He gets some pain in his testicles when he walks too far.  Other than that he is actually feeling pretty well.  He has not noted much of a change in his inguinal lymphadenopathy.  He does not note any hot flashes.  He has no other concerns today.    Subjective  Past Medical History:   Diagnosis Date    Depression     Gastro-oesophageal reflux disease     Headache(784.0)     Hypertension     LOC (loss of consciousness) (H) age 45    out for 20 mintes after hit on top of head with board    Major depression 5/7/2013    Otitis media, chronic     Sleep apnea     cant tolerate cpap     Chart Review:  1/11/2023 admitted for respiratory arrest with in the setting of opioid overdose with bystander administration of naloxone.  PMH notable for HTN, HLD, GERD, MDD with psychotic features, LTBI, chronic HCV inguinal hernia repair 2006, HEIKE/OHS.     Past Surgical History:   Procedure Laterality Date    COLONOSCOPY N/A 3/8/2018    Procedure: COLONOSCOPY;  colonoscopy;  Surgeon: Iona Lutz MD;  Location: [facility]    HERNIA REPAIR  2006    boith sides    HERNIORRHAPHY INGUINAL Right 6/29/2023    Procedure: Right INGUINAL, lymph node excisional biopsy;  Surgeon: Frank Carlson MD;   Location: [facility] OR    LASER CO2 TYMPANOMASTOIDECTOMY  9/7/2011    Procedure:LASER CO2 TYMPANOMASTOIDECTOMY; Right Tympanoplasty , Cartilage Backed Right Tympanomastoidectomy, Omni Guide Laser on Standby  *Latex Safe*; Surgeon:BENNETT MAXWELL; Location:[facility] OR    SEPTOPLASTY  9/17/2012    Procedure: SEPTOPLASTY;  Septoplasty *Latex Safe* Turbinate reduction ;  Surgeon: Babar Dickerson MD;  Location: [facility] OR    UVULOPALATOPHARYNGOPLASTY  7/9/2012    Procedure: UVULOPALATOPHARYNGOPLASTY;  Uvulopalatopharyngoplasty * Latex Safe*;  Surgeon: Babar Dickerson MD;  [facility]     Social History     Socioeconomic History    Marital status: Single   Tobacco Use    Smoking status: Every Day     Packs/day: .5     Types: Cigarettes    Smokeless tobacco: Never   Substance and Sexual Activity    Alcohol use: No     Alcohol/week: 0.0 standard drinks of alcohol    Drug use: No     Comment: hx polysubstance now in remission      SH obtained at initial visit 7/27/23:  He currently lives independently in [place].  He previously worked as a .  He is not currently working and is on Social Security. He is from [place]. He has 3 children who live in [place].  He has not seen them for over 10 years because they will not see him.  He has 1 friend with whom he is close.  Otherwise he is pretty much on his own.  He reports he does not have any friends or family locally.  He spends most of his day at home and tells me he does not have any activities that bring him tay.  He does most of his ADLs himself such as cooking, cleaning, self-care but feels he has been slacking on this.  He identifies a point in time when his psychiatrist took him off of his antianxiety medication as a change in his mental wellbeing and he feels very depressed now.  He has been smoking tobacco products since the age of 10 and smokes about half a pack a day, approximately 30 pack years in total.  He uses marijuana daily and will smoke a joint in the  morning and one at night.  He does not drink alcohol regularly.  He occasionally uses cocaine, most recently 4 months ago, and has not used recently due to not having the funds for it.  He had an episode in January when he used cocaine that was laced with fentanyl resulting in an accidental overdose requiring hospitalization.  He has a remote history of IV drug use including heroin in the early 2000's, he does not use IV drugs now.    Family History   Problem Relation Age of Onset    Depression Mother     Depression Father     Substance Abuse Father     Mental Illness Other         institutionalized    Substance Abuse Brother     Substance Abuse Brother     Substance Abuse Brother     Substance Abuse Brother     Substance Abuse Brother     Liver Disease No family hx of          Current Outpatient Medications   Medication Sig    ASPIRIN PO Take 81 mg by mouth daily    atorvastatin (LIPITOR) 40 MG tablet Take 1 tablet (40 mg) by mouth daily    buPROPion (WELLBUTRIN XL) 150 MG 24 hr tablet Take 1 tablet (150 mg) by mouth every morning    doxepin (SINEQUAN) 10 MG capsule Take 1 capsule (10 mg) by mouth At Bedtime    HYDROcodone-acetaminophen (NORCO) 5-325 MG tablet Take 1-2 tablets by mouth every 4 hours as needed for moderate to severe pain    lisinopril-hydrochlorothiazide (PRINZIDE,ZESTORETIC) 20-25 MG per tablet Take 1 tablet by mouth daily    metFORMIN (GLUCOPHAGE) 500 MG tablet Take one tablet daily in the am and two tablets at bedtime.  HOME Samuels NP at [facility]    OLANZapine (ZYPREXA) 15 MG tablet Take one-half (7.5 mg) to one tablet (15 mg) by mouth at bedtime    prazosin (MINIPRESS) 2 MG capsule Take 2 capsules (4 mg) by mouth At Bedtime    ranitidine (ZANTAC) 150 MG tablet Take 1 tablet (150 mg) by mouth 2 times daily    senna-docusate (SENOKOT-S/PERICOLACE) 8.6-50 MG tablet Take 1-2 tablets by mouth 2 times daily    sertraline (ZOLOFT) 100 MG tablet Take 2 tablets (200 mg) by mouth daily    venlafaxine  (EFFEXOR-XR) 37.5 MG 24 hr capsule Take 2 capsules (75 mg) by mouth daily    VITAMIN D3 1000 units tablet TK 2 TS PO D     No current facility-administered medications for this visit.      Visit history:  Discussion and Medical Decision Making (07/27/2023):  Today utilizing a teleinterpreter I discussed with Jaylen his diagnosis of metastatic castration sensitive prostate cancer.  We discussed the incurable nature of metastatic prostate cancer, the slow-growing nature and likely development over months to years, the biology of prostate cancer growth in response to testosterone, the general treatment strategy of hormone blockade +/- chemotherapy with a palliative intent of prolonging life and improving quality of life, and a prognosis with treatment on the order of years.  Gianluca made it clear he is not interested in chemotherapy, but would be accepting of leuprolide shots and potentially abiraterone in the future.  We discussed the general side effects of antihormonal therapy including but not limited to cardiac and cerebrovascular disease, osteoporosis, mood changes, anhedonia.     Objective  Physical Exam:   There were no vitals taken for this visit.  Physical Exam  Constitutional:       General: He is not in acute distress.     Appearance: He is not ill-appearing or toxic-appearing.   HENT:      Head: Normocephalic and atraumatic.      Mouth/Throat:      Mouth: Mucous membranes are moist.   Eyes:      General: No scleral icterus.     Extraocular Movements: Extraocular movements intact.   Cardiovascular:      Rate and Rhythm: Normal rate and regular rhythm.   Pulmonary:      Effort: Pulmonary effort is normal. No respiratory distress.   Abdominal:      General: There is no distension.      Palpations: Abdomen is soft.      Tenderness: There is no abdominal tenderness. There is no guarding.   Skin:     General: Skin is warm and dry.      Coloration: Skin is not jaundiced.   Neurological:      Mental Status: He is  alert. Mental status is at baseline.     Lymph node survey reveals bilateral 3-5 cm very firm lymph nodes the medial aspect of the inguinal bands.  The meatus of the penis does not reveal any blood.  The penis exam is negative.  The testicles are small without masses.  There is no palpable hernia within the scrotum.  No,    Data:     I personally reviewed the following studies:  Recent Labs   Lab Test 06/25/18  1114 11/13/17  1113 03/09/17  0000   WBC 5.6 6.3 6.5   % NEUTROPHILS  --  63.7  --    HEMOGLOBIN 13.7 14.6 14.6   PLATELET COUNT 222 248 278     Recent Labs   Lab Test 11/06/23  0850 08/21/23  1013 06/24/23  0946 05/15/23  1037 12/01/22  1347   SODIUM 135 137 138 135* 137   POTASSIUM (R) 4.4 4.2 4.6 4.4 4.5   CHLORIDE (R) 102 100 105 101 103   CARBON DIOXIDE (R) 21* 19* 21* 18* 17*   ANION GAP 12 18* 12 16* 17*   UREA NITROGEN (R) 15.7 14.3 17.5 17.3 14.8   CREATININE 0.77 0.82 0.85 0.97 1.03   CALCIUM, TOTAL 9.7 10.2 9.7 9.9 9.7     Recent Labs   Lab Test 12/21/21  1213   MAGNESIUM 1.6     Recent Labs   Lab Test 11/06/23  0850 05/15/23  1037 07/11/22  1202   BILIRUBIN TOTAL <0.2 0.4 0.2   ALKPHOS 74 84 78   ALT 11 14 13   AST 16 21  --    ALBUMIN (R) 3.9 4.3 3.9       No results found for this or any previous visit (from the past 24 hour(s)).

## 2024-02-02 NOTE — NURSING NOTE
"Oncology Rooming Note    February 2, 2024 12:42 PM   Gianluca Cooper is a 68 year old male who presents for:    Chief Complaint   Patient presents with    Blood Draw     Labs drawn via  by VAT. VS taken.    Oncology Clinic Visit     Adenocarcinoma of prostate     Initial Vitals: /68 (BP Location: Right arm, Patient Position: Sitting, Cuff Size: Adult Regular)   Pulse 68   Temp 98.1  F (36.7  C) (Oral)   Resp 16   Wt 80.1 kg (176 lb 8 oz)   SpO2 95%   BMI 28.67 kg/m   Estimated body mass index is 28.67 kg/m  as calculated from the following:    Height as of 7/27/23: 1.671 m (5' 5.79\").    Weight as of this encounter: 80.1 kg (176 lb 8 oz). Body surface area is 1.93 meters squared.  Mild Pain (2) Comment: Data Unavailable   No LMP for male patient.  Allergies reviewed: Yes  Medications reviewed: Yes    Medications: Medication refills not needed today.  Pharmacy name entered into [employer]:    [facility] - [place], [place] - [address]   [id number] - [place], [place] - [address] AT [facility] [address]  [id number] [place], [place] - [address] AT [facility] [address]    Frailty Screening:   Is the patient here for a new oncology consult visit in cancer care? 2. No      Clinical concerns: none       Jailyn Benjamin              "

## 2024-02-02 NOTE — NURSING NOTE
Chief Complaint   Patient presents with    Blood Draw     Labs drawn via  by VAT. VS taken.     Labs collected from venipuncture by VAT. Vitals taken. Checked in for appointment(s).     Pt reports bleeding in groin area; rates pain as mild, states that the bleeding has been going on since 1/30/24. FYI message sent to provider.    Bhargavi Roth RN

## 2024-02-02 NOTE — NURSING NOTE
LUPRON 45 mg administered in left arm subcutaneously.     Patient tolerated well and had no issues.    Elvis Rangel LPN

## 2024-02-06 LAB — TESTOST SERPL-MCNC: 378 NG/DL (ref 240–950)

## 2024-02-06 NOTE — PROGRESS NOTES
Oncology encounter chart check 2/6/2024    Ron's labs came back showing a normal testosterone level of 378 and a rising PSA to 377.  He did receive Lupron 45 mg dosing 6 months earlier.  I am not sure what to say about this but clearly that 45 mg dose of Lupron did not make him castrate.  I really wonder if he actually received it.  It is tough for me to parse out what is in the E MR.  For the time being, then I have to assume that his prostate cancer was not treated and he just received his first dose of Lupron with us a few days ago at 45 mg.  I am going to see him again in 3 months with labs just to make sure that he is appropriately responding.    SB

## 2024-02-07 ENCOUNTER — PATIENT OUTREACH (OUTPATIENT)
Dept: ONCOLOGY | Facility: CLINIC | Age: 69
End: 2024-02-07
Payer: COMMERCIAL

## 2024-02-07 NOTE — PROGRESS NOTES
[facility]: Cancer Care Short Note                                                                                      Per Aydee's request, completed requisition form sent to him for signature. Required pathology report, demographic information, and visit notes uploaded with the requisition. All forms will be sent to Germania electronically when signature is complete.      Larisa Adler, RN, BSN  Oncology RN Care Coordinator  [facility]

## 2024-02-27 NOTE — TELEPHONE ENCOUNTER
MEDICAL RECORDS REQUEST   [facility]  Urology Clinic  [address] [place] [address]  PHONE: [phone]  Fax: [phone]        FUTURE VISIT INFORMATION                                                   Gianluca Cooper, : 1955 scheduled for future visit at [facility]    APPOINTMENT INFORMATION:  Date: 2024  Provider:  Jamar Mckeon MD  Reason for Visit/Diagnosis: ERECTILE DYSFUNCTION    REFERRAL INFORMATION:  Referring provider:  Mariia Hurley MD affiliated with [facility]      RECORDS REQUESTED FOR VISIT                                                     NOTES  STATUS/DETAILS   OFFICE NOTE from referring provider  yes   MEDICATION LIST  yes   LABS     URINALYSIS (UA)  yes     PRE-VISIT CHECKLIST      Joint diagnostic appointment coordinated correctly          (ensure right order & amount of time) Yes   RECORD COLLECTION COMPLETE Yes

## 2024-02-29 LAB — SPECIMEN STATUS: NORMAL

## 2024-03-07 ENCOUNTER — PRE VISIT (OUTPATIENT)
Dept: UROLOGY | Facility: CLINIC | Age: 69
End: 2024-03-07
Payer: COMMERCIAL

## 2024-03-07 NOTE — CONFIDENTIAL NOTE
Reason for visit: consult     Relevant information: erectile dysfunction    Records/imaging/labs/orders: in epic    At Rooming: jovanni Finch  3/7/2024  11:24 AM

## 2024-03-11 ENCOUNTER — LAB REQUISITION (OUTPATIENT)
Dept: LAB | Facility: CLINIC | Age: 69
End: 2024-03-11
Payer: COMMERCIAL

## 2024-03-11 DIAGNOSIS — E11.9 TYPE 2 DIABETES MELLITUS WITHOUT COMPLICATIONS (H): ICD-10-CM

## 2024-03-11 DIAGNOSIS — C61 MALIGNANT NEOPLASM OF PROSTATE (H): ICD-10-CM

## 2024-03-11 LAB
ANION GAP SERPL CALCULATED.3IONS-SCNC: 13 MMOL/L (ref 7–15)
BUN SERPL-MCNC: 29.5 MG/DL (ref 8–23)
CALCIUM SERPL-MCNC: 9.5 MG/DL (ref 8.8–10.2)
CHLORIDE SERPL-SCNC: 104 MMOL/L (ref 98–107)
CREAT SERPL-MCNC: 0.95 MG/DL (ref 0.67–1.17)
DEPRECATED HCO3 PLAS-SCNC: 18 MMOL/L (ref 22–29)
EGFRCR SERPLBLD CKD-EPI 2021: 87 ML/MIN/1.73M2
GLUCOSE SERPL-MCNC: 119 MG/DL (ref 70–99)
POTASSIUM SERPL-SCNC: 4.8 MMOL/L (ref 3.4–5.3)
SODIUM SERPL-SCNC: 135 MMOL/L (ref 135–145)

## 2024-03-11 PROCEDURE — 80048 BASIC METABOLIC PNL TOTAL CA: CPT | Mod: ORL | Performed by: PEDIATRICS

## 2024-03-21 ENCOUNTER — OFFICE VISIT (OUTPATIENT)
Dept: UROLOGY | Facility: CLINIC | Age: 69
End: 2024-03-21
Payer: COMMERCIAL

## 2024-03-21 ENCOUNTER — PRE VISIT (OUTPATIENT)
Dept: UROLOGY | Facility: CLINIC | Age: 69
End: 2024-03-21

## 2024-03-21 VITALS
WEIGHT: 179 LBS | BODY MASS INDEX: 29.08 KG/M2 | DIASTOLIC BLOOD PRESSURE: 82 MMHG | SYSTOLIC BLOOD PRESSURE: 142 MMHG | HEART RATE: 63 BPM

## 2024-03-21 DIAGNOSIS — N52.1 ERECTILE DYSFUNCTION DUE TO DISEASES CLASSIFIED ELSEWHERE: Primary | ICD-10-CM

## 2024-03-21 DIAGNOSIS — C61 PROSTATE CANCER, PRIMARY, WITH METASTASIS FROM PROSTATE TO OTHER SITE (H): ICD-10-CM

## 2024-03-21 PROCEDURE — 99214 OFFICE O/P EST MOD 30 MIN: CPT | Performed by: UROLOGY

## 2024-03-21 ASSESSMENT — PAIN SCALES - GENERAL: PAINLEVEL: MODERATE PAIN (4)

## 2024-03-21 NOTE — PROGRESS NOTES
I am seeing Gianluca Cooper in consultation from Mariia Hurley for evaluation of erectile dysfunction.    HPI:  Gianluca Cooper is a 68 year old male is a very nice man with complaints of erectile dysfunction.    He is currently single.   He is not sure how long he has had ED.  He has metastatic prostate cancer, on systemic therapy with palliative Lupron at this time.  This was diagnosed by Dr. Slater at the time of an inguinal hernia repair.  Abnormal lymph nodes were sampled in the right groin, which returned metastatic prostate cancer.  Despite systemic therapy, PSA remains elevated and rising.    Lab Results   Component Value Date    .10 02/02/2024    .60 07/18/2023          ED/Vascular disease risk factors:  HTN: yes.  Hyperlipidemia: treated   Smoking:: yes, tobacco and marijuana both.  DM: treated, type II  Cardiovascular disease: no   Meds associated with ED that he's taking: BP medications.  Anxiety/anger/depression: yes, treated.     Denies gross hematuria at this time.    Penile US reviewed 2/1/24  Impression:   1. Numerous enlarged, heterogeneous inguinal and iliac chain lymph  nodes, similar to that seen on 7/25/2023 PET/CT.  2. The pelvic lymph nodes exert mass effect upon the bladder.  Otherwise no sonographic abnormality to explain the patient's  hematuria.        PAST MEDICAL HX:  Past Medical History:   Diagnosis Date     Depression      Gastro-oesophageal reflux disease      Headache(784.0)      Hypertension      LOC (loss of consciousness) (H) age 45    out for 20 mintes after hit on top of head with board     Major depression 5/7/2013     Otitis media, chronic      Sleep apnea     cant tolerate cpap       PAST SURG HX:  Past Surgical History:   Procedure Laterality Date     COLONOSCOPY N/A 3/8/2018    Procedure: COLONOSCOPY;  colonoscopy;  Surgeon: Iona Lutz MD;  Location: [facility]     HERNIA REPAIR  2006    boith sides     HERNIORRHAPHY INGUINAL  Right 6/29/2023    Procedure: Right INGUINAL, lymph node excisional biopsy;  Surgeon: Frank Carlson MD;  Location: [facility] OR     LASER CO2 TYMPANOMASTOIDECTOMY  9/7/2011    Procedure:LASER CO2 TYMPANOMASTOIDECTOMY; Right Tympanoplasty , Cartilage Backed Right Tympanomastoidectomy, Omni Guide Laser on Standby  *Latex Safe*; Surgeon:BENNETT MAXWELL; Location:[facility]     SEPTOPLASTY  9/17/2012    Procedure: SEPTOPLASTY;  Septoplasty *Latex Safe* Turbinate reduction ;  Surgeon: Babar Dickerson MD;  Location: [facility]     UVULOPALATOPHARYNGOPLASTY  7/9/2012    Procedure: UVULOPALATOPHARYNGOPLASTY;  Uvulopalatopharyngoplasty * Latex Safe*;  Surgeon: Babar Dickerson MD;  Location: [facility]        FAMILY HX:  Family History   Problem Relation Age of Onset     Depression Mother      Depression Father      Substance Abuse Father      Mental Illness Other         institutionalized     Substance Abuse Brother      Substance Abuse Brother      Substance Abuse Brother      Substance Abuse Brother      Substance Abuse Brother      Liver Disease No family hx of        SOCIAL HX:  Social History     Tobacco Use     Smoking status: Every Day     Packs/day: .5     Types: Cigarettes     Smokeless tobacco: Never   Substance Use Topics     Alcohol use: No     Alcohol/week: 0.0 standard drinks of alcohol     Drug use: No     Comment: hx polysubstance now in remission       MEDICATIONS:  Current Outpatient Medications   Medication Sig     ASPIRIN PO Take 81 mg by mouth daily     atorvastatin (LIPITOR) 40 MG tablet Take 1 tablet (40 mg) by mouth daily     buPROPion (WELLBUTRIN XL) 150 MG 24 hr tablet Take 1 tablet (150 mg) by mouth every morning     doxepin (SINEQUAN) 10 MG capsule Take 1 capsule (10 mg) by mouth At Bedtime     HYDROcodone-acetaminophen (NORCO) 5-325 MG tablet Take 1-2 tablets by mouth every 4 hours as needed for moderate to severe pain     lisinopril-hydrochlorothiazide (PRINZIDE,ZESTORETIC) 20-25 MG per tablet Take 1  tablet by mouth daily     metFORMIN (GLUCOPHAGE) 500 MG tablet Take one tablet daily in the am and two tablets at bedtime.  R Skolar NP at [facility]     OLANZapine (ZYPREXA) 15 MG tablet Take one-half (7.5 mg) to one tablet (15 mg) by mouth at bedtime     prazosin (MINIPRESS) 2 MG capsule Take 2 capsules (4 mg) by mouth At Bedtime     ranitidine (ZANTAC) 150 MG tablet Take 1 tablet (150 mg) by mouth 2 times daily     senna-docusate (SENOKOT-S/PERICOLACE) 8.6-50 MG tablet Take 1-2 tablets by mouth 2 times daily     sertraline (ZOLOFT) 100 MG tablet Take 2 tablets (200 mg) by mouth daily     venlafaxine (EFFEXOR-XR) 37.5 MG 24 hr capsule Take 2 capsules (75 mg) by mouth daily     VITAMIN D3 1000 units tablet TK 2 TS PO D     No current facility-administered medications for this visit.       ALLERGIES:  Trazodone and Thiamine      GENERAL PHYSICAL EXAM:     BP (!) 142/82   Pulse 63   Wt 81.2 kg (179 lb)   BMI 29.08 kg/m     Constitutional: No acute distress. Well nourished.   PSYCH: normal mood and affect.  NEURO: normal gait, no focal deficits.   EYES: anicteric, EOMI, PERR.  ENT: neck supple, no lymphadenopathy, mucosae moist, no thrush.  CARDIOPULMONARY: breathing non-labored, pulse regular rate/rhythm, no peripheral edema.  GI: Abdomen soft, overweight   MUSCULOSKELETAL: normal limb proportions, no muscle wasting, no contractures.  SKIN: Normal virilized hair distribution, no lesions, warts or rashes over genitalia, abdomen extremities or face.  HEME/LYMPH: no ecchymosis, palpable lymphadenopathy in bilateral inguinal lymph nodes.     EXAM:  Phallus normal   Left testis descended   Right testis descended   Both testes are little soft consistency, about 14 mL in volume.  Cord structures not remarkable.      Prostate exam: not indicated     Imaging/labs:  Lab Results   Component Value Date    CR 0.95 03/11/2024    CR 0.81 02/02/2024    CR 0.77 11/06/2023    CR 0.92 06/25/2018    CR 0.81 02/09/2018    CR 0.91  11/13/2017     Lab Results   Component Value Date    .10 02/02/2024    .60 07/18/2023       Reviewed CT abdomen/pelvis 7/25/23 PET scan  IMPRESSION: In this patient with biopsy-proven metastatic prostate  cancer:  1. Marked uptake (SUV mean 14)  in the prostate gland with  extraprostatic extension, compatible with prostatic adenocarcinoma.  2. Moderate to marked uptake in the multiple enlarged lymph nodes (SUV  mean 7-15) in the chest, abdomen and pelvis, consistent with extensive  warren metastases.  3. No bone metastases.  4. Incidentally noted hypoattenuating lesion in the uncinate process  1.4 cm, indeterminant, may represent an IPMN.  Attention on follow-up.    Reviewed penile soft tissue ultrasound 2/1/2024  Impression:   1. Numerous enlarged, heterogeneous inguinal and iliac chain lymph  nodes, similar to that seen on 7/25/2023 PET/CT.  2. The pelvic lymph nodes exert mass effect upon the bladder.  Otherwise no sonographic abnormality to explain the patient's  Hematuria.        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  Also androgen ablation will affect sexual function.    He is managing most of the modifiable risk factors for arterial disease.  Stopping tobacco and marijuana would help.      PLAN:    Recommended intracavernosal injections as a next step since PDE5i are not working.  He has no S.O. right now, however, so he will consider this option..    Some report of gross hematuria- nothing now since that initial episode, he states..  CT scan about 6 months ago is otherwise negative for  pathology.  Suspect gross hematuria was due to local invasion of prostate cancer.  Discussed that office cystoscopy is an option to help confirm this, but I think this is low yield given known locally-invasive prostate cancer.  He is OK with forgoing an office cystoscopy at this time, but advised we should do this if gross hematuria is  worsening.    Continue prostate cancer management with medical oncology.    He will contact us if interested in ICI therapy. 20mcg Edex would be a good starting dose.    I'm happy to see him back in the future as needed.       Copied cc to Consulting provider Mariia Hurley        Thank-you for the kind consultation.  Jamar Mckeon MD     Urological Surgeon         Additional Coding Information:    Problems:  4 -- one or more chronic illnesses with exacerbation or side effects  ED secondary to organic issues and low T, secondary to prostate cancer    Data Reviewed  3 or more studies reviewed, as listed above     Tests ordered/pending: N/A     Notes from other providers reviewed:   Reviewed Dr. Hurley's internal medicine note 3/11/2024  Reviewed [facility] oncology note Dr. Brigido Bajwa 2/2/2024    Level of risk:  4 -- prescription drug management  (discussed)      Time spent:  19 minutes spent on the date of the encounter doing chart review, history and exam, documentation and further activities per the note

## 2024-03-21 NOTE — LETTER
3/21/2024       RE: Gianluca Cooper  [address]  [address]  [facility] [address]     Dear Colleague,    Thank you for referring your patient, Gianluca Cooper, to the [facility]. Please see a copy of my visit note below.    I am seeing Gianluca Cooper in consultation from Mariia Hurley for evaluation of erectile dysfunction.    HPI:  Gianluca Cooper is a 68 year old male is a very nice man with complaints of erectile dysfunction.    He is currently single.   He is not sure how long he has had ED.  He has metastatic prostate cancer, on systemic therapy with palliative Lupron at this time.  This was diagnosed by Dr. Slater at the time of an inguinal hernia repair.  Abnormal lymph nodes were sampled in the right groin, which returned metastatic prostate cancer.  Despite systemic therapy, PSA remains elevated and rising.    Lab Results   Component Value Date    .10 02/02/2024    .60 07/18/2023          ED/Vascular disease risk factors:  HTN: yes.  Hyperlipidemia: treated   Smoking:: yes, tobacco and marijuana both.  DM: treated, type II  Cardiovascular disease: no   Meds associated with ED that he's taking: BP medications.  Anxiety/anger/depression: yes, treated.     Denies gross hematuria at this time.    Penile US reviewed 2/1/24  Impression:   1. Numerous enlarged, heterogeneous inguinal and iliac chain lymph  nodes, similar to that seen on 7/25/2023 PET/CT.  2. The pelvic lymph nodes exert mass effect upon the bladder.  Otherwise no sonographic abnormality to explain the patient's  hematuria.        PAST MEDICAL HX:  Past Medical History:   Diagnosis Date    Depression     Gastro-oesophageal reflux disease     Headache(784.0)     Hypertension     LOC (loss of consciousness) (H) age 45    out for 20 mintes after hit on top of head with board    Major depression 5/7/2013     Otitis media, chronic     Sleep apnea     cant tolerate cpap       PAST SURG HX:  Past Surgical History:   Procedure Laterality Date    COLONOSCOPY N/A 3/8/2018    Procedure: COLONOSCOPY;  colonoscopy;  Surgeon: Iona Lutz MD;  Location:  GI    HERNIA REPAIR  2006    boith sides    HERNIORRHAPHY INGUINAL Right 6/29/2023    Procedure: Right INGUINAL, lymph node excisional biopsy;  Surgeon: Frank Carlson MD;  Location: [facility] OR    LASER CO2 TYMPANOMASTOIDECTOMY  9/7/2011    Procedure:LASER CO2 TYMPANOMASTOIDECTOMY; Right Tympanoplasty , Cartilage Backed Right Tympanomastoidectomy, Omni Guide Laser on Standby  *Latex Safe*; Surgeon:BENNETT MAXWELL; [facility]    SEPTOPLASTY  9/17/2012    Procedure: SEPTOPLASTY;  Septoplasty *Latex Safe* Turbinate reduction ;  Surgeon: Babar Dickerson MD;  Location: [facility]    UVULOPALATOPHARYNGOPLASTY  7/9/2012    Procedure: UVULOPALATOPHARYNGOPLASTY;  Uvulopalatopharyngoplasty * Latex Safe*;  Surgeon: Babar Dickerson MD;  Location: [facility]        FAMILY HX:  Family History   Problem Relation Age of Onset    Depression Mother     Depression Father     Substance Abuse Father     Mental Illness Other         institutionalized    Substance Abuse Brother     Substance Abuse Brother     Substance Abuse Brother     Substance Abuse Brother     Substance Abuse Brother     Liver Disease No family hx of        SOCIAL HX:  Social History     Tobacco Use    Smoking status: Every Day     Packs/day: .5     Types: Cigarettes    Smokeless tobacco: Never   Substance Use Topics    Alcohol use: No     Alcohol/week: 0.0 standard drinks of alcohol    Drug use: No     Comment: hx polysubstance now in remission       MEDICATIONS:  Current Outpatient Medications   Medication Sig    ASPIRIN PO Take 81 mg by mouth daily    atorvastatin (LIPITOR) 40 MG tablet Take 1 tablet (40 mg) by mouth daily    buPROPion (WELLBUTRIN XL) 150 MG 24 hr tablet Take 1 tablet (150 mg) by mouth every  morning    doxepin (SINEQUAN) 10 MG capsule Take 1 capsule (10 mg) by mouth At Bedtime    HYDROcodone-acetaminophen (NORCO) 5-325 MG tablet Take 1-2 tablets by mouth every 4 hours as needed for moderate to severe pain    lisinopril-hydrochlorothiazide (PRINZIDE,ZESTORETIC) 20-25 MG per tablet Take 1 tablet by mouth daily    metFORMIN (GLUCOPHAGE) 500 MG tablet Take one tablet daily in the am and two tablets at bedtime.  R Skolar NP at [facility]    OLANZapine (ZYPREXA) 15 MG tablet Take one-half (7.5 mg) to one tablet (15 mg) by mouth at bedtime    prazosin (MINIPRESS) 2 MG capsule Take 2 capsules (4 mg) by mouth At Bedtime    ranitidine (ZANTAC) 150 MG tablet Take 1 tablet (150 mg) by mouth 2 times daily    senna-docusate (SENOKOT-S/PERICOLACE) 8.6-50 MG tablet Take 1-2 tablets by mouth 2 times daily    sertraline (ZOLOFT) 100 MG tablet Take 2 tablets (200 mg) by mouth daily    venlafaxine (EFFEXOR-XR) 37.5 MG 24 hr capsule Take 2 capsules (75 mg) by mouth daily    VITAMIN D3 1000 units tablet TK 2 TS PO D     No current facility-administered medications for this visit.       ALLERGIES:  Trazodone and Thiamine      GENERAL PHYSICAL EXAM:     BP (!) 142/82   Pulse 63   Wt 81.2 kg (179 lb)   BMI 29.08 kg/m     Constitutional: No acute distress. Well nourished.   PSYCH: normal mood and affect.  NEURO: normal gait, no focal deficits.   EYES: anicteric, EOMI, PERR.  ENT: neck supple, no lymphadenopathy, mucosae moist, no thrush.  CARDIOPULMONARY: breathing non-labored, pulse regular rate/rhythm, no peripheral edema.  GI: Abdomen soft, overweight   MUSCULOSKELETAL: normal limb proportions, no muscle wasting, no contractures.  SKIN: Normal virilized hair distribution, no lesions, warts or rashes over genitalia, abdomen extremities or face.  HEME/LYMPH: no ecchymosis, palpable lymphadenopathy in bilateral inguinal lymph nodes.     EXAM:  Phallus normal   Left testis descended   Right testis descended   Both testes are  little soft consistency, about 14 mL in volume.  Cord structures not remarkable.      Prostate exam: not indicated     Imaging/labs:  Lab Results   Component Value Date    CR 0.95 03/11/2024    CR 0.81 02/02/2024    CR 0.77 11/06/2023    CR 0.92 06/25/2018    CR 0.81 02/09/2018    CR 0.91 11/13/2017     Lab Results   Component Value Date    .10 02/02/2024    .60 07/18/2023       Reviewed CT abdomen/pelvis 7/25/23 PET scan  IMPRESSION: In this patient with biopsy-proven metastatic prostate  cancer:  1. Marked uptake (SUV mean 14)  in the prostate gland with  extraprostatic extension, compatible with prostatic adenocarcinoma.  2. Moderate to marked uptake in the multiple enlarged lymph nodes (SUV  mean 7-15) in the chest, abdomen and pelvis, consistent with extensive  warren metastases.  3. No bone metastases.  4. Incidentally noted hypoattenuating lesion in the uncinate process  1.4 cm, indeterminant, may represent an IPMN.  Attention on follow-up.    Reviewed penile soft tissue ultrasound 2/1/2024  Impression:   1. Numerous enlarged, heterogeneous inguinal and iliac chain lymph  nodes, similar to that seen on 7/25/2023 PET/CT.  2. The pelvic lymph nodes exert mass effect upon the bladder.  Otherwise no sonographic abnormality to explain the patient's  Hematuria.        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  Also androgen ablation will affect sexual function.  He is managing most of the modifiable risk factors for arterial disease.  Stopping tobacco and marijuana would help.      PLAN:  Recommended intracavernosal injections as a next step since PDE5i are not working.  He has no S.O. right now, however, so he will consider this option..  Some report of gross hematuria- nothing now since that initial episode, he states..  CT scan about 6 months ago is otherwise negative for  pathology.  Suspect gross hematuria was due to local invasion of prostate  cancer.  Discussed that office cystoscopy is an option to help confirm this, but I think this is low yield given known locally-invasive prostate cancer.  He is OK with forgoing an office cystoscopy at this time, but advised we should do this if gross hematuria is worsening.  Continue prostate cancer management with medical oncology.  He will contact us if interested in ICI therapy. 20mcg Edex would be a good starting dose.  I'm happy to see him back in the future as needed.       Copied cc to Consulting provider Mariia Hurley        Thank-you for the kind consultation.  Jamar Mckeon MD     Urological Surgeon         Additional Coding Information:    Problems:  4 -- one or more chronic illnesses with exacerbation or side effects  ED secondary to organic issues and low T, secondary to prostate cancer    Data Reviewed  3 or more studies reviewed, as listed above     Tests ordered/pending: N/A     Notes from other providers reviewed:   Reviewed Dr. Hurley's internal medicine note 3/11/2024  Reviewed [facility] oncology note Dr. Brigido Bajwa 2/2/2024    Level of risk:  4 -- prescription drug management  (discussed)      Time spent:  19 minutes spent on the date of the encounter doing chart review, history and exam, documentation and further activities per the note

## 2024-03-21 NOTE — NURSING NOTE
"Gianluca Cooper is a 68 year old male patient that presents today in clinic for the following:    Chief Complaint   Patient presents with    Follow Up     \"Go over the US results.\"       The patient's allergies and medications were reviewed as noted. A set of vitals were recorded as noted without incident. The patient does not have any other questions for the provider.    Blood pressure (!) 142/82, pulse 63, weight 81.2 kg (179 lb). Body mass index is 29.08 kg/m .    Patient Active Problem List   Diagnosis    Non-English speaking patient    Chronic nasal congestion    HEIKE (obstructive sleep apnea)    Chronic hepatitis C (H)    Esophageal reflux    Headache    Short-term memory loss    Polysubstance dependence, non-opioid, in remission (H)    Social anxiety disorder    Severe recurrent major depression without psychotic features (H)    Heterozygous MTHFR mutation [id number]    Insomnia, unspecified type    Right inguinal hernia    Adenocarcinoma of prostate (H)       Allergies   Allergen Reactions    Trazodone Other (See Comments)     Very depressed and suicidal  Other reaction(s): Other (See Comments)  Very depressed and suicidal    Thiamine Nausea     LegacyRecord#[id number]       Current Outpatient Medications   Medication Sig Dispense Refill    ASPIRIN PO Take 81 mg by mouth daily      atorvastatin (LIPITOR) 40 MG tablet Take 1 tablet (40 mg) by mouth daily      buPROPion (WELLBUTRIN XL) 150 MG 24 hr tablet Take 1 tablet (150 mg) by mouth every morning 30 tablet 1    doxepin (SINEQUAN) 10 MG capsule Take 1 capsule (10 mg) by mouth At Bedtime 30 capsule 2    HYDROcodone-acetaminophen (NORCO) 5-325 MG tablet Take 1-2 tablets by mouth every 4 hours as needed for moderate to severe pain 15 tablet 0    lisinopril-hydrochlorothiazide (PRINZIDE,ZESTORETIC) 20-25 MG per tablet Take 1 tablet by mouth daily      metFORMIN (GLUCOPHAGE) 500 MG tablet Take one tablet daily in the am and two tablets at bedtime.  HOME Samuels NP at " [facility]      OLANZapine (ZYPREXA) 15 MG tablet Take one-half (7.5 mg) to one tablet (15 mg) by mouth at bedtime 30 tablet 1    prazosin (MINIPRESS) 2 MG capsule Take 2 capsules (4 mg) by mouth At Bedtime 60 capsule 1    ranitidine (ZANTAC) 150 MG tablet Take 1 tablet (150 mg) by mouth 2 times daily      senna-docusate (SENOKOT-S/PERICOLACE) 8.6-50 MG tablet Take 1-2 tablets by mouth 2 times daily 15 tablet 0    sertraline (ZOLOFT) 100 MG tablet Take 2 tablets (200 mg) by mouth daily 60 tablet 1    venlafaxine (EFFEXOR-XR) 37.5 MG 24 hr capsule Take 2 capsules (75 mg) by mouth daily 60 capsule 1    VITAMIN D3 1000 units tablet TK 2 TS PO D  1       Social History     Tobacco Use    Smoking status: Every Day     Packs/day: .5     Types: Cigarettes    Smokeless tobacco: Never   Substance Use Topics    Alcohol use: No     Alcohol/week: 0.0 standard drinks of alcohol    Drug use: No     Comment: hx polysubstance now in remission       Christina Finch  3/21/2024  9:01 AM

## 2024-04-16 ENCOUNTER — DOCUMENTATION ONLY (OUTPATIENT)
Dept: ONCOLOGY | Facility: CLINIC | Age: 69
End: 2024-04-16
Payer: COMMERCIAL

## 2024-04-16 DIAGNOSIS — C61 ADENOCARCINOMA OF PROSTATE (H): Primary | ICD-10-CM

## 2024-04-16 NOTE — PROGRESS NOTES
Hello,   In order to offer our patients the best possible experience, the lab schedule is reviewed to ensure patients have lab orders in Epic prior to arriving at the lab.    Please have one of your team members place a lab order in Epic for this patient prior to the lab appointment date.     Thank you for your attention,   [facility] [phone]

## 2024-04-19 ENCOUNTER — LAB (OUTPATIENT)
Dept: LAB | Facility: CLINIC | Age: 69
End: 2024-04-19
Attending: INTERNAL MEDICINE
Payer: COMMERCIAL

## 2024-04-19 DIAGNOSIS — C61 ADENOCARCINOMA OF PROSTATE (H): ICD-10-CM

## 2024-04-19 LAB
ALBUMIN SERPL BCG-MCNC: 4.3 G/DL (ref 3.5–5.2)
ALP SERPL-CCNC: 84 U/L (ref 40–150)
ALT SERPL W P-5'-P-CCNC: 6 U/L (ref 0–70)
ANION GAP SERPL CALCULATED.3IONS-SCNC: 9 MMOL/L (ref 7–15)
AST SERPL W P-5'-P-CCNC: 20 U/L (ref 0–45)
BASOPHILS # BLD AUTO: 0 10E3/UL (ref 0–0.2)
BASOPHILS NFR BLD AUTO: 1 %
BILIRUB SERPL-MCNC: 0.2 MG/DL
BUN SERPL-MCNC: 13.4 MG/DL (ref 8–23)
CALCIUM SERPL-MCNC: 9.8 MG/DL (ref 8.8–10.2)
CHLORIDE SERPL-SCNC: 100 MMOL/L (ref 98–107)
CREAT SERPL-MCNC: 0.81 MG/DL (ref 0.67–1.17)
DEPRECATED HCO3 PLAS-SCNC: 25 MMOL/L (ref 22–29)
EGFRCR SERPLBLD CKD-EPI 2021: >90 ML/MIN/1.73M2
EOSINOPHIL # BLD AUTO: 0.1 10E3/UL (ref 0–0.7)
EOSINOPHIL NFR BLD AUTO: 1 %
ERYTHROCYTE [DISTWIDTH] IN BLOOD BY AUTOMATED COUNT: 14 % (ref 10–15)
GLUCOSE SERPL-MCNC: 103 MG/DL (ref 70–99)
HCT VFR BLD AUTO: 40 % (ref 40–53)
HGB BLD-MCNC: 13.6 G/DL (ref 13.3–17.7)
IMM GRANULOCYTES # BLD: 0 10E3/UL
IMM GRANULOCYTES NFR BLD: 1 %
LYMPHOCYTES # BLD AUTO: 1.4 10E3/UL (ref 0.8–5.3)
LYMPHOCYTES NFR BLD AUTO: 24 %
MCH RBC QN AUTO: 30.2 PG (ref 26.5–33)
MCHC RBC AUTO-ENTMCNC: 34 G/DL (ref 31.5–36.5)
MCV RBC AUTO: 89 FL (ref 78–100)
MONOCYTES # BLD AUTO: 0.4 10E3/UL (ref 0–1.3)
MONOCYTES NFR BLD AUTO: 8 %
NEUTROPHILS # BLD AUTO: 3.8 10E3/UL (ref 1.6–8.3)
NEUTROPHILS NFR BLD AUTO: 65 %
NRBC # BLD AUTO: 0 10E3/UL
NRBC BLD AUTO-RTO: 0 /100
PLATELET # BLD AUTO: 272 10E3/UL (ref 150–450)
POTASSIUM SERPL-SCNC: 5 MMOL/L (ref 3.4–5.3)
PROT SERPL-MCNC: 7.1 G/DL (ref 6.4–8.3)
PSA SERPL DL<=0.01 NG/ML-MCNC: 131.8 NG/ML (ref 0–4.5)
RBC # BLD AUTO: 4.5 10E6/UL (ref 4.4–5.9)
SODIUM SERPL-SCNC: 134 MMOL/L (ref 135–145)
WBC # BLD AUTO: 5.8 10E3/UL (ref 4–11)

## 2024-04-19 PROCEDURE — 85025 COMPLETE CBC W/AUTO DIFF WBC: CPT | Performed by: PATHOLOGY

## 2024-04-19 PROCEDURE — 84153 ASSAY OF PSA TOTAL: CPT | Performed by: PATHOLOGY

## 2024-04-19 PROCEDURE — 36415 COLL VENOUS BLD VENIPUNCTURE: CPT | Performed by: PATHOLOGY

## 2024-04-19 PROCEDURE — 84403 ASSAY OF TOTAL TESTOSTERONE: CPT | Performed by: INTERNAL MEDICINE

## 2024-04-19 PROCEDURE — 99000 SPECIMEN HANDLING OFFICE-LAB: CPT | Performed by: PATHOLOGY

## 2024-04-19 PROCEDURE — 80053 COMPREHEN METABOLIC PANEL: CPT | Performed by: PATHOLOGY

## 2024-04-21 NOTE — PROGRESS NOTES
[facility] CANCER [place]  New patient history and physical  4/22/2024      ADDENDUM  Gianluca's testosterone came back at 171. Clearly the 45mg Lupron injection does not keep him castrate for the entire 6 months. We will check a testosterone at the 5 month faustino this time around. I will likely go to q 3 month injections after that.  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 Gianluca presented with inguinal adenopathy that led to the biopsy. PSA at the time of diagnosis was 145.6. PSMA PET showed warren involvement of the chest, abdomen and pelvis, wothout visceral or bone metastases. We did run a Caris on his tumor but there were no actionable mutations.  Current Treatment and intent: Palliative leuprolide  Treatment Summary:   ADT since 8/3/23 . PSA concha to 377 on 2/2/2024.However, his testosterone was 378! He received Lupron 45 mg at that point (2/2/2024). He is back today to make sure his PSA has responded.    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  We know without a doubt that the inguinal nodes contain metastatic prostate cancer because this is how he was diagnosed.    At the last visit his PSA had doubled which was concerning for the development of castrate resistant disease.  However his testosterone returned normal as well, indicating failure of his ADT from August to last the 6 months.  I took a deep breath and just got him back started on Lupron then.  Today he is back and his PSA has dropped nicely from 377 back down to 131.  While this is a nice drop, a PSA of triple digits is concerning for disease that is not quite castrate sensitive.  Moreover,he tells me that his inguinal nodes are growing.   Therefore, I would like to  add abiraterone/prednisone. Side effects were discussed with Gianluca (through our ).These include, but are not limited to, transaminitis, fatigue, nausea.  Gross hematuria, resolved.      Plan:  Begin abiraterone/prednisone  Return to clinic with me in 2 months with labs just prior.      Brigido Bajwa MD, MSc  Associate Professor of Medicine  [employer] [employer]  [address] [address]  [phone]    30 minutes spent on this appointment today including chart review, direct face-to-face time, care coordination, and documentation.  Hem/onc History:   Edit Oncology History  Oncology History Overview Note   Gianluca initially presented to his PCP with right inguinal swelling similar to previously when he had a right inguinal hernia that was repaired in 2006.  He was referred to a surgeon and during exploration of the right groin in the operating room he was noted to have multiple enlarged lymph nodes without a hernia for which he underwent a sentinel lymph node biopsy with pathology consistent with metastatic prostate adenocarcinoma.  A PSMA PET scan showed evidence of metastatic disease in nonregional lymph nodes of the chest, abdomen, and pelvis without bony or visceral metastases.      Adenocarcinoma of prostate (H)   6/29/2023 Biopsy    Right inguinal excisional lymph node biopsy  Final Diagnosis   Lymph nodes, right inguinal, excision:  - Metastatic prostatic adenocarcinoma involving 2 of 5 lymph nodes  - Deposit size: 6.5 cm  - Extranodal extension is present        7/18/2023 Tumor Markers    PSA Tumor Marker 0.00 - 4.50 ng/mL 145.60 High          7/25/2023 Imaging    PSMA PET-CT  IMPRESSION: In this patient with biopsy-proven metastatic prostate  cancer:  1. Marked uptake (SUV mean 14)  in the prostate gland with  extraprostatic extension, compatible with prostatic adenocarcinoma.  2. Moderate to marked uptake in the multiple enlarged lymph  nodes (SUV  mean 7-15) in the chest, abdomen and pelvis, consistent with extensive  warren metastases.  3. No bone metastases.  4. Incidentally noted hypoattenuating lesion in the uncinate process  1.4 cm, indeterminant, may represent an IPMN.  Attention on follow-up.     8/3/2023 -  Chemotherapy    OP ONC Prostate Cancer - Leuprolide (Eligard) EVERY 6 MONTHS  Plan Provider: Ramiro Headley MD  Treatment goal: Palliative  Line of treatment: First Line         Interval History:   Feels his inguinal lymph nodes are increased in size since the last visit.    No longer has the ability to get erections. Did not quite understand the connection between his cancer treatment and this.  When presented with the idea of stopping his cancer treatment to allow him to get erections again, he preferred to continue on with his cancer treatment.  He gets some pain in his testicles when he walks too far.  Other than that he is actually feeling pretty well.   He does not note any hot flashes.  He has no other concerns today.    Subjective   Past Medical History:   Diagnosis Date    Depression     Gastro-oesophageal reflux disease     Headache(784.0)     Hypertension     LOC (loss of consciousness) (H) age 45    out for 20 mintes after hit on top of head with board    Major depression 5/7/2013    Otitis media, chronic     Sleep apnea     cant tolerate cpap     HEIKE  GERD  COPD  Mood d/o -- MDD with psychotic features, PTSD  Cocaine and marijuana use disorders  Hx of accidental opiate overdose x2 c/b respiratory arrest 1/2023 -- reports his cocaine was laced with fentanyl  Hx HBV and HCV, cleared as of 6/25/18  Hx IVDU last ~2002  Tobacco use  Chart Review:  1/11/2023 admitted for respiratory arrest with in the setting of opioid overdose with bystander administration of naloxone.  PMH notable for HTN, HLD, GERD, MDD with psychotic features, LTBI, chronic HCV inguinal hernia repair 2006, HEIKE/OHS.     Past Surgical History:   Procedure  Laterality Date    COLONOSCOPY N/A 3/8/2018    Procedure: COLONOSCOPY;  colonoscopy;  Surgeon: Iona Lutz MD;  Location:  GI    HERNIA REPAIR  2006    boith sides    HERNIORRHAPHY INGUINAL Right 6/29/2023    Procedure: Right INGUINAL, lymph node excisional biopsy;  Surgeon: Frank Carlson MD;  Location: [facility] [place]    LASER CO2 TYMPANOMASTOIDECTOMY  9/7/2011    Procedure:LASER CO2 TYMPANOMASTOIDECTOMY; Right Tympanoplasty , Cartilage Backed Right Tympanomastoidectomy, Omni Guide Laser on Standby  *Latex Safe*; Surgeon:BENNETT MAXWELL; Location:[facility] OR    SEPTOPLASTY  9/17/2012    Procedure: SEPTOPLASTY;  Septoplasty *Latex Safe* Turbinate reduction ;  Surgeon: Babar Dickerson MD;  Location:  OR    UVULOPALATOPHARYNGOPLASTY  7/9/2012    Procedure: UVULOPALATOPHARYNGOPLASTY;  Uvulopalatopharyngoplasty * Latex Safe*;  Surgeon: Babar Dickerson MD;  Location:  OR     Social History     Socioeconomic History    Marital status: Single   Tobacco Use    Smoking status: Every Day     Current packs/day: 0.50     Types: Cigarettes    Smokeless tobacco: Never   Substance and Sexual Activity    Alcohol use: No     Alcohol/week: 0.0 standard drinks of alcohol    Drug use: No     Comment: hx polysubstance now in remission     Social Determinants of Health     Financial Resource Strain: Not at Risk (3/29/2021)    Received from [facility]     Financial Resource Strain     Financial Resource Strain: 1   Food Insecurity: Not on File (11/8/2019)    Received from [facility]     Food Insecurity     Food: 0   Transportation Needs: Not at Risk (3/29/2021)    Received from [facility]     Transportation Needs     Transportation: 1   Physical Activity: Not on File (11/8/2019)    Received from [facility]     Physical Activity     Physical Activity: 0   Stress: At Risk (3/29/2021)    Received from [facility]     Stress     Stress: 2   Social Connections: Not at Risk (3/29/2021)    Received from [facility]     Social Connections     Social Connections and Isolation: 1   Housing Stability: Not at Risk (3/29/2021)    Received from [facility]     Housing Stability     Housin      SH obtained at initial visit 23:  He currently lives independently in [place].  He previously worked as a .  He is not currently working and is on Social Security. He is from [place]. He has 3 children who live in [place].  He has not seen them for over 10 years because they will not see him.  He has 1 friend with whom he is close.  Otherwise he is pretty much on his own.  He reports he does not have any friends or family locally.  He spends most of his day at home and tells me he does not have any activities that bring him tay.  He does most of his ADLs himself such as cooking, cleaning, self-care but feels he has been slacking on this.  He identifies a point in time when his psychiatrist took him off of his antianxiety medication as a change in his mental wellbeing and he feels very depressed now.  He has been smoking tobacco products since the age of 10 and smokes about half a pack a day, approximately 30 pack years in total.  He uses marijuana daily and will smoke a joint in the morning and one at night.  He does not drink alcohol regularly.  He occasionally uses cocaine, most recently 4 months ago, and has not used recently due to not having the funds for it.  He had an episode in January when he used cocaine that was laced with fentanyl resulting in an accidental overdose requiring hospitalization.  He has a remote history of IV drug use including heroin in the early , he does not use IV drugs now.    Family History   Problem Relation Age of Onset    Depression Mother     Depression Father     Substance Abuse Father     Mental Illness Other         institutionalized    Substance Abuse Brother     Substance Abuse Brother     Substance Abuse Brother     Substance Abuse Brother     Substance  Abuse Brother     Liver Disease No family hx of          Current Outpatient Medications   Medication Sig Dispense Refill    ASPIRIN PO Take 81 mg by mouth daily      atorvastatin (LIPITOR) 40 MG tablet Take 1 tablet (40 mg) by mouth daily      buPROPion (WELLBUTRIN XL) 150 MG 24 hr tablet Take 1 tablet (150 mg) by mouth every morning 30 tablet 1    doxepin (SINEQUAN) 10 MG capsule Take 1 capsule (10 mg) by mouth At Bedtime 30 capsule 2    HYDROcodone-acetaminophen (NORCO) 5-325 MG tablet Take 1-2 tablets by mouth every 4 hours as needed for moderate to severe pain 15 tablet 0    lisinopril-hydrochlorothiazide (PRINZIDE,ZESTORETIC) 20-25 MG per tablet Take 1 tablet by mouth daily      metFORMIN (GLUCOPHAGE) 500 MG tablet Take one tablet daily in the am and two tablets at bedtime.  R Abril NP at [facility]      OLANZapine (ZYPREXA) 15 MG tablet Take one-half (7.5 mg) to one tablet (15 mg) by mouth at bedtime 30 tablet 1    prazosin (MINIPRESS) 2 MG capsule Take 2 capsules (4 mg) by mouth At Bedtime 60 capsule 1    ranitidine (ZANTAC) 150 MG tablet Take 1 tablet (150 mg) by mouth 2 times daily      senna-docusate (SENOKOT-S/PERICOLACE) 8.6-50 MG tablet Take 1-2 tablets by mouth 2 times daily 15 tablet 0    sertraline (ZOLOFT) 100 MG tablet Take 2 tablets (200 mg) by mouth daily 60 tablet 1    venlafaxine (EFFEXOR-XR) 37.5 MG 24 hr capsule Take 2 capsules (75 mg) by mouth daily 60 capsule 1    VITAMIN D3 1000 units tablet TK 2 TS PO D  1     No current facility-administered medications for this visit.      Visit history:  Discussion and Medical Decision Making (07/27/2023):  Today utilizing a teleinterpreter I discussed with Jaylen his diagnosis of metastatic castration sensitive prostate cancer.  We discussed the incurable nature of metastatic prostate cancer, the slow-growing nature and likely development over months to years, the biology of prostate cancer growth in response to testosterone, the general treatment strategy  of hormone blockade +/- chemotherapy with a palliative intent of prolonging life and improving quality of life, and a prognosis with treatment on the order of years.  Gianluca made it clear he is not interested in chemotherapy, but would be accepting of leuprolide shots and potentially abiraterone in the future.  We discussed the general side effects of antihormonal therapy including but not limited to cardiac and cerebrovascular disease, osteoporosis, mood changes, anhedonia.     Objective   Physical Exam:   There were no vitals taken for this visit.  Physical Exam  Constitutional:       General: He is not in acute distress.     Appearance: He is not ill-appearing or toxic-appearing.   HENT:      Head: Normocephalic and atraumatic.      Mouth/Throat:      Mouth: Mucous membranes are moist.   Eyes:      General: No scleral icterus.     Extraocular Movements: Extraocular movements intact.   Cardiovascular:      Rate and Rhythm: Normal rate and regular rhythm.   Pulmonary:      Effort: Pulmonary effort is normal. No respiratory distress.   Abdominal:      General: There is no distension.      Palpations: Abdomen is soft.      Tenderness: There is no abdominal tenderness. There is no guarding.   Skin:     General: Skin is warm and dry.      Coloration: Skin is not jaundiced.   Neurological:      Mental Status: He is alert. Mental status is at baseline.     Lymph node survey reveals bilateral 3-5 cm very firm lymph nodes the medial aspect of the inguinal bands, medial >lateral, particularly on the right,      Data:      Latest Reference Range & Units 07/18/23 12:26 02/02/24 11:49 04/19/24 09:40   PSA Tumor Marker 0.00 - 4.50 ng/mL 145.60 (H) 377.10 (H) 131.80 (H)   (H): Data is abnormally high    PSMA Scan 7/25/2023        IMPRESSION: In this patient with biopsy-proven metastatic prostate  cancer:  1. Marked uptake (SUV mean 14)  in the prostate gland with  extraprostatic extension, compatible with prostatic  adenocarcinoma.  2. Moderate to marked uptake in the multiple enlarged lymph nodes (SUV  mean 7-15) in the chest, abdomen and pelvis, consistent with extensive  warren metastases.  3. No bone metastases.          I personally reviewed the following studies:  Recent Labs   Lab Test 04/19/24  0940 02/02/24  1149 06/25/18  1114 11/13/17  1113 03/09/17  0000   WBC 5.8 5.2 5.6 6.3 6.5   % NEUTROPHILS 65 66  --  63.7  --    HEMOGLOBIN 13.6 12.6* 13.7 14.6 14.6   PLATELET COUNT 272 384 222 248 278     Recent Labs   Lab Test 04/19/24  0940 03/11/24  1020 02/02/24  1149 11/06/23  0850 08/21/23  1013   SODIUM 134* 135 135 135 137   POTASSIUM (R) 5.0 4.8 4.5 4.4 4.2   CHLORIDE (R) 100 104 101 102 100   CARBON DIOXIDE (R) 25 18* 25 21* 19*   ANION GAP 9 13 9 12 18*   UREA NITROGEN (R) 13.4 29.5* 20.9 15.7 14.3   CREATININE 0.81 0.95 0.81 0.77 0.82   CALCIUM, TOTAL 9.8 9.5 9.7 9.7 10.2     Recent Labs   Lab Test 12/21/21  1213   MAGNESIUM 1.6     Recent Labs   Lab Test 04/19/24  0940 02/02/24  1149 11/06/23  0850   BILIRUBIN TOTAL 0.2 0.2 <0.2   ALKPHOS 84 75 74   ALT 6 10 11   AST 20 15 16   ALBUMIN (R) 4.3 4.2 3.9       No results found for this or any previous visit (from the past 24 hour(s)).

## 2024-04-22 ENCOUNTER — ONCOLOGY VISIT (OUTPATIENT)
Dept: ONCOLOGY | Facility: CLINIC | Age: 69
End: 2024-04-22
Attending: INTERNAL MEDICINE
Payer: COMMERCIAL

## 2024-04-22 VITALS
DIASTOLIC BLOOD PRESSURE: 89 MMHG | HEIGHT: 66 IN | SYSTOLIC BLOOD PRESSURE: 158 MMHG | OXYGEN SATURATION: 98 % | BODY MASS INDEX: 29.04 KG/M2 | TEMPERATURE: 98.8 F | RESPIRATION RATE: 16 BRPM | WEIGHT: 180.7 LBS | HEART RATE: 67 BPM

## 2024-04-22 DIAGNOSIS — C61 ADENOCARCINOMA OF PROSTATE (H): Primary | ICD-10-CM

## 2024-04-22 PROCEDURE — G0463 HOSPITAL OUTPT CLINIC VISIT: HCPCS | Performed by: INTERNAL MEDICINE

## 2024-04-22 PROCEDURE — 99214 OFFICE O/P EST MOD 30 MIN: CPT | Performed by: INTERNAL MEDICINE

## 2024-04-22 ASSESSMENT — PAIN SCALES - GENERAL: PAINLEVEL: NO PAIN (0)

## 2024-04-22 NOTE — NURSING NOTE
"Oncology Rooming Note    April 22, 2024 11:19 AM   Gianluca Cooper is a 68 year old male who presents for:    Chief Complaint   Patient presents with    Oncology Clinic Visit     Adenocarcinoma of prostate     Initial Vitals: BP (!) 158/89 (BP Location: Right arm, Patient Position: Sitting, Cuff Size: Adult Regular)   Pulse 67   Temp 98.8  F (37.1  C) (Oral)   Resp 16   Ht 1.673 m (5' 5.85\")   Wt 82 kg (180 lb 11.2 oz)   SpO2 98%   BMI 29.30 kg/m   Estimated body mass index is 29.3 kg/m  as calculated from the following:    Height as of this encounter: 1.673 m (5' 5.85\").    Weight as of this encounter: 82 kg (180 lb 11.2 oz). Body surface area is 1.95 meters squared.  No Pain (0) Comment: Data Unavailable   No LMP for male patient.  Allergies reviewed: Yes  Medications reviewed: Yes    Medications: Medication refills not needed today.  Pharmacy name entered into [employer]:    [facility] - [place], [place] - [address]   [id number] [place], [place] - [address]  [id number] [place], [place] - [address]    Frailty Screening:   Is the patient here for a new oncology consult visit in cancer care? 2. No      Clinical concerns: none       Jailyn Benjamin              "

## 2024-04-22 NOTE — LETTER
4/22/2024         RE: Gianluca Cooper  [address]  [address]  [facility] [address]        Dear Colleague,    Thank you for referring your patient, Gianluca Cooper, to the [facility]. Please see a copy of my visit note below.    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 Gianluca presented with inguinal adenopathy that led to the biopsy. PSA at the time of diagnosis was 145.6. PSMA PET showed warren involvement of the chest, abdomen and pelvis, wothout visceral or bone metastases. We did run a Caris on his tumor but there were no actionable mutations.  Current Treatment and intent: Palliative leuprolide  Treatment Summary:   ADT since 8/3/23 . PSA concha to 377 on 2/2/2024.However, his testosterone was 378! He received Lupron 45 mg at that point (2/2/2024). He is back today to make sure his PSA has responded.    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  We know without a doubt that the inguinal nodes contain metastatic prostate cancer because this is how he was diagnosed.    At the last visit his PSA had doubled which was concerning for the development of castrate resistant disease.  However his testosterone returned normal as well, indicating failure of his ADT from August to last the 6 months.  I took a deep breath and just got him back started on Lupron then.  Today he is back and his PSA has dropped nicely from 377 back down to 131.  While this is a nice drop, a PSA of triple digits is concerning for disease that is not quite castrate sensitive.  Moreover,he tells me that his inguinal nodes are growing.   Therefore, I  would like to add abiraterone/prednisone. Side effects were discussed with Gianluca (through our ).These include, but are not limited to, transaminitis, fatigue, nausea.  Gross hematuria, resolved.      Plan:  Begin abiraterone/prednisone  Return to clinic with me in 2 months with labs just prior.      Brigido Bajwa MD, MSc  Associate Professor of Medicine  [facility] [place] [address]  [phone]    30 minutes spent on this appointment today including chart review, direct face-to-face time, care coordination, and documentation.  Hem/onc History:   Edit Oncology History  Oncology History Overview Note   Gianluca initially presented to his PCP with right inguinal swelling similar to previously when he had a right inguinal hernia that was repaired in 2006.  He was referred to a surgeon and during exploration of the right groin in the operating room he was noted to have multiple enlarged lymph nodes without a hernia for which he underwent a sentinel lymph node biopsy with pathology consistent with metastatic prostate adenocarcinoma.  A PSMA PET scan showed evidence of metastatic disease in nonregional lymph nodes of the chest, abdomen, and pelvis without bony or visceral metastases.      Adenocarcinoma of prostate (H)   6/29/2023 Biopsy    Right inguinal excisional lymph node biopsy  Final Diagnosis   Lymph nodes, right inguinal, excision:  - Metastatic prostatic adenocarcinoma involving 2 of 5 lymph nodes  - Deposit size: 6.5 cm  - Extranodal extension is present        7/18/2023 Tumor Markers    PSA Tumor Marker 0.00 - 4.50 ng/mL 145.60 High          7/25/2023 Imaging    PSMA PET-CT  IMPRESSION: In this patient with biopsy-proven metastatic prostate  cancer:  1. Marked uptake (SUV mean 14)  in the prostate gland with  extraprostatic extension, compatible with prostatic adenocarcinoma.  2. Moderate to marked uptake in the multiple  enlarged lymph nodes (SUV  mean 7-15) in the chest, abdomen and pelvis, consistent with extensive  warren metastases.  3. No bone metastases.  4. Incidentally noted hypoattenuating lesion in the uncinate process  1.4 cm, indeterminant, may represent an IPMN.  Attention on follow-up.     8/3/2023 -  Chemotherapy    OP ONC Prostate Cancer - Leuprolide (Eligard) EVERY 6 MONTHS  Plan Provider: Ramiro Headley MD  Treatment goal: Palliative  Line of treatment: First Line         Interval History:   Feels his inguinal lymph nodes are increased in size since the last visit.    No longer has the ability to get erections. Did not quite understand the connection between his cancer treatment and this.  When presented with the idea of stopping his cancer treatment to allow him to get erections again, he preferred to continue on with his cancer treatment.  He gets some pain in his testicles when he walks too far.  Other than that he is actually feeling pretty well.   He does not note any hot flashes.  He has no other concerns today.    Subjective  Past Medical History:   Diagnosis Date    Depression     Gastro-oesophageal reflux disease     Headache(784.0)     Hypertension     LOC (loss of consciousness) (H) age 45    out for 20 mintes after hit on top of head with board    Major depression 5/7/2013    Otitis media, chronic     Sleep apnea     cant tolerate cpap     HEIKE  GERD  COPD  Mood d/o -- MDD with psychotic features, PTSD  Cocaine and marijuana use disorders  Hx of accidental opiate overdose x2 c/b respiratory arrest 1/2023 -- reports his cocaine was laced with fentanyl  Hx HBV and HCV, cleared as of 6/25/18  Hx IVDU last ~2002  Tobacco use  Chart Review:  1/11/2023 admitted for respiratory arrest with in the setting of opioid overdose with bystander administration of naloxone.  PMH notable for HTN, HLD, GERD, MDD with psychotic features, LTBI, chronic HCV inguinal hernia repair 2006, HEIKE/OHS.     Past Surgical History:    Procedure Laterality Date    COLONOSCOPY N/A 3/8/2018    Procedure: COLONOSCOPY;  colonoscopy;  Surgeon: Iona Lutz MD;  Location:  GI    HERNIA REPAIR  2006    boith sides    HERNIORRHAPHY INGUINAL Right 6/29/2023    Procedure: Right INGUINAL, lymph node excisional biopsy;  Surgeon: Frank Carlson MD;  Location: [facility] OR    LASER CO2 TYMPANOMASTOIDECTOMY  9/7/2011    Procedure:LASER CO2 TYMPANOMASTOIDECTOMY; Right Tympanoplasty , Cartilage Backed Right Tympanomastoidectomy, Omni Guide Laser on Standby  *Latex Safe*; Surgeon:BENNETT MAXWELL; Location:[facility] OR    SEPTOPLASTY  9/17/2012    Procedure: SEPTOPLASTY;  Septoplasty *Latex Safe* Turbinate reduction ;  Surgeon: Babar Dickerson MD;  Location:  OR    UVULOPALATOPHARYNGOPLASTY  7/9/2012    Procedure: UVULOPALATOPHARYNGOPLASTY;  Uvulopalatopharyngoplasty * Latex Safe*;  Surgeon: Babar Dickerson MD;  Location:  OR     Social History     Socioeconomic History    Marital status: Single   Tobacco Use    Smoking status: Every Day     Current packs/day: 0.50     Types: Cigarettes    Smokeless tobacco: Never   Substance and Sexual Activity    Alcohol use: No     Alcohol/week: 0.0 standard drinks of alcohol    Drug use: No     Comment: hx polysubstance now in remission     Social Determinants of Health     Financial Resource Strain: Not at Risk (3/29/2021)    Received from [facility]     Financial Resource Strain     Financial Resource Strain: 1   Food Insecurity: Not on File (11/8/2019)    Received from [facility]     Food Insecurity     Food: 0   Transportation Needs: Not at Risk (3/29/2021)    Received from [facility]     Transportation Needs     Transportation: 1   Physical Activity: Not on File (11/8/2019)    Received from [facility]     Physical Activity     Physical Activity: 0   Stress: At Risk (3/29/2021)    Received from [facility]     Stress     Stress: 2   Social Connections: Not at Risk (3/29/2021)    Received  from [facility]     Social Connections     Social Connections and Isolation: 1   Housing Stability: Not at Risk (3/29/2021)    Received from [facility]     Housing Stability     Housin      SH obtained at initial visit 23:  He currently lives independently in [place].  He previously worked as a .  He is not currently working and is on Social Security. He is from [place]. He has 3 children who live in [place].  He has not seen them for over 10 years because they will not see him.  He has 1 friend with whom he is close.  Otherwise he is pretty much on his own.  He reports he does not have any friends or family locally.  He spends most of his day at home and tells me he does not have any activities that bring him tay.  He does most of his ADLs himself such as cooking, cleaning, self-care but feels he has been slacking on this.  He identifies a point in time when his psychiatrist took him off of his antianxiety medication as a change in his mental wellbeing and he feels very depressed now.  He has been smoking tobacco products since the age of 10 and smokes about half a pack a day, approximately 30 pack years in total.  He uses marijuana daily and will smoke a joint in the morning and one at night.  He does not drink alcohol regularly.  He occasionally uses cocaine, most recently 4 months ago, and has not used recently due to not having the funds for it.  He had an episode in January when he used cocaine that was laced with fentanyl resulting in an accidental overdose requiring hospitalization.  He has a remote history of IV drug use including heroin in the early , he does not use IV drugs now.    Family History   Problem Relation Age of Onset    Depression Mother     Depression Father     Substance Abuse Father     Mental Illness Other         institutionalized    Substance Abuse Brother     Substance Abuse Brother     Substance Abuse Brother     Substance Abuse Brother      Substance Abuse Brother     Liver Disease No family hx of          Current Outpatient Medications   Medication Sig Dispense Refill    ASPIRIN PO Take 81 mg by mouth daily      atorvastatin (LIPITOR) 40 MG tablet Take 1 tablet (40 mg) by mouth daily      buPROPion (WELLBUTRIN XL) 150 MG 24 hr tablet Take 1 tablet (150 mg) by mouth every morning 30 tablet 1    doxepin (SINEQUAN) 10 MG capsule Take 1 capsule (10 mg) by mouth At Bedtime 30 capsule 2    HYDROcodone-acetaminophen (NORCO) 5-325 MG tablet Take 1-2 tablets by mouth every 4 hours as needed for moderate to severe pain 15 tablet 0    lisinopril-hydrochlorothiazide (PRINZIDE,ZESTORETIC) 20-25 MG per tablet Take 1 tablet by mouth daily      metFORMIN (GLUCOPHAGE) 500 MG tablet Take one tablet daily in the am and two tablets at bedtime.  R Abril NP at [facility]      OLANZapine (ZYPREXA) 15 MG tablet Take one-half (7.5 mg) to one tablet (15 mg) by mouth at bedtime 30 tablet 1    prazosin (MINIPRESS) 2 MG capsule Take 2 capsules (4 mg) by mouth At Bedtime 60 capsule 1    ranitidine (ZANTAC) 150 MG tablet Take 1 tablet (150 mg) by mouth 2 times daily      senna-docusate (SENOKOT-S/PERICOLACE) 8.6-50 MG tablet Take 1-2 tablets by mouth 2 times daily 15 tablet 0    sertraline (ZOLOFT) 100 MG tablet Take 2 tablets (200 mg) by mouth daily 60 tablet 1    venlafaxine (EFFEXOR-XR) 37.5 MG 24 hr capsule Take 2 capsules (75 mg) by mouth daily 60 capsule 1    VITAMIN D3 1000 units tablet TK 2 TS PO D  1     No current facility-administered medications for this visit.      Visit history:  Discussion and Medical Decision Making (07/27/2023):  Today utilizing a teleinterpreter I discussed with Jaylen his diagnosis of metastatic castration sensitive prostate cancer.  We discussed the incurable nature of metastatic prostate cancer, the slow-growing nature and likely development over months to years, the biology of prostate cancer growth in response to testosterone, the general treatment  strategy of hormone blockade +/- chemotherapy with a palliative intent of prolonging life and improving quality of life, and a prognosis with treatment on the order of years.  Gianluca made it clear he is not interested in chemotherapy, but would be accepting of leuprolide shots and potentially abiraterone in the future.  We discussed the general side effects of antihormonal therapy including but not limited to cardiac and cerebrovascular disease, osteoporosis, mood changes, anhedonia.     Objective  Physical Exam:   There were no vitals taken for this visit.  Physical Exam  Constitutional:       General: He is not in acute distress.     Appearance: He is not ill-appearing or toxic-appearing.   HENT:      Head: Normocephalic and atraumatic.      Mouth/Throat:      Mouth: Mucous membranes are moist.   Eyes:      General: No scleral icterus.     Extraocular Movements: Extraocular movements intact.   Cardiovascular:      Rate and Rhythm: Normal rate and regular rhythm.   Pulmonary:      Effort: Pulmonary effort is normal. No respiratory distress.   Abdominal:      General: There is no distension.      Palpations: Abdomen is soft.      Tenderness: There is no abdominal tenderness. There is no guarding.   Skin:     General: Skin is warm and dry.      Coloration: Skin is not jaundiced.   Neurological:      Mental Status: He is alert. Mental status is at baseline.     Lymph node survey reveals bilateral 3-5 cm very firm lymph nodes the medial aspect of the inguinal bands, medial >lateral, particularly on the right,      Data:      Latest Reference Range & Units 07/18/23 12:26 02/02/24 11:49 04/19/24 09:40   PSA Tumor Marker 0.00 - 4.50 ng/mL 145.60 (H) 377.10 (H) 131.80 (H)   (H): Data is abnormally high    PSMA Scan 7/25/2023        IMPRESSION: In this patient with biopsy-proven metastatic prostate  cancer:  1. Marked uptake (SUV mean 14)  in the prostate gland with  extraprostatic extension, compatible with prostatic  adenocarcinoma.  2. Moderate to marked uptake in the multiple enlarged lymph nodes (SUV  mean 7-15) in the chest, abdomen and pelvis, consistent with extensive  warren metastases.  3. No bone metastases.          I personally reviewed the following studies:  Recent Labs   Lab Test 04/19/24  0940 02/02/24  1149 06/25/18  1114 11/13/17  1113 03/09/17  0000   WBC 5.8 5.2 5.6 6.3 6.5   % NEUTROPHILS 65 66  --  63.7  --    HEMOGLOBIN 13.6 12.6* 13.7 14.6 14.6   PLATELET COUNT 272 384 222 248 278     Recent Labs   Lab Test 04/19/24  0940 03/11/24  1020 02/02/24  1149 11/06/23  0850 08/21/23  1013   SODIUM 134* 135 135 135 137   POTASSIUM (R) 5.0 4.8 4.5 4.4 4.2   CHLORIDE (R) 100 104 101 102 100   CARBON DIOXIDE (R) 25 18* 25 21* 19*   ANION GAP 9 13 9 12 18*   UREA NITROGEN (R) 13.4 29.5* 20.9 15.7 14.3   CREATININE 0.81 0.95 0.81 0.77 0.82   CALCIUM, TOTAL 9.8 9.5 9.7 9.7 10.2     Recent Labs   Lab Test 12/21/21  1213   MAGNESIUM 1.6     Recent Labs   Lab Test 04/19/24  0940 02/02/24  1149 11/06/23  0850   BILIRUBIN TOTAL 0.2 0.2 <0.2   ALKPHOS 84 75 74   ALT 6 10 11   AST 20 15 16   ALBUMIN (R) 4.3 4.2 3.9       No results found for this or any previous visit (from the past 24 hour(s)).

## 2024-04-23 ENCOUNTER — TELEPHONE (OUTPATIENT)
Dept: ONCOLOGY | Facility: CLINIC | Age: 69
End: 2024-04-23
Payer: COMMERCIAL

## 2024-04-23 ENCOUNTER — APPOINTMENT (OUTPATIENT)
Dept: INTERPRETER SERVICES | Facility: CLINIC | Age: 69
End: 2024-04-23
Payer: COMMERCIAL

## 2024-04-23 DIAGNOSIS — C61 ADENOCARCINOMA OF PROSTATE (H): Primary | ICD-10-CM

## 2024-04-23 DIAGNOSIS — Z79.899 ENCOUNTER FOR LONG-TERM (CURRENT) USE OF MEDICATIONS: ICD-10-CM

## 2024-04-23 LAB — TESTOST SERPL-MCNC: 171 NG/DL (ref 240–950)

## 2024-04-23 RX ORDER — ABIRATERONE ACETATE 250 MG/1
1000 TABLET ORAL DAILY
Qty: 120 TABLET | Refills: 0 | Status: SHIPPED | OUTPATIENT
Start: 2024-04-29 | End: 2024-05-23

## 2024-04-23 RX ORDER — PREDNISONE 5 MG/1
5 TABLET ORAL 2 TIMES DAILY
Qty: 60 TABLET | Refills: 0 | Status: SHIPPED | OUTPATIENT
Start: 2024-04-29 | End: 2024-05-23

## 2024-04-23 NOTE — TELEPHONE ENCOUNTER
PA Initiation    Medication: ABIRATERONE ACETATE 250 MG PO TABS  Insurance Company: [employer] - Phone [phone] Fax [phone]  Pharmacy Filling the Rx: [facility] - [place], [place] - [address]  Filling Pharmacy Phone:    Filling Pharmacy Fax:    Start Date: 4/23/2024

## 2024-04-23 NOTE — TELEPHONE ENCOUNTER
Prior Authorization Approval    Medication: ABIRATERONE ACETATE 250 MG PO TABS  Authorization Effective Date: 3/24/2024  Authorization Expiration Date: 4/23/2024  Approved Dose/Quantity: 120/20  Reference #: [id number]   Insurance Company: [employer] - Phone [phone] Fax [phone]  Expected CoPay: $ 0  CoPay Card Available:      Financial Assistance Needed: No  Which [facility] the [facility] - [facility] [address]  Pharmacy Notified: Yes  Patient Notified: Yes

## 2024-04-23 NOTE — ORAL ONC MGMT
"Oral Chemotherapy Monitoring Program    Lab Monitoring Plan      Labs drawn outside of [facility]: No, will get at [facility]  Subjective/Objective:  Gianluca Cooper is a 68 year old male contacted by phone with  for an initial visit for oral chemotherapy education.  I spoke to Gianluca regarding the possible drug interaction between abiraterone and atorvastatin.  He will watch for increase in muscle aches and pains and report to Dr. Bajwa if this occurs.  He is agreeable to have labs done at the [facility] every 2 weeks for the first 3 months then monthly.  Will have scheduling reach out to him to get these set up.          4/23/2024    11:00 AM 4/23/2024    12:00 PM   ORAL CHEMOTHERAPY   Assessment Type Initial Work up New Teach   Diagnosis Code Prostate Cancer Prostate Cancer   Providers Dr. Aydee Bajwa   Clinic Name/Location [facility]   Is this patient followed by the [facility] OC team? No No   Drug Name Zytiga (abiraterone) Zytiga (abiraterone)   Dose 1,000 mg 1,000 mg   Current Schedule Daily Daily   Cycle Details Continuous Continuous   Any new drug interactions?  Yes   Pharmacist Intervention?  Yes   Intervention(s)  Patient Education   Is the dose as ordered appropriate for the patient?  Yes       Last PHQ-2 Score on record:       7/13/2023     1:23 PM 6/25/2018    11:26 AM   PHQ-2 ( 1999 Pf[employer])   Q1: Little interest or pleasure in doing things 3 3   Q2: Feeling down, depressed or hopeless 3 3   PHQ-2 Score 6 6       Vitals:  BP:   BP Readings from Last 1 Encounters:   04/22/24 (!) 158/89     Wt Readings from Last 1 Encounters:   04/22/24 82 kg (180 lb 11.2 oz)     Estimated body surface area is 1.95 meters squared as calculated from the following:    Height as of 4/22/24: 1.673 m (5' 5.85\").    Weight as of 4/22/24: 82 kg (180 lb 11.2 oz).    Labs:  _  Result Component Current Result Ref Range   Sodium 134 (L) (4/19/2024) 135 - 145 mmol/L     _  Result Component Current Result Ref Range "   Potassium 5.0 (4/19/2024) 3.4 - 5.3 mmol/L     _  Result Component Current Result Ref Range   Calcium 9.8 (4/19/2024) 8.8 - 10.2 mg/dL     No results found for Mag within last 30 days.     No results found for Phos within last 30 days.     _  Result Component Current Result Ref Range   Albumin 4.3 (4/19/2024) 3.5 - 5.2 g/dL     _  Result Component Current Result Ref Range   Urea Nitrogen 13.4 (4/19/2024) 8.0 - 23.0 mg/dL     _  Result Component Current Result Ref Range   Creatinine 0.81 (4/19/2024) 0.67 - 1.17 mg/dL     _  Result Component Current Result Ref Range   AST 20 (4/19/2024) 0 - 45 U/L     _  Result Component Current Result Ref Range   ALT 6 (4/19/2024) 0 - 70 U/L     _  Result Component Current Result Ref Range   Bilirubin Total 0.2 (4/19/2024) <=1.2 mg/dL     _  Result Component Current Result Ref Range   WBC Count 5.8 (4/19/2024) 4.0 - 11.0 10e3/uL     _  Result Component Current Result Ref Range   Hemoglobin 13.6 (4/19/2024) 13.3 - 17.7 g/dL     _  Result Component Current Result Ref Range   Platelet Count 272 (4/19/2024) 150 - 450 10e3/uL     No results found for ANC within last 30 days.     _  Result Component Current Result Ref Range   Absolute Neutrophils 3.8 (4/19/2024) 1.6 - 8.3 10e3/uL        Assessment:  Patient is appropriate to start therapy.    Plan:  Basic chemotherapy teaching was reviewed with the patient including indication, start date of therapy, dose, administration, adverse effects, missed doses, food and drug interactions, monitoring, side effect management, office contact information, and safe handling. Written materials were provided and all questions answered.    Follow-Up:  No follow up appointments scheduled at this time.      Elba Alvarado, PharmD  Hematology/Oncology Clinical Pharmacist  [facility]  [phone]

## 2024-05-02 ENCOUNTER — APPOINTMENT (OUTPATIENT)
Dept: INTERPRETER SERVICES | Facility: CLINIC | Age: 69
End: 2024-05-02
Payer: COMMERCIAL

## 2024-05-02 ENCOUNTER — TELEPHONE (OUTPATIENT)
Dept: ONCOLOGY | Facility: CLINIC | Age: 69
End: 2024-05-02
Payer: COMMERCIAL

## 2024-05-02 NOTE — ORAL ONC MGMT
Oral Chemotherapy Monitoring Program    Subjective/Objective:  Gianluca Cooper is a 68 year old male contacted by phone for a follow-up visit for oral chemotherapy.  Schedulers asked us to reach out to explain need for labs as there was some confusion. With assistance of [place]ian interpretor contacted Gianluca. Gianluca reports starting the zytiga 2-3 days ago and confirmed the correct dose of 1000mg daily, and is taking prednisone 1 tab twice a day. So far no side effects have been noticed, but he does report he is peeing blood again. This is not new, and this time is less than it has been before, will continue to monitor.     Gianluca now understands the need for lab monitoring and is agreeable to getting these scheduled. He was on the bus when the  called earlier and states he couldn't hear very well so that likely contributed to the confusion.        4/23/2024    11:00 AM 4/23/2024    12:00 PM 5/2/2024     4:00 PM   ORAL CHEMOTHERAPY   Assessment Type Initial Work up New Teach Other   Diagnosis Code Prostate Cancer Prostate Cancer Prostate Cancer   Providers Dr. Aydee Bajwa   [facility]   Is this patient followed by the [facility] OC team? No No No   Drug Name Zytiga (abiraterone) Zytiga (abiraterone) Zytiga (abiraterone)   Dose 1,000 mg 1,000 mg 1,000 mg   Current Schedule Daily Daily Daily   Cycle Details Continuous Continuous Continuous   Adverse Effects   No AE identified during assessment   Any new drug interactions?  Yes    Pharmacist Intervention?  Yes    Intervention(s)  Patient Education    Is the dose as ordered appropriate for the patient?  Yes        Last PHQ-2 Score on record:       7/13/2023     1:23 PM 6/25/2018    11:26 AM   PHQ-2 ( 1999 Pfizer)   Q1: Little interest or pleasure in doing things 3 3   Q2: Feeling down, depressed or hopeless 3 3   PHQ-2 Score 6 6       Vitals:  BP:   BP Readings from Last 1 Encounters:   04/22/24 (!) 158/89     Wt  "Readings from Last 1 Encounters:   04/22/24 82 kg (180 lb 11.2 oz)     Estimated body surface area is 1.95 meters squared as calculated from the following:    Height as of 4/22/24: 1.673 m (5' 5.85\").    Weight as of 4/22/24: 82 kg (180 lb 11.2 oz).    Labs:  _  Result Component Current Result Ref Range   Sodium 134 (L) (4/19/2024) 135 - 145 mmol/L     _  Result Component Current Result Ref Range   Potassium 5.0 (4/19/2024) 3.4 - 5.3 mmol/L     _  Result Component Current Result Ref Range   Calcium 9.8 (4/19/2024) 8.8 - 10.2 mg/dL     No results found for Mag within last 30 days.     No results found for Phos within last 30 days.     _  Result Component Current Result Ref Range   Albumin 4.3 (4/19/2024) 3.5 - 5.2 g/dL     _  Result Component Current Result Ref Range   Urea Nitrogen 13.4 (4/19/2024) 8.0 - 23.0 mg/dL     _  Result Component Current Result Ref Range   Creatinine 0.81 (4/19/2024) 0.67 - 1.17 mg/dL     _  Result Component Current Result Ref Range   AST 20 (4/19/2024) 0 - 45 U/L     _  Result Component Current Result Ref Range   ALT 6 (4/19/2024) 0 - 70 U/L     _  Result Component Current Result Ref Range   Bilirubin Total 0.2 (4/19/2024) <=1.2 mg/dL     _  Result Component Current Result Ref Range   WBC Count 5.8 (4/19/2024) 4.0 - 11.0 10e3/uL     _  Result Component Current Result Ref Range   Hemoglobin 13.6 (4/19/2024) 13.3 - 17.7 g/dL     _  Result Component Current Result Ref Range   Platelet Count 272 (4/19/2024) 150 - 450 10e3/uL     No results found for ANC within last 30 days.     _  Result Component Current Result Ref Range   Absolute Neutrophils 3.8 (4/19/2024) 1.6 - 8.3 10e3/uL          Assessment/Plan:  Continue abiraterone    Follow-Up:  Schedule labs    Refill Due:  In about 28 days    Cheyanne Juarez, PharmD, BCPS  Oral Chemotherapy Monitoring Program  [facility]  [phone]      "

## 2024-05-07 ENCOUNTER — TELEPHONE (OUTPATIENT)
Dept: ONCOLOGY | Facility: CLINIC | Age: 69
End: 2024-05-07

## 2024-05-07 ENCOUNTER — LAB (OUTPATIENT)
Dept: LAB | Facility: CLINIC | Age: 69
End: 2024-05-07
Attending: INTERNAL MEDICINE
Payer: COMMERCIAL

## 2024-05-07 DIAGNOSIS — Z79.899 ENCOUNTER FOR LONG-TERM (CURRENT) USE OF MEDICATIONS: ICD-10-CM

## 2024-05-07 DIAGNOSIS — C61 ADENOCARCINOMA OF PROSTATE (H): ICD-10-CM

## 2024-05-07 LAB
ALBUMIN SERPL BCG-MCNC: 4 G/DL (ref 3.5–5.2)
ALP SERPL-CCNC: 70 U/L (ref 40–150)
ALT SERPL W P-5'-P-CCNC: 11 U/L (ref 0–70)
ANION GAP SERPL CALCULATED.3IONS-SCNC: 11 MMOL/L (ref 7–15)
AST SERPL W P-5'-P-CCNC: 19 U/L (ref 0–45)
BILIRUB SERPL-MCNC: 0.2 MG/DL
BUN SERPL-MCNC: 13.6 MG/DL (ref 8–23)
CALCIUM SERPL-MCNC: 9.5 MG/DL (ref 8.8–10.2)
CHLORIDE SERPL-SCNC: 100 MMOL/L (ref 98–107)
CREAT SERPL-MCNC: 0.74 MG/DL (ref 0.67–1.17)
DEPRECATED HCO3 PLAS-SCNC: 24 MMOL/L (ref 22–29)
EGFRCR SERPLBLD CKD-EPI 2021: >90 ML/MIN/1.73M2
GLUCOSE SERPL-MCNC: 108 MG/DL (ref 70–99)
POTASSIUM SERPL-SCNC: 4.2 MMOL/L (ref 3.4–5.3)
PROT SERPL-MCNC: 6.4 G/DL (ref 6.4–8.3)
SODIUM SERPL-SCNC: 135 MMOL/L (ref 135–145)

## 2024-05-07 PROCEDURE — 36415 COLL VENOUS BLD VENIPUNCTURE: CPT

## 2024-05-07 PROCEDURE — 80053 COMPREHEN METABOLIC PANEL: CPT

## 2024-05-07 NOTE — NURSING NOTE
Chief Complaint   Patient presents with    Labs Only     Labs collected from venipuncture by RN.      Labs collected from venipuncture by RN.     Fransisca Mays RN

## 2024-05-07 NOTE — ORAL ONC MGMT
Oral Chemotherapy Monitoring Program  Lab Follow Up    Reviewed lab results from 5/7/24.        4/23/2024    11:00 AM 4/23/2024    12:00 PM 5/2/2024     4:00 PM 5/7/2024     1:00 PM   ORAL CHEMOTHERAPY   Assessment Type Initial Work up New Teach Other Initial Follow up;Lab Monitoring   Diagnosis Code Prostate Cancer Prostate Cancer Prostate Cancer Prostate Cancer   Providers Dr. Aydee Bajwa   [facility]   Is this patient followed by the [facility] OC team? No No No No   Drug Name Zytiga (abiraterone) Zytiga (abiraterone) Zytiga (abiraterone) Zytiga (abiraterone)   Dose 1,000 mg 1,000 mg 1,000 mg 1,000 mg   Current Schedule Daily Daily Daily Daily   Cycle Details Continuous Continuous Continuous Continuous   Start Date of Last Cycle    4/30/2024   Doses missed in last 2 weeks    0   Adherence Assessment    Adherent   Adverse Effects   No AE identified during assessment No AE identified during assessment   Any new drug interactions?  Yes     Pharmacist Intervention?  Yes     Intervention(s)  Patient Education     Is the dose as ordered appropriate for the patient?  Yes         Labs:  _  Result Component Current Result Ref Range   Sodium 135 (5/7/2024) 135 - 145 mmol/L     _  Result Component Current Result Ref Range   Potassium 4.2 (5/7/2024) 3.4 - 5.3 mmol/L     _  Result Component Current Result Ref Range   Calcium 9.5 (5/7/2024) 8.8 - 10.2 mg/dL     No results found for Mag within last 30 days.     No results found for Phos within last 30 days.     _  Result Component Current Result Ref Range   Albumin 4.0 (5/7/2024) 3.5 - 5.2 g/dL     _  Result Component Current Result Ref Range   Urea Nitrogen 13.6 (5/7/2024) 8.0 - 23.0 mg/dL     _  Result Component Current Result Ref Range   Creatinine 0.74 (5/7/2024) 0.67 - 1.17 mg/dL     _  Result Component Current Result Ref Range   AST 19 (5/7/2024) 0 - 45 U/L     _  Result Component Current Result Ref Range   ALT  11 (5/7/2024) 0 - 70 U/L     _  Result Component Current Result Ref Range   Bilirubin Total 0.2 (5/7/2024) <=1.2 mg/dL     _  Result Component Current Result Ref Range   WBC Count 5.8 (4/19/2024) 4.0 - 11.0 10e3/uL     _  Result Component Current Result Ref Range   Hemoglobin 13.6 (4/19/2024) 13.3 - 17.7 g/dL     _  Result Component Current Result Ref Range   Platelet Count 272 (4/19/2024) 150 - 450 10e3/uL     No results found for ANC within last 30 days.     _  Result Component Current Result Ref Range   Absolute Neutrophils 3.8 (4/19/2024) 1.6 - 8.3 10e3/uL        Assessment & Plan:  Spoke to Gianluca with professional  ([id number]). No concerning abnormalities. Reviewed lab results and initial assessment with Gianluca after we called him last week but just a few days after he started. He confirms taking Zytiga as directed 1000 mg (250 mg x 4) daily on an empty stomach. He denies any missed doses and no new medications. Furthermore, he denies any side effects at this time.    Questions answered to patient's satisfaction.    Follow-Up:  5/21 lab results    Charles Reyes, PharmD, BCPS, BCOP  Hematology/Oncology Clinical Pharmacist  Oral Chemotherapy Monitoring Program  [facility]  [phone]

## 2024-05-20 DIAGNOSIS — C61 ADENOCARCINOMA OF PROSTATE (H): Primary | ICD-10-CM

## 2024-05-21 ENCOUNTER — LAB (OUTPATIENT)
Dept: LAB | Facility: CLINIC | Age: 69
End: 2024-05-21
Attending: INTERNAL MEDICINE
Payer: COMMERCIAL

## 2024-05-21 ENCOUNTER — VIRTUAL VISIT (OUTPATIENT)
Dept: INTERPRETER SERVICES | Facility: CLINIC | Age: 69
End: 2024-05-21

## 2024-05-21 DIAGNOSIS — C61 ADENOCARCINOMA OF PROSTATE (H): ICD-10-CM

## 2024-05-21 DIAGNOSIS — Z79.899 ENCOUNTER FOR LONG-TERM (CURRENT) USE OF MEDICATIONS: ICD-10-CM

## 2024-05-21 LAB
ALBUMIN SERPL BCG-MCNC: 4.5 G/DL (ref 3.5–5.2)
ALP SERPL-CCNC: 81 U/L (ref 40–150)
ALT SERPL W P-5'-P-CCNC: 17 U/L (ref 0–70)
ANION GAP SERPL CALCULATED.3IONS-SCNC: 10 MMOL/L (ref 7–15)
AST SERPL W P-5'-P-CCNC: 20 U/L (ref 0–45)
BILIRUB SERPL-MCNC: 0.4 MG/DL
BUN SERPL-MCNC: 12.8 MG/DL (ref 8–23)
CALCIUM SERPL-MCNC: 10 MG/DL (ref 8.8–10.2)
CHLORIDE SERPL-SCNC: 95 MMOL/L (ref 98–107)
CREAT SERPL-MCNC: 0.72 MG/DL (ref 0.67–1.17)
DEPRECATED HCO3 PLAS-SCNC: 27 MMOL/L (ref 22–29)
EGFRCR SERPLBLD CKD-EPI 2021: >90 ML/MIN/1.73M2
GLUCOSE SERPL-MCNC: 118 MG/DL (ref 70–99)
POTASSIUM SERPL-SCNC: 3.6 MMOL/L (ref 3.4–5.3)
PROT SERPL-MCNC: 7.2 G/DL (ref 6.4–8.3)
SODIUM SERPL-SCNC: 132 MMOL/L (ref 135–145)

## 2024-05-21 PROCEDURE — T1013 SIGN LANG/ORAL INTERPRETER: HCPCS | Mod: U4,TEL,95

## 2024-05-21 PROCEDURE — 36415 COLL VENOUS BLD VENIPUNCTURE: CPT

## 2024-05-21 PROCEDURE — 80053 COMPREHEN METABOLIC PANEL: CPT

## 2024-05-21 NOTE — PROGRESS NOTES
Chief Complaint   Patient presents with    Labs Only     Blood draw,  by JOLANTA MISTRY.     Christina Finch MA

## 2024-05-22 ENCOUNTER — TELEPHONE (OUTPATIENT)
Dept: ONCOLOGY | Facility: CLINIC | Age: 69
End: 2024-05-22
Payer: COMMERCIAL

## 2024-05-22 NOTE — TELEPHONE ENCOUNTER
Oral Chemotherapy Monitoring Program  Lab Follow Up    Reviewed lab results from 5/21.        4/23/2024    11:00 AM 4/23/2024    12:00 PM 5/2/2024     4:00 PM 5/7/2024     1:00 PM 5/22/2024    10:00 AM   ORAL CHEMOTHERAPY   Assessment Type Initial Work up New Teach Other Initial Follow up;Lab Monitoring Lab Monitoring   Diagnosis Code Prostate Cancer Prostate Cancer Prostate Cancer Prostate Cancer Prostate Cancer   Providers Dr. Aydee Bajwa   [facility]   Is this patient followed by the [facility] OC team? No No No No No   Drug Name Zytiga (abiraterone) Zytiga (abiraterone) Zytiga (abiraterone) Zytiga (abiraterone) Zytiga (abiraterone)   Dose 1,000 mg 1,000 mg 1,000 mg 1,000 mg 1,000 mg   Current Schedule Daily Daily Daily Daily Daily   Cycle Details Continuous Continuous Continuous Continuous Continuous   Start Date of Last Cycle    4/30/2024    Doses missed in last 2 weeks    0    Adherence Assessment    Adherent    Adverse Effects   No AE identified during assessment No AE identified during assessment    Any new drug interactions?  Yes      Pharmacist Intervention?  Yes      Intervention(s)  Patient Education      Is the dose as ordered appropriate for the patient?  Yes          Labs:  _  Result Component Current Result Ref Range   Sodium 132 (L) (5/21/2024) 135 - 145 mmol/L     _  Result Component Current Result Ref Range   Potassium 3.6 (5/21/2024) 3.4 - 5.3 mmol/L     _  Result Component Current Result Ref Range   Calcium 10.0 (5/21/2024) 8.8 - 10.2 mg/dL     No results found for Mag within last 30 days.     No results found for Phos within last 30 days.     _  Result Component Current Result Ref Range   Albumin 4.5 (5/21/2024) 3.5 - 5.2 g/dL     _  Result Component Current Result Ref Range   Urea Nitrogen 12.8 (5/21/2024) 8.0 - 23.0 mg/dL     _  Result Component Current Result Ref Range   Creatinine 0.72 (5/21/2024) 0.67 - 1.17  mg/dL     _  Result Component Current Result Ref Range   AST 20 (5/21/2024) 0 - 45 U/L     _  Result Component Current Result Ref Range   ALT 17 (5/21/2024) 0 - 70 U/L     _  Result Component Current Result Ref Range   Bilirubin Total 0.4 (5/21/2024) <=1.2 mg/dL     No results found for WBC within last 30 days.     No results found for HGB within last 30 days.     No results found for PLT within last 30 days.     No results found for ANC within last 30 days.     No results found for ANC within last 30 days.        Assessment & Plan:  Spoke to Gianluca using the Narr8 line via a . No concerning abnormalities.    Questions answered to patient's satisfaction.    Follow-Up:  6/4 Labs  Sent an IB to the  to schedule his f/u provider appt    Cindi Arrieta  Pharmacy Intern  Oral Chemotherapy Monitoring Program  [facility]   [phone]

## 2024-05-23 DIAGNOSIS — C61 ADENOCARCINOMA OF PROSTATE (H): Primary | ICD-10-CM

## 2024-05-23 RX ORDER — PREDNISONE 5 MG/1
5 TABLET ORAL 2 TIMES DAILY
Qty: 60 TABLET | Refills: 0 | Status: SHIPPED | OUTPATIENT
Start: 2024-05-29 | End: 2024-06-21

## 2024-05-23 RX ORDER — ABIRATERONE ACETATE 250 MG/1
1000 TABLET ORAL DAILY
Qty: 120 TABLET | Refills: 0 | Status: SHIPPED | OUTPATIENT
Start: 2024-05-29 | End: 2024-06-21

## 2024-06-04 ENCOUNTER — LAB (OUTPATIENT)
Dept: LAB | Facility: CLINIC | Age: 69
End: 2024-06-04
Attending: INTERNAL MEDICINE
Payer: COMMERCIAL

## 2024-06-04 DIAGNOSIS — C61 ADENOCARCINOMA OF PROSTATE (H): ICD-10-CM

## 2024-06-04 LAB
ALBUMIN SERPL BCG-MCNC: 4.1 G/DL (ref 3.5–5.2)
ALP SERPL-CCNC: 71 U/L (ref 40–150)
ALT SERPL W P-5'-P-CCNC: 20 U/L (ref 0–70)
ANION GAP SERPL CALCULATED.3IONS-SCNC: 12 MMOL/L (ref 7–15)
AST SERPL W P-5'-P-CCNC: 25 U/L (ref 0–45)
BILIRUB SERPL-MCNC: 0.2 MG/DL
BUN SERPL-MCNC: 10.6 MG/DL (ref 8–23)
CALCIUM SERPL-MCNC: 9.4 MG/DL (ref 8.8–10.2)
CHLORIDE SERPL-SCNC: 96 MMOL/L (ref 98–107)
CREAT SERPL-MCNC: 0.73 MG/DL (ref 0.67–1.17)
DEPRECATED HCO3 PLAS-SCNC: 25 MMOL/L (ref 22–29)
EGFRCR SERPLBLD CKD-EPI 2021: >90 ML/MIN/1.73M2
GLUCOSE SERPL-MCNC: 175 MG/DL (ref 70–99)
POTASSIUM SERPL-SCNC: 3.4 MMOL/L (ref 3.4–5.3)
PROT SERPL-MCNC: 6.4 G/DL (ref 6.4–8.3)
SODIUM SERPL-SCNC: 133 MMOL/L (ref 135–145)

## 2024-06-04 PROCEDURE — 82374 ASSAY BLOOD CARBON DIOXIDE: CPT

## 2024-06-04 PROCEDURE — 36415 COLL VENOUS BLD VENIPUNCTURE: CPT

## 2024-06-04 NOTE — PROGRESS NOTES
Chief Complaint   Patient presents with    Labs Only     Blood draw,  by TV, VAT.     Christina Finch MA

## 2024-06-05 ENCOUNTER — DOCUMENTATION ONLY (OUTPATIENT)
Dept: ONCOLOGY | Facility: CLINIC | Age: 69
End: 2024-06-05
Payer: COMMERCIAL

## 2024-06-05 ENCOUNTER — APPOINTMENT (OUTPATIENT)
Dept: INTERPRETER SERVICES | Facility: CLINIC | Age: 69
End: 2024-06-05
Payer: COMMERCIAL

## 2024-06-05 NOTE — PROGRESS NOTES
Oral Chemotherapy Monitoring Program  Lab Follow Up    Reviewed lab results from 6/4.        4/23/2024    11:00 AM 4/23/2024    12:00 PM 5/2/2024     4:00 PM 5/7/2024     1:00 PM 5/22/2024    10:00 AM 5/23/2024     8:00 AM 6/5/2024     9:00 AM   ORAL CHEMOTHERAPY   Assessment Type Initial Work up New Teach Other Initial Follow up;Lab Monitoring Lab Monitoring Refill Lab Monitoring   Diagnosis Code Prostate Cancer Prostate Cancer Prostate Cancer Prostate Cancer Prostate Cancer Prostate Cancer Prostate Cancer   Providers Dr. Aydee Bajwa   [facility]   Is this patient followed by the [facility] OC team? No No No No No No No   Drug Name Zytiga (abiraterone) Zytiga (abiraterone) Zytiga (abiraterone) Zytiga (abiraterone) Zytiga (abiraterone) Zytiga (abiraterone) Zytiga (abiraterone)   Dose 1,000 mg 1,000 mg 1,000 mg 1,000 mg 1,000 mg 1,000 mg 1,000 mg   Current Schedule Daily Daily Daily Daily Daily Daily Daily   Cycle Details Continuous Continuous Continuous Continuous Continuous Continuous Continuous   Start Date of Last Cycle    4/30/2024      Doses missed in last 2 weeks    0      Adherence Assessment    Adherent      Adverse Effects   No AE identified during assessment No AE identified during assessment      Any new drug interactions?  Yes        Pharmacist Intervention?  Yes        Intervention(s)  Patient Education        Is the dose as ordered appropriate for the patient?  Yes            Labs:  _  Result Component Current Result Ref Range   Sodium 133 (L) (6/4/2024) 135 - 145 mmol/L     _  Result Component Current Result Ref Range   Potassium 3.4 (6/4/2024) 3.4 - 5.3 mmol/L     _  Result Component Current Result Ref Range   Calcium 9.4 (6/4/2024) 8.8 - 10.2 mg/dL     No results found for Mag within last 30 days.     No results found for Phos within last 30 days.     _  Result Component Current Result  Ref Range   Albumin 4.1 (6/4/2024) 3.5 - 5.2 g/dL     _  Result Component Current Result Ref Range   Urea Nitrogen 10.6 (6/4/2024) 8.0 - 23.0 mg/dL     _  Result Component Current Result Ref Range   Creatinine 0.73 (6/4/2024) 0.67 - 1.17 mg/dL     _  Result Component Current Result Ref Range   AST 25 (6/4/2024) 0 - 45 U/L     _  Result Component Current Result Ref Range   ALT 20 (6/4/2024) 0 - 70 U/L     _  Result Component Current Result Ref Range   Bilirubin Total 0.2 (6/4/2024) <=1.2 mg/dL     No results found for WBC within last 30 days.     No results found for HGB within last 30 days.     No results found for PLT within last 30 days.     No results found for ANC within last 30 days.     No results found for ANC within last 30 days.        Assessment & Plan:    No concerning abnormalities.    Patient was contacted by phone with  and instructed to continue abiraterone as prescribed.        Follow-Up:    Lab draw 6/18  Appointment with Dr. Bajwa 6/27    Iris Bruce  Pharmacy Intern  Oral Chemotherapy Monitoring Program  [facility]  [phone]

## 2024-06-13 ENCOUNTER — APPOINTMENT (OUTPATIENT)
Dept: INTERPRETER SERVICES | Facility: CLINIC | Age: 69
End: 2024-06-13
Payer: COMMERCIAL

## 2024-06-17 DIAGNOSIS — C61 ADENOCARCINOMA OF PROSTATE (H): Primary | ICD-10-CM

## 2024-06-18 ENCOUNTER — LAB (OUTPATIENT)
Dept: LAB | Facility: CLINIC | Age: 69
End: 2024-06-18
Attending: INTERNAL MEDICINE
Payer: COMMERCIAL

## 2024-06-18 DIAGNOSIS — C61 ADENOCARCINOMA OF PROSTATE (H): ICD-10-CM

## 2024-06-18 LAB
ALBUMIN SERPL BCG-MCNC: 3.8 G/DL (ref 3.5–5.2)
ALP SERPL-CCNC: 66 U/L (ref 40–150)
ALT SERPL W P-5'-P-CCNC: 24 U/L (ref 0–70)
ANION GAP SERPL CALCULATED.3IONS-SCNC: 10 MMOL/L (ref 7–15)
AST SERPL W P-5'-P-CCNC: 25 U/L (ref 0–45)
BILIRUB SERPL-MCNC: 0.3 MG/DL
BUN SERPL-MCNC: 11.5 MG/DL (ref 8–23)
CALCIUM SERPL-MCNC: 9.5 MG/DL (ref 8.8–10.2)
CHLORIDE SERPL-SCNC: 99 MMOL/L (ref 98–107)
CREAT SERPL-MCNC: 0.76 MG/DL (ref 0.67–1.17)
DEPRECATED HCO3 PLAS-SCNC: 26 MMOL/L (ref 22–29)
EGFRCR SERPLBLD CKD-EPI 2021: >90 ML/MIN/1.73M2
GLUCOSE SERPL-MCNC: 138 MG/DL (ref 70–99)
POTASSIUM SERPL-SCNC: 3.6 MMOL/L (ref 3.4–5.3)
PROT SERPL-MCNC: 6 G/DL (ref 6.4–8.3)
SODIUM SERPL-SCNC: 135 MMOL/L (ref 135–145)

## 2024-06-18 PROCEDURE — 36415 COLL VENOUS BLD VENIPUNCTURE: CPT

## 2024-06-18 PROCEDURE — 82374 ASSAY BLOOD CARBON DIOXIDE: CPT

## 2024-06-18 PROCEDURE — 84155 ASSAY OF PROTEIN SERUM: CPT

## 2024-06-19 ENCOUNTER — TELEPHONE (OUTPATIENT)
Dept: ONCOLOGY | Facility: CLINIC | Age: 69
End: 2024-06-19
Payer: COMMERCIAL

## 2024-06-19 NOTE — TELEPHONE ENCOUNTER
Oral Chemotherapy Monitoring Program  Lab Follow Up    Reviewed lab results from 6/18/24.    Patient was called with , left voice message to call back. No name or medication were mentioned.        4/23/2024    12:00 PM 5/2/2024     4:00 PM 5/7/2024     1:00 PM 5/22/2024    10:00 AM 5/23/2024     8:00 AM 6/5/2024     9:00 AM 6/19/2024     3:00 PM   ORAL CHEMOTHERAPY   Assessment Type New Teach Other Initial Follow up;Lab Monitoring Lab Monitoring Refill Lab Monitoring Monthly Follow up;Lab Monitoring   Diagnosis Code Prostate Cancer Prostate Cancer Prostate Cancer Prostate Cancer Prostate Cancer Prostate Cancer Prostate Cancer   Providers Dr. Aydee Bajwa   [facility]   Is this patient followed by the [facility] OC team? No No No No No No No   Drug Name Zytiga (abiraterone) Zytiga (abiraterone) Zytiga (abiraterone) Zytiga (abiraterone) Zytiga (abiraterone) Zytiga (abiraterone) Zytiga (abiraterone)   Dose 1,000 mg 1,000 mg 1,000 mg 1,000 mg 1,000 mg 1,000 mg 1,000 mg   Current Schedule Daily Daily Daily Daily Daily Daily Daily   Cycle Details Continuous Continuous Continuous Continuous Continuous Continuous Continuous   Start Date of Last Cycle   4/30/2024       Doses missed in last 2 weeks   0       Adherence Assessment   Adherent       Adverse Effects  No AE identified during assessment No AE identified during assessment       Any new drug interactions? Yes         Pharmacist Intervention? Yes         Intervention(s) Patient Education         Is the dose as ordered appropriate for the patient? Yes             Labs:  _  Result Component Current Result Ref Range   Sodium 135 (6/18/2024) 135 - 145 mmol/L     _  Result Component Current Result Ref Range   Potassium 3.6 (6/18/2024) 3.4 - 5.3 mmol/L     _  Result Component Current Result Ref Range   Calcium 9.5 (6/18/2024) 8.8 - 10.2 mg/dL      No results found for Mag within last 30 days.     No results found for Phos within last 30 days.     _  Result Component Current Result Ref Range   Albumin 3.8 (6/18/2024) 3.5 - 5.2 g/dL     _  Result Component Current Result Ref Range   Urea Nitrogen 11.5 (6/18/2024) 8.0 - 23.0 mg/dL     _  Result Component Current Result Ref Range   Creatinine 0.76 (6/18/2024) 0.67 - 1.17 mg/dL     _  Result Component Current Result Ref Range   AST 25 (6/18/2024) 0 - 45 U/L     _  Result Component Current Result Ref Range   ALT 24 (6/18/2024) 0 - 70 U/L     _  Result Component Current Result Ref Range   Bilirubin Total 0.3 (6/18/2024) <=1.2 mg/dL     No results found for WBC within last 30 days.     No results found for HGB within last 30 days.     No results found for PLT within last 30 days.     No results found for ANC within last 30 days.     No results found for ANC within last 30 days.        Assessment & Plan:  No new concerning abnormalities.    Left voice message.    Follow-Up:  Appointment with Dr. Bajwa 6/27    Iris Bruce  Pharmacy Intern  Oral Chemotherapy Monitoring Program  [facility]  [phone]

## 2024-06-21 DIAGNOSIS — C61 ADENOCARCINOMA OF PROSTATE (H): Primary | ICD-10-CM

## 2024-06-21 RX ORDER — ABIRATERONE ACETATE 250 MG/1
1000 TABLET ORAL DAILY
Qty: 120 TABLET | Refills: 0 | Status: SHIPPED | OUTPATIENT
Start: 2024-06-28 | End: 2024-07-28

## 2024-06-21 RX ORDER — PREDNISONE 5 MG/1
5 TABLET ORAL 2 TIMES DAILY
Qty: 60 TABLET | Refills: 0 | Status: SHIPPED | OUTPATIENT
Start: 2024-06-28

## 2024-07-03 ENCOUNTER — APPOINTMENT (OUTPATIENT)
Dept: INTERPRETER SERVICES | Facility: CLINIC | Age: 69
End: 2024-07-03
Payer: COMMERCIAL

## 2024-07-11 ENCOUNTER — VIRTUAL VISIT (OUTPATIENT)
Dept: INTERPRETER SERVICES | Facility: CLINIC | Age: 69
End: 2024-07-11
Payer: COMMERCIAL

## 2024-07-11 ENCOUNTER — TELEPHONE (OUTPATIENT)
Dept: ONCOLOGY | Facility: CLINIC | Age: 69
End: 2024-07-11
Payer: COMMERCIAL

## 2024-07-11 NOTE — ORAL ONC MGMT
"Oral Chemotherapy Monitoring Program    Subjective/Objective:  Gianluca Cooper is a 68 year old male contacted by phone for a follow-up visit for oral chemotherapy.      Called Gianluca with the assistance of a . The scheduling team has been trying to reach Gianluca to reschedule the lab appointment he missed last week but unable to reach him. He does answer the phone today. Attempted to assess abiraterone therapy - at first Gianluca says he is taking abiraterone 1000 mg daily, then when asked if he is having side effects he states he is \"super constipated\" and he stopped taking the medication, the interpretor asked for clarification multiple times and Gianluca states he stopped taking the abiraterone, when asked what day he stopped taking the abiraterone Gianluca then states that he is still taking it. The interpretor clarifies that this time he does state he is still taking it. Adherence is unclear so I moved on to address the constipation. I reviewed there are a few OTC options to try to help with constipation and reinforced it is very important to continue taking the abiraterone for the prostate cancer. Gianluca shared he does have an open container of metamucil or miralax at home (unclear which one), and I instructed him he could start taking this daily PRN to help with constipation. Gianluca states it has been two days since his last bowel movement, he is unsure if there have been longer stretches without a bowel movement, I asked him to call in if he continues to have issues with constipation despite addition of daily miralax. Gianluca does have a senna/docusate rx on med list from 2023 but unclear if he still has any of this medication at home. Review that drinking plenty of water can help with this and our goal would be as close to 8 glasses of water daily and he says he \"doesn't drink that much water, has never drank that much water in his life\", tried to reinforce that drinking more water can help with " "constipation as well.     Ended the call by reminding him of his 7/16 1130 am lab appointment and that it is very important to attend this appointment. He missed the lab appointment last week because he couldn't make it. He then states that \"they are calling me\" unclear who he meant or if he meant the scheduling team and if he meant they were calling right now and he needed to end the call. I ended the call by reinforcing the supportive care recommendations for constipation and that he needs to show up to the 7/16 lab appointment.        5/7/2024     1:00 PM 5/22/2024    10:00 AM 5/23/2024     8:00 AM 6/5/2024     9:00 AM 6/19/2024     3:00 PM 6/21/2024     1:00 PM 7/11/2024     9:00 AM   ORAL CHEMOTHERAPY   Assessment Type Initial Follow up;Lab Monitoring Lab Monitoring Refill Lab Monitoring Monthly Follow up;Lab Monitoring Refill Monthly Follow up   Diagnosis Code Prostate Cancer Prostate Cancer Prostate Cancer Prostate Cancer Prostate Cancer Prostate Cancer Prostate Cancer   Providers Dr. Aydee Bajwa   [facility]   Is this patient followed by the [facility] OC team? No No No No No No No   Drug Name Zytiga (abiraterone) Zytiga (abiraterone) Zytiga (abiraterone) Zytiga (abiraterone) Zytiga (abiraterone) Zytiga (abiraterone) Zytiga (abiraterone)   Dose 1,000 mg 1,000 mg 1,000 mg 1,000 mg 1,000 mg 1,000 mg 1,000 mg   Current Schedule Daily Daily Daily Daily Daily Daily Daily   Cycle Details Continuous Continuous Continuous Continuous Continuous Continuous Continuous   Start Date of Last Cycle 4/30/2024         Doses missed in last 2 weeks 0         Adherence Assessment Adherent      Non-adherent   Adverse Effects No AE identified during assessment             Last PHQ-2 Score on record:       7/13/2023     1:23 PM 6/25/2018    11:26 AM   PHQ-2 ( 1999 [employer])   Q1: Little interest or pleasure in doing " "things 3 3   Q2: Feeling down, depressed or hopeless 3 3   PHQ-2 Score 6 6       Vitals:  BP:   BP Readings from Last 1 Encounters:   04/22/24 (!) 158/89     Wt Readings from Last 1 Encounters:   04/22/24 82 kg (180 lb 11.2 oz)     Estimated body surface area is 1.95 meters squared as calculated from the following:    Height as of 4/22/24: 1.673 m (5' 5.85\").    Weight as of 4/22/24: 82 kg (180 lb 11.2 oz).    Labs:  _  Result Component Current Result Ref Range   Sodium 135 (6/18/2024) 135 - 145 mmol/L     _  Result Component Current Result Ref Range   Potassium 3.6 (6/18/2024) 3.4 - 5.3 mmol/L     _  Result Component Current Result Ref Range   Calcium 9.5 (6/18/2024) 8.8 - 10.2 mg/dL     No results found for Mag within last 30 days.     No results found for Phos within last 30 days.     _  Result Component Current Result Ref Range   Albumin 3.8 (6/18/2024) 3.5 - 5.2 g/dL     _  Result Component Current Result Ref Range   Urea Nitrogen 11.5 (6/18/2024) 8.0 - 23.0 mg/dL     _  Result Component Current Result Ref Range   Creatinine 0.76 (6/18/2024) 0.67 - 1.17 mg/dL     _  Result Component Current Result Ref Range   AST 25 (6/18/2024) 0 - 45 U/L     _  Result Component Current Result Ref Range   ALT 24 (6/18/2024) 0 - 70 U/L     _  Result Component Current Result Ref Range   Bilirubin Total 0.3 (6/18/2024) <=1.2 mg/dL     No results found for WBC within last 30 days.     No results found for HGB within last 30 days.     No results found for PLT within last 30 days.     No results found for ANC within last 30 days.     No results found for ANC within last 30 days.          Assessment/Plan:  Continue abiraterone, try to increase water intake and OTC recommendations for constipation.     IB to Dr Bajwa if orders for Testosterone and PSA should be placed for lab check on 7/16    Follow-Up:  Labs 7/16    Refill Due:  Moving refill date out - [facility] still has refill sent in June on file, last Rx sold on 6/21/24.    Cheyanne" Larry, PharmD, [facility]  Oral Chemotherapy Monitoring Program  [facility]  [phone]

## 2024-07-12 DIAGNOSIS — C61 PROSTATE CANCER, PRIMARY, WITH METASTASIS FROM PROSTATE TO OTHER SITE (H): Primary | ICD-10-CM

## 2024-07-12 DIAGNOSIS — C61 ADENOCARCINOMA OF PROSTATE (H): ICD-10-CM

## 2024-07-16 ENCOUNTER — LAB (OUTPATIENT)
Dept: LAB | Facility: CLINIC | Age: 69
End: 2024-07-16
Attending: INTERNAL MEDICINE
Payer: COMMERCIAL

## 2024-07-16 DIAGNOSIS — Z79.899 ENCOUNTER FOR LONG-TERM (CURRENT) USE OF MEDICATIONS: ICD-10-CM

## 2024-07-16 DIAGNOSIS — C61 ADENOCARCINOMA OF PROSTATE (H): ICD-10-CM

## 2024-07-16 DIAGNOSIS — C61 PROSTATE CANCER, PRIMARY, WITH METASTASIS FROM PROSTATE TO OTHER SITE (H): ICD-10-CM

## 2024-07-16 LAB
ALBUMIN SERPL BCG-MCNC: 3.9 G/DL (ref 3.5–5.2)
ALP SERPL-CCNC: 81 U/L (ref 40–150)
ALT SERPL W P-5'-P-CCNC: 24 U/L (ref 0–70)
ANION GAP SERPL CALCULATED.3IONS-SCNC: 9 MMOL/L (ref 7–15)
AST SERPL W P-5'-P-CCNC: 23 U/L (ref 0–45)
BILIRUB SERPL-MCNC: 0.4 MG/DL
BUN SERPL-MCNC: 14.2 MG/DL (ref 8–23)
CALCIUM SERPL-MCNC: 9.8 MG/DL (ref 8.8–10.4)
CHLORIDE SERPL-SCNC: 101 MMOL/L (ref 98–107)
CREAT SERPL-MCNC: 0.83 MG/DL (ref 0.67–1.17)
EGFRCR SERPLBLD CKD-EPI 2021: >90 ML/MIN/1.73M2
GLUCOSE SERPL-MCNC: 193 MG/DL (ref 70–99)
HCO3 SERPL-SCNC: 27 MMOL/L (ref 22–29)
POTASSIUM SERPL-SCNC: 3.7 MMOL/L (ref 3.4–5.3)
PROT SERPL-MCNC: 6.3 G/DL (ref 6.4–8.3)
PSA SERPL DL<=0.01 NG/ML-MCNC: 11.87 NG/ML (ref 0–4.5)
SODIUM SERPL-SCNC: 137 MMOL/L (ref 135–145)

## 2024-07-16 PROCEDURE — 84403 ASSAY OF TOTAL TESTOSTERONE: CPT

## 2024-07-16 PROCEDURE — 84295 ASSAY OF SERUM SODIUM: CPT

## 2024-07-16 PROCEDURE — 36415 COLL VENOUS BLD VENIPUNCTURE: CPT

## 2024-07-16 PROCEDURE — 84153 ASSAY OF PSA TOTAL: CPT

## 2024-07-16 PROCEDURE — 84155 ASSAY OF PROTEIN SERUM: CPT

## 2024-07-16 NOTE — NURSING NOTE
Chief Complaint   Patient presents with    Labs Only     Labs drawn via venipuncture by RN in lab     Labs collected from venipuncture by RN.    Shannon Poole RN

## 2024-07-17 ENCOUNTER — APPOINTMENT (OUTPATIENT)
Dept: INTERPRETER SERVICES | Facility: CLINIC | Age: 69
End: 2024-07-17
Payer: COMMERCIAL

## 2024-07-17 ENCOUNTER — TELEPHONE (OUTPATIENT)
Dept: ONCOLOGY | Facility: CLINIC | Age: 69
End: 2024-07-17
Payer: COMMERCIAL

## 2024-07-17 NOTE — TELEPHONE ENCOUNTER
Oral Chemotherapy Monitoring Program  Lab Follow Up    Reviewed lab results from 7/16 with assistance of , Donna.        5/22/2024    10:00 AM 5/23/2024     8:00 AM 6/5/2024     9:00 AM 6/19/2024     3:00 PM 6/21/2024     1:00 PM 7/11/2024     9:00 AM 7/17/2024     1:00 PM   ORAL CHEMOTHERAPY   Assessment Type Lab Monitoring Refill Lab Monitoring Monthly Follow up;Lab Monitoring Refill Monthly Follow up Lab Monitoring   Diagnosis Code Prostate Cancer Prostate Cancer Prostate Cancer Prostate Cancer Prostate Cancer Prostate Cancer Prostate Cancer   Providers Dr. Aydee Bajwa   [facility]   Is this patient followed by the [facility] OC team? No No No No No No No   Drug Name Zytiga (abiraterone) Zytiga (abiraterone) Zytiga (abiraterone) Zytiga (abiraterone) Zytiga (abiraterone) Zytiga (abiraterone) Zytiga (abiraterone)   Dose 1,000 mg 1,000 mg 1,000 mg 1,000 mg 1,000 mg 1,000 mg 1,000 mg   Current Schedule Daily Daily Daily Daily Daily Daily Daily   Cycle Details Continuous Continuous Continuous Continuous Continuous Continuous Continuous   Adherence Assessment      Non-adherent    Reason for Non-adherence      Wanted to avoid side-effects    Adherence Intervention Recommended      Medication education    Adverse Effects      Constipation    Constipation      Grade 1    Pharmacist Intervention(constipation)      Yes    Intervention(s)      Patient education;OTC recommendation        Labs:  _  Result Component Current Result Ref Range   Sodium 137 (7/16/2024) 135 - 145 mmol/L     _  Result Component Current Result Ref Range   Potassium 3.7 (7/16/2024) 3.4 - 5.3 mmol/L     _  Result Component Current Result Ref Range   Calcium 9.8 (7/16/2024) 8.8 - 10.4 mg/dL     No results found for Mag within last 30 days.     No results found for Phos within last 30 days.     _  Result Component Current  Result Ref Range   Albumin 3.9 (7/16/2024) 3.5 - 5.2 g/dL     _  Result Component Current Result Ref Range   Urea Nitrogen 14.2 (7/16/2024) 8.0 - 23.0 mg/dL     _  Result Component Current Result Ref Range   Creatinine 0.83 (7/16/2024) 0.67 - 1.17 mg/dL     _  Result Component Current Result Ref Range   AST 23 (7/16/2024) 0 - 45 U/L     _  Result Component Current Result Ref Range   ALT 24 (7/16/2024) 0 - 70 U/L     _  Result Component Current Result Ref Range   Bilirubin Total 0.4 (7/16/2024) <=1.2 mg/dL     No results found for WBC within last 30 days.     No results found for HGB within last 30 days.     No results found for PLT within last 30 days.     No results found for ANC within last 30 days.     No results found for ANC within last 30 days.        Assessment & Plan:  No concerning abnormalities.    Questions answered to patient's satisfaction.    Follow-Up:  Was due for follow up with Dr. Bajwa on 6/27, but this appointment was cancelled. Sent message to schedulers to get this rescheduled.  8/13 labs    Carole Hensley, PharmD, BCOP  Oral Chemotherapy Monitoring Program  [facility]  [phone]

## 2024-07-18 ENCOUNTER — ONCOLOGY VISIT (OUTPATIENT)
Dept: ONCOLOGY | Facility: CLINIC | Age: 69
End: 2024-07-18
Attending: INTERNAL MEDICINE
Payer: COMMERCIAL

## 2024-07-18 VITALS
DIASTOLIC BLOOD PRESSURE: 101 MMHG | HEIGHT: 65 IN | SYSTOLIC BLOOD PRESSURE: 161 MMHG | TEMPERATURE: 98.7 F | RESPIRATION RATE: 18 BRPM | WEIGHT: 165.4 LBS | BODY MASS INDEX: 27.56 KG/M2 | OXYGEN SATURATION: 99 % | HEART RATE: 74 BPM

## 2024-07-18 DIAGNOSIS — C61 ADENOCARCINOMA OF PROSTATE (H): Primary | ICD-10-CM

## 2024-07-18 LAB — TESTOST SERPL-MCNC: <2 NG/DL (ref 240–950)

## 2024-07-18 PROCEDURE — 99214 OFFICE O/P EST MOD 30 MIN: CPT | Performed by: INTERNAL MEDICINE

## 2024-07-18 PROCEDURE — G0463 HOSPITAL OUTPT CLINIC VISIT: HCPCS | Performed by: INTERNAL MEDICINE

## 2024-07-18 PROCEDURE — 250N000011 HC RX IP 250 OP 636: Performed by: INTERNAL MEDICINE

## 2024-07-18 PROCEDURE — 96402 CHEMO HORMON ANTINEOPL SQ/IM: CPT

## 2024-07-18 RX ADMIN — LEUPROLIDE ACETATE 22.5 MG: 22.5 INJECTION, SUSPENSION, EXTENDED RELEASE SUBCUTANEOUS at 09:55

## 2024-07-18 ASSESSMENT — PAIN SCALES - GENERAL: PAINLEVEL: MILD PAIN (2)

## 2024-07-18 NOTE — LETTER
7/18/2024      Gianluca Cooper  [address]  [address]  [facility] [address]      Dear Colleague,    Thank you for referring your patient, Gianluca Cooper, to the [facility]. Please see a copy of my visit note below.    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  We know without a doubt that the inguinal nodes contain metastatic prostate cancer because this is how he was diagnosed.    Six months ago, his PSA had doubled to 377, which was concerning for the development of castrate resistant disease.  However his testosterone returned normal as well, indicating failure of his 45 Lupron shot to last the 6 months.    S/P  repeat lupron with PSA dropping from 377 to 131, but two months later, his testosterone remained (more than) measurable at 171. This was again c/w his 45 mg Lupron injection was ineffective.  S/P addition of abiraterone/prednisone with the expectation he becomes completely castrate with a corresponding drop in his PSA.  Happily he has had a deep and major response, with his current PSA being 11.87. Testosterone is pending, but we will give him another lupron today, but a 22.5 mg dose because the 45 mg was ineffective.  Gianluca nunez tells me that he is tolerating the drug well without any side effects.  Minimal blood from penis due to his prostate cancer.      PLAN     Continue abiraterone/prednisone  Next Lupron today of 22.5 mg  Return to clinic with me in 3 months with labs just prior, for his next ADT.    30 minutes spent on this appointment today including chart review, direct face-to-face time, care coordination, and documentation.    Brigido Bajwa MD, MSc   of  Medicine  [facility] [employer]  [facility]  [address] [place] [id number]  [phone]    Diagnosis:   De aries metastatic castration-sensitive prostate adenocarcinoma, diagnosed at right inguinal LN biopsy, 6/9/2023. Gianluca presented with inguinal adenopathy that led to the biopsy. PSA at the time of diagnosis was 145.6. PSMA PET showed warren involvement of the chest, abdomen and pelvis, wothout visceral or bone metastases. We did run a Caris on his tumor but there were no actionable mutations.  Primary resistance to leuprolide (see #1 below).  Current Treatment Leuprolide and abiraterone  Treatment To Date:   ADT since 8/3/23 . PSA concha to 377 on 2/2/2024.However, his testosterone was 378! He received Lupron 45 mg at that point (2/2/2024). PSA dropped to 131 two months later (though testosterone was 171, down from 378). This demonstrated to me the possibility that Lupron does not result in sufficient androgen deprivation.  Abiraterone added 4/23/2024, after it became apparent ADT monotherapy did not result adequate suppression of testosterone (T of 171). PSA dropped from 131 to 11.78 (7/16/2024)      Hem/onc History:   Edit Oncology History  Oncology History Overview Note   Gianluca initially presented to his PCP with right inguinal swelling similar to previously when he had a right inguinal hernia that was repaired in 2006.  He was referred to a surgeon and during exploration of the right groin in the operating room he was noted to have multiple enlarged lymph nodes without a hernia for which he underwent a sentinel lymph node biopsy with pathology consistent with metastatic prostate adenocarcinoma.  A PSMA PET scan showed evidence of metastatic disease in nonregional lymph nodes of the chest, abdomen, and pelvis without bony or visceral metastases.      Adenocarcinoma of prostate (H)   6/29/2023 Biopsy    Right inguinal excisional lymph node biopsy  Final Diagnosis   Lymph  nodes, right inguinal, excision:  - Metastatic prostatic adenocarcinoma involving 2 of 5 lymph nodes  - Deposit size: 6.5 cm  - Extranodal extension is present        7/18/2023 Tumor Markers    PSA Tumor Marker 0.00 - 4.50 ng/mL 145.60 High          7/25/2023 Imaging    PSMA PET-CT  IMPRESSION: In this patient with biopsy-proven metastatic prostate  cancer:  1. Marked uptake (SUV mean 14)  in the prostate gland with  extraprostatic extension, compatible with prostatic adenocarcinoma.  2. Moderate to marked uptake in the multiple enlarged lymph nodes (SUV  mean 7-15) in the chest, abdomen and pelvis, consistent with extensive  warren metastases.  3. No bone metastases.  4. Incidentally noted hypoattenuating lesion in the uncinate process  1.4 cm, indeterminant, may represent an IPMN.  Attention on follow-up.     8/3/2023 - 2/2/2024 Chemotherapy    OP ONC Prostate Cancer - Leuprolide (Eligard) EVERY 6 MONTHS  Plan Provider: Ramiro Headley MD  Treatment goal: Palliative  Line of treatment: First Line     4/29/2024 -  Chemotherapy    Oral ONC Prostate Cancer - Abiraterone  Plan Provider: Brigido Bajwa MD  Treatment goal: Palliative  Line of treatment: First Line     8/29/2024 -  Supportive Treatment    OP ONC Prostate Cancer - Leuprolide (Eligard) EVERY 3 MONTHS  Plan Provider: Brigido Bajwa MD  Treatment goal: Palliative  Line of treatment: First Line         Interval History:   Gianluca is back.  His inguinal nodes have shrunk down nicely.  He no longer has the ability to get erections. He was not aware this was a side effect of his ADT.  He tried to masturbate yesterday, and some blood came out of his penis. It was not a large amount.  Feels his inguinal lymph nodes are increased in size since the last visit.    He gets some pain in his testicles when he walks too far.  Other than that he is actually feeling pretty well.   He does not note any hot flashes.  He has no other concerns today.    Subjective  Past  Medical History:   Diagnosis Date     Depression      Gastro-oesophageal reflux disease      Headache(784.0)      Hypertension      LOC (loss of consciousness) (H) age 45    out for 20 mintes after hit on top of head with board     Major depression 5/7/2013     Otitis media, chronic      Sleep apnea     cant tolerate cpap     HEIKE  GERD  COPD  Mood d/o -- MDD with psychotic features, PTSD  Cocaine and marijuana use disorders  Hx of accidental opiate overdose x2 c/b respiratory arrest 1/2023 -- reports his cocaine was laced with fentanyl  Hx HBV and HCV, cleared as of 6/25/18  Hx IVDU last ~2002  Tobacco use  Chart Review:  1/11/2023 admitted for respiratory arrest with in the setting of opioid overdose with bystander administration of naloxone.  PMH notable for HTN, HLD, GERD, MDD with psychotic features, LTBI, chronic HCV inguinal hernia repair 2006, HEIKE/OHS.     Past Surgical History:   Procedure Laterality Date     COLONOSCOPY N/A 3/8/2018    Procedure: COLONOSCOPY;  colonoscopy;  Surgeon: Iona Lutz MD;  Location:  GI     HERNIA REPAIR  2006    boith sides     HERNIORRHAPHY INGUINAL Right 6/29/2023    Procedure: Right INGUINAL, lymph node excisional biopsy;  Surgeon: Frank Carlson MD;  [place] [facility]     LASER CO2 TYMPANOMASTOIDECTOMY  9/7/2011    Procedure:LASER CO2 TYMPANOMASTOIDECTOMY; Right Tympanoplasty , Cartilage Backed Right Tympanomastoidectomy, Omni Guide Laser on Standby  *Latex Safe*; Surgeon:BENNETT MAXWELL; Location: OR     SEPTOPLASTY  9/17/2012    Procedure: SEPTOPLASTY;  Septoplasty *Latex Safe* Turbinate reduction ;  Surgeon: Babar Dickerson MD;  Location:  OR     UVULOPALATOPHARYNGOPLASTY  7/9/2012    Procedure: UVULOPALATOPHARYNGOPLASTY;  Uvulopalatopharyngoplasty * Latex Safe*;  Surgeon: Babar Dickerson MD;  Location:  OR     Social History     Socioeconomic History     Marital status: Single   Tobacco Use     Smoking status: Every Day     Current  packs/day: 0.50     Types: Cigarettes     Smokeless tobacco: Never   Substance and Sexual Activity     Alcohol use: No     Alcohol/week: 0.0 standard drinks of alcohol     Drug use: No     Comment: hx polysubstance now in remission     Social Determinants of Health     Financial Resource Strain: Not at Risk (3/29/2021)    [facility] from [facility]    Financial Resource Strain      Financial Resource Strain: 1   Food Insecurity: Not on File (2019)    Received from [facility]    Food Insecurity      Food: 0   Transportation Needs: Not at Risk (3/29/2021)    Received from [facility]    Transportation Needs      Transportation: 1   Physical Activity: Not on File (2019)    Received from [facility]    Physical Activity      Physical Activity: 0   Stress: At Risk (3/29/2021)    Received from [facility]    Stress      Stress: 2   Social Connections: Not at Risk (3/29/2021)    Received from [facility]    Social Connections      Social Connections and Isolation: 1   Housing Stability: Not at Risk (3/29/2021)    Received from [facility]    Housing Stability      Housin      SH obtained at initial visit 23:  He currently lives independently in [place].  He previously worked as a .  He is not currently working and is on Social Security. He is from [place]. He has 3 children who live in [place].  He has not seen them for over 10 years because they will not see him.  He has 1 friend with whom he is close.  Otherwise he is pretty much on his own.  He reports he does not have any friends or family locally.  He spends most of his day at home and tells me he does not have any activities that bring him tay.  He does most of his ADLs himself such as cooking, cleaning, self-care but feels he has been slacking on this.  He identifies a point in time when his psychiatrist took him off of his antianxiety medication as a change in his mental wellbeing and he feels very depressed now.  He  has been smoking tobacco products since the age of 10 and smokes about half a pack a day, approximately 30 pack years in total.  He uses marijuana daily and will smoke a joint in the morning and one at night.  He does not drink alcohol regularly.  He occasionally uses cocaine, most recently 4 months ago, and has not used recently due to not having the funds for it.  He had an episode in January when he used cocaine that was laced with fentanyl resulting in an accidental overdose requiring hospitalization.  He has a remote history of IV drug use including heroin in the early 2000's, he does not use IV drugs now.    Family History   Problem Relation Age of Onset     Depression Mother      Depression Father      Substance Abuse Father      Mental Illness Other         institutionalized     Substance Abuse Brother      Substance Abuse Brother      Substance Abuse Brother      Substance Abuse Brother      Substance Abuse Brother      Liver Disease No family hx of          Current Outpatient Medications   Medication Sig Dispense Refill     abiraterone (ZYTIGA) 250 MG tablet Take 4 tablets (1,000 mg) by mouth daily for 30 doses Take on empty stomach. 120 tablet 0     ASPIRIN PO Take 81 mg by mouth daily       atorvastatin (LIPITOR) 40 MG tablet Take 1 tablet (40 mg) by mouth daily       buPROPion (WELLBUTRIN XL) 150 MG 24 hr tablet Take 1 tablet (150 mg) by mouth every morning 30 tablet 1     doxepin (SINEQUAN) 10 MG capsule Take 1 capsule (10 mg) by mouth At Bedtime 30 capsule 2     HYDROcodone-acetaminophen (NORCO) 5-325 MG tablet Take 1-2 tablets by mouth every 4 hours as needed for moderate to severe pain 15 tablet 0     lisinopril-hydrochlorothiazide (PRINZIDE,ZESTORETIC) 20-25 MG per tablet Take 1 tablet by mouth daily       metFORMIN (GLUCOPHAGE) 500 MG tablet Take one tablet daily in the am and two tablets at bedtime.  R Skolar NP at [facility]       OLANZapine (ZYPREXA) 15 MG tablet Take one-half (7.5 mg) to one tablet  "(15 mg) by mouth at bedtime 30 tablet 1     prazosin (MINIPRESS) 2 MG capsule Take 2 capsules (4 mg) by mouth At Bedtime 60 capsule 1     predniSONE (DELTASONE) 5 MG tablet Take 1 tablet (5 mg) by mouth 2 times daily 60 tablet 0     ranitidine (ZANTAC) 150 MG tablet Take 1 tablet (150 mg) by mouth 2 times daily       senna-docusate (SENOKOT-S/PERICOLACE) 8.6-50 MG tablet Take 1-2 tablets by mouth 2 times daily 15 tablet 0     sertraline (ZOLOFT) 100 MG tablet Take 2 tablets (200 mg) by mouth daily 60 tablet 1     venlafaxine (EFFEXOR-XR) 37.5 MG 24 hr capsule Take 2 capsules (75 mg) by mouth daily 60 capsule 1     VITAMIN D3 1000 units tablet TK 2 TS PO D  1     No current facility-administered medications for this visit.       Objective  Physical Exam:   Blood pressure (!) 161/101, pulse 74, temperature 98.7  F (37.1  C), temperature source Oral, resp. rate 18, height 1.651 m (5' 5\"), weight 75 kg (165 lb 6.4 oz), SpO2 99%.  Physical Exam  Constitutional:       General: He is not in acute distress.     Appearance: He is not ill-appearing or toxic-appearing.   HENT:      Head: Normocephalic and atraumatic.      Mouth/Throat:      Mouth: Mucous membranes are moist.   Eyes:      General: No scleral icterus.     Extraocular Movements: Extraocular movements intact.   Cardiovascular:      Rate and Rhythm: Normal rate and regular rhythm.   Pulmonary:      Effort: Pulmonary effort is normal. No respiratory distress.   Abdominal:      General: There is no distension.      Palpations: Abdomen is soft.      Tenderness: There is no abdominal tenderness. There is no guarding.   Skin:     General: Skin is warm and dry.      Coloration: Skin is not jaundiced.   Neurological:      Mental Status: He is alert. Mental status is at baseline.     Lymph node survey reveals resolution of previously palpated bilateral 3-5 cm very firm lymph nodes the medial aspect of the inguinal bands, medial >lateral, particularly on the " right,      Data:     PSA trend: (current 11.87)          CBC and CMP are normal!       Latest Reference Range & Units 07/18/23 12:26 02/02/24 11:49 04/19/24 09:40   PSA Tumor Marker 0.00 - 4.50 ng/mL 145.60 (H) 377.10 (H) 131.80 (H)   (H): Data is abnormally high    PSMA Scan 7/25/2023        IMPRESSION: In this patient with biopsy-proven metastatic prostate  cancer:  1. Marked uptake (SUV mean 14)  in the prostate gland with  extraprostatic extension, compatible with prostatic adenocarcinoma.  2. Moderate to marked uptake in the multiple enlarged lymph nodes (SUV  mean 7-15) in the chest, abdomen and pelvis, consistent with extensive  warren metastases.  3. No bone metastases.          I personally reviewed the following studies:  Recent Labs   Lab Test 04/19/24  0940 02/02/24  1149 06/25/18  1114 11/13/17  1113 03/09/17  0000   WBC 5.8 5.2 5.6 6.3 6.5   % NEUTROPHILS 65 66  --  63.7  --    HEMOGLOBIN 13.6 12.6* 13.7 14.6 14.6   PLATELET COUNT 272 384 222 248 278     Recent Labs   Lab Test 07/16/24  1148 06/18/24  1058 06/04/24  1107 05/21/24  1225 05/07/24  1151   SODIUM 137 135 133* 132* 135   POTASSIUM (R) 3.7 3.6 3.4 3.6 4.2   CHLORIDE (R) 101 99 96* 95* 100   CARBON DIOXIDE (R) 27 26 25 27 24   ANION GAP 9 10 12 10 11   UREA NITROGEN (R) 14.2 11.5 10.6 12.8 13.6   CREATININE 0.83 0.76 0.73 0.72 0.74   CALCIUM, TOTAL 9.8 9.5 9.4 10.0 9.5     Recent Labs   Lab Test 12/21/21  1213   MAGNESIUM 1.6     Recent Labs   Lab Test 07/16/24  1148 06/18/24  1058 06/04/24  1107   BILIRUBIN TOTAL 0.4 0.3 0.2   ALKPHOS 81 66 71   ALT 24 24 20   AST 23 25 25   ALBUMIN (R) 3.9 3.8 4.1       No results found for this or any previous visit (from the past 24 hour(s)).         Again, thank you for allowing me to participate in the care of your patient.        Sincerely,        Brigido Bajwa MD

## 2024-07-18 NOTE — PROGRESS NOTES
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  We know without a doubt that the inguinal nodes contain metastatic prostate cancer because this is how he was diagnosed.    Six months ago, his PSA had doubled to 377, which was concerning for the development of castrate resistant disease.  However his testosterone returned normal as well, indicating failure of his 45 Lupron shot to last the 6 months.    S/P  repeat lupron with PSA dropping from 377 to 131, but two months later, his testosterone remained (more than) measurable at 171. This was again c/w his 45 mg Lupron injection was ineffective.  S/P addition of abiraterone/prednisone with the expectation he becomes completely castrate with a corresponding drop in his PSA.  Happily he has had a deep and major response, with his current PSA being 11.87. Testosterone is pending, but we will give him another lupron today, but a 22.5 mg dose because the 45 mg was ineffective.  Gianluca he tells me that he is tolerating the drug well without any side effects.  Minimal blood from penis due to his prostate cancer.      PLAN     Continue abiraterone/prednisone  Next Lupron today of 22.5 mg  Return to clinic with me in 3 months with labs just prior, for his next ADT.    30 minutes spent on this appointment today including chart review, direct face-to-face time, care coordination, and documentation.    Brigido Bajwa MD, MSc  Associate Professor of Medicine  [employer]  [address] [address]  [phone]    Diagnosis:   De aries metastatic castration-sensitive prostate adenocarcinoma, diagnosed at right inguinal LN biopsy, 6/9/2023. Gianluca presented with  inguinal adenopathy that led to the biopsy. PSA at the time of diagnosis was 145.6. PSMA PET showed warren involvement of the chest, abdomen and pelvis, wothout visceral or bone metastases. We did run a Caris on his tumor but there were no actionable mutations.  Primary resistance to leuprolide (see #1 below).  Current Treatment Leuprolide and abiraterone  Treatment To Date:   ADT since 8/3/23 . PSA concha to 377 on 2/2/2024.However, his testosterone was 378! He received Lupron 45 mg at that point (2/2/2024). PSA dropped to 131 two months later (though testosterone was 171, down from 378). This demonstrated to me the possibility that Lupron does not result in sufficient androgen deprivation.  Abiraterone added 4/23/2024, after it became apparent ADT monotherapy did not result adequate suppression of testosterone (T of 171). PSA dropped from 131 to 11.78 (7/16/2024)      Hem/onc History:   Edit Oncology History  Oncology History Overview Note   Gianluca initially presented to his PCP with right inguinal swelling similar to previously when he had a right inguinal hernia that was repaired in 2006.  He was referred to a surgeon and during exploration of the right groin in the operating room he was noted to have multiple enlarged lymph nodes without a hernia for which he underwent a sentinel lymph node biopsy with pathology consistent with metastatic prostate adenocarcinoma.  A PSMA PET scan showed evidence of metastatic disease in nonregional lymph nodes of the chest, abdomen, and pelvis without bony or visceral metastases.      Adenocarcinoma of prostate (H)   6/29/2023 Biopsy    Right inguinal excisional lymph node biopsy  Final Diagnosis   Lymph nodes, right inguinal, excision:  - Metastatic prostatic adenocarcinoma involving 2 of 5 lymph nodes  - Deposit size: 6.5 cm  - Extranodal extension is present        7/18/2023 Tumor Markers    PSA Tumor Marker 0.00 - 4.50 ng/mL 145.60 High          7/25/2023 Imaging    PSMA  PET-CT  IMPRESSION: In this patient with biopsy-proven metastatic prostate  cancer:  1. Marked uptake (SUV mean 14)  in the prostate gland with  extraprostatic extension, compatible with prostatic adenocarcinoma.  2. Moderate to marked uptake in the multiple enlarged lymph nodes (SUV  mean 7-15) in the chest, abdomen and pelvis, consistent with extensive  warren metastases.  3. No bone metastases.  4. Incidentally noted hypoattenuating lesion in the uncinate process  1.4 cm, indeterminant, may represent an IPMN.  Attention on follow-up.     8/3/2023 - 2/2/2024 Chemotherapy    OP ONC Prostate Cancer - Leuprolide (Eligard) EVERY 6 MONTHS  Plan Provider: Ramiro Headley MD  Treatment goal: Palliative  Line of treatment: First Line     4/29/2024 -  Chemotherapy    Oral ONC Prostate Cancer - Abiraterone  Plan Provider: Brigido Bajwa MD  Treatment goal: Palliative  Line of treatment: First Line     8/29/2024 -  Supportive Treatment    OP ONC Prostate Cancer - Leuprolide (Eligard) EVERY 3 MONTHS  Plan Provider: Brigido Bajwa MD  Treatment goal: Palliative  Line of treatment: First Line         Interval History:   Gianluca is back.  His inguinal nodes have shrunk down nicely.  He no longer has the ability to get erections. He was not aware this was a side effect of his ADT.  He tried to masturbate yesterday, and some blood came out of his penis. It was not a large amount.  Feels his inguinal lymph nodes are increased in size since the last visit.    He gets some pain in his testicles when he walks too far.  Other than that he is actually feeling pretty well.   He does not note any hot flashes.  He has no other concerns today.    Subjective   Past Medical History:   Diagnosis Date    Depression     Gastro-oesophageal reflux disease     Headache(784.0)     Hypertension     LOC (loss of consciousness) (H) age 45    out for 20 mintes after hit on top of head with board    Major depression 5/7/2013    Otitis media,  chronic     Sleep apnea     cant tolerate cpap     HEIKE  GERD  COPD  Mood d/o -- MDD with psychotic features, PTSD  Cocaine and marijuana use disorders  Hx of accidental opiate overdose x2 c/b respiratory arrest 1/2023 -- reports his cocaine was laced with fentanyl  Hx HBV and HCV, cleared as of 6/25/18  Hx IVDU last ~2002  Tobacco use  Chart Review:  1/11/2023 admitted for respiratory arrest with in the setting of opioid overdose with bystander administration of naloxone.  PMH notable for HTN, HLD, GERD, MDD with psychotic features, LTBI, chronic HCV inguinal hernia repair 2006, HEIKE/OHS.     Past Surgical History:   Procedure Laterality Date    COLONOSCOPY N/A 3/8/2018    Procedure: COLONOSCOPY;  colonoscopy;  Surgeon: Iona Lutz MD;  Location:  GI    HERNIA REPAIR  2006    boith sides    HERNIORRHAPHY INGUINAL Right 6/29/2023    Procedure: Right INGUINAL, lymph node excisional biopsy;  Surgeon: Frank Carlson MD;  [facility]    LASER CO2 TYMPANOMASTOIDECTOMY  9/7/2011    Procedure:LASER CO2 TYMPANOMASTOIDECTOMY; Right Tympanoplasty , Cartilage Backed Right Tympanomastoidectomy, Omni Guide Laser on Standby  *Latex Safe*; Surgeon:BENNETT MAXWELL; Location: OR    SEPTOPLASTY  9/17/2012    Procedure: SEPTOPLASTY;  Septoplasty *Latex Safe* Turbinate reduction ;  Surgeon: Babar Dickerson MD;  Location:  OR    UVULOPALATOPHARYNGOPLASTY  7/9/2012    Procedure: UVULOPALATOPHARYNGOPLASTY;  Uvulopalatopharyngoplasty * Latex Safe*;  Surgeon: Babar Dickerson MD;  Location:  OR     Social History     Socioeconomic History    Marital status: Single   Tobacco Use    Smoking status: Every Day     Current packs/day: 0.50     Types: Cigarettes    Smokeless tobacco: Never   Substance and Sexual Activity    Alcohol use: No     Alcohol/week: 0.0 standard drinks of alcohol    Drug use: No     Comment: hx polysubstance now in remission     Social Determinants of Health     Financial Resource  Strain: Not at Risk (3/29/2021)    [facility] from [facility]    Financial Resource Strain     Financial Resource Strain: 1   Food Insecurity: Not on File (2019)    Received from [facility]    Food Insecurity     Food: 0   Transportation Needs: Not at Risk (3/29/2021)    Received from [facility]    Transportation Needs     Transportation: 1   Physical Activity: Not on File (2019)    Received from [facility]    Physical Activity     Physical Activity: 0   Stress: At Risk (3/29/2021)    Received from [facility]    Stress     Stress: 2   Social Connections: Not at Risk (3/29/2021)    Received from [facility]    Social Connections     Social Connections and Isolation: 1   Housing Stability: Not at Risk (3/29/2021)    Received from [facility]    Housing Stability     Housin      SH obtained at initial visit 23:  He currently lives independently in [place].  He previously worked as a .  He is not currently working and is on Social Security. He is from [place]. He has 3 children who live in [place].  He has not seen them for over 10 years because they will not see him.  He has 1 friend with whom he is close.  Otherwise he is pretty much on his own.  He reports he does not have any friends or family locally.  He spends most of his day at home and tells me he does not have any activities that bring him tay.  He does most of his ADLs himself such as cooking, cleaning, self-care but feels he has been slacking on this.  He identifies a point in time when his psychiatrist took him off of his antianxiety medication as a change in his mental wellbeing and he feels very depressed now.  He has been smoking tobacco products since the age of 10 and smokes about half a pack a day, approximately 30 pack years in total.  He uses marijuana daily and will smoke a joint in the morning and one at night.  He does not drink alcohol regularly.  He occasionally uses cocaine, most recently 4  months ago, and has not used recently due to not having the funds for it.  He had an episode in January when he used cocaine that was laced with fentanyl resulting in an accidental overdose requiring hospitalization.  He has a remote history of IV drug use including heroin in the early 2000's, he does not use IV drugs now.    Family History   Problem Relation Age of Onset    Depression Mother     Depression Father     Substance Abuse Father     Mental Illness Other         institutionalized    Substance Abuse Brother     Substance Abuse Brother     Substance Abuse Brother     Substance Abuse Brother     Substance Abuse Brother     Liver Disease No family hx of          Current Outpatient Medications   Medication Sig Dispense Refill    abiraterone (ZYTIGA) 250 MG tablet Take 4 tablets (1,000 mg) by mouth daily for 30 doses Take on empty stomach. 120 tablet 0    ASPIRIN PO Take 81 mg by mouth daily      atorvastatin (LIPITOR) 40 MG tablet Take 1 tablet (40 mg) by mouth daily      buPROPion (WELLBUTRIN XL) 150 MG 24 hr tablet Take 1 tablet (150 mg) by mouth every morning 30 tablet 1    doxepin (SINEQUAN) 10 MG capsule Take 1 capsule (10 mg) by mouth At Bedtime 30 capsule 2    HYDROcodone-acetaminophen (NORCO) 5-325 MG tablet Take 1-2 tablets by mouth every 4 hours as needed for moderate to severe pain 15 tablet 0    lisinopril-hydrochlorothiazide (PRINZIDE,ZESTORETIC) 20-25 MG per tablet Take 1 tablet by mouth daily      metFORMIN (GLUCOPHAGE) 500 MG tablet Take one tablet daily in the am and two tablets at bedtime.  R Skolar NP at [facility]      OLANZapine (ZYPREXA) 15 MG tablet Take one-half (7.5 mg) to one tablet (15 mg) by mouth at bedtime 30 tablet 1    prazosin (MINIPRESS) 2 MG capsule Take 2 capsules (4 mg) by mouth At Bedtime 60 capsule 1    predniSONE (DELTASONE) 5 MG tablet Take 1 tablet (5 mg) by mouth 2 times daily 60 tablet 0    ranitidine (ZANTAC) 150 MG tablet Take 1 tablet (150 mg) by mouth 2 times daily    "   senna-docusate (SENOKOT-S/PERICOLACE) 8.6-50 MG tablet Take 1-2 tablets by mouth 2 times daily 15 tablet 0    sertraline (ZOLOFT) 100 MG tablet Take 2 tablets (200 mg) by mouth daily 60 tablet 1    venlafaxine (EFFEXOR-XR) 37.5 MG 24 hr capsule Take 2 capsules (75 mg) by mouth daily 60 capsule 1    VITAMIN D3 1000 units tablet TK 2 TS PO D  1     No current facility-administered medications for this visit.       Objective   Physical Exam:   Blood pressure (!) 161/101, pulse 74, temperature 98.7  F (37.1  C), temperature source Oral, resp. rate 18, height 1.651 m (5' 5\"), weight 75 kg (165 lb 6.4 oz), SpO2 99%.  Physical Exam  Constitutional:       General: He is not in acute distress.     Appearance: He is not ill-appearing or toxic-appearing.   HENT:      Head: Normocephalic and atraumatic.      Mouth/Throat:      Mouth: Mucous membranes are moist.   Eyes:      General: No scleral icterus.     Extraocular Movements: Extraocular movements intact.   Cardiovascular:      Rate and Rhythm: Normal rate and regular rhythm.   Pulmonary:      Effort: Pulmonary effort is normal. No respiratory distress.   Abdominal:      General: There is no distension.      Palpations: Abdomen is soft.      Tenderness: There is no abdominal tenderness. There is no guarding.   Skin:     General: Skin is warm and dry.      Coloration: Skin is not jaundiced.   Neurological:      Mental Status: He is alert. Mental status is at baseline.     Lymph node survey reveals resolution of previously palpated bilateral 3-5 cm very firm lymph nodes the medial aspect of the inguinal bands, medial >lateral, particularly on the right,      Data:     PSA trend: (current 11.87)          CBC and CMP are normal!       Latest Reference Range & Units 07/18/23 12:26 02/02/24 11:49 04/19/24 09:40   PSA Tumor Marker 0.00 - 4.50 ng/mL 145.60 (H) 377.10 (H) 131.80 (H)   (H): Data is abnormally high    PSMA Scan 7/25/2023        IMPRESSION: In this patient with " biopsy-proven metastatic prostate  cancer:  1. Marked uptake (SUV mean 14)  in the prostate gland with  extraprostatic extension, compatible with prostatic adenocarcinoma.  2. Moderate to marked uptake in the multiple enlarged lymph nodes (SUV  mean 7-15) in the chest, abdomen and pelvis, consistent with extensive  warren metastases.  3. No bone metastases.          I personally reviewed the following studies:  Recent Labs   Lab Test 04/19/24  0940 02/02/24  1149 06/25/18  1114 11/13/17  1113 03/09/17  0000   WBC 5.8 5.2 5.6 6.3 6.5   % NEUTROPHILS 65 66  --  63.7  --    HEMOGLOBIN 13.6 12.6* 13.7 14.6 14.6   PLATELET COUNT 272 384 222 248 278     Recent Labs   Lab Test 07/16/24  1148 06/18/24  1058 06/04/24  1107 05/21/24  1225 05/07/24  1151   SODIUM 137 135 133* 132* 135   POTASSIUM (R) 3.7 3.6 3.4 3.6 4.2   CHLORIDE (R) 101 99 96* 95* 100   CARBON DIOXIDE (R) 27 26 25 27 24   ANION GAP 9 10 12 10 11   UREA NITROGEN (R) 14.2 11.5 10.6 12.8 13.6   CREATININE 0.83 0.76 0.73 0.72 0.74   CALCIUM, TOTAL 9.8 9.5 9.4 10.0 9.5     Recent Labs   Lab Test 12/21/21  1213   MAGNESIUM 1.6     Recent Labs   Lab Test 07/16/24  1148 06/18/24  1058 06/04/24  1107   BILIRUBIN TOTAL 0.4 0.3 0.2   ALKPHOS 81 66 71   ALT 24 24 20   AST 23 25 25   ALBUMIN (R) 3.9 3.8 4.1       No results found for this or any previous visit (from the past 24 hour(s)).

## 2024-07-18 NOTE — NURSING NOTE
Eligard 22.5 mg administered subcutaneous in left upper arm per order. See MAR. Patient tolerated injection with no incident.   Fidelina Nuñez CMA July 18, 2024

## 2024-07-18 NOTE — NURSING NOTE
"Oncology Rooming Note    July 18, 2024 9:04 AM   Gianluca Cooper is a 68 year old male who presents for:    Chief Complaint   Patient presents with    Oncology Clinic Visit     RTN for Prostate Cancer     Initial Vitals: BP (!) 161/101 (BP Location: Right arm, Patient Position: Right side, Cuff Size: Adult Regular)   Pulse 74   Temp 98.7  F (37.1  C) (Oral)   Resp 18   Ht 1.651 m (5' 5\")   Wt 75 kg (165 lb 6.4 oz)   SpO2 99%   BMI 27.52 kg/m   Estimated body mass index is 27.52 kg/m  as calculated from the following:    Height as of this encounter: 1.651 m (5' 5\").    Weight as of this encounter: 75 kg (165 lb 6.4 oz). Body surface area is 1.85 meters squared.  Mild Pain (2) Comment: Data Unavailable   No LMP for male patient.  Allergies reviewed: Yes  Medications reviewed: Yes    Medications: Medication refills not needed today.  Pharmacy name entered into [employer]:    [facility] - [place], [place] - [address]   [id number] [place], [place] - [address] AT [facility] [address]  [id number] [place], [place] - [address] AT [facility] [id number]    Frailty Screening:   Is the patient here for a new oncology consult visit in cancer care? 2. No      Clinical concerns: Blood Pressure high-her has no complaints       Gina Hubbard MA             "

## 2024-08-05 ENCOUNTER — TRANSCRIBE ORDERS (OUTPATIENT)
Dept: OTHER | Age: 69
End: 2024-08-05

## 2024-08-05 DIAGNOSIS — E11.9 CONTROLLED TYPE 2 DIABETES MELLITUS WITHOUT COMPLICATION, WITH LONG-TERM CURRENT USE OF INSULIN (H): Primary | ICD-10-CM

## 2024-08-05 DIAGNOSIS — M79.672 PAIN IN BOTH FEET: ICD-10-CM

## 2024-08-05 DIAGNOSIS — Z79.4 CONTROLLED TYPE 2 DIABETES MELLITUS WITHOUT COMPLICATION, WITH LONG-TERM CURRENT USE OF INSULIN (H): Primary | ICD-10-CM

## 2024-08-05 DIAGNOSIS — M79.671 PAIN IN BOTH FEET: ICD-10-CM

## 2024-08-12 DIAGNOSIS — C61 ADENOCARCINOMA OF PROSTATE (H): Primary | ICD-10-CM

## 2024-08-13 ENCOUNTER — LAB (OUTPATIENT)
Dept: LAB | Facility: CLINIC | Age: 69
End: 2024-08-13
Attending: INTERNAL MEDICINE
Payer: COMMERCIAL

## 2024-08-13 DIAGNOSIS — C61 ADENOCARCINOMA OF PROSTATE (H): ICD-10-CM

## 2024-08-13 LAB
ALBUMIN SERPL BCG-MCNC: 3.7 G/DL (ref 3.5–5.2)
ALP SERPL-CCNC: 105 U/L (ref 40–150)
ALT SERPL W P-5'-P-CCNC: 27 U/L (ref 0–70)
ANION GAP SERPL CALCULATED.3IONS-SCNC: 10 MMOL/L (ref 7–15)
AST SERPL W P-5'-P-CCNC: 30 U/L (ref 0–45)
BILIRUB SERPL-MCNC: 0.5 MG/DL
BUN SERPL-MCNC: 11.1 MG/DL (ref 8–23)
CALCIUM SERPL-MCNC: 9.2 MG/DL (ref 8.8–10.4)
CHLORIDE SERPL-SCNC: 90 MMOL/L (ref 98–107)
CREAT SERPL-MCNC: 0.9 MG/DL (ref 0.67–1.17)
EGFRCR SERPLBLD CKD-EPI 2021: >90 ML/MIN/1.73M2
GLUCOSE SERPL-MCNC: 145 MG/DL (ref 70–99)
HCO3 SERPL-SCNC: 28 MMOL/L (ref 22–29)
POTASSIUM SERPL-SCNC: 3 MMOL/L (ref 3.4–5.3)
PROT SERPL-MCNC: 6.2 G/DL (ref 6.4–8.3)
SODIUM SERPL-SCNC: 128 MMOL/L (ref 135–145)

## 2024-08-13 PROCEDURE — 36415 COLL VENOUS BLD VENIPUNCTURE: CPT

## 2024-08-13 PROCEDURE — 80053 COMPREHEN METABOLIC PANEL: CPT

## 2024-08-13 NOTE — NURSING NOTE
Chief Complaint   Patient presents with    Lab Only     Labs drawn via  by RN.        Isaura Delatorre, RN

## 2024-08-14 ENCOUNTER — TELEPHONE (OUTPATIENT)
Dept: ONCOLOGY | Facility: CLINIC | Age: 69
End: 2024-08-14
Payer: COMMERCIAL

## 2024-08-14 DIAGNOSIS — C61 ADENOCARCINOMA OF PROSTATE (H): Primary | ICD-10-CM

## 2024-08-14 RX ORDER — ABIRATERONE ACETATE 250 MG/1
1000 TABLET ORAL DAILY
Qty: 120 TABLET | Refills: 0 | Status: SHIPPED | OUTPATIENT
Start: 2024-08-14 | End: 2024-09-13

## 2024-08-14 RX ORDER — PREDNISONE 5 MG/1
5 TABLET ORAL 2 TIMES DAILY
Qty: 60 TABLET | Refills: 0 | Status: SHIPPED | OUTPATIENT
Start: 2024-08-14

## 2024-08-14 NOTE — TELEPHONE ENCOUNTER
Oral Chemotherapy Monitoring Program  Lab Follow Up    Reviewed lab results from 8/13.        5/23/2024     8:00 AM 6/5/2024     9:00 AM 6/19/2024     3:00 PM 6/21/2024     1:00 PM 7/11/2024     9:00 AM 7/17/2024     1:00 PM 8/14/2024     1:00 PM   ORAL CHEMOTHERAPY   Assessment Type Refill Lab Monitoring Monthly Follow up;Lab Monitoring Refill Monthly Follow up Lab Monitoring Lab Monitoring;Refill   Diagnosis Code Prostate Cancer Prostate Cancer Prostate Cancer Prostate Cancer Prostate Cancer Prostate Cancer Prostate Cancer   Providers Dr. Aydee Bajwa   [facility]   Is this patient followed by the [facility] OC team? No No No No No No No   Drug Name Zytiga (abiraterone) Zytiga (abiraterone) Zytiga (abiraterone) Zytiga (abiraterone) Zytiga (abiraterone) Zytiga (abiraterone) Zytiga (abiraterone)   Dose 1,000 mg 1,000 mg 1,000 mg 1,000 mg 1,000 mg 1,000 mg 1,000 mg   Current Schedule Daily Daily Daily Daily Daily Daily Daily   Cycle Details Continuous Continuous Continuous Continuous Continuous Continuous Continuous   Adherence Assessment     Non-adherent     Reason for Non-adherence     Wanted to avoid side-effects     Adherence Intervention Recommended     Medication education     Adverse Effects     Constipation     Constipation     Grade 1     Pharmacist Intervention(constipation)     Yes     Intervention(s)     Patient education;OTC recommendation         Labs:  _  Result Component Current Result Ref Range   Sodium 128 (L) (8/13/2024) 135 - 145 mmol/L     _  Result Component Current Result Ref Range   Potassium 3.0 (L) (8/13/2024) 3.4 - 5.3 mmol/L     _  Result Component Current Result Ref Range   Calcium 9.2 (8/13/2024) 8.8 - 10.4 mg/dL     No results found for Mag within last 30 days.     No results found for Phos within last 30 days.     _  Result Component Current Result Ref Range   Albumin 3.7  (8/13/2024) 3.5 - 5.2 g/dL     _  Result Component Current Result Ref Range   Urea Nitrogen 11.1 (8/13/2024) 8.0 - 23.0 mg/dL     _  Result Component Current Result Ref Range   Creatinine 0.90 (8/13/2024) 0.67 - 1.17 mg/dL     _  Result Component Current Result Ref Range   AST 30 (8/13/2024) 0 - 45 U/L     _  Result Component Current Result Ref Range   ALT 27 (8/13/2024) 0 - 70 U/L     _  Result Component Current Result Ref Range   Bilirubin Total 0.5 (8/13/2024) <=1.2 mg/dL     No results found for WBC within last 30 days.     No results found for HGB within last 30 days.     No results found for PLT within last 30 days.     No results found for ANC within last 30 days.     No results found for ANC within last 30 days.        Assessment & Plan:  Spoke with Gianluca with the assistance of  (ID#[id number]). Results are concerning for grade 2 hyponatremia and grade 1 hypokalemia. He has had stable labs while on abiraterone. He notes that his dose of hydrochlorothiazide was increased earlier this month. This can cause the electrolyte abnormalities observed. Recommended he follow up with Dr. Hurley regarding these.    Questions answered to patient's satisfaction.    Follow-Up:  9/10 labs    Carole Hensley, PharmD, BCOP  Oral Chemotherapy Monitoring Program  [facility]  [phone]

## 2024-09-08 DIAGNOSIS — C61 ADENOCARCINOMA OF PROSTATE (H): Primary | ICD-10-CM

## 2024-09-10 ENCOUNTER — LAB (OUTPATIENT)
Dept: LAB | Facility: CLINIC | Age: 69
End: 2024-09-10
Attending: INTERNAL MEDICINE
Payer: COMMERCIAL

## 2024-09-10 DIAGNOSIS — C61 ADENOCARCINOMA OF PROSTATE (H): ICD-10-CM

## 2024-09-10 LAB
ALBUMIN SERPL BCG-MCNC: 3.8 G/DL (ref 3.5–5.2)
ALP SERPL-CCNC: 111 U/L (ref 40–150)
ALT SERPL W P-5'-P-CCNC: 22 U/L (ref 0–70)
ANION GAP SERPL CALCULATED.3IONS-SCNC: 9 MMOL/L (ref 7–15)
AST SERPL W P-5'-P-CCNC: 30 U/L (ref 0–45)
BILIRUB SERPL-MCNC: 0.4 MG/DL
BUN SERPL-MCNC: 9.5 MG/DL (ref 8–23)
CALCIUM SERPL-MCNC: 9.6 MG/DL (ref 8.8–10.4)
CHLORIDE SERPL-SCNC: 94 MMOL/L (ref 98–107)
CREAT SERPL-MCNC: 0.76 MG/DL (ref 0.67–1.17)
EGFRCR SERPLBLD CKD-EPI 2021: >90 ML/MIN/1.73M2
GLUCOSE SERPL-MCNC: 145 MG/DL (ref 70–99)
HCO3 SERPL-SCNC: 30 MMOL/L (ref 22–29)
POTASSIUM SERPL-SCNC: 3 MMOL/L (ref 3.4–5.3)
PROT SERPL-MCNC: 6.2 G/DL (ref 6.4–8.3)
SODIUM SERPL-SCNC: 133 MMOL/L (ref 135–145)

## 2024-09-10 PROCEDURE — 36415 COLL VENOUS BLD VENIPUNCTURE: CPT

## 2024-09-10 PROCEDURE — 82040 ASSAY OF SERUM ALBUMIN: CPT

## 2024-09-11 ENCOUNTER — APPOINTMENT (OUTPATIENT)
Dept: INTERPRETER SERVICES | Facility: CLINIC | Age: 69
End: 2024-09-11
Payer: COMMERCIAL

## 2024-09-11 ENCOUNTER — TELEPHONE (OUTPATIENT)
Dept: ONCOLOGY | Facility: CLINIC | Age: 69
End: 2024-09-11
Payer: COMMERCIAL

## 2024-09-11 DIAGNOSIS — C61 ADENOCARCINOMA OF PROSTATE (H): Primary | ICD-10-CM

## 2024-09-11 RX ORDER — ABIRATERONE ACETATE 250 MG/1
1000 TABLET ORAL DAILY
Qty: 120 TABLET | Refills: 0 | Status: SHIPPED | OUTPATIENT
Start: 2024-09-11 | End: 2024-10-11

## 2024-09-11 RX ORDER — PREDNISONE 5 MG/1
5 TABLET ORAL 2 TIMES DAILY
Qty: 60 TABLET | Refills: 0 | Status: SHIPPED | OUTPATIENT
Start: 2024-09-11

## 2024-09-11 NOTE — TELEPHONE ENCOUNTER
Oral Chemotherapy Monitoring Program  Lab Follow Up    Reviewed lab results from 9/10/24.        6/5/2024     9:00 AM 6/19/2024     3:00 PM 6/21/2024     1:00 PM 7/11/2024     9:00 AM 7/17/2024     1:00 PM 8/14/2024     1:00 PM 9/11/2024     3:00 PM   ORAL CHEMOTHERAPY   Assessment Type Lab Monitoring Monthly Follow up;Lab Monitoring Refill Monthly Follow up Lab Monitoring Lab Monitoring;Refill Lab Monitoring   Diagnosis Code Prostate Cancer Prostate Cancer Prostate Cancer Prostate Cancer Prostate Cancer Prostate Cancer Prostate Cancer   Providers Dr. Aydee Bajwa   [facility]   Is this patient followed by the [facility] OC team? No No No No No No No   Drug Name Zytiga (abiraterone) Zytiga (abiraterone) Zytiga (abiraterone) Zytiga (abiraterone) Zytiga (abiraterone) Zytiga (abiraterone) Zytiga (abiraterone)   Dose 1,000 mg 1,000 mg 1,000 mg 1,000 mg 1,000 mg 1,000 mg 1,000 mg   Current Schedule Daily Daily Daily Daily Daily Daily Daily   Cycle Details Continuous Continuous Continuous Continuous Continuous Continuous Continuous   Adherence Assessment    Non-adherent      Reason for Non-adherence    Wanted to avoid side-effects      Adherence Intervention Recommended    Medication education      Adverse Effects    Constipation      Constipation    Grade 1      Pharmacist Intervention(constipation)    Yes      Intervention(s)    Patient education;OTC recommendation          Labs:  _  Result Component Current Result Ref Range   Sodium 133 (L) (9/10/2024) 135 - 145 mmol/L     _  Result Component Current Result Ref Range   Potassium 3.0 (L) (9/10/2024) 3.4 - 5.3 mmol/L     _  Result Component Current Result Ref Range   Calcium 9.6 (9/10/2024) 8.8 - 10.4 mg/dL     No results found for Mag within last 30 days.     No results found for Phos within last 30 days.     _  Result Component Current Result Ref Range    Albumin 3.8 (9/10/2024) 3.5 - 5.2 g/dL     _  Result Component Current Result Ref Range   Urea Nitrogen 9.5 (9/10/2024) 8.0 - 23.0 mg/dL     _  Result Component Current Result Ref Range   Creatinine 0.76 (9/10/2024) 0.67 - 1.17 mg/dL     _  Result Component Current Result Ref Range   AST 30 (9/10/2024) 0 - 45 U/L     _  Result Component Current Result Ref Range   ALT 22 (9/10/2024) 0 - 70 U/L     _  Result Component Current Result Ref Range   Bilirubin Total 0.4 (9/10/2024) <=1.2 mg/dL     No results found for WBC within last 30 days.     No results found for HGB within last 30 days.     No results found for PLT within last 30 days.     No results found for ANC within last 30 days.     No results found for ANC within last 30 days.        Assessment & Plan:  Results are concerning for low sodium, low potassium, low chloride, high CO2.  Discussed with Dr Bajwa, who advises no changes to treatment at this time.    No changes with Zytiga needed at this time.    Discussed with patient (with assistance of professional ) and questions answered to patient's satisfaction.    Clotilde Santa, PharmD, BCPS, BCOP  Oncology Clinical Pharmacy Specialist  [facility]  [phone]

## 2024-09-20 ENCOUNTER — ANCILLARY PROCEDURE (OUTPATIENT)
Dept: GENERAL RADIOLOGY | Facility: CLINIC | Age: 69
End: 2024-09-20
Attending: PEDIATRICS
Payer: COMMERCIAL

## 2024-09-20 DIAGNOSIS — R07.81 RIB PAIN ON RIGHT SIDE: ICD-10-CM

## 2024-09-20 PROCEDURE — 71100 X-RAY EXAM RIBS UNI 2 VIEWS: CPT | Mod: RT | Performed by: RADIOLOGY

## 2024-09-27 ENCOUNTER — APPOINTMENT (OUTPATIENT)
Dept: INTERPRETER SERVICES | Facility: CLINIC | Age: 69
End: 2024-09-27
Payer: COMMERCIAL

## 2024-10-01 ENCOUNTER — APPOINTMENT (OUTPATIENT)
Dept: INTERPRETER SERVICES | Facility: CLINIC | Age: 69
End: 2024-10-01
Payer: COMMERCIAL

## 2024-10-08 ENCOUNTER — LAB (OUTPATIENT)
Dept: LAB | Facility: CLINIC | Age: 69
End: 2024-10-08
Attending: INTERNAL MEDICINE
Payer: COMMERCIAL

## 2024-10-08 DIAGNOSIS — C61 ADENOCARCINOMA OF PROSTATE (H): ICD-10-CM

## 2024-10-08 LAB
ALBUMIN SERPL BCG-MCNC: 4 G/DL (ref 3.5–5.2)
ALP SERPL-CCNC: 125 U/L (ref 40–150)
ALT SERPL W P-5'-P-CCNC: 19 U/L (ref 0–70)
ANION GAP SERPL CALCULATED.3IONS-SCNC: 11 MMOL/L (ref 7–15)
AST SERPL W P-5'-P-CCNC: 27 U/L (ref 0–45)
BASOPHILS # BLD AUTO: 0.1 10E3/UL (ref 0–0.2)
BASOPHILS NFR BLD AUTO: 1 %
BILIRUB SERPL-MCNC: 0.3 MG/DL
BUN SERPL-MCNC: 9.3 MG/DL (ref 8–23)
CALCIUM SERPL-MCNC: 9.5 MG/DL (ref 8.8–10.4)
CHLORIDE SERPL-SCNC: 95 MMOL/L (ref 98–107)
CREAT SERPL-MCNC: 0.94 MG/DL (ref 0.67–1.17)
EGFRCR SERPLBLD CKD-EPI 2021: 87 ML/MIN/1.73M2
EOSINOPHIL # BLD AUTO: 0.3 10E3/UL (ref 0–0.7)
EOSINOPHIL NFR BLD AUTO: 4 %
ERYTHROCYTE [DISTWIDTH] IN BLOOD BY AUTOMATED COUNT: 12.5 % (ref 10–15)
GLUCOSE SERPL-MCNC: 112 MG/DL (ref 70–99)
HCO3 SERPL-SCNC: 29 MMOL/L (ref 22–29)
HCT VFR BLD AUTO: 33.6 % (ref 40–53)
HGB BLD-MCNC: 12.2 G/DL (ref 13.3–17.7)
IMM GRANULOCYTES # BLD: 0 10E3/UL
IMM GRANULOCYTES NFR BLD: 1 %
LYMPHOCYTES # BLD AUTO: 1.4 10E3/UL (ref 0.8–5.3)
LYMPHOCYTES NFR BLD AUTO: 24 %
MCH RBC QN AUTO: 31.8 PG (ref 26.5–33)
MCHC RBC AUTO-ENTMCNC: 36.3 G/DL (ref 31.5–36.5)
MCV RBC AUTO: 88 FL (ref 78–100)
MONOCYTES # BLD AUTO: 0.5 10E3/UL (ref 0–1.3)
MONOCYTES NFR BLD AUTO: 9 %
NEUTROPHILS # BLD AUTO: 3.5 10E3/UL (ref 1.6–8.3)
NEUTROPHILS NFR BLD AUTO: 61 %
NRBC # BLD AUTO: 0 10E3/UL
NRBC BLD AUTO-RTO: 0 /100
PLATELET # BLD AUTO: 261 10E3/UL (ref 150–450)
POTASSIUM SERPL-SCNC: 2.7 MMOL/L (ref 3.4–5.3)
PROT SERPL-MCNC: 6.5 G/DL (ref 6.4–8.3)
PSA SERPL DL<=0.01 NG/ML-MCNC: 2.79 NG/ML (ref 0–6.5)
RBC # BLD AUTO: 3.84 10E6/UL (ref 4.4–5.9)
SODIUM SERPL-SCNC: 135 MMOL/L (ref 135–145)
WBC # BLD AUTO: 5.7 10E3/UL (ref 4–11)

## 2024-10-08 PROCEDURE — 84153 ASSAY OF PSA TOTAL: CPT

## 2024-10-08 PROCEDURE — 85025 COMPLETE CBC W/AUTO DIFF WBC: CPT

## 2024-10-08 PROCEDURE — 36415 COLL VENOUS BLD VENIPUNCTURE: CPT

## 2024-10-08 PROCEDURE — 84403 ASSAY OF TOTAL TESTOSTERONE: CPT

## 2024-10-08 PROCEDURE — 82040 ASSAY OF SERUM ALBUMIN: CPT

## 2024-10-08 NOTE — NURSING NOTE
Chief Complaint   Patient presents with    Blood Draw     Labs collected from venipuncture by RN.      Labs collected from venipuncture by JOLANTA.    Fransisca Mays RN

## 2024-10-09 ENCOUNTER — TELEPHONE (OUTPATIENT)
Dept: ONCOLOGY | Facility: CLINIC | Age: 69
End: 2024-10-09
Payer: COMMERCIAL

## 2024-10-09 NOTE — ORAL ONC MGMT
Oral Chemotherapy Monitoring Program  Lab Follow Up    Patient is on abiraterone (Zytiga)  Reviewed lab results from 10/8/24.    Assessment & Plan:   CBC, CMP reviewed. Results show stable low electrolytes- this has been discussed with Dr Bajwa in the past with no change.  Plan to continue Zytiga as prescribed. Questions answered to patient's satisfaction.    Follow-Up:  10/25: Dr Bajwa visit      Rola Brunner, JesusD  Hematology/Oncology Clinical Pharmacist  Oral Chemotherapy Monitoring Program  [facility]  [phone]  October 9, 2024 6/21/2024     1:00 PM 7/11/2024     9:00 AM 7/17/2024     1:00 PM 8/14/2024     1:00 PM 9/11/2024     3:00 PM 9/11/2024     3:55 PM 10/9/2024     9:00 AM   ORAL CHEMOTHERAPY   Assessment Type Refill Monthly Follow up Lab Monitoring Lab Monitoring;Refill Lab Monitoring Refill Lab Monitoring   Diagnosis Code Prostate Cancer Prostate Cancer Prostate Cancer Prostate Cancer Prostate Cancer Prostate Cancer Prostate Cancer   Providers Dr. Aydee Bajwa   [facility]   Is this patient followed by the [facility] OC team? No No No No No No No   Drug Name Zytiga (abiraterone) Zytiga (abiraterone) Zytiga (abiraterone) Zytiga (abiraterone) Zytiga (abiraterone) Zytiga (abiraterone) Zytiga (abiraterone)   Dose 1,000 mg 1,000 mg 1,000 mg 1,000 mg 1,000 mg 1,000 mg 1,000 mg   Current Schedule Daily Daily Daily Daily Daily Daily Daily   Cycle Details Continuous Continuous Continuous Continuous Continuous Continuous Continuous   Adherence Assessment  Non-adherent        Reason for Non-adherence  Wanted to avoid side-effects        Adherence Intervention Recommended  Medication education        Adverse Effects  Constipation        Constipation  Grade 1        Pharmacist Intervention(constipation)  Yes        Intervention(s)  Patient education;OTC recommendation         Is the dose as ordered appropriate for the patient?       Yes       Labs:  _  Result Component Current Result Ref Range   Sodium 135 (10/8/2024) 135 - 145 mmol/L     _  Result Component Current Result Ref Range   Potassium 2.7 (L) (10/8/2024) 3.4 - 5.3 mmol/L     _  Result Component Current Result Ref Range   Calcium 9.5 (10/8/2024) 8.8 - 10.4 mg/dL     No results found for Mag within last 30 days.     No results found for Phos within last 30 days.     _  Result Component Current Result Ref Range   Albumin 4.0 (10/8/2024) 3.5 - 5.2 g/dL     _  Result Component Current Result Ref Range   Urea Nitrogen 9.3 (10/8/2024) 8.0 - 23.0 mg/dL     _  Result Component Current Result Ref Range   Creatinine 0.94 (10/8/2024) 0.67 - 1.17 mg/dL     _  Result Component Current Result Ref Range   AST 27 (10/8/2024) 0 - 45 U/L     _  Result Component Current Result Ref Range   ALT 19 (10/8/2024) 0 - 70 U/L     _  Result Component Current Result Ref Range   Bilirubin Total 0.3 (10/8/2024) <=1.2 mg/dL     _  Result Component Current Result Ref Range   WBC Count 5.7 (10/8/2024) 4.0 - 11.0 10e3/uL     _  Result Component Current Result Ref Range   Hemoglobin 12.2 (L) (10/8/2024) 13.3 - 17.7 g/dL     _  Result Component Current Result Ref Range   Platelet Count 261 (10/8/2024) 150 - 450 10e3/uL     No results found for ANC within last 30 days.

## 2024-10-10 LAB — TESTOST SERPL-MCNC: <2 NG/DL (ref 240–950)

## 2024-10-21 DIAGNOSIS — C61 ADENOCARCINOMA OF PROSTATE (H): Primary | ICD-10-CM

## 2024-10-22 DIAGNOSIS — C61 ADENOCARCINOMA OF PROSTATE (H): Primary | ICD-10-CM

## 2024-10-22 RX ORDER — ABIRATERONE ACETATE 250 MG/1
1000 TABLET ORAL DAILY
Qty: 120 TABLET | Refills: 0 | Status: SHIPPED | OUTPATIENT
Start: 2024-10-22 | End: 2024-11-21

## 2024-10-22 RX ORDER — PREDNISONE 5 MG/1
5 TABLET ORAL 2 TIMES DAILY
Qty: 60 TABLET | Refills: 0 | Status: SHIPPED | OUTPATIENT
Start: 2024-10-22

## 2024-10-22 NOTE — PROGRESS NOTES
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  We know without a doubt that the inguinal nodes contain metastatic prostate cancer because this is how he was diagnosed.    Eight months ago, his PSA had doubled to 377, which was concerning for the development of castrate resistant disease.  However his testosterone was normal as well, indicating failure of his 45 Lupron shot to last the 6 months.    S/P  repeat lupron with PSA dropping from 377 to 131, but two months later, his testosterone remained (more than) measurable at 171. This was again c/w his 45 mg Lupron injection was ineffective.  S/P addition of abiraterone/prednisone with the expectation he becomes completely castrate with a corresponding drop in his PSA.  Happily he has had a deep and major response, with his current PSA being 2.79.  His testosterone is finally at castrate levels, as well.   Gianluca he tells me that he is tolerating the drug well without any side effects.  Minimal blood from penis due to his prostate cancer. This is not unforeseen in someone with de aries disease that has responded to therapy  Left inguinal pain unclear etiology, but could be due to a hernia.  He did have palpable adenopathy in the area previously which I could not palpate today.      PLAN     Continue abiraterone/prednisone  Next Lupron today of 22.5 mg  Return to clinic with me in 3 months with labs just prior, for his next ADT.    30 minutes spent on this appointment today including chart review, direct face-to-face time, care coordination, and documentation.    Brigido Bajwa MD, MSc  Associate Professor of Medicine  [employer]  [facility]  [address] [place] [address]  [place] [address]  [phone]      DIAGNOSIS     De aries metastatic castration-sensitive prostate adenocarcinoma, diagnosed at right inguinal LN biopsy, 6/9/2023. Gianluca presented with inguinal adenopathy that led to the biopsy. PSA at the time of diagnosis was 145.6. PSMA PET showed warren involvement of the chest, abdomen and pelvis, wothout visceral or bone metastases. We did run a Caris on his tumor but there were no actionable mutations.  Primary resistance to leuprolide (see #1 below).      Therapy to date     Current Treatment Leuprolide and abiraterone  Treatment To Date:   ADT since 8/3/23 . PSA concha to 377 on 2/2/2024.However, his testosterone was 378! He received Lupron 45 mg at that point (2/2/2024). PSA dropped to 131 two months later (though testosterone was 171, down from 378). This demonstrated to me the possibility that Lupron does not result in sufficient androgen deprivation.  Abiraterone added 4/23/2024, after it became apparent ADT monotherapy did not result adequate suppression of testosterone (T of 171). PSA dropped from 131 to 11.78 (7/16/2024)      Hem/onc History:   Edit Oncology History  Oncology History Overview Note   Gianluca initially presented to his PCP with right inguinal swelling similar to previously when he had a right inguinal hernia that was repaired in 2006.  He was referred to a surgeon and during exploration of the right groin in the operating room he was noted to have multiple enlarged lymph nodes without a hernia for which he underwent a sentinel lymph node biopsy with pathology consistent with metastatic prostate adenocarcinoma.  A PSMA PET scan showed evidence of metastatic disease in nonregional lymph nodes of the chest, abdomen, and pelvis without bony or visceral metastases.      Adenocarcinoma of prostate (H)   6/29/2023 Biopsy    Right inguinal excisional lymph node biopsy  Final Diagnosis   Lymph nodes, right inguinal, excision:  - Metastatic prostatic  adenocarcinoma involving 2 of 5 lymph nodes  - Deposit size: 6.5 cm  - Extranodal extension is present        7/18/2023 Tumor Markers    PSA Tumor Marker 0.00 - 4.50 ng/mL 145.60 High          7/25/2023 Imaging    PSMA PET-CT  IMPRESSION: In this patient with biopsy-proven metastatic prostate  cancer:  1. Marked uptake (SUV mean 14)  in the prostate gland with  extraprostatic extension, compatible with prostatic adenocarcinoma.  2. Moderate to marked uptake in the multiple enlarged lymph nodes (SUV  mean 7-15) in the chest, abdomen and pelvis, consistent with extensive  warren metastases.  3. No bone metastases.  4. Incidentally noted hypoattenuating lesion in the uncinate process  1.4 cm, indeterminant, may represent an IPMN.  Attention on follow-up.     8/3/2023 - 2/2/2024 Chemotherapy    OP ONC Prostate Cancer - Leuprolide (Eligard) EVERY 6 MONTHS  Plan Provider: Ramiro Headley MD  Treatment goal: Palliative  Line of treatment: First Line     4/29/2024 -  Chemotherapy    Oral ONC Prostate Cancer - Abiraterone  Plan Provider: Brigido Bajwa MD  Treatment goal: Palliative  Line of treatment: First Line     7/18/2024 -  Supportive Treatment    OP ONC Prostate Cancer - Leuprolide (Eligard) EVERY 3 MONTHS  Plan Provider: Brigido Bajwa MD  Treatment goal: Palliative  Line of treatment: First Line         Interval History:   Gianluca is back.  His inguinal nodes have shrunk down nicely, but he still has some pain in the left inguinal area.  He would like me to take a look.  Continues to have some blood come out of his penis when urinating.  He gets some pain in his testicles when he walks too far.  Other than that he is actually feeling pretty well.   He does not note any hot flashes.  He has no other concerns today.    Subjective   Past Medical History:   Diagnosis Date    Depression     Gastro-oesophageal reflux disease     Headache(784.0)     Hypertension     LOC (loss of consciousness) (H) age 45    out for  20 mintes after hit on top of head with board    Major depression 5/7/2013    Otitis media, chronic     Sleep apnea     cant tolerate cpap     HEIKE  GERD  COPD  Mood d/o -- MDD with psychotic features, PTSD  Cocaine and marijuana use disorders  Hx of accidental opiate overdose x2 c/b respiratory arrest 1/2023 -- reports his cocaine was laced with fentanyl  Hx HBV and HCV, cleared as of 6/25/18  Hx IVDU last ~2002  Tobacco use  Chart Review:  1/11/2023 admitted for respiratory arrest with in the setting of opioid overdose with bystander administration of naloxone.  PMH notable for HTN, HLD, GERD, MDD with psychotic features, LTBI, chronic HCV inguinal hernia repair 2006, HEIKE/OHS.     Past Surgical History:   Procedure Laterality Date    COLONOSCOPY N/A 3/8/2018    Procedure: COLONOSCOPY;  colonoscopy;  Surgeon: Iona Lutz MD;  Location:  GI    HERNIA REPAIR  2006    boith sides    HERNIORRHAPHY INGUINAL Right 6/29/2023    Procedure: Right INGUINAL, lymph node excisional biopsy;  Surgeon: Frank Carlson MD;  [place] [facility] [place]    LASER CO2 TYMPANOMASTOIDECTOMY  9/7/2011    Procedure:LASER CO2 TYMPANOMASTOIDECTOMY; Right Tympanoplasty , Cartilage Backed Right Tympanomastoidectomy, Omni Guide Laser on Standby  *Latex Safe*; Surgeon:BENNETT MAXWELL; Location: OR    SEPTOPLASTY  9/17/2012    Procedure: SEPTOPLASTY;  Septoplasty *Latex Safe* Turbinate reduction ;  Surgeon: Babar Dickerson MD;  Location:  OR    UVULOPALATOPHARYNGOPLASTY  7/9/2012    Procedure: UVULOPALATOPHARYNGOPLASTY;  Uvulopalatopharyngoplasty * Latex Safe*;  Surgeon: Babar Dickerson MD;  Location:  OR     Social History     Socioeconomic History    Marital status: Single   Tobacco Use    Smoking status: Every Day     Current packs/day: 0.50     Types: Cigarettes    Smokeless tobacco: Never   Substance and Sexual Activity    Alcohol use: No     Alcohol/week: 0.0 standard drinks of alcohol    Drug use: No     Comment: hx  polysubstance now in remission     Social Determinants of Health     Financial Resource Strain: Not on File (3/29/2021)    Received from [facility]    Financial Resource Strain     Financial Resource Strain: 0   Food Insecurity: Not on File (2024)    Received from [facility]    Food Insecurity     Food: 0   Transportation Needs: Not on File (3/29/2021)    Received from [facility]    Transportation Needs     Transportation: 0   Physical Activity: Not on File (2019)    [facility] from [facility]    Physical Activity     Physical Activity: 0   Stress: Not on File (3/29/2021)    Received from [facility]    Stress     Stress: 0   Recent Concern: Stress - At Risk (3/29/2021)    Received from [facility]    Stress     Stress: 2   Social Connections: Not on File (3/29/2021)    Received from [facility]    Social Connections     Connectedness: 0   Housing Stability: Not on File (3/29/2021)    Received from [facility]    Housing Stability     Housin      SH obtained at initial visit 23:  He currently lives independently in [place].  He previously worked as a .  He is not currently working and is on Social Security. He is from [place]. He has 3 children who live in [place].  He has not seen them for over 10 years because they will not see him.  He has 1 friend with whom he is close.  Otherwise he is pretty much on his own.  He reports he does not have any friends or family locally.  He spends most of his day at home and tells me he does not have any activities that bring him tay.  He does most of his ADLs himself such as cooking, cleaning, self-care but feels he has been slacking on this.  He identifies a point in time when his psychiatrist took him off of his antianxiety medication as a change in his mental wellbeing and he feels very depressed now.  He has been smoking tobacco products since the age of 10 and smokes about half a pack a day, approximately 30 pack years in total.  He uses marijuana daily and will  smoke a joint in the morning and one at night.  He does not drink alcohol regularly.  He occasionally uses cocaine, most recently 4 months ago, and has not used recently due to not having the funds for it.  He had an episode in January when he used cocaine that was laced with fentanyl resulting in an accidental overdose requiring hospitalization.  He has a remote history of IV drug use including heroin in the early 2000's, he does not use IV drugs now.    Family History   Problem Relation Age of Onset    Depression Mother     Depression Father     Substance Abuse Father     Mental Illness Other         institutionalized    Substance Abuse Brother     Substance Abuse Brother     Substance Abuse Brother     Substance Abuse Brother     Substance Abuse Brother     Liver Disease No family hx of          Current Outpatient Medications   Medication Sig Dispense Refill    abiraterone (ZYTIGA) 250 MG tablet Take 4 tablets (1,000 mg) by mouth daily for 30 doses. Take on empty stomach. 120 tablet 0    ASPIRIN PO Take 81 mg by mouth daily      atorvastatin (LIPITOR) 40 MG tablet Take 1 tablet (40 mg) by mouth daily      buPROPion (WELLBUTRIN XL) 150 MG 24 hr tablet Take 1 tablet (150 mg) by mouth every morning 30 tablet 1    doxepin (SINEQUAN) 10 MG capsule Take 1 capsule (10 mg) by mouth At Bedtime 30 capsule 2    HYDROcodone-acetaminophen (NORCO) 5-325 MG tablet Take 1-2 tablets by mouth every 4 hours as needed for moderate to severe pain 15 tablet 0    lisinopril-hydrochlorothiazide (PRINZIDE,ZESTORETIC) 20-25 MG per tablet Take 1 tablet by mouth daily      metFORMIN (GLUCOPHAGE) 500 MG tablet Take one tablet daily in the am and two tablets at bedtime.  R Skolar NP at [facility]      OLANZapine (ZYPREXA) 15 MG tablet Take one-half (7.5 mg) to one tablet (15 mg) by mouth at bedtime 30 tablet 1    prazosin (MINIPRESS) 2 MG capsule Take 2 capsules (4 mg) by mouth At Bedtime 60 capsule 1    predniSONE (DELTASONE) 5 MG tablet Take 1  tablet (5 mg) by mouth 2 times daily. 60 tablet 0    predniSONE (DELTASONE) 5 MG tablet Take 1 tablet (5 mg) by mouth 2 times daily. 60 tablet 0    predniSONE (DELTASONE) 5 MG tablet Take 1 tablet (5 mg) by mouth 2 times daily 60 tablet 0    predniSONE (DELTASONE) 5 MG tablet Take 1 tablet (5 mg) by mouth 2 times daily 60 tablet 0    ranitidine (ZANTAC) 150 MG tablet Take 1 tablet (150 mg) by mouth 2 times daily      senna-docusate (SENOKOT-S/PERICOLACE) 8.6-50 MG tablet Take 1-2 tablets by mouth 2 times daily 15 tablet 0    sertraline (ZOLOFT) 100 MG tablet Take 2 tablets (200 mg) by mouth daily 60 tablet 1    venlafaxine (EFFEXOR-XR) 37.5 MG 24 hr capsule Take 2 capsules (75 mg) by mouth daily 60 capsule 1    VITAMIN D3 1000 units tablet TK 2 TS PO D  1     No current facility-administered medications for this visit.       Objective   Physical Exam:   There were no vitals taken for this visit.  Physical Exam  Constitutional:       General: He is not in acute distress.     Appearance: He is not ill-appearing or toxic-appearing.   HENT:      Head: Normocephalic and atraumatic.      Mouth/Throat:      Mouth: Mucous membranes are moist.   Eyes:      General: No scleral icterus.     Extraocular Movements: Extraocular movements intact.   Cardiovascular:      Rate and Rhythm: Normal rate and regular rhythm.   Pulmonary:      Effort: Pulmonary effort is normal. No respiratory distress.   Abdominal:      General: There is no distension.      Palpations: Abdomen is soft.      Tenderness: There is no abdominal tenderness. There is no guarding.   Skin:     General: Skin is warm and dry.      Coloration: Skin is not jaundiced.   Neurological:      Mental Status: He is alert. Mental status is at baseline.     Lymph node survey reveals resolution of previously palpated bilateral 3-5 cm very firm lymph nodes the medial aspect of the inguinal bands, medial >lateral, particularly on the right,      Data:     PSA trend: (current  2.79)          CBC and CMP are normal!   Latest Reference Range & Units 02/02/24 11:49 04/19/24 09:40 07/16/24 11:48 10/08/24 11:43   Testosterone Total 240 - 950 ng/dL 378 171 (L) <2 (L) <2 (L)   (L): Data is abnormally low     Latest Reference Range & Units 07/18/23 12:26 02/02/24 11:49 04/19/24 09:40   PSA Tumor Marker 0.00 - 4.50 ng/mL 145.60 (H) 377.10 (H) 131.80 (H)   (H): Data is abnormally high    PSMA Scan 7/25/2023        IMPRESSION: In this patient with biopsy-proven metastatic prostate  cancer:  1. Marked uptake (SUV mean 14)  in the prostate gland with  extraprostatic extension, compatible with prostatic adenocarcinoma.  2. Moderate to marked uptake in the multiple enlarged lymph nodes (SUV  mean 7-15) in the chest, abdomen and pelvis, consistent with extensive  warren metastases.  3. No bone metastases.          I personally reviewed the following studies:  Recent Labs   Lab Test 10/08/24  1143 04/19/24  0940 02/02/24  1149 06/25/18  1114 11/13/17  1113   WBC 5.7 5.8 5.2 5.6 6.3   % NEUTROPHILS 61 65 66  --  63.7   HEMOGLOBIN 12.2* 13.6 12.6* 13.7 14.6   PLATELET COUNT 261 272 384 222 248     Recent Labs   Lab Test 10/08/24  1143 09/10/24  1200 08/13/24  1146 07/16/24  1148 06/18/24  1058   SODIUM 135 133* 128* 137 135   POTASSIUM (R) 2.7* 3.0* 3.0* 3.7 3.6   CHLORIDE (R) 95* 94* 90* 101 99   CARBON DIOXIDE (R) 29 30* 28 27 26   ANION GAP 11 9 10 9 10   UREA NITROGEN (R) 9.3 9.5 11.1 14.2 11.5   CREATININE 0.94 0.76 0.90 0.83 0.76   CALCIUM, TOTAL 9.5 9.6 9.2 9.8 9.5     Recent Labs   Lab Test 12/21/21  1213   MAGNESIUM 1.6     Recent Labs   Lab Test 10/08/24  1143 09/10/24  1200 08/13/24  1146   BILIRUBIN TOTAL 0.3 0.4 0.5   ALKPHOS 125 111 105   ALT 19 22 27   AST 27 30 30   ALBUMIN (R) 4.0 3.8 3.7       No results found for this or any previous visit (from the past 24 hour(s)).

## 2024-10-25 ENCOUNTER — ONCOLOGY VISIT (OUTPATIENT)
Dept: ONCOLOGY | Facility: CLINIC | Age: 69
End: 2024-10-25
Attending: INTERNAL MEDICINE
Payer: COMMERCIAL

## 2024-10-25 VITALS
HEART RATE: 67 BPM | WEIGHT: 167.5 LBS | SYSTOLIC BLOOD PRESSURE: 170 MMHG | OXYGEN SATURATION: 98 % | DIASTOLIC BLOOD PRESSURE: 106 MMHG | BODY MASS INDEX: 27.87 KG/M2 | RESPIRATION RATE: 16 BRPM

## 2024-10-25 DIAGNOSIS — C61 ADENOCARCINOMA OF PROSTATE (H): Primary | ICD-10-CM

## 2024-10-25 PROCEDURE — 96402 CHEMO HORMON ANTINEOPL SQ/IM: CPT

## 2024-10-25 PROCEDURE — 99214 OFFICE O/P EST MOD 30 MIN: CPT | Performed by: INTERNAL MEDICINE

## 2024-10-25 PROCEDURE — G0463 HOSPITAL OUTPT CLINIC VISIT: HCPCS | Performed by: INTERNAL MEDICINE

## 2024-10-25 PROCEDURE — 250N000011 HC RX IP 250 OP 636: Mod: JZ | Performed by: INTERNAL MEDICINE

## 2024-10-25 RX ORDER — ASPIRIN 81 MG/1
81 TABLET, COATED ORAL DAILY
COMMUNITY
Start: 2024-10-08

## 2024-10-25 RX ORDER — FOLIC ACID 1 MG/1
1 TABLET ORAL DAILY
COMMUNITY
Start: 2024-08-14

## 2024-10-25 RX ORDER — ARIPIPRAZOLE 5 MG/1
5 TABLET ORAL DAILY
COMMUNITY
Start: 2024-05-21

## 2024-10-25 RX ORDER — MULTIVITAMIN,THERAPEUTIC
1 TABLET ORAL DAILY
COMMUNITY
Start: 2024-02-29

## 2024-10-25 RX ORDER — METHYLPHENIDATE HYDROCHLORIDE 10 MG/1
10 TABLET ORAL 2 TIMES DAILY
COMMUNITY
Start: 2024-10-09

## 2024-10-25 RX ORDER — IBUPROFEN 400 MG/1
400 TABLET, FILM COATED ORAL EVERY 8 HOURS PRN
COMMUNITY
Start: 2023-07-17

## 2024-10-25 RX ORDER — MIRTAZAPINE 30 MG/1
1 TABLET, FILM COATED ORAL AT BEDTIME
COMMUNITY
Start: 2024-09-10

## 2024-10-25 RX ORDER — POTASSIUM CHLORIDE 1500 MG/1
1 TABLET, EXTENDED RELEASE ORAL DAILY
COMMUNITY
Start: 2024-05-21

## 2024-10-25 RX ORDER — FAMOTIDINE 40 MG/1
1 TABLET, FILM COATED ORAL DAILY
COMMUNITY
Start: 2024-02-28

## 2024-10-25 RX ORDER — GABAPENTIN 300 MG/1
300 CAPSULE ORAL 3 TIMES DAILY
COMMUNITY

## 2024-10-25 RX ORDER — POLYETHYLENE GLYCOL 3350 17 G/17G
17 POWDER, FOR SOLUTION ORAL DAILY
COMMUNITY
Start: 2024-06-10

## 2024-10-25 RX ORDER — BUDESONIDE AND FORMOTEROL FUMARATE DIHYDRATE 80; 4.5 UG/1; UG/1
2 AEROSOL RESPIRATORY (INHALATION) 2 TIMES DAILY
COMMUNITY
Start: 2024-01-10

## 2024-10-25 RX ORDER — CLONIDINE HYDROCHLORIDE 0.1 MG/1
1 TABLET ORAL 3 TIMES DAILY PRN
COMMUNITY
Start: 2024-09-10

## 2024-10-25 RX ADMIN — LEUPROLIDE ACETATE 22.5 MG: 22.5 INJECTION, SUSPENSION, EXTENDED RELEASE SUBCUTANEOUS at 13:56

## 2024-10-25 ASSESSMENT — PAIN SCALES - GENERAL: PAINLEVEL_OUTOF10: MODERATE PAIN (4)

## 2024-10-25 NOTE — NURSING NOTE
"Oncology Rooming Note    October 25, 2024 1:15 PM   Gianluca Cooper is a 69 year old male who presents for:    Chief Complaint   Patient presents with    Oncology Clinic Visit     Prostate Cancer     Initial Vitals: BP (!) 170/106 (BP Location: Left arm, Patient Position: Sitting, Cuff Size: Adult Large)   Pulse 67   Resp 16   Wt 76 kg (167 lb 8 oz)   SpO2 98%   BMI 27.87 kg/m   Estimated body mass index is 27.87 kg/m  as calculated from the following:    Height as of 7/18/24: 1.651 m (5' 5\").    Weight as of this encounter: 76 kg (167 lb 8 oz). Body surface area is 1.87 meters squared.  Moderate Pain (4) Comment: Data Unavailable   No LMP for male patient.  Allergies reviewed: Yes  Medications reviewed: Unable to review meds with patient.    Medications: Medication refills not needed today.  Pharmacy name entered into SE[facility] AB:    [facility] - [place], [place] - [address]   [id number] [place], [place] - [address] [facility] [address]  [id number] [place], [place] - [address] [facility] [address]    Frailty Screening:   Is the patient here for a new oncology consult visit in cancer care? 2. No      Clinical concerns: None       Angela Garcia LPN  10/25/2024              "

## 2024-10-25 NOTE — NURSING NOTE
Patient was given Eligard 22.5 mg subcutaneously in the left arm without complication.  Patient tolerated well and was discharged. See MAR for details.    Angela Garcia LPN  10/25/2024

## 2024-10-25 NOTE — LETTER
10/25/2024      Gianluca Cooper  [address]  [facility] [address]      Dear Colleague,    Thank you for referring your patient, Gianluca Cooper, to the [facility]. Please see a copy of my visit note below.      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  We know without a doubt that the inguinal nodes contain metastatic prostate cancer because this is how he was diagnosed.    Eight months ago, his PSA had doubled to 377, which was concerning for the development of castrate resistant disease.  However his testosterone was normal as well, indicating failure of his 45 Lupron shot to last the 6 months.    S/P  repeat lupron with PSA dropping from 377 to 131, but two months later, his testosterone remained (more than) measurable at 171. This was again c/w his 45 mg Lupron injection was ineffective.  S/P addition of abiraterone/prednisone with the expectation he becomes completely castrate with a corresponding drop in his PSA.  Happily he has had a deep and major response, with his current PSA being 2.79.  His testosterone is finally at castrate levels, as well.   Gianluca he tells me that he is tolerating the drug well without any side effects.  Minimal blood from penis due to his prostate cancer. This is not unforeseen in someone with de aries disease that has responded to therapy  Left inguinal pain unclear etiology, but could be due to a hernia.  He did have palpable adenopathy in the area previously which I could not palpate today.      PLAN     Continue abiraterone/prednisone  Next Lupron today of 22.5 mg  Return to clinic with me in 3 months with labs just prior, for his next ADT.    30 minutes spent  on this appointment today including chart review, direct face-to-face time, care coordination, and documentation.    Brigido Bajwa MD, MSc  Associate Professor of Medicine  [employer]  [address] [address] [phone]      DIAGNOSIS     De aries metastatic castration-sensitive prostate adenocarcinoma, diagnosed at right inguinal LN biopsy, 6/9/2023. Gianluca presented with inguinal adenopathy that led to the biopsy. PSA at the time of diagnosis was 145.6. PSMA PET showed warren involvement of the chest, abdomen and pelvis, wothout visceral or bone metastases. We did run a Caris on his tumor but there were no actionable mutations.  Primary resistance to leuprolide (see #1 below).      Therapy to date     Current Treatment Leuprolide and abiraterone  Treatment To Date:   ADT since 8/3/23 . PSA concah to 377 on 2/2/2024.However, his testosterone was 378! He received Lupron 45 mg at that point (2/2/2024). PSA dropped to 131 two months later (though testosterone was 171, down from 378). This demonstrated to me the possibility that Lupron does not result in sufficient androgen deprivation.  Abiraterone added 4/23/2024, after it became apparent ADT monotherapy did not result adequate suppression of testosterone (T of 171). PSA dropped from 131 to 11.78 (7/16/2024)      Hem/onc History:   Edit Oncology History  Oncology History Overview Note   Gianluca initially presented to his PCP with right inguinal swelling similar to previously when he had a right inguinal hernia that was repaired in 2006.  He was referred to a surgeon and during exploration of the right groin in the operating room he was noted to have multiple enlarged lymph nodes without a hernia for which he underwent a sentinel lymph node biopsy with pathology consistent with metastatic prostate adenocarcinoma.  A PSMA PET scan showed evidence of metastatic disease in nonregional lymph nodes of the  chest, abdomen, and pelvis without bony or visceral metastases.      Adenocarcinoma of prostate (H)   6/29/2023 Biopsy    Right inguinal excisional lymph node biopsy  Final Diagnosis   Lymph nodes, right inguinal, excision:  - Metastatic prostatic adenocarcinoma involving 2 of 5 lymph nodes  - Deposit size: 6.5 cm  - Extranodal extension is present        7/18/2023 Tumor Markers    PSA Tumor Marker 0.00 - 4.50 ng/mL 145.60 High          7/25/2023 Imaging    PSMA PET-CT  IMPRESSION: In this patient with biopsy-proven metastatic prostate  cancer:  1. Marked uptake (SUV mean 14)  in the prostate gland with  extraprostatic extension, compatible with prostatic adenocarcinoma.  2. Moderate to marked uptake in the multiple enlarged lymph nodes (SUV  mean 7-15) in the chest, abdomen and pelvis, consistent with extensive  warren metastases.  3. No bone metastases.  4. Incidentally noted hypoattenuating lesion in the uncinate process  1.4 cm, indeterminant, may represent an IPMN.  Attention on follow-up.     8/3/2023 - 2/2/2024 Chemotherapy    OP ONC Prostate Cancer - Leuprolide (Eligard) EVERY 6 MONTHS  Plan Provider: Ramiro Headley MD  Treatment goal: Palliative  Line of treatment: [facility]     4/29/2024 -  Chemotherapy    Oral ONC Prostate Cancer - Abiraterone  Plan Provider: Brigido Bajwa MD  Treatment goal: Palliative  Line of treatment: [facility]     7/18/2024 -  Supportive Treatment    OP ONC Prostate Cancer - Leuprolide (Eligard) EVERY 3 MONTHS  Plan Provider: Brigido Bajwa MD  Treatment goal: Palliative  Line of treatment: [facility]         Interval History:   Gianluca is back.  His inguinal nodes have shrunk down nicely, but he still has some pain in the left inguinal area.  He would like me to take a look.  Continues to have some blood come out of his penis when urinating.  He gets some pain in his testicles when he walks too far.  Other than that he is actually feeling pretty well.   He does not note  any hot flashes.  He has no other concerns today.    Subjective  Past Medical History:   Diagnosis Date     Depression      Gastro-oesophageal reflux disease      Headache(784.0)      Hypertension      LOC (loss of consciousness) (H) age 45    out for 20 mintes after hit on top of head with board     Major depression 5/7/2013     Otitis media, chronic      Sleep apnea     cant tolerate cpap     HEIKE  GERD  COPD  Mood d/o -- MDD with psychotic features, PTSD  Cocaine and marijuana use disorders  Hx of accidental opiate overdose x2 c/b respiratory arrest 1/2023 -- reports his cocaine was laced with fentanyl  Hx HBV and HCV, cleared as of 6/25/18  Hx IVDU last ~2002  Tobacco use  Chart Review:  1/11/2023 admitted for respiratory arrest with in the setting of opioid overdose with bystander administration of naloxone.  PMH notable for HTN, HLD, GERD, MDD with psychotic features, LTBI, chronic HCV inguinal hernia repair 2006, HEIKE/OHS.     Past Surgical History:   Procedure Laterality Date     COLONOSCOPY N/A 3/8/2018    Procedure: COLONOSCOPY;  colonoscopy;  Surgeon: Iona Lutz MD;  Location:  GI     HERNIA REPAIR  2006    boith sides     HERNIORRHAPHY INGUINAL Right 6/29/2023    Procedure: Right INGUINAL, lymph node excisional biopsy;  Surgeon: Frank Carlson MD;  Location: [facility] OR     LASER CO2 TYMPANOMASTOIDECTOMY  9/7/2011    Procedure:LASER CO2 TYMPANOMASTOIDECTOMY; Right Tympanoplasty , Cartilage Backed Right Tympanomastoidectomy, Omni Guide Laser on Standby  *Latex Safe*; Surgeon:BENNETT MAXWELL; Location: OR     SEPTOPLASTY  9/17/2012    Procedure: SEPTOPLASTY;  Septoplasty *Latex Safe* Turbinate reduction ;  Surgeon: Babar Dickerson MD;  Location:  OR     UVULOPALATOPHARYNGOPLASTY  7/9/2012    Procedure: UVULOPALATOPHARYNGOPLASTY;  Uvulopalatopharyngoplasty * Latex Safe*;  Surgeon: Babar Dickerson MD;  Location:  OR     Social History     Socioeconomic History     Marital status:  Single   Tobacco Use     Smoking status: Every Day     Current packs/day: 0.50     Types: Cigarettes     Smokeless tobacco: Never   Substance and Sexual Activity     Alcohol use: No     Alcohol/week: 0.0 standard drinks of alcohol     Drug use: No     Comment: hx polysubstance now in remission     Social Determinants of Health     Financial Resource Strain: Not on File (3/29/2021)    Received from [employer]    Financial Resource Strain      Financial Resource Strain: 0   Food Insecurity: Not on File (2024)    Received from [employer]    Food Insecurity      Food: 0   Transportation Needs: Not on File (3/29/2021)    Received from [employer]    Transportation Needs      Transportation: 0   Physical Activity: Not on File (2019)    Received from [employer]    Physical Activity      Physical Activity: 0   Stress: Not on File (3/29/2021)    Received from [employer]    Stress      Stress: 0   Recent Concern: Stress - At Risk (3/29/2021)    Received from [employer] [email or web address]    Stress      Stress: 2   Social Connections: Not on File (3/29/2021)    Received from [employer]    Social Connections      Connectedness: 0   Housing Stability: Not on File (3/29/2021)    Received from [employer]    Housing Stability      Housin      SH obtained at initial visit 23:  He currently lives independently in [place].  He previously worked as a .  He is not currently working and is on Social Security. He is from [place]. He has 3 children who live in [place].  He has not seen them for over 10 years because they will not see him.  He has 1 friend with whom he is close.  Otherwise he is pretty much on his own.  He reports he does not have any friends or family locally.  He spends most of his day at home and tells me he does not have any activities that bring him tay.  He does most of his ADLs himself such as cooking, cleaning, self-care but feels he has been slacking on this.  He identifies a point in time when his psychiatrist took  him off of his antianxiety medication as a change in his mental wellbeing and he feels very depressed now.  He has been smoking tobacco products since the age of 10 and smokes about half a pack a day, approximately 30 pack years in total.  He uses marijuana daily and will smoke a joint in the morning and one at night.  He does not drink alcohol regularly.  He occasionally uses cocaine, most recently 4 months ago, and has not used recently due to not having the funds for it.  He had an episode in January when he used cocaine that was laced with fentanyl resulting in an accidental overdose requiring hospitalization.  He has a remote history of IV drug use including heroin in the early 2000's, he does not use IV drugs now.    Family History   Problem Relation Age of Onset     Depression Mother      Depression Father      Substance Abuse Father      Mental Illness Other         institutionalized     Substance Abuse Brother      Substance Abuse Brother      Substance Abuse Brother      Substance Abuse Brother      Substance Abuse Brother      Liver Disease No family hx of          Current Outpatient Medications   Medication Sig Dispense Refill     abiraterone (ZYTIGA) 250 MG tablet Take 4 tablets (1,000 mg) by mouth daily for 30 doses. Take on empty stomach. 120 tablet 0     ASPIRIN PO Take 81 mg by mouth daily       atorvastatin (LIPITOR) 40 MG tablet Take 1 tablet (40 mg) by mouth daily       buPROPion (WELLBUTRIN XL) 150 MG 24 hr tablet Take 1 tablet (150 mg) by mouth every morning 30 tablet 1     doxepin (SINEQUAN) 10 MG capsule Take 1 capsule (10 mg) by mouth At Bedtime 30 capsule 2     HYDROcodone-acetaminophen (NORCO) 5-325 MG tablet Take 1-2 tablets by mouth every 4 hours as needed for moderate to severe pain 15 tablet 0     lisinopril-hydrochlorothiazide (PRINZIDE,ZESTORETIC) 20-25 MG per tablet Take 1 tablet by mouth daily       metFORMIN (GLUCOPHAGE) 500 MG tablet Take one tablet daily in the am and two  tablets at bedtime.  R Skolar NP at [facility]       OLANZapine (ZYPREXA) 15 MG tablet Take one-half (7.5 mg) to one tablet (15 mg) by mouth at bedtime 30 tablet 1     prazosin (MINIPRESS) 2 MG capsule Take 2 capsules (4 mg) by mouth At Bedtime 60 capsule 1     predniSONE (DELTASONE) 5 MG tablet Take 1 tablet (5 mg) by mouth 2 times daily. 60 tablet 0     predniSONE (DELTASONE) 5 MG tablet Take 1 tablet (5 mg) by mouth 2 times daily. 60 tablet 0     predniSONE (DELTASONE) 5 MG tablet Take 1 tablet (5 mg) by mouth 2 times daily 60 tablet 0     predniSONE (DELTASONE) 5 MG tablet Take 1 tablet (5 mg) by mouth 2 times daily 60 tablet 0     ranitidine (ZANTAC) 150 MG tablet Take 1 tablet (150 mg) by mouth 2 times daily       senna-docusate (SENOKOT-S/PERICOLACE) 8.6-50 MG tablet Take 1-2 tablets by mouth 2 times daily 15 tablet 0     sertraline (ZOLOFT) 100 MG tablet Take 2 tablets (200 mg) by mouth daily 60 tablet 1     venlafaxine (EFFEXOR-XR) 37.5 MG 24 hr capsule Take 2 capsules (75 mg) by mouth daily 60 capsule 1     VITAMIN D3 1000 units tablet TK 2 TS PO D  1     No current facility-administered medications for this visit.       Objective  Physical Exam:   There were no vitals taken for this visit.  Physical Exam  Constitutional:       General: He is not in acute distress.     Appearance: He is not ill-appearing or toxic-appearing.   HENT:      Head: Normocephalic and atraumatic.      Mouth/Throat:      Mouth: Mucous membranes are moist.   Eyes:      General: No scleral icterus.     Extraocular Movements: Extraocular movements intact.   Cardiovascular:      Rate and Rhythm: Normal rate and regular rhythm.   Pulmonary:      Effort: Pulmonary effort is normal. No respiratory distress.   Abdominal:      General: There is no distension.      Palpations: Abdomen is soft.      Tenderness: There is no abdominal tenderness. There is no guarding.   Skin:     General: Skin is warm and dry.      Coloration: Skin is not jaundiced.    Neurological:      Mental Status: He is alert. Mental status is at baseline.     Lymph node survey reveals resolution of previously palpated bilateral 3-5 cm very firm lymph nodes the medial aspect of the inguinal bands, medial >lateral, particularly on the right,      Data:     PSA trend: (current 2.79)          CBC and CMP are normal!   Latest Reference Range & Units 02/02/24 11:49 04/19/24 09:40 07/16/24 11:48 10/08/24 11:43   Testosterone Total 240 - 950 ng/dL 378 171 (L) <2 (L) <2 (L)   (L): Data is abnormally low     Latest Reference Range & Units 07/18/23 12:26 02/02/24 11:49 04/19/24 09:40   PSA Tumor Marker 0.00 - 4.50 ng/mL 145.60 (H) 377.10 (H) 131.80 (H)   (H): Data is abnormally high    PSMA Scan 7/25/2023        IMPRESSION: In this patient with biopsy-proven metastatic prostate  cancer:  1. Marked uptake (SUV mean 14)  in the prostate gland with  extraprostatic extension, compatible with prostatic adenocarcinoma.  2. Moderate to marked uptake in the multiple enlarged lymph nodes (SUV  mean 7-15) in the chest, abdomen and pelvis, consistent with extensive  warren metastases.  3. No bone metastases.          I personally reviewed the following studies:  Recent Labs   Lab Test 10/08/24  1143 04/19/24  0940 02/02/24  1149 06/25/18  1114 11/13/17  1113   WBC 5.7 5.8 5.2 5.6 6.3   % NEUTROPHILS 61 65 66  --  63.7   HEMOGLOBIN 12.2* 13.6 12.6* 13.7 14.6   PLATELET COUNT 261 272 384 222 248     Recent Labs   Lab Test 10/08/24  1143 09/10/24  1200 08/13/24  1146 07/16/24  1148 06/18/24  1058   SODIUM 135 133* 128* 137 135   POTASSIUM (R) 2.7* 3.0* 3.0* 3.7 3.6   CHLORIDE (R) 95* 94* 90* 101 99   CARBON DIOXIDE (R) 29 30* 28 27 26   ANION GAP 11 9 10 9 10   UREA NITROGEN (R) 9.3 9.5 11.1 14.2 11.5   CREATININE 0.94 0.76 0.90 0.83 0.76   CALCIUM, TOTAL 9.5 9.6 9.2 9.8 9.5     Recent Labs   Lab Test 12/21/21  1213   MAGNESIUM 1.6     Recent Labs   Lab Test 10/08/24  1143 09/10/24  1200 08/13/24  1146   BILIRUBIN  TOTAL 0.3 0.4 0.5   ALKPHOS 125 111 105   ALT 19 22 27   AST 27 30 30   ALBUMIN (R) 4.0 3.8 3.7       No results found for this or any previous visit (from the past 24 hour(s)).         Again, thank you for allowing me to participate in the care of your patient.        Sincerely,        Brigido Bajwa MD

## 2024-11-05 ENCOUNTER — LAB (OUTPATIENT)
Dept: LAB | Facility: CLINIC | Age: 69
End: 2024-11-05
Attending: INTERNAL MEDICINE
Payer: COMMERCIAL

## 2024-11-05 DIAGNOSIS — C61 ADENOCARCINOMA OF PROSTATE (H): ICD-10-CM

## 2024-11-05 LAB
ALBUMIN SERPL BCG-MCNC: 4 G/DL (ref 3.5–5.2)
ALP SERPL-CCNC: 142 U/L (ref 40–150)
ALT SERPL W P-5'-P-CCNC: 37 U/L (ref 0–70)
ANION GAP SERPL CALCULATED.3IONS-SCNC: 13 MMOL/L (ref 7–15)
AST SERPL W P-5'-P-CCNC: 56 U/L (ref 0–45)
BILIRUB SERPL-MCNC: 0.8 MG/DL
BUN SERPL-MCNC: 18 MG/DL (ref 8–23)
CALCIUM SERPL-MCNC: 9.6 MG/DL (ref 8.8–10.4)
CHLORIDE SERPL-SCNC: 89 MMOL/L (ref 98–107)
CREAT SERPL-MCNC: 1.07 MG/DL (ref 0.67–1.17)
EGFRCR SERPLBLD CKD-EPI 2021: 75 ML/MIN/1.73M2
GLUCOSE SERPL-MCNC: 199 MG/DL (ref 70–99)
HCO3 SERPL-SCNC: 25 MMOL/L (ref 22–29)
POTASSIUM SERPL-SCNC: 3.4 MMOL/L (ref 3.4–5.3)
PROT SERPL-MCNC: 7.1 G/DL (ref 6.4–8.3)
SODIUM SERPL-SCNC: 127 MMOL/L (ref 135–145)

## 2024-11-05 PROCEDURE — 36415 COLL VENOUS BLD VENIPUNCTURE: CPT

## 2024-11-05 PROCEDURE — 82947 ASSAY GLUCOSE BLOOD QUANT: CPT

## 2024-11-05 PROCEDURE — 84155 ASSAY OF PROTEIN SERUM: CPT

## 2024-11-05 NOTE — NURSING NOTE
Chief Complaint   Patient presents with    Blood Draw     Labs drawn via  by RN in lab.      Christina Bishop RN

## 2024-11-07 ENCOUNTER — TELEPHONE (OUTPATIENT)
Dept: ONCOLOGY | Facility: CLINIC | Age: 69
End: 2024-11-07
Payer: COMMERCIAL

## 2024-11-07 NOTE — ORAL ONC MGMT
Oral Chemotherapy Monitoring Program  Lab Follow Up    Reviewed lab results from 11/5/24.        7/17/2024     1:00 PM 8/14/2024     1:00 PM 9/11/2024     3:00 PM 9/11/2024     3:55 PM 10/9/2024     9:00 AM 10/22/2024     8:00 AM 11/7/2024    12:00 PM   ORAL CHEMOTHERAPY   Assessment Type Lab Monitoring Lab Monitoring;Refill Lab Monitoring Refill Lab Monitoring Refill Lab Monitoring;Left Voicemail   Diagnosis Code Prostate Cancer Prostate Cancer Prostate Cancer Prostate Cancer Prostate Cancer Prostate Cancer Prostate Cancer   Providers Dr. Aydee Bajwa   [facility]   Is this patient followed by the [facility] OC team? No No No No No No No   Drug Name Zytiga (abiraterone) Zytiga (abiraterone) Zytiga (abiraterone) Zytiga (abiraterone) Zytiga (abiraterone) Zytiga (abiraterone) Zytiga (abiraterone)   Dose 1,000 mg 1,000 mg 1,000 mg 1,000 mg 1,000 mg 1,000 mg 1,000 mg   Current Schedule Daily Daily Daily Daily Daily Daily Daily   Cycle Details Continuous Continuous Continuous Continuous Continuous Continuous Continuous   Is the dose as ordered appropriate for the patient?     Yes         Labs:  _  Result Component Current Result Ref Range   Sodium 127 (L) (11/5/2024) 135 - 145 mmol/L     _  Result Component Current Result Ref Range   Potassium 3.4 (11/5/2024) 3.4 - 5.3 mmol/L     _  Result Component Current Result Ref Range   Calcium 9.6 (11/5/2024) 8.8 - 10.4 mg/dL     No results found for Mag within last 30 days.     No results found for Phos within last 30 days.     _  Result Component Current Result Ref Range   Albumin 4.0 (11/5/2024) 3.5 - 5.2 g/dL     _  Result Component Current Result Ref Range   Urea Nitrogen 18.0 (11/5/2024) 8.0 - 23.0 mg/dL     _  Result Component Current Result Ref Range   Creatinine 1.07 (11/5/2024) 0.67 - 1.17 mg/dL     _  Result Component Current Result Ref Range   AST 56 (H)  (11/5/2024) 0 - 45 U/L     _  Result Component Current Result Ref Range   ALT 37 (11/5/2024) 0 - 70 U/L     _  Result Component Current Result Ref Range   Bilirubin Total 0.8 (11/5/2024) <=1.2 mg/dL     No results found for WBC within last 30 days.     No results found for HGB within last 30 days.     No results found for PLT within last 30 days.     No results found for ANC within last 30 days.     No results found for ANC within last 30 days.        Assessment & Plan:  No concerning abnormalities. Grade 1 AST elevation, will monitor. Slight Hyponatremia however this has been seen on labs earlier this summer and we will continue to monitor as Dr Bajwa has been previously made aware.    Left voicemail with interpretor for checking in and lab results review - will update when response received.    SP is filling the Rx that was released on 10/22/24 today, will move out refill date.    Follow-Up:  Labs 12/3    Cheyanne Juarez, PharmD, BCPS  Oral Chemotherapy Monitoring Program  [facility]  [phone]

## 2024-12-01 DIAGNOSIS — C61 ADENOCARCINOMA OF PROSTATE (H): Primary | ICD-10-CM

## 2024-12-04 ENCOUNTER — APPOINTMENT (OUTPATIENT)
Dept: INTERPRETER SERVICES | Facility: CLINIC | Age: 69
End: 2024-12-04
Payer: COMMERCIAL

## 2024-12-05 ENCOUNTER — LAB (OUTPATIENT)
Dept: LAB | Facility: CLINIC | Age: 69
End: 2024-12-05
Attending: INTERNAL MEDICINE
Payer: COMMERCIAL

## 2024-12-05 ENCOUNTER — NURSE TRIAGE (OUTPATIENT)
Dept: ONCOLOGY | Facility: CLINIC | Age: 69
End: 2024-12-05

## 2024-12-05 DIAGNOSIS — C61 ADENOCARCINOMA OF PROSTATE (H): ICD-10-CM

## 2024-12-05 DIAGNOSIS — E87.6 LOW BLOOD POTASSIUM: Primary | ICD-10-CM

## 2024-12-05 LAB
ALBUMIN SERPL BCG-MCNC: 4.2 G/DL (ref 3.5–5.2)
ALP SERPL-CCNC: 132 U/L (ref 40–150)
ALT SERPL W P-5'-P-CCNC: 29 U/L (ref 0–70)
ANION GAP SERPL CALCULATED.3IONS-SCNC: 10 MMOL/L (ref 7–15)
AST SERPL W P-5'-P-CCNC: 42 U/L (ref 0–45)
BILIRUB SERPL-MCNC: 0.3 MG/DL
BUN SERPL-MCNC: 13.6 MG/DL (ref 8–23)
CALCIUM SERPL-MCNC: 9.4 MG/DL (ref 8.8–10.4)
CHLORIDE SERPL-SCNC: 96 MMOL/L (ref 98–107)
CREAT SERPL-MCNC: 1.17 MG/DL (ref 0.67–1.17)
EGFRCR SERPLBLD CKD-EPI 2021: 67 ML/MIN/1.73M2
GLUCOSE SERPL-MCNC: 167 MG/DL (ref 70–99)
HCO3 SERPL-SCNC: 32 MMOL/L (ref 22–29)
POTASSIUM SERPL-SCNC: 2.5 MMOL/L (ref 3.4–5.3)
PROT SERPL-MCNC: 7.1 G/DL (ref 6.4–8.3)
SODIUM SERPL-SCNC: 138 MMOL/L (ref 135–145)

## 2024-12-05 PROCEDURE — 80053 COMPREHEN METABOLIC PANEL: CPT | Performed by: PATHOLOGY

## 2024-12-05 PROCEDURE — 36415 COLL VENOUS BLD VENIPUNCTURE: CPT | Performed by: PATHOLOGY

## 2024-12-05 RX ORDER — POTASSIUM CHLORIDE 1500 MG/1
20 TABLET, EXTENDED RELEASE ORAL 2 TIMES DAILY
Qty: 60 TABLET | Refills: 0 | Status: SHIPPED | OUTPATIENT
Start: 2024-12-05

## 2024-12-05 NOTE — TELEPHONE ENCOUNTER
DATE/TIME OF CALL RECEIVED FROM LAB:  12/05/24 at 10:39 AM   Critical LAB TEST:  potassium 2.5  Last LAB VALUE:  11/05/24 potassium 3.4  PROVIDER NOTIFIED?: Yes  PROVIDER NAME:   DATE/TIME LAB VALUE REPORTED TO PROVIDER: 12/5/2024  MECHANISM OF PROVIDER NOTIFICATION:  [employer] page  [phone], Secure message [phone] Call to pt to check in. Pt denies muscle weakness or spasms, tingling or numbness, feeling of skipped heart beats or palpitations. Pt reports he does not know if he is currently taking potassium supplement.   PROVIDER RESPONSE:

## 2024-12-05 NOTE — ORAL ONC MGMT
Oral Chemotherapy Monitoring Program  Lab Follow Up    Reviewed lab results from 12/5.        8/14/2024     1:00 PM 9/11/2024     3:00 PM 9/11/2024     3:55 PM 10/9/2024     9:00 AM 10/22/2024     8:00 AM 11/7/2024    12:00 PM 12/5/2024     4:00 PM   ORAL CHEMOTHERAPY   Assessment Type Lab Monitoring;Refill Lab Monitoring Refill Lab Monitoring Refill Lab Monitoring;Left Voicemail Lab Monitoring   Diagnosis Code Prostate Cancer Prostate Cancer Prostate Cancer Prostate Cancer Prostate Cancer Prostate Cancer Prostate Cancer   Providers Dr. Aydee Bajwa   [facility]   Is this patient followed by the [facility] OC team? No No No No No No No   Drug Name Zytiga (abiraterone) Zytiga (abiraterone) Zytiga (abiraterone) Zytiga (abiraterone) Zytiga (abiraterone) Zytiga (abiraterone) Zytiga (abiraterone)   Dose 1,000 mg 1,000 mg 1,000 mg 1,000 mg 1,000 mg 1,000 mg 1,000 mg   Current Schedule Daily Daily Daily Daily Daily Daily Daily   Cycle Details Continuous Continuous Continuous Continuous Continuous Continuous Continuous   Is the dose as ordered appropriate for the patient?    Yes          Labs:  _  Result Component Current Result Ref Range   Sodium 138 (12/5/2024) 135 - 145 mmol/L     _  Result Component Current Result Ref Range   Potassium 2.5 (LL) (12/5/2024) 3.4 - 5.3 mmol/L     _  Result Component Current Result Ref Range   Calcium 9.4 (12/5/2024) 8.8 - 10.4 mg/dL     No results found for Mag within last 30 days.     No results found for Phos within last 30 days.     _  Result Component Current Result Ref Range   Albumin 4.2 (12/5/2024) 3.5 - 5.2 g/dL     _  Result Component Current Result Ref Range   Urea Nitrogen 13.6 (12/5/2024) 8.0 - 23.0 mg/dL     _  Result Component Current Result Ref Range   Creatinine 1.17 (12/5/2024) 0.67 - 1.17 mg/dL     _  Result Component Current Result Ref Range   AST 42  (12/5/2024) 0 - 45 U/L     _  Result Component Current Result Ref Range   ALT 29 (12/5/2024) 0 - 70 U/L     _  Result Component Current Result Ref Range   Bilirubin Total 0.3 (12/5/2024) <=1.2 mg/dL     No results found for WBC within last 30 days.     No results found for HGB within last 30 days.     No results found for PLT within last 30 days.     No results found for ANC within last 30 days.     No results found for ANC within last 30 days.        Assessment & Plan:  Results are concerning for hypokalemia, K supplement 20 mEq BID prescribed today by Dr. Bajwa.  Continue abiraterone as prescribed.     Questions answered to patient's satisfaction. Triage RN Colette discussed with patient.     Follow-Up:  1/6 monthly labs    Shanique Son PharmD  Oral Chemotherapy Monitoring Program  [facility]  [phone]

## 2024-12-05 NOTE — TELEPHONE ENCOUNTER
Per Dr Aydee clemens put him on potassium supplementation KCL 20 mEq Twice daily #60 tabs.      called #[phone] used to complete call.     Call placed to Gianluca to let him know that DR Bajwa would like him to start potassium supplement 20mEq twice daily. He should take 1 tablet in AM and one in the evening. We will send a prescription for a one month supply. Gianluca let me know his preferred pharmacy.     Medication Teed up and sent to pharmacy

## 2024-12-09 ENCOUNTER — MEDICAL CORRESPONDENCE (OUTPATIENT)
Dept: HEALTH INFORMATION MANAGEMENT | Facility: CLINIC | Age: 69
End: 2024-12-09
Payer: COMMERCIAL

## 2024-12-10 ENCOUNTER — TRANSCRIBE ORDERS (OUTPATIENT)
Dept: OTHER | Age: 69
End: 2024-12-10

## 2024-12-10 DIAGNOSIS — G62.9 NEUROPATHY: ICD-10-CM

## 2024-12-10 DIAGNOSIS — Z79.4 CONTROLLED TYPE 2 DIABETES MELLITUS WITHOUT COMPLICATION, WITH LONG-TERM CURRENT USE OF INSULIN (H): Primary | ICD-10-CM

## 2024-12-10 DIAGNOSIS — E11.9 CONTROLLED TYPE 2 DIABETES MELLITUS WITHOUT COMPLICATION, WITH LONG-TERM CURRENT USE OF INSULIN (H): Primary | ICD-10-CM

## 2024-12-11 ENCOUNTER — PATIENT OUTREACH (OUTPATIENT)
Dept: CARE COORDINATION | Facility: CLINIC | Age: 69
End: 2024-12-11
Payer: COMMERCIAL

## 2025-01-06 ENCOUNTER — LAB (OUTPATIENT)
Dept: LAB | Facility: CLINIC | Age: 70
End: 2025-01-06
Attending: INTERNAL MEDICINE
Payer: COMMERCIAL

## 2025-01-06 DIAGNOSIS — C61 ADENOCARCINOMA OF PROSTATE (H): ICD-10-CM

## 2025-01-06 LAB
ALBUMIN SERPL BCG-MCNC: 4.5 G/DL (ref 3.5–5.2)
ALP SERPL-CCNC: 123 U/L (ref 40–150)
ALT SERPL W P-5'-P-CCNC: 20 U/L (ref 0–70)
ANION GAP SERPL CALCULATED.3IONS-SCNC: 12 MMOL/L (ref 7–15)
AST SERPL W P-5'-P-CCNC: 28 U/L (ref 0–45)
BASOPHILS # BLD AUTO: 0 10E3/UL (ref 0–0.2)
BASOPHILS NFR BLD AUTO: 0 %
BILIRUB SERPL-MCNC: 0.3 MG/DL
BUN SERPL-MCNC: 12.3 MG/DL (ref 8–23)
CALCIUM SERPL-MCNC: 10.1 MG/DL (ref 8.8–10.4)
CHLORIDE SERPL-SCNC: 93 MMOL/L (ref 98–107)
CREAT SERPL-MCNC: 0.81 MG/DL (ref 0.67–1.17)
EGFRCR SERPLBLD CKD-EPI 2021: >90 ML/MIN/1.73M2
EOSINOPHIL # BLD AUTO: 0 10E3/UL (ref 0–0.7)
EOSINOPHIL NFR BLD AUTO: 0 %
ERYTHROCYTE [DISTWIDTH] IN BLOOD BY AUTOMATED COUNT: 12.9 % (ref 10–15)
GLUCOSE SERPL-MCNC: 185 MG/DL (ref 70–99)
HCO3 SERPL-SCNC: 26 MMOL/L (ref 22–29)
HCT VFR BLD AUTO: 38.3 % (ref 40–53)
HGB BLD-MCNC: 13.4 G/DL (ref 13.3–17.7)
IMM GRANULOCYTES # BLD: 0 10E3/UL
IMM GRANULOCYTES NFR BLD: 0 %
LYMPHOCYTES # BLD AUTO: 0.3 10E3/UL (ref 0.8–5.3)
LYMPHOCYTES NFR BLD AUTO: 5 %
MCH RBC QN AUTO: 31.2 PG (ref 26.5–33)
MCHC RBC AUTO-ENTMCNC: 35 G/DL (ref 31.5–36.5)
MCV RBC AUTO: 89 FL (ref 78–100)
MONOCYTES # BLD AUTO: 0.1 10E3/UL (ref 0–1.3)
MONOCYTES NFR BLD AUTO: 1 %
NEUTROPHILS # BLD AUTO: 4.9 10E3/UL (ref 1.6–8.3)
NEUTROPHILS NFR BLD AUTO: 93 %
NRBC # BLD AUTO: 0 10E3/UL
NRBC BLD AUTO-RTO: 0 /100
PLATELET # BLD AUTO: 325 10E3/UL (ref 150–450)
POTASSIUM SERPL-SCNC: 3.6 MMOL/L (ref 3.4–5.3)
PROT SERPL-MCNC: 7.5 G/DL (ref 6.4–8.3)
PSA SERPL DL<=0.01 NG/ML-MCNC: 1.54 NG/ML (ref 0–6.5)
RBC # BLD AUTO: 4.29 10E6/UL (ref 4.4–5.9)
SODIUM SERPL-SCNC: 131 MMOL/L (ref 135–145)
WBC # BLD AUTO: 5.3 10E3/UL (ref 4–11)

## 2025-01-06 PROCEDURE — 85014 HEMATOCRIT: CPT

## 2025-01-06 PROCEDURE — 84153 ASSAY OF PSA TOTAL: CPT

## 2025-01-06 PROCEDURE — 36415 COLL VENOUS BLD VENIPUNCTURE: CPT

## 2025-01-06 PROCEDURE — 85004 AUTOMATED DIFF WBC COUNT: CPT

## 2025-01-06 PROCEDURE — 82435 ASSAY OF BLOOD CHLORIDE: CPT

## 2025-01-06 PROCEDURE — 84403 ASSAY OF TOTAL TESTOSTERONE: CPT

## 2025-01-06 PROCEDURE — 84155 ASSAY OF PROTEIN SERUM: CPT

## 2025-01-07 ENCOUNTER — TELEPHONE (OUTPATIENT)
Dept: ONCOLOGY | Facility: CLINIC | Age: 70
End: 2025-01-07
Payer: COMMERCIAL

## 2025-01-07 NOTE — TELEPHONE ENCOUNTER
Oral Chemotherapy Monitoring Program  Lab Follow Up    Reviewed lab results from 1/6.    Assessment & Plan:  CMP and CBC w/diff reviewed. Results are stable with no concerning abnormalities. Plan to continue Zytiga as prescribed.     Called patient using  and left VM. Placed call to patient in follow up of oral chemotherapy. Left message requesting call back. No drug names were mentioned. Will update when response received.     Follow-Up:  1/13 Visit with Dr. Aydee Bush, PharmD  Hematology/Oncology Clinical Pharmacist  [facility]  [phone]        10/9/2024     9:00 AM 10/22/2024     8:00 AM 11/7/2024    12:00 PM 12/5/2024     4:00 PM 12/6/2024     8:00 AM 1/3/2025     8:00 AM 1/7/2025    11:00 AM   ORAL CHEMOTHERAPY   Assessment Type Lab Monitoring Refill Lab Monitoring;Left Voicemail Lab Monitoring Refill Refill Lab Monitoring   Diagnosis Code Prostate Cancer Prostate Cancer Prostate Cancer Prostate Cancer Prostate Cancer Prostate Cancer Prostate Cancer   Providers Dr. Aydee Bajwa   [facility]   Is this patient followed by the [facility] OC team? No No No No No No No   Drug Name Zytiga (abiraterone) Zytiga (abiraterone) Zytiga (abiraterone) Zytiga (abiraterone) Zytiga (abiraterone) Zytiga (abiraterone) Zytiga (abiraterone)   Dose 1,000 mg 1,000 mg 1,000 mg 1,000 mg 1,000 mg 1,000 mg 1,000 mg   Current Schedule Daily Daily Daily Daily Daily Daily Daily   Cycle Details Continuous Continuous Continuous Continuous Continuous Continuous Continuous   Is the dose as ordered appropriate for the patient? Yes             Labs:  _  Result Component Current Result Ref Range   Sodium 131 (L) (1/6/2025) 135 - 145 mmol/L     _  Result Component Current Result Ref Range   Potassium 3.6 (1/6/2025) 3.4 - 5.3 mmol/L     _  Result Component Current Result Ref  Range   Calcium 10.1 (1/6/2025) 8.8 - 10.4 mg/dL     No results found for Mag within last 30 days.     No results found for Phos within last 30 days.     _  Result Component Current Result Ref Range   Albumin 4.5 (1/6/2025) 3.5 - 5.2 g/dL     _  Result Component Current Result Ref Range   Urea Nitrogen 12.3 (1/6/2025) 8.0 - 23.0 mg/dL     _  Result Component Current Result Ref Range   Creatinine 0.81 (1/6/2025) 0.67 - 1.17 mg/dL     _  Result Component Current Result Ref Range   AST 28 (1/6/2025) 0 - 45 U/L     _  Result Component Current Result Ref Range   ALT 20 (1/6/2025) 0 - 70 U/L     _  Result Component Current Result Ref Range   Bilirubin Total 0.3 (1/6/2025) <=1.2 mg/dL     _  Result Component Current Result Ref Range   WBC Count 5.3 (1/6/2025) 4.0 - 11.0 10e3/uL     _  Result Component Current Result Ref Range   Hemoglobin 13.4 (1/6/2025) 13.3 - 17.7 g/dL     _  Result Component Current Result Ref Range   Platelet Count 325 (1/6/2025) 150 - 450 10e3/uL     _  Result Component Current Result Ref Range   Absolute Neutrophils 4.9 (1/6/2025) 1.6 - 8.3 10e3/uL

## 2025-01-08 LAB — TESTOST SERPL-MCNC: <2 NG/DL (ref 240–950)

## 2025-01-13 ENCOUNTER — ONCOLOGY VISIT (OUTPATIENT)
Dept: ONCOLOGY | Facility: CLINIC | Age: 70
End: 2025-01-13
Attending: INTERNAL MEDICINE
Payer: COMMERCIAL

## 2025-01-13 VITALS
DIASTOLIC BLOOD PRESSURE: 84 MMHG | BODY MASS INDEX: 27.21 KG/M2 | SYSTOLIC BLOOD PRESSURE: 162 MMHG | OXYGEN SATURATION: 99 % | RESPIRATION RATE: 28 BRPM | HEART RATE: 92 BPM | WEIGHT: 163.5 LBS | TEMPERATURE: 99 F

## 2025-01-13 DIAGNOSIS — C61 ADENOCARCINOMA OF PROSTATE (H): Primary | ICD-10-CM

## 2025-01-13 PROCEDURE — T1013 SIGN LANG/ORAL INTERPRETER: HCPCS | Mod: GT

## 2025-01-13 PROCEDURE — 250N000011 HC RX IP 250 OP 636: Mod: JZ | Performed by: INTERNAL MEDICINE

## 2025-01-13 PROCEDURE — 99214 OFFICE O/P EST MOD 30 MIN: CPT | Performed by: INTERNAL MEDICINE

## 2025-01-13 PROCEDURE — 96402 CHEMO HORMON ANTINEOPL SQ/IM: CPT

## 2025-01-13 PROCEDURE — G2211 COMPLEX E/M VISIT ADD ON: HCPCS | Performed by: INTERNAL MEDICINE

## 2025-01-13 PROCEDURE — G0463 HOSPITAL OUTPT CLINIC VISIT: HCPCS | Performed by: INTERNAL MEDICINE

## 2025-01-13 RX ADMIN — LEUPROLIDE ACETATE 22.5 MG: 22.5 INJECTION, SUSPENSION, EXTENDED RELEASE SUBCUTANEOUS at 13:45

## 2025-01-13 ASSESSMENT — PAIN SCALES - GENERAL: PAINLEVEL_OUTOF10: NO PAIN (0)

## 2025-01-13 NOTE — LETTER
1/13/2025      Gianluca Cooper  [address]  [facility] [address]      Dear Colleague,    Thank you for referring your patient, Gianluca Cooper, to the [facility]. Please see a copy of my visit note below.      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  We know without a doubt that the inguinal nodes contain metastatic prostate cancer because this is how he was diagnosed.    Almost a year ago, his PSA had doubled to 377, which was concerning for the development of castrate resistant disease.  However his testosterone was normal as well, indicating failure of his 45 Lupron shot to last the 6 months.    S/P  repeat lupron with PSA dropping from 377 to 131, but two months later, his testosterone remained (more than) measurable at 171. This was again c/w his 45 mg Lupron injection was ineffective.  S/P addition of abiraterone/prednisone with the expectation he becomes completely castrate with a corresponding drop in his PSA.  Happily he has had a deep and major response, with his current PSA being 2.79.  His testosterone finally became castrate as well.   Gianluca he tells me that he is tolerating the drug well without any side effects. We will just continue this approach for as long as it remains beneficial.  Minimal blood from penis due to his prostate cancer. This is not unforeseen in someone with de aries disease that has responded to therapy.  His hemoglobin is normal, so we will just observe without intervention.  We did discuss today that the Lupron is controlling his cancer but comes at the cost of erectile dysfunction.  He voices an understanding about that and wants to continue on the androgen  deprivation therapy.      PLAN     Continue abiraterone/prednisone  Next Lupron today of 22.5 mg  Return to clinic with me in 3 months with labs just prior, for his next ADT.    30 minutes spent on this appointment today including chart review, direct face-to-face time, care coordination, and documentation.    The longitudinal plan of care for the diagnosis(es)/condition(s) as documented were addressed during this visit. Due to the added complexity in care, I will continue to support Gianluca in the subsequent management and with ongoing continuity of care.     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 Gianluca presented with inguinal adenopathy that led to the biopsy. PSA at the time of diagnosis was 145.6. PSMA PET showed warren involvement of the chest, abdomen and pelvis, wothout visceral or bone metastases. We did run a Caris on his tumor but there were no actionable mutations.  Primary resistance to leuprolide (see #1 below).      Therapy to date     Current Treatment Leuprolide and abiraterone  Treatment To Date:   ADT since 8/3/23 . PSA concha to 377 on 2/2/2024.However, his testosterone was 378! He received Lupron 45 mg at that point (2/2/2024). PSA dropped to 131 two months later (though testosterone was 171, down from 378). This demonstrated to me the possibility that Lupron does not result in sufficient androgen deprivation.  Abiraterone added 4/23/2024, after it became apparent ADT monotherapy did not result adequate suppression of testosterone (T of 171). PSA dropped from 131 to 11.78 (7/16/2024). It was 2.79 when last assessed 10/8/2024.      Hem/onc History:   Edit Oncology History  Oncology History Overview Note   Gianluca initially presented to  his PCP with right inguinal swelling similar to previously when he had a right inguinal hernia that was repaired in 2006.  He was referred to a surgeon and during exploration of the right groin in the operating room he was noted to have multiple enlarged lymph nodes without a hernia for which he underwent a sentinel lymph node biopsy with pathology consistent with metastatic prostate adenocarcinoma.  A PSMA PET scan showed evidence of metastatic disease in nonregional lymph nodes of the chest, abdomen, and pelvis without bony or visceral metastases.      Adenocarcinoma of prostate (H)   6/29/2023 Biopsy    Right inguinal excisional lymph node biopsy  Final Diagnosis   Lymph nodes, right inguinal, excision:  - Metastatic prostatic adenocarcinoma involving 2 of 5 lymph nodes  - Deposit size: 6.5 cm  - Extranodal extension is present        7/18/2023 Tumor Markers    PSA Tumor Marker 0.00 - 4.50 ng/mL 145.60 High          7/25/2023 Imaging    PSMA PET-CT  IMPRESSION: In this patient with biopsy-proven metastatic prostate  cancer:  1. Marked uptake (SUV mean 14)  in the prostate gland with  extraprostatic extension, compatible with prostatic adenocarcinoma.  2. Moderate to marked uptake in the multiple enlarged lymph nodes (SUV  mean 7-15) in the chest, abdomen and pelvis, consistent with extensive  warren metastases.  3. No bone metastases.  4. Incidentally noted hypoattenuating lesion in the uncinate process  1.4 cm, indeterminant, may represent an IPMN.  Attention on follow-up.     8/3/2023 - 2/2/2024 Chemotherapy    OP ONC Prostate Cancer - Leuprolide (Eligard) EVERY 6 MONTHS  Plan Provider: Ramiro Headley MD  Treatment goal: Palliative  Line of treatment: First Line     4/29/2024 -  Chemotherapy    Oral ONC Prostate Cancer - Abiraterone  Plan Provider: Brigido Bajwa MD  Treatment goal: Palliative  Line of treatment: First Line     7/18/2024 -  Supportive Treatment    OP ONC Prostate Cancer - Leuprolide  (Gio) EVERY 3 MONTHS  Plan Provider: Brigido Bajwa MD  Treatment goal: Palliative  Line of treatment: First Line         Interval History:   Gianluca is back.  Continues to have some blood come out of his penis when urinating.  Still wants to know when his penis will start to work again.  When presented with the information that he would have to choose between erections/cancer progression and no erections/cancer control, for today he tells me that he prefers cancer control.  He gets some pain in his testicles when he walks too far.  Other than that he is actually feeling pretty well.   He does not note any hot flashes.  He has no other concerns today.    Subjective  Past Medical History:   Diagnosis Date     Depression      Gastro-oesophageal reflux disease      Headache(784.0)      Hypertension      LOC (loss of consciousness) (H) age 45    out for 20 mintes after hit on top of head with board     Major depression 5/7/2013     Otitis media, chronic      Sleep apnea     cant tolerate cpap     HEIKE  GERD  COPD  Mood d/o -- MDD with psychotic features, PTSD  Cocaine and marijuana use disorders  Hx of accidental opiate overdose x2 c/b respiratory arrest 1/2023 -- reports his cocaine was laced with fentanyl  Hx HBV and HCV, cleared as of 6/25/18  Hx IVDU last ~2002  Tobacco use  Chart Review:  1/11/2023 admitted for respiratory arrest with in the setting of opioid overdose with bystander administration of naloxone.  PMH notable for HTN, HLD, GERD, MDD with psychotic features, LTBI, chronic HCV inguinal hernia repair 2006, HEIKE/OHS.     Past Surgical History:   Procedure Laterality Date     COLONOSCOPY N/A 3/8/2018    Procedure: COLONOSCOPY;  colonoscopy;  Surgeon: Iona Lutz MD;  Location:  GI     HERNIA REPAIR  2006    boith sides     HERNIORRHAPHY INGUINAL Right 6/29/2023    Procedure: Right INGUINAL, lymph node excisional biopsy;  Surgeon: Frank Carlson MD;  [place] [facility]      LASER CO2 TYMPANOMASTOIDECTOMY  2011    Procedure:LASER CO2 TYMPANOMASTOIDECTOMY; Right Tympanoplasty , Cartilage Backed Right Tympanomastoidectomy, Omni Guide Laser on Standby  *Latex Safe*; Surgeon:BENNETT MAXWELL; Location:[facility] OR     SEPTOPLASTY  2012    Procedure: SEPTOPLASTY;  Septoplasty *Latex Safe* Turbinate reduction ;  Surgeon: Babar Dickerson MD;  Location: [facility] OR     UVULOPALATOPHARYNGOPLASTY  2012    Procedure: UVULOPALATOPHARYNGOPLASTY;  Uvulopalatopharyngoplasty * Latex Safe*;  Surgeon: Babar Dickerson MD;  [facility]     Social History     Socioeconomic History     Marital status: Single   Tobacco Use     Smoking status: Every Day     Current packs/day: 0.50     Types: Cigarettes     Smokeless tobacco: Never   Substance and Sexual Activity     Alcohol use: No     Alcohol/week: 0.0 standard drinks of alcohol     Drug use: No     Comment: hx polysubstance now in remission     Social Drivers of Health     Financial Resource Strain: Not on File (3/29/2021)    Received from [facility]    Financial Resource Strain      Financial Resource Strain: 0   Food Insecurity: Not on File (2024)    Received from [facility]    Food Insecurity      Food: 0   Transportation Needs: Not on File (3/29/2021)    Received from [facility]    Transportation Needs      Transportation: 0   Physical Activity: Not on File (2019)    Received from [facility]    Physical Activity      Physical Activity: 0   Stress: Not on File (3/29/2021)    Received from [facility]    Stress      Stress: 0   Recent Concern: Stress - At Risk (3/29/2021)    Received from [facility]    Stress      Stress: 2   Social Connections: Not on File (3/29/2021)    Received from [facility]    Social Connections      Connectedness: 0   Housing Stability: Not on File (3/29/2021)    Received from [facility]    Housing Stability      Housin      SH obtained at initial visit 23:  He currently lives independently in [place].  He previously worked as a  .  He is not currently working and is on Social Security. He is from [place]. He has 3 children who live in [place].  He has not seen them for over 10 years because they will not see him.  He has 1 friend with whom he is close.  Otherwise he is pretty much on his own.  He reports he does not have any friends or family locally.  He spends most of his day at home and tells me he does not have any activities that bring him tay.  He does most of his ADLs himself such as cooking, cleaning, self-care but feels he has been slacking on this.  He identifies a point in time when his psychiatrist took him off of his antianxiety medication as a change in his mental wellbeing and he feels very depressed now.  He has been smoking tobacco products since the age of 10 and smokes about half a pack a day, approximately 30 pack years in total.  He uses marijuana daily and will smoke a joint in the morning and one at night.  He does not drink alcohol regularly.  He occasionally uses cocaine, most recently 4 months ago, and has not used recently due to not having the funds for it.  He had an episode in January when he used cocaine that was laced with fentanyl resulting in an accidental overdose requiring hospitalization.  He has a remote history of IV drug use including heroin in the early 2000's, he does not use IV drugs now.    Family History   Problem Relation Age of Onset     Depression Mother      Depression Father      Substance Abuse Father      Mental Illness Other         institutionalized     Substance Abuse Brother      Substance Abuse Brother      Substance Abuse Brother      Substance Abuse Brother      Substance Abuse Brother      Liver Disease No family hx of          Current Outpatient Medications   Medication Sig Dispense Refill     abiraterone (ZYTIGA) 250 MG tablet Take 4 tablets (1,000 mg) by mouth daily for 30 doses. Take on empty stomach. 120 tablet 0     ARIPiprazole (ABILIFY) 5 MG tablet Take 5  mg by mouth daily.       ASPIRIN LOW DOSE 81 MG EC tablet Take 81 mg by mouth daily.       atorvastatin (LIPITOR) 40 MG tablet Take 1 tablet (40 mg) by mouth daily       budesonide-formoterol (SYMBICORT) 80-4.5 MCG/ACT Inhaler Inhale 2 puffs into the lungs 2 times daily.       buPROPion (WELLBUTRIN XL) 150 MG 24 hr tablet Take 1 tablet (150 mg) by mouth every morning 30 tablet 1     cloNIDine (CATAPRES) 0.1 MG tablet Take 1 tablet by mouth 3 times daily as needed.       doxepin (SINEQUAN) 10 MG capsule Take 1 capsule (10 mg) by mouth At Bedtime 30 capsule 2     famotidine (PEPCID) 40 MG tablet Take 1 tablet by mouth daily.       folic acid (FOLVITE) 1 MG tablet Take 1 tablet by mouth daily.       gabapentin (NEURONTIN) 300 MG capsule Take 300 mg by mouth 3 times daily.       HYDROcodone-acetaminophen (NORCO) 5-325 MG tablet Take 1-2 tablets by mouth every 4 hours as needed for moderate to severe pain 15 tablet 0     ibuprofen (ADVIL/MOTRIN) 400 MG tablet Take 400 mg by mouth every 8 hours as needed.       lisinopril-hydrochlorothiazide (PRINZIDE,ZESTORETIC) 20-25 MG per tablet Take 1 tablet by mouth daily       metFORMIN (GLUCOPHAGE) 500 MG tablet Take one tablet daily in the am and two tablets at bedtime.  HOME Samuels NP at [facility]       methylphenidate (RITALIN) 10 MG tablet Take 10 mg by mouth 2 times daily.       mirtazapine (REMERON) 30 MG tablet Take 1 tablet by mouth at bedtime.       multivitamin, therapeutic (THERA-VIT) TABS tablet Take 1 tablet by mouth daily.       naloxone (NARCAN) 4 MG/0.1ML nasal spray Spray 4 mg into one nostril alternating nostrils once as needed.       OLANZapine (ZYPREXA) 15 MG tablet Take one-half (7.5 mg) to one tablet (15 mg) by mouth at bedtime 30 tablet 1     polyethylene glycol (MIRALAX) 17 GM/Dose powder Take 17 g by mouth daily.       potassium chloride katharine ER (KLOR-CON M20) 20 MEQ CR tablet Take 1 tablet (20 mEq) by mouth 2 times daily. 60 tablet 0     potassium chloride katharine  ER (KLOR-CON M20) 20 MEQ CR tablet Take 1 tablet by mouth daily.       prazosin (MINIPRESS) 2 MG capsule Take 2 capsules (4 mg) by mouth At Bedtime 60 capsule 1     predniSONE (DELTASONE) 5 MG tablet Take 1 tablet (5 mg) by mouth 2 times daily. 60 tablet 0     predniSONE (DELTASONE) 5 MG tablet Take 1 tablet (5 mg) by mouth 2 times daily. 60 tablet 0     ranitidine (ZANTAC) 150 MG tablet Take 1 tablet (150 mg) by mouth 2 times daily       senna-docusate (SENOKOT-S/PERICOLACE) 8.6-50 MG tablet Take 1-2 tablets by mouth 2 times daily 15 tablet 0     sertraline (ZOLOFT) 100 MG tablet Take 2 tablets (200 mg) by mouth daily 60 tablet 1     venlafaxine (EFFEXOR-XR) 37.5 MG 24 hr capsule Take 2 capsules (75 mg) by mouth daily 60 capsule 1     VITAMIN D3 1000 units tablet Take 25 mcg by mouth daily.  1     No current facility-administered medications for this visit.       Objective  Physical Exam:   There were no vitals taken for this visit.  Physical Exam  Constitutional:       General: He is not in acute distress.     Appearance: He is not ill-appearing or toxic-appearing.   HENT:      Head: Normocephalic and atraumatic.      Mouth/Throat:      Mouth: Mucous membranes are moist.   Eyes:      General: No scleral icterus.     Extraocular Movements: Extraocular movements intact.   Cardiovascular:      Rate and Rhythm: Normal rate and regular rhythm.   Pulmonary:      Effort: Pulmonary effort is normal. No respiratory distress.   Abdominal:      General: There is no distension.      Palpations: Abdomen is soft.      Tenderness: There is no abdominal tenderness. There is no guarding.   Skin:     General: Skin is warm and dry.      Coloration: Skin is not jaundiced.   Neurological:      Mental Status: He is alert. Mental status is at baseline.     Lymph node survey: No cervical or supraclavicular lymphadenopathy.  I did not examine his inguinal region today.      Data:     PSA trend: (current 1.54)        Other labs from  1/6/2025  CBC and CMP are essentially unremarkable  Testosterone <2      (H): Data is abnormally high    PSMA Scan 7/25/2023        IMPRESSION: In this patient with biopsy-proven metastatic prostate  cancer:  1. Marked uptake (SUV mean 14)  in the prostate gland with  extraprostatic extension, compatible with prostatic adenocarcinoma.  2. Moderate to marked uptake in the multiple enlarged lymph nodes (SUV  mean 7-15) in the chest, abdomen and pelvis, consistent with extensive  warren metastases.  3. No bone metastases.          I personally reviewed the following studies:  Recent Labs   Lab Test 01/06/25  1243 10/08/24  1143 04/19/24  0940 02/02/24  1149 06/25/18  1114 11/13/17  1113   WBC 5.3 5.7 5.8 5.2 5.6 6.3   % NEUTROPHILS 93 61 65 66  --  63.7   HEMOGLOBIN 13.4 12.2* 13.6 12.6* 13.7 14.6   PLATELET COUNT 325 261 272 384 222 248     Recent Labs   Lab Test 01/06/25  1243 12/05/24  0934 11/05/24  1045 10/08/24  1143 09/10/24  1200   SODIUM 131* 138 127* 135 133*   POTASSIUM (R) 3.6 2.5* 3.4 2.7* 3.0*   CHLORIDE (R) 93* 96* 89* 95* 94*   CARBON DIOXIDE (R) 26 32* 25 29 30*   ANION GAP 12 10 13 11 9   UREA NITROGEN (R) 12.3 13.6 18.0 9.3 9.5   CREATININE 0.81 1.17 1.07 0.94 0.76   CALCIUM, TOTAL 10.1 9.4 9.6 9.5 9.6     Recent Labs   Lab Test 12/21/21  1213   MAGNESIUM 1.6     Recent Labs   Lab Test 01/06/25  1243 12/05/24  0934 11/05/24  1045   BILIRUBIN TOTAL 0.3 0.3 0.8   ALKPHOS 123 132 142   ALT 20 29 37   AST 28 42 56*   ALBUMIN (R) 4.5 4.2 4.0       No results found for this or any previous visit (from the past 24 hours).         Again, thank you for allowing me to participate in the care of your patient.        Sincerely,        Brigido Bajwa MD    Electronically signed

## 2025-01-13 NOTE — NURSING NOTE
Patient was given Eligard in Right arm. Patient tolerated well and was discharged. See MAR for details.    Jamar Fonseca

## 2025-01-13 NOTE — NURSING NOTE
"Oncology Rooming Note    January 13, 2025 1:11 PM   Gianluca Coopre is a 69 year old male who presents for:    Chief Complaint   Patient presents with    Oncology Clinic Visit     RTN Prostate CA      Initial Vitals: BP (!) 162/84 (BP Location: Right arm, Patient Position: Sitting, Cuff Size: Adult Regular)   Pulse 92   Temp 99  F (37.2  C) (Oral)   Resp 28   Wt 74.2 kg (163 lb 8 oz)   SpO2 99%   BMI 27.21 kg/m   Estimated body mass index is 27.21 kg/m  as calculated from the following:    Height as of 7/18/24: 1.651 m (5' 5\").    Weight as of this encounter: 74.2 kg (163 lb 8 oz). Body surface area is 1.84 meters squared.  No Pain (0) Comment: Data Unavailable   No LMP for male patient.  Allergies reviewed: Yes  Medications reviewed: Yes    Medications: Medication refills not needed today.  Pharmacy name entered into [employer] (Closed):    [facility] - [place], [place] - [address]   [id number] - [place], [place] - [address] [facility] [address]  [id number] [place], [place] - [address] AT [facility] [address]    Frailty Screening:   Is the patient here for a new oncology consult visit in cancer care? 2. No      Clinical concerns: none      Chely Branch             "

## 2025-01-27 DIAGNOSIS — C61 ADENOCARCINOMA OF PROSTATE (H): Primary | ICD-10-CM

## 2025-02-03 DIAGNOSIS — C61 ADENOCARCINOMA OF PROSTATE (H): Primary | ICD-10-CM

## 2025-02-03 RX ORDER — ABIRATERONE ACETATE 250 MG/1
1000 TABLET ORAL DAILY
Qty: 120 TABLET | Refills: 0 | Status: SHIPPED | OUTPATIENT
Start: 2025-02-03 | End: 2025-03-05

## 2025-02-03 RX ORDER — PREDNISONE 5 MG/1
5 TABLET ORAL 2 TIMES DAILY
Qty: 60 TABLET | Refills: 0 | Status: SHIPPED | OUTPATIENT
Start: 2025-02-03

## 2025-02-23 DIAGNOSIS — C61 ADENOCARCINOMA OF PROSTATE (H): Primary | ICD-10-CM

## 2025-02-27 NOTE — TELEPHONE ENCOUNTER
DIAGNOSIS: Diabetic foot check neuropathy / Mariia Hurley MD / [facility]    APPOINTMENT DATE: 4/2/25   NOTES STATUS DETAILS   OFFICE NOTE from referring provider Care Everywhere 12/9/25: Mariia Hurley MD - [facility]   OFFICE NOTE from other specialist [facility] 2/16/22: Carl Martinez DPM - [facility]    MEDICATION LIST [facility]    LABS     CBC/DIFF [facility]

## 2025-03-03 DIAGNOSIS — C61 ADENOCARCINOMA OF PROSTATE (H): Primary | ICD-10-CM

## 2025-03-03 RX ORDER — PREDNISONE 5 MG/1
5 TABLET ORAL 2 TIMES DAILY
Qty: 60 TABLET | Refills: 0 | Status: SHIPPED | OUTPATIENT
Start: 2025-03-03

## 2025-03-03 RX ORDER — ABIRATERONE ACETATE 250 MG/1
1000 TABLET ORAL DAILY
Qty: 120 TABLET | Refills: 0 | Status: SHIPPED | OUTPATIENT
Start: 2025-03-03 | End: 2025-04-02

## 2025-03-28 NOTE — PROGRESS NOTES
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  We know without a doubt that the inguinal nodes contain metastatic prostate cancer because this is how he was diagnosed.    Almost a year ago, his PSA had doubled to 377, which was concerning for the development of castrate resistant disease.  However his testosterone was normal as well, indicating failure of his 45 Lupron shot to last the 6 months.    S/P  repeat lupron with PSA dropping from 377 to 131, but two months later, his testosterone remained (more than) measurable at 171. This was again c/w his 45 mg Lupron injection was ineffective.  S/P addition of abiraterone/prednisone with the expectation he becomes completely castrate with a corresponding drop in his PSA.  Happily he has had a deep and major response, with his current PSA being 2.79.  His testosterone finally became castrate as well.   Gianluca he tells me that he is tolerating the drug well without any side effects. We will just continue this approach for as long as it remains beneficial.  Minimal blood from penis due to his prostate cancer. This is not unforeseen in someone with de aries disease that has responded to therapy.  His hemoglobin is normal, so we will just observe without intervention.  We did discuss today that the Lupron is controlling his cancer but comes at the cost of erectile dysfunction.  He voices an understanding about that and wants to continue on the androgen deprivation therapy.      PLAN     Continue abiraterone/prednisone  Next Lupron today of 22.5 mg  Return to clinic with me in 3 months with labs just prior, for his next ADT.    30 minutes spent on this appointment today including chart review, direct face-to-face    [x] [facility]  Outpatient Rehabilitation &  Therapy  [address] [place] [address]  [phone]  F: [phone]             Physical Therapy Daily Treatment Note    Date:  3/28/2025  Patient Name:  Stella Courtney    :  1947  MRN: [id number]  Physician: Dr. Harper                                   Insurance: [facility] MEDICARE  Approval For 5 Visits Received Through [facility] From 2025 Through May 19, 2025  Medical Diagnosis: Bursitis Left Hip               Rehab Codes: M70.72  M25.552  M25.652  M62.81  R26.2   Onset Date: 2025                                 Next 's appt: None Scheduled  Visit# / total visits: ; Progress note for patient due at visit 06     Cancels/No Shows: 0/0    Subjective:  Pt reports she was a little sore in her Groin after her previous Therapy session. She states she was able to lay on her Right Side a little longer last night. She notes difficulty lifting her Left LE into her car. She states she still feels unsteady without the St Cane. She denies Low Back pain this morning. She reports less Left Hip and Thigh pain this morning.  Pain:  [x] Yes  [] No Location: Left Hip And Thigh   Pain Rating: (0-10 scale) 3/10  Pain altered Tx:  [x] No  [] Yes  Action: None    Objective:  Pt presents ambulating with a St Cane in her Right Hand without deviation noted. Ambulating without the St Cane, pt demonstrates Compensated Trendelenburg Gait Left.  Standing Posture: Right Ilium and Scapula Elevated. Kyphosis with Rotation Right.  Trunk AROM: Flexion - Finger Tips to Toes with no effect, Extension 50% with no effect, Lateral Flexion Right 25% and Left 50% with no effect, Rotation 25% Bilat with no effect.  Single Leg Heel Raises: 5 Reps with Left weaker per pt.  DTR's: L4 1+ Bilat              S1 1+ Bilat  Sensation to Light Touch intact Bilat LE.  Slump Test (-)  SLR (-)    LE Strength: Hip Flexors Right 4-/5 (17.6 lbs) and Left 4-/5 (22.3 lbs), Hip IR Right  time, care coordination, and documentation.    The longitudinal plan of care for the diagnosis(es)/condition(s) as documented were addressed during this visit. Due to the added complexity in care, I will continue to support Gianluca in the subsequent management and with ongoing continuity of care.     Brigido Bajwa MD, MSc  Associate Professor of Medicine  [employer]  [address] [address]  [phone]      DIAGNOSIS     De aries metastatic castration-sensitive prostate adenocarcinoma, diagnosed at right inguinal LN biopsy, 6/9/2023. Gianluca presented with inguinal adenopathy that led to the biopsy. PSA at the time of diagnosis was 145.6. PSMA PET showed warren involvement of the chest, abdomen and pelvis, wothout visceral or bone metastases. We did run a Caris on his tumor but there were no actionable mutations.  Primary resistance to leuprolide (see #1 below).      Therapy to date     Current Treatment Leuprolide and abiraterone  Treatment To Date:   ADT since 8/3/23 . PSA concha to 377 on 2/2/2024.However, his testosterone was 378! He received Lupron 45 mg at that point (2/2/2024). PSA dropped to 131 two months later (though testosterone was 171, down from 378). This demonstrated to me the possibility that Lupron does not result in sufficient androgen deprivation.  Abiraterone added 4/23/2024, after it became apparent ADT monotherapy did not result adequate suppression of testosterone (T of 171). PSA dropped from 131 to 11.78 (7/16/2024). It was 2.79 when last assessed 10/8/2024.      Hem/onc History:   Edit Oncology History  Oncology History Overview Note   Gianluca initially presented to his PCP with right inguinal swelling similar to previously when he had a right inguinal hernia that was repaired in 2006.  He was referred to a surgeon and during exploration of the right groin in the operating room he was noted to have multiple enlarged lymph nodes  without a hernia for which he underwent a sentinel lymph node biopsy with pathology consistent with metastatic prostate adenocarcinoma.  A PSMA PET scan showed evidence of metastatic disease in nonregional lymph nodes of the chest, abdomen, and pelvis without bony or visceral metastases.      Adenocarcinoma of prostate (H)   6/29/2023 Biopsy    Right inguinal excisional lymph node biopsy  Final Diagnosis   Lymph nodes, right inguinal, excision:  - Metastatic prostatic adenocarcinoma involving 2 of 5 lymph nodes  - Deposit size: 6.5 cm  - Extranodal extension is present        7/18/2023 Tumor Markers    PSA Tumor Marker 0.00 - 4.50 ng/mL 145.60 High          7/25/2023 Imaging    PSMA PET-CT  IMPRESSION: In this patient with biopsy-proven metastatic prostate  cancer:  1. Marked uptake (SUV mean 14)  in the prostate gland with  extraprostatic extension, compatible with prostatic adenocarcinoma.  2. Moderate to marked uptake in the multiple enlarged lymph nodes (SUV  mean 7-15) in the chest, abdomen and pelvis, consistent with extensive  warren metastases.  3. No bone metastases.  4. Incidentally noted hypoattenuating lesion in the uncinate process  1.4 cm, indeterminant, may represent an IPMN.  Attention on follow-up.     8/3/2023 - 2/2/2024 Chemotherapy    OP ONC Prostate Cancer - Leuprolide (Eligard) EVERY 6 MONTHS  Plan Provider: Ramiro Headley MD  Treatment goal: Palliative  Line of treatment: First Line     4/29/2024 -  Chemotherapy    Oral ONC Prostate Cancer - Abiraterone  Plan Provider: Brigido Bajwa MD  Treatment goal: Palliative  Line of treatment: First Line     7/18/2024 -  Supportive Treatment    OP ONC Prostate Cancer - Leuprolide (Eligard) EVERY 3 MONTHS  Plan Provider: Brigido Bajwa MD  Treatment goal: Palliative  Line of treatment: First Line         Interval History:   Gianluca is back.  Continues to have some blood come out of his penis when urinating.  Still wants to know when his penis  will start to work again.  When presented with the information that he would have to choose between erections/cancer progression and no erections/cancer control, for today he tells me that he prefers cancer control.  He gets some pain in his testicles when he walks too far.  Other than that he is actually feeling pretty well.   He does not note any hot flashes.  He has no other concerns today.    Subjective   Past Medical History:   Diagnosis Date    Depression     Gastro-oesophageal reflux disease     Headache(784.0)     Hypertension     LOC (loss of consciousness) (H) age 45    out for 20 mintes after hit on top of head with board    Major depression 5/7/2013    Otitis media, chronic     Sleep apnea     cant tolerate cpap     HEIKE  GERD  COPD  Mood d/o -- MDD with psychotic features, PTSD  Cocaine and marijuana use disorders  Hx of accidental opiate overdose x2 c/b respiratory arrest 1/2023 -- reports his cocaine was laced with fentanyl  Hx HBV and HCV, cleared as of 6/25/18  Hx IVDU last ~2002  Tobacco use  Chart Review:  1/11/2023 admitted for respiratory arrest with in the setting of opioid overdose with bystander administration of naloxone.  PMH notable for HTN, HLD, GERD, MDD with psychotic features, LTBI, chronic HCV inguinal hernia repair 2006, HEIKE/OHS.     Past Surgical History:   Procedure Laterality Date    COLONOSCOPY N/A 3/8/2018    Procedure: COLONOSCOPY;  colonoscopy;  Surgeon: Iona Lutz MD;  Location:  GI    HERNIA REPAIR  2006    boith sides    HERNIORRHAPHY INGUINAL Right 6/29/2023    Procedure: Right INGUINAL, lymph node excisional biopsy;  Surgeon: Frank Carlson MD;  [place] [facility] [place]ER CO2 TYMPANOMASTOIDECTOMY  9/7/2011    Procedure:LASER CO2 TYMPANOMASTOIDECTOMY; Right Tympanoplasty , Cartilage Backed Right Tympanomastoidectomy, Omni Guide Laser on Standby  *Latex Safe*; Surgeon:BENNETT MAXWELL; Location: OR    SEPTOPLASTY  9/17/2012    Procedure:  SEPTOPLASTY;  Septoplasty *Latex Safe* Turbinate reduction ;  Surgeon: Babar Dickerson MD;  Location:  OR    UVULOPALATOPHARYNGOPLASTY  2012    Procedure: UVULOPALATOPHARYNGOPLASTY;  Uvulopalatopharyngoplasty * Latex Safe*;  Surgeon: Babar Dickerson MD;  Location:  OR     Social History     Socioeconomic History    Marital status: Single   Tobacco Use    Smoking status: Every Day     Current packs/day: 0.50     Types: Cigarettes    Smokeless tobacco: Never   Substance and Sexual Activity    Alcohol use: No     Alcohol/week: 0.0 standard drinks of alcohol    Drug use: No     Comment: hx polysubstance now in remission     Social Drivers of Health     Financial Resource Strain: Not on File (3/29/2021)    Received from [employer]    Financial Resource Strain     Financial Resource Strain: 0   Food Insecurity: Not on File (2024)    Received from [employer]    Food Insecurity     Food: 0   Transportation Needs: Not on File (3/29/2021)    Received from [employer]    Transportation Needs     Transportation: 0   Physical Activity: Not on File (2019)    [facility]    Physical Activity     Physical Activity: 0   Stress: Not on File (3/29/2021)    Received from [employer]    Stress     Stress: 0   Recent Concern: Stress - At Risk (3/29/2021)    Received from [facility]    Stress     Stress: 2   Social Connections: Not on File (3/29/2021)    Received from [employer]    Social Connections     Connectedness: 0   Housing Stability: Not on File (3/29/2021)    Received from [employer]    Housing Stability     Housin      SH obtained at initial visit 23:  He currently lives independently in [place].  He previously worked as a .  He is not currently working and is on Social Security. He is from [place]. He has 3 children who live in [place].  He has not seen them for over 10 years because they will not see him.  He has 1 friend with whom he is close.  Otherwise he is pretty much on his own.  He  reports he does not have any friends or family locally.  He spends most of his day at home and tells me he does not have any activities that bring him tay.  He does most of his ADLs himself such as cooking, cleaning, self-care but feels he has been slacking on this.  He identifies a point in time when his psychiatrist took him off of his antianxiety medication as a change in his mental wellbeing and he feels very depressed now.  He has been smoking tobacco products since the age of 10 and smokes about half a pack a day, approximately 30 pack years in total.  He uses marijuana daily and will smoke a joint in the morning and one at night.  He does not drink alcohol regularly.  He occasionally uses cocaine, most recently 4 months ago, and has not used recently due to not having the funds for it.  He had an episode in January when he used cocaine that was laced with fentanyl resulting in an accidental overdose requiring hospitalization.  He has a remote history of IV drug use including heroin in the early 2000's, he does not use IV drugs now.    Family History   Problem Relation Age of Onset    Depression Mother     Depression Father     Substance Abuse Father     Mental Illness Other         institutionalized    Substance Abuse Brother     Substance Abuse Brother     Substance Abuse Brother     Substance Abuse Brother     Substance Abuse Brother     Liver Disease No family hx of          Current Outpatient Medications   Medication Sig Dispense Refill    abiraterone (ZYTIGA) 250 MG tablet Take 4 tablets (1,000 mg) by mouth daily for 30 doses. Take on empty stomach. 120 tablet 0    ARIPiprazole (ABILIFY) 5 MG tablet Take 5 mg by mouth daily.      ASPIRIN LOW DOSE 81 MG EC tablet Take 81 mg by mouth daily.      atorvastatin (LIPITOR) 40 MG tablet Take 1 tablet (40 mg) by mouth daily      budesonide-formoterol (SYMBICORT) 80-4.5 MCG/ACT Inhaler Inhale 2 puffs into the lungs 2 times daily.      buPROPion (WELLBUTRIN XL) 150  MG 24 hr tablet Take 1 tablet (150 mg) by mouth every morning 30 tablet 1    cloNIDine (CATAPRES) 0.1 MG tablet Take 1 tablet by mouth 3 times daily as needed.      doxepin (SINEQUAN) 10 MG capsule Take 1 capsule (10 mg) by mouth At Bedtime 30 capsule 2    famotidine (PEPCID) 40 MG tablet Take 1 tablet by mouth daily.      folic acid (FOLVITE) 1 MG tablet Take 1 tablet by mouth daily.      gabapentin (NEURONTIN) 300 MG capsule Take 300 mg by mouth 3 times daily.      HYDROcodone-acetaminophen (NORCO) 5-325 MG tablet Take 1-2 tablets by mouth every 4 hours as needed for moderate to severe pain 15 tablet 0    ibuprofen (ADVIL/MOTRIN) 400 MG tablet Take 400 mg by mouth every 8 hours as needed.      lisinopril-hydrochlorothiazide (PRINZIDE,ZESTORETIC) 20-25 MG per tablet Take 1 tablet by mouth daily      metFORMIN (GLUCOPHAGE) 500 MG tablet Take one tablet daily in the am and two tablets at bedtime.  HOME Samuels NP at [facility]      methylphenidate (RITALIN) 10 MG tablet Take 10 mg by mouth 2 times daily.      mirtazapine (REMERON) 30 MG tablet Take 1 tablet by mouth at bedtime.      multivitamin, therapeutic (THERA-VIT) TABS tablet Take 1 tablet by mouth daily.      naloxone (NARCAN) 4 MG/0.1ML nasal spray Spray 4 mg into one nostril alternating nostrils once as needed.      OLANZapine (ZYPREXA) 15 MG tablet Take one-half (7.5 mg) to one tablet (15 mg) by mouth at bedtime 30 tablet 1    polyethylene glycol (MIRALAX) 17 GM/Dose powder Take 17 g by mouth daily.      potassium chloride katharine ER (KLOR-CON M20) 20 MEQ CR tablet Take 1 tablet (20 mEq) by mouth 2 times daily. 60 tablet 0    potassium chloride katharine ER (KLOR-CON M20) 20 MEQ CR tablet Take 1 tablet by mouth daily.      prazosin (MINIPRESS) 2 MG capsule Take 2 capsules (4 mg) by mouth At Bedtime 60 capsule 1    predniSONE (DELTASONE) 5 MG tablet Take 1 tablet (5 mg) by mouth 2 times daily. 60 tablet 0    predniSONE (DELTASONE) 5 MG tablet Take 1 tablet (5 mg) by mouth 2  times daily. 60 tablet 0    ranitidine (ZANTAC) 150 MG tablet Take 1 tablet (150 mg) by mouth 2 times daily      senna-docusate (SENOKOT-S/PERICOLACE) 8.6-50 MG tablet Take 1-2 tablets by mouth 2 times daily 15 tablet 0    sertraline (ZOLOFT) 100 MG tablet Take 2 tablets (200 mg) by mouth daily 60 tablet 1    venlafaxine (EFFEXOR-XR) 37.5 MG 24 hr capsule Take 2 capsules (75 mg) by mouth daily 60 capsule 1    VITAMIN D3 1000 units tablet Take 25 mcg by mouth daily.  1     No current facility-administered medications for this visit.       Objective   Physical Exam:   There were no vitals taken for this visit.  Physical Exam  Constitutional:       General: He is not in acute distress.     Appearance: He is not ill-appearing or toxic-appearing.   HENT:      Head: Normocephalic and atraumatic.      Mouth/Throat:      Mouth: Mucous membranes are moist.   Eyes:      General: No scleral icterus.     Extraocular Movements: Extraocular movements intact.   Cardiovascular:      Rate and Rhythm: Normal rate and regular rhythm.   Pulmonary:      Effort: Pulmonary effort is normal. No respiratory distress.   Abdominal:      General: There is no distension.      Palpations: Abdomen is soft.      Tenderness: There is no abdominal tenderness. There is no guarding.   Skin:     General: Skin is warm and dry.      Coloration: Skin is not jaundiced.   Neurological:      Mental Status: He is alert. Mental status is at baseline.     Lymph node survey: No cervical or supraclavicular lymphadenopathy.  I did not examine his inguinal region today.      Data:     PSA trend: (current 1.54)        Other labs from 1/6/2025  CBC and CMP are essentially unremarkable  Testosterone <2      (H): Data is abnormally high    PSMA Scan 7/25/2023        IMPRESSION: In this patient with biopsy-proven metastatic prostate  cancer:  1. Marked uptake (SUV mean 14)  in the prostate gland with  extraprostatic extension, compatible with prostatic  adenocarcinoma.  2. Moderate to marked uptake in the multiple enlarged lymph nodes (SUV  mean 7-15) in the chest, abdomen and pelvis, consistent with extensive  warren metastases.  3. No bone metastases.          I personally reviewed the following studies:  Recent Labs   Lab Test 01/06/25  1243 10/08/24  1143 04/19/24  0940 02/02/24  1149 06/25/18  1114 11/13/17  1113   WBC 5.3 5.7 5.8 5.2 5.6 6.3   % NEUTROPHILS 93 61 65 66  --  63.7   HEMOGLOBIN 13.4 12.2* 13.6 12.6* 13.7 14.6   PLATELET COUNT 325 261 272 384 222 248     Recent Labs   Lab Test 01/06/25  1243 12/05/24  0934 11/05/24  1045 10/08/24  1143 09/10/24  1200   SODIUM 131* 138 127* 135 133*   POTASSIUM (R) 3.6 2.5* 3.4 2.7* 3.0*   CHLORIDE (R) 93* 96* 89* 95* 94*   CARBON DIOXIDE (R) 26 32* 25 29 30*   ANION GAP 12 10 13 11 9   UREA NITROGEN (R) 12.3 13.6 18.0 9.3 9.5   CREATININE 0.81 1.17 1.07 0.94 0.76   CALCIUM, TOTAL 10.1 9.4 9.6 9.5 9.6     Recent Labs   Lab Test 12/21/21  1213   MAGNESIUM 1.6     Recent Labs   Lab Test 01/06/25  1243 12/05/24  0934 11/05/24  1045   BILIRUBIN TOTAL 0.3 0.3 0.8   ALKPHOS 123 132 142   ALT 20 29 37   AST 28 42 56*   ALBUMIN (R) 4.5 4.2 4.0       No results found for this or any previous visit (from the past 24 hours).

## 2025-03-30 DIAGNOSIS — C61 ADENOCARCINOMA OF PROSTATE (H): Primary | ICD-10-CM

## 2025-03-31 DIAGNOSIS — C61 ADENOCARCINOMA OF PROSTATE (H): Primary | ICD-10-CM

## 2025-04-01 ENCOUNTER — TELEPHONE (OUTPATIENT)
Dept: ONCOLOGY | Facility: CLINIC | Age: 70
End: 2025-04-01
Payer: COMMERCIAL

## 2025-04-01 NOTE — TELEPHONE ENCOUNTER
Prior Authorization Not Needed per Insurance    Medication: ABIRATERONE ACETATE 250 MG PO TABS  Insurance Company: [employer] - Phone [phone] Fax [phone]  Expected CoPay: $    Pharmacy Filling the Rx: [facility] - [place], [place] - [address]   Pharmacy Notified:   Patient Notified:           Thank you,    Loulou Baron  Oncology Pharmacy Liaison [email or web address]  Phone: [phone]  Fax: [phone]

## 2025-04-02 ENCOUNTER — PRE VISIT (OUTPATIENT)
Dept: ORTHOPEDICS | Facility: CLINIC | Age: 70
End: 2025-04-02

## 2025-04-02 DIAGNOSIS — C61 ADENOCARCINOMA OF PROSTATE (H): Primary | ICD-10-CM

## 2025-04-02 RX ORDER — PREDNISONE 5 MG/1
5 TABLET ORAL 2 TIMES DAILY
Qty: 60 TABLET | Refills: 0 | Status: SHIPPED | OUTPATIENT
Start: 2025-04-02

## 2025-04-02 RX ORDER — ABIRATERONE ACETATE 250 MG/1
1000 TABLET ORAL DAILY
Qty: 120 TABLET | Refills: 0 | Status: SHIPPED | OUTPATIENT
Start: 2025-04-02 | End: 2025-05-02

## 2025-04-04 ENCOUNTER — LAB (OUTPATIENT)
Dept: LAB | Facility: CLINIC | Age: 70
End: 2025-04-04
Attending: INTERNAL MEDICINE
Payer: COMMERCIAL

## 2025-04-04 DIAGNOSIS — Z79.899 ENCOUNTER FOR LONG-TERM (CURRENT) USE OF MEDICATIONS: ICD-10-CM

## 2025-04-04 DIAGNOSIS — C61 ADENOCARCINOMA OF PROSTATE (H): ICD-10-CM

## 2025-04-04 LAB
ALBUMIN SERPL BCG-MCNC: 4.1 G/DL (ref 3.5–5.2)
ALP SERPL-CCNC: 123 U/L (ref 40–150)
ALT SERPL W P-5'-P-CCNC: 16 U/L (ref 0–70)
ANION GAP SERPL CALCULATED.3IONS-SCNC: 15 MMOL/L (ref 7–15)
AST SERPL W P-5'-P-CCNC: 27 U/L (ref 0–45)
BASOPHILS # BLD AUTO: 0 10E3/UL (ref 0–0.2)
BASOPHILS NFR BLD AUTO: 1 %
BILIRUB SERPL-MCNC: 0.3 MG/DL
BUN SERPL-MCNC: 12.9 MG/DL (ref 8–23)
CALCIUM SERPL-MCNC: 9.4 MG/DL (ref 8.8–10.4)
CHLORIDE SERPL-SCNC: 101 MMOL/L (ref 98–107)
CREAT SERPL-MCNC: 0.81 MG/DL (ref 0.67–1.17)
EGFRCR SERPLBLD CKD-EPI 2021: >90 ML/MIN/1.73M2
EOSINOPHIL # BLD AUTO: 0.1 10E3/UL (ref 0–0.7)
EOSINOPHIL NFR BLD AUTO: 2 %
ERYTHROCYTE [DISTWIDTH] IN BLOOD BY AUTOMATED COUNT: 12.1 % (ref 10–15)
GLUCOSE SERPL-MCNC: 125 MG/DL (ref 70–99)
HCO3 SERPL-SCNC: 21 MMOL/L (ref 22–29)
HCT VFR BLD AUTO: 31.9 % (ref 40–53)
HGB BLD-MCNC: 11 G/DL (ref 13.3–17.7)
IMM GRANULOCYTES # BLD: 0 10E3/UL
IMM GRANULOCYTES NFR BLD: 0 %
LYMPHOCYTES # BLD AUTO: 0.8 10E3/UL (ref 0.8–5.3)
LYMPHOCYTES NFR BLD AUTO: 17 %
MCH RBC QN AUTO: 30.9 PG (ref 26.5–33)
MCHC RBC AUTO-ENTMCNC: 34.5 G/DL (ref 31.5–36.5)
MCV RBC AUTO: 90 FL (ref 78–100)
MONOCYTES # BLD AUTO: 0.4 10E3/UL (ref 0–1.3)
MONOCYTES NFR BLD AUTO: 8 %
NEUTROPHILS # BLD AUTO: 3.3 10E3/UL (ref 1.6–8.3)
NEUTROPHILS NFR BLD AUTO: 72 %
NRBC # BLD AUTO: 0 10E3/UL
NRBC BLD AUTO-RTO: 0 /100
PLATELET # BLD AUTO: 238 10E3/UL (ref 150–450)
POTASSIUM SERPL-SCNC: 3 MMOL/L (ref 3.4–5.3)
PROT SERPL-MCNC: 7.2 G/DL (ref 6.4–8.3)
PSA SERPL DL<=0.01 NG/ML-MCNC: 0.67 NG/ML (ref 0–6.5)
RBC # BLD AUTO: 3.56 10E6/UL (ref 4.4–5.9)
SODIUM SERPL-SCNC: 137 MMOL/L (ref 135–145)
WBC # BLD AUTO: 4.6 10E3/UL (ref 4–11)

## 2025-04-04 PROCEDURE — 84132 ASSAY OF SERUM POTASSIUM: CPT

## 2025-04-04 PROCEDURE — 84153 ASSAY OF PSA TOTAL: CPT

## 2025-04-04 PROCEDURE — 84403 ASSAY OF TOTAL TESTOSTERONE: CPT

## 2025-04-04 PROCEDURE — 80053 COMPREHEN METABOLIC PANEL: CPT

## 2025-04-04 PROCEDURE — 85025 COMPLETE CBC W/AUTO DIFF WBC: CPT

## 2025-04-04 PROCEDURE — 36415 COLL VENOUS BLD VENIPUNCTURE: CPT

## 2025-04-04 NOTE — NURSING NOTE
Chief Complaint   Patient presents with    Labs Only     Labs drawn from venipuncture by RN     Labs collected from venipuncture by RN.     Shannon Poole RN

## 2025-04-08 LAB — TESTOST SERPL-MCNC: 2 NG/DL (ref 240–950)

## 2025-04-15 ENCOUNTER — TRANSCRIBE ORDERS (OUTPATIENT)
Dept: OTHER | Age: 70
End: 2025-04-15

## 2025-04-15 DIAGNOSIS — N39.41 URGE INCONTINENCE OF URINE: Primary | ICD-10-CM

## 2025-04-15 DIAGNOSIS — N52.9 MALE ERECTILE DISORDER: ICD-10-CM

## 2025-04-15 DIAGNOSIS — C61 PROSTATE CANCER (H): ICD-10-CM

## 2025-04-16 ENCOUNTER — APPOINTMENT (OUTPATIENT)
Dept: INTERPRETER SERVICES | Facility: CLINIC | Age: 70
End: 2025-04-16
Payer: COMMERCIAL

## 2025-05-19 ENCOUNTER — LAB REQUISITION (OUTPATIENT)
Dept: LAB | Facility: CLINIC | Age: 70
End: 2025-05-19
Payer: COMMERCIAL

## 2025-05-19 DIAGNOSIS — E11.9 TYPE 2 DIABETES MELLITUS WITHOUT COMPLICATIONS (H): ICD-10-CM

## 2025-05-19 LAB
CHOLEST SERPL-MCNC: 175 MG/DL
FASTING STATUS PATIENT QL REPORTED: YES
HDLC SERPL-MCNC: 83 MG/DL
LDLC SERPL CALC-MCNC: 80 MG/DL
NONHDLC SERPL-MCNC: 92 MG/DL
TRIGL SERPL-MCNC: 61 MG/DL

## 2025-05-19 PROCEDURE — 80061 LIPID PANEL: CPT | Mod: ORL | Performed by: PEDIATRICS

## 2025-07-22 ENCOUNTER — TRANSCRIBE ORDERS (OUTPATIENT)
Dept: OTHER | Age: 70
End: 2025-07-22

## 2025-07-22 DIAGNOSIS — M79.672 PAIN IN BOTH FEET: ICD-10-CM

## 2025-07-22 DIAGNOSIS — M79.671 PAIN IN BOTH FEET: ICD-10-CM

## 2025-07-22 DIAGNOSIS — E11.9 CONTROLLED TYPE 2 DIABETES MELLITUS WITHOUT COMPLICATION, WITH LONG-TERM CURRENT USE OF INSULIN (H): Primary | ICD-10-CM

## 2025-07-22 DIAGNOSIS — Z79.4 CONTROLLED TYPE 2 DIABETES MELLITUS WITHOUT COMPLICATION, WITH LONG-TERM CURRENT USE OF INSULIN (H): Primary | ICD-10-CM

## 2025-07-23 ENCOUNTER — PATIENT OUTREACH (OUTPATIENT)
Dept: CARE COORDINATION | Facility: CLINIC | Age: 70
End: 2025-07-23
Payer: COMMERCIAL

## 2025-07-28 ENCOUNTER — APPOINTMENT (OUTPATIENT)
Dept: INTERPRETER SERVICES | Facility: CLINIC | Age: 70
End: 2025-07-28
Payer: COMMERCIAL

## 2025-08-22 ENCOUNTER — ANCILLARY PROCEDURE (OUTPATIENT)
Dept: GENERAL RADIOLOGY | Facility: CLINIC | Age: 70
End: 2025-08-22
Attending: PODIATRIST
Payer: COMMERCIAL

## 2025-08-22 DIAGNOSIS — M21.41 PES PLANUS OF BOTH FEET: ICD-10-CM

## 2025-08-22 DIAGNOSIS — M21.42 PES PLANUS OF BOTH FEET: ICD-10-CM

## 2025-08-22 DIAGNOSIS — M79.672 PAIN IN BOTH FEET: ICD-10-CM

## 2025-08-22 DIAGNOSIS — M20.11 VALGUS DEFORMITY OF BOTH GREAT TOES: ICD-10-CM

## 2025-08-22 DIAGNOSIS — M79.671 PAIN IN BOTH FEET: ICD-10-CM

## 2025-08-22 DIAGNOSIS — M20.12 VALGUS DEFORMITY OF BOTH GREAT TOES: ICD-10-CM

## 2025-08-22 DIAGNOSIS — E11.49 TYPE II OR UNSPECIFIED TYPE DIABETES MELLITUS WITH NEUROLOGICAL MANIFESTATIONS, NOT STATED AS UNCONTROLLED(250.60) (H): ICD-10-CM

## 2025-08-22 PROCEDURE — 73630 X-RAY EXAM OF FOOT: CPT | Mod: TC | Performed by: RADIOLOGY

## (undated) DEVICE — SU VICRYL 2-0 CT-2 27" UND J269H

## (undated) DEVICE — DRAIN PENROSE 0.25"X18" LATEX FREE GR201

## (undated) DEVICE — SU MONOCRYL 4-0 PS-2 27" UND Y426H

## (undated) DEVICE — ESU GROUND PAD ADULT W/CORD E7507

## (undated) DEVICE — DRSG STERI STRIP 1/2X4" R1547

## (undated) DEVICE — SOL NACL 0.9% IRRIG 500ML BOTTLE 2F7123

## (undated) DEVICE — PACK MINOR CUSTOM ASC

## (undated) DEVICE — SU PDS II 0 CT-2 27" Z334H

## (undated) DEVICE — LINEN ORTHO PACK 5446

## (undated) DEVICE — GLOVE BIOGEL PI MICRO SZ 7.5 48575

## (undated) DEVICE — SUPPORTER ATHLETIC LG LATEX 202636

## (undated) DEVICE — SU VICRYL 0 CT-2 27" J334H

## (undated) DEVICE — BLADE KNIFE SURG 10 371110

## (undated) DEVICE — DRSG GAUZE 4X4" 3033

## (undated) DEVICE — PREP CHLORAPREP 26ML TINTED ORANGE  260815

## (undated) DEVICE — SU PDS II 2-0 SH 27" Z317H

## (undated) DEVICE — DRAPE LAP W/ARMBOARD 29410

## (undated) RX ORDER — EPHEDRINE SULFATE 50 MG/ML
INJECTION, SOLUTION INTRAMUSCULAR; INTRAVENOUS; SUBCUTANEOUS
Status: DISPENSED
Start: 2023-06-29

## (undated) RX ORDER — DEXAMETHASONE SODIUM PHOSPHATE 4 MG/ML
INJECTION, SOLUTION INTRA-ARTICULAR; INTRALESIONAL; INTRAMUSCULAR; INTRAVENOUS; SOFT TISSUE
Status: DISPENSED
Start: 2023-06-29

## (undated) RX ORDER — FENTANYL CITRATE 50 UG/ML
INJECTION, SOLUTION INTRAMUSCULAR; INTRAVENOUS
Status: DISPENSED
Start: 2023-06-29

## (undated) RX ORDER — PROPOFOL 10 MG/ML
INJECTION, EMULSION INTRAVENOUS
Status: DISPENSED
Start: 2023-06-29

## (undated) RX ORDER — ACETAMINOPHEN 325 MG/1
TABLET ORAL
Status: DISPENSED
Start: 2023-06-29

## (undated) RX ORDER — KETOROLAC TROMETHAMINE 30 MG/ML
INJECTION, SOLUTION INTRAMUSCULAR; INTRAVENOUS
Status: DISPENSED
Start: 2023-06-29

## (undated) RX ORDER — BUPIVACAINE HYDROCHLORIDE 2.5 MG/ML
INJECTION, SOLUTION EPIDURAL; INFILTRATION; INTRACAUDAL
Status: DISPENSED
Start: 2023-06-29

## (undated) RX ORDER — ONDANSETRON 2 MG/ML
INJECTION INTRAMUSCULAR; INTRAVENOUS
Status: DISPENSED
Start: 2023-06-29

## (undated) RX ORDER — FENTANYL CITRATE 50 UG/ML
INJECTION, SOLUTION INTRAMUSCULAR; INTRAVENOUS
Status: DISPENSED
Start: 2018-03-08

## (undated) RX ORDER — CEFAZOLIN SODIUM 2 G/50ML
SOLUTION INTRAVENOUS
Status: DISPENSED
Start: 2023-06-29